# Patient Record
Sex: FEMALE | Race: BLACK OR AFRICAN AMERICAN | Employment: UNEMPLOYED | URBAN - METROPOLITAN AREA
[De-identification: names, ages, dates, MRNs, and addresses within clinical notes are randomized per-mention and may not be internally consistent; named-entity substitution may affect disease eponyms.]

---

## 2017-02-11 ENCOUNTER — IP HISTORICAL/CONVERTED ENCOUNTER (OUTPATIENT)
Dept: OTHER | Age: 62
End: 2017-02-11

## 2017-02-18 ENCOUNTER — IP HISTORICAL/CONVERTED ENCOUNTER (OUTPATIENT)
Dept: OTHER | Age: 62
End: 2017-02-18

## 2017-03-07 ENCOUNTER — IP HISTORICAL/CONVERTED ENCOUNTER (OUTPATIENT)
Dept: OTHER | Age: 62
End: 2017-03-07

## 2017-03-24 ENCOUNTER — ED HISTORICAL/CONVERTED ENCOUNTER (OUTPATIENT)
Dept: OTHER | Age: 62
End: 2017-03-24

## 2017-05-18 ENCOUNTER — HOSPITAL ENCOUNTER (INPATIENT)
Age: 62
LOS: 90 days | Discharge: HOME | DRG: 750 | End: 2017-08-16
Attending: PSYCHIATRY & NEUROLOGY | Admitting: PSYCHIATRY & NEUROLOGY
Payer: MEDICAID

## 2017-05-18 PROBLEM — F25.9 SCHIZOAFFECTIVE DISORDER (HCC): Status: ACTIVE | Noted: 2017-05-18

## 2017-05-18 LAB
GLUCOSE BLD STRIP.AUTO-MCNC: 103 MG/DL (ref 65–100)
GLUCOSE BLD STRIP.AUTO-MCNC: 134 MG/DL (ref 65–100)
SERVICE CMNT-IMP: ABNORMAL
SERVICE CMNT-IMP: ABNORMAL

## 2017-05-18 PROCEDURE — 65220000003 HC RM SEMIPRIVATE PSYCH

## 2017-05-18 PROCEDURE — 74011250636 HC RX REV CODE- 250/636: Performed by: PSYCHIATRY & NEUROLOGY

## 2017-05-18 PROCEDURE — 74011250637 HC RX REV CODE- 250/637: Performed by: PSYCHIATRY & NEUROLOGY

## 2017-05-18 PROCEDURE — 74011000250 HC RX REV CODE- 250: Performed by: PSYCHIATRY & NEUROLOGY

## 2017-05-18 PROCEDURE — 82962 GLUCOSE BLOOD TEST: CPT

## 2017-05-18 RX ORDER — ZOLPIDEM TARTRATE 5 MG/1
5 TABLET ORAL
Status: DISCONTINUED | OUTPATIENT
Start: 2017-05-18 | End: 2017-05-23

## 2017-05-18 RX ORDER — MAGNESIUM SULFATE 100 %
4 CRYSTALS MISCELLANEOUS AS NEEDED
Status: DISCONTINUED | OUTPATIENT
Start: 2017-05-18 | End: 2017-08-16 | Stop reason: HOSPADM

## 2017-05-18 RX ORDER — ESCITALOPRAM OXALATE 5 MG/1
5 TABLET ORAL DAILY
COMMUNITY
End: 2017-08-16

## 2017-05-18 RX ORDER — ATORVASTATIN CALCIUM 20 MG/1
20 TABLET, FILM COATED ORAL DAILY
Status: DISCONTINUED | OUTPATIENT
Start: 2017-05-19 | End: 2017-08-16 | Stop reason: HOSPADM

## 2017-05-18 RX ORDER — DIVALPROEX SODIUM 500 MG/1
500 TABLET, DELAYED RELEASE ORAL 2 TIMES DAILY
COMMUNITY
End: 2017-08-16

## 2017-05-18 RX ORDER — GABAPENTIN 100 MG/1
100 CAPSULE ORAL 2 TIMES DAILY
COMMUNITY
End: 2017-08-16

## 2017-05-18 RX ORDER — ACETAMINOPHEN 500 MG
500 TABLET ORAL 2 TIMES DAILY
COMMUNITY
End: 2017-08-16

## 2017-05-18 RX ORDER — LORAZEPAM 0.5 MG/1
0.5 TABLET ORAL
Status: DISCONTINUED | OUTPATIENT
Start: 2017-05-18 | End: 2017-05-20

## 2017-05-18 RX ORDER — ACETAMINOPHEN 325 MG/1
650 TABLET ORAL
Status: DISCONTINUED | OUTPATIENT
Start: 2017-05-18 | End: 2017-08-16 | Stop reason: HOSPADM

## 2017-05-18 RX ORDER — ADHESIVE BANDAGE
30 BANDAGE TOPICAL DAILY PRN
Status: DISCONTINUED | OUTPATIENT
Start: 2017-05-18 | End: 2017-08-04

## 2017-05-18 RX ORDER — LORAZEPAM 2 MG/ML
0.5 INJECTION INTRAMUSCULAR
Status: DISCONTINUED | OUTPATIENT
Start: 2017-05-18 | End: 2017-05-20

## 2017-05-18 RX ORDER — OLANZAPINE 2.5 MG/1
2.5 TABLET ORAL
Status: DISCONTINUED | OUTPATIENT
Start: 2017-05-18 | End: 2017-05-26

## 2017-05-18 RX ORDER — CLONIDINE HYDROCHLORIDE 0.2 MG/1
TABLET ORAL 3 TIMES DAILY
COMMUNITY
End: 2017-08-16

## 2017-05-18 RX ORDER — QUETIAPINE FUMARATE 25 MG/1
25 TABLET, FILM COATED ORAL DAILY
COMMUNITY
End: 2017-08-16

## 2017-05-18 RX ORDER — INSULIN LISPRO 100 [IU]/ML
INJECTION, SOLUTION INTRAVENOUS; SUBCUTANEOUS
Status: DISCONTINUED | OUTPATIENT
Start: 2017-05-18 | End: 2017-06-05

## 2017-05-18 RX ORDER — CLONIDINE HYDROCHLORIDE 0.1 MG/1
0.2 TABLET ORAL 2 TIMES DAILY
Status: DISCONTINUED | OUTPATIENT
Start: 2017-05-18 | End: 2017-05-18

## 2017-05-18 RX ORDER — ESCITALOPRAM OXALATE 10 MG/1
10 TABLET ORAL DAILY
Status: ON HOLD | COMMUNITY
End: 2017-05-18

## 2017-05-18 RX ORDER — IBUPROFEN 400 MG/1
400 TABLET ORAL
Status: DISCONTINUED | OUTPATIENT
Start: 2017-05-18 | End: 2017-06-21

## 2017-05-18 RX ORDER — DEXTROSE 50 % IN WATER (D50W) INTRAVENOUS SYRINGE
12.5-25 AS NEEDED
Status: DISCONTINUED | OUTPATIENT
Start: 2017-05-18 | End: 2017-05-18 | Stop reason: RX

## 2017-05-18 RX ORDER — HYDROCHLOROTHIAZIDE 25 MG/1
25 TABLET ORAL DAILY
Status: DISCONTINUED | OUTPATIENT
Start: 2017-05-19 | End: 2017-07-04

## 2017-05-18 RX ORDER — QUETIAPINE FUMARATE 100 MG/1
100 TABLET, FILM COATED ORAL EVERY EVENING
COMMUNITY
End: 2017-08-16

## 2017-05-18 RX ORDER — HYDROXYZINE PAMOATE 50 MG/1
50 CAPSULE ORAL 4 TIMES DAILY
COMMUNITY
End: 2017-08-16

## 2017-05-18 RX ORDER — LOPERAMIDE HYDROCHLORIDE 2 MG/1
2 CAPSULE ORAL
COMMUNITY
End: 2017-08-16

## 2017-05-18 RX ORDER — BENZTROPINE MESYLATE 1 MG/ML
1 INJECTION INTRAMUSCULAR; INTRAVENOUS
Status: DISCONTINUED | OUTPATIENT
Start: 2017-05-18 | End: 2017-08-16 | Stop reason: HOSPADM

## 2017-05-18 RX ORDER — FLUPHENAZINE HYDROCHLORIDE 5 MG/1
5 TABLET ORAL 2 TIMES DAILY
Status: DISCONTINUED | OUTPATIENT
Start: 2017-05-18 | End: 2017-05-19

## 2017-05-18 RX ORDER — AMLODIPINE BESYLATE 5 MG/1
10 TABLET ORAL DAILY
Status: DISCONTINUED | OUTPATIENT
Start: 2017-05-19 | End: 2017-08-16 | Stop reason: HOSPADM

## 2017-05-18 RX ORDER — NAPROXEN 500 MG/1
500 TABLET ORAL 2 TIMES DAILY WITH MEALS
COMMUNITY
End: 2017-08-16

## 2017-05-18 RX ORDER — BENZTROPINE MESYLATE 1 MG/1
1 TABLET ORAL
Status: DISCONTINUED | OUTPATIENT
Start: 2017-05-18 | End: 2017-08-16 | Stop reason: HOSPADM

## 2017-05-18 RX ORDER — LORAZEPAM 0.5 MG/1
0.5 TABLET ORAL 2 TIMES DAILY
COMMUNITY
End: 2017-08-16

## 2017-05-18 RX ORDER — IBUPROFEN 200 MG
1 TABLET ORAL
Status: DISCONTINUED | OUTPATIENT
Start: 2017-05-18 | End: 2017-08-16 | Stop reason: HOSPADM

## 2017-05-18 RX ADMIN — LORAZEPAM 0.5 MG: 0.5 TABLET ORAL at 13:49

## 2017-05-18 RX ADMIN — WATER 10 MG: 1 INJECTION INTRAMUSCULAR; INTRAVENOUS; SUBCUTANEOUS at 16:30

## 2017-05-18 RX ADMIN — OLANZAPINE 2.5 MG: 2.5 TABLET, FILM COATED ORAL at 20:11

## 2017-05-18 RX ADMIN — OLANZAPINE 2.5 MG: 2.5 TABLET, FILM COATED ORAL at 09:25

## 2017-05-18 NOTE — IP AVS SNAPSHOT
[address] 
[phone] Patient: Patricia Jordan MRN: [id number] CUY:[id number] Current Discharge Medication List  
  
START taking these medications Dose & Instructions Dispensing Information Comments Morning Noon Evening Bedtime  
 brief disposable Misc Commonly known as:  adult Dose:  1 Package 1 Package by Does Not Apply route. Indications: urinary incontinence Quantity:  10 Package Refills:  0  
     
   
   
   
  
 carBAMazepine  mg SR tablet Commonly known as:  TEGretol XR Replaces:  carBAMazepine 200 mg tablet Your last dose was:  8/16/17 Your next dose is:  8/16/17 Dose:  200 mg Take 1 Tab by mouth two (2) times a day. Indications: Cris associated with Bipolar Disorder Quantity:  30 Tab Refills:  1  
     
  
   
   
  
   
  
 divalproex  mg ER tablet Commonly known as:  DEPAKOTE ER  
Replaces:  divalproex  mg tablet Your last dose was:  8/15/17 Your next dose is:  8/16/17 Dose:  1000 mg Take 2 Tabs by mouth nightly. Indications: Cris associated with Bipolar Disorder Quantity:  30 Tab Refills:  1  
     
   
   
   
  
  
 docusate sodium 100 mg capsule Commonly known as:  Clarisa Booze Dose:  100 mg Take 1 Cap by mouth two (2) times daily as needed for Constipation for up to 90 days. Indications: constipation Quantity:  30 Cap Refills:  1  
     
   
   
   
  
 fluPHENAZine decanoate 25 mg/mL injection Commonly known as:  PROLIXIN Start taking on:  8/18/2017 Dose:  25 mg  
1 mL by IntraMUSCular route Once every 2 weeks. Next injection due on August 18th  Indications: schizoaffective disorder Quantity:  1 Vial  
Refills:  1  
     
   
   
   
  
 insulin lispro 100 unit/mL injection Commonly known as:  HUMALOG Notes to Patient:  No insulin on 8/16/17  Sliding scale insulin twice daily  Indications: type 2 diabetes mellitus Quantity:  1 Vial  
Refills:  0  
     
   
   
   
  
 lidocaine 5 % Commonly known as:  Katarzyna Lat Your last dose was:  8/16/17-10 am  
   
 Apply patch to the affected area for 12 hours a day and remove for 12 hours a day. Indications: knee pain Quantity:  15 Each Refills:  0  
     
  
   
   
   
  
 lisinopril 10 mg tablet Commonly known as:  Cash Paras Your last dose was:  8/16/17 Dose:  10 mg Take 1 Tab by mouth daily. Indications: hypertension Quantity:  15 Tab Refills:  1  
     
  
   
   
   
  
 methyl salicylate-menthol 26-92 % topical cream  
Commonly known as:  Erick Malissa Your last dose was:  8/16/17 Your next dose is:  8/16/17 Apply  to affected area three (3) times daily. Indications: arthritis in knees Quantity:  1 Tube Refills:  0  
     
  
   
   
  
   
  
  
 oxybutynin chloride XL 15 mg CR tablet Commonly known as:  DITROPAN XL Your last dose was:  8/16/17 Dose:  15 mg Take 1 Tab by mouth daily. Indications: URINARY URGE INCONTINENCE Quantity:  15 Tab Refills:  1  
     
  
   
   
   
  
 pantoprazole 40 mg tablet Commonly known as:  PROTONIX Your last dose was:  8/16/17 Dose:  40 mg Take 1 Tab by mouth Daily (before breakfast). Indications: gastroesophageal reflux disease Quantity:  15 Tab Refills:  1  
     
  
   
   
   
  
 polyethylene glycol 17 gram packet Commonly known as:  Agata Muzzy Dose:  17 g Take 1 Packet by mouth daily as needed. Indications: constipation Quantity:  15 Packet Refills:  1  
     
   
   
   
  
 trihexyphenidyl 2 mg tablet Commonly known as:  ARTANE Your last dose was:  8/16/17 Your next dose is:  8/16/17 Dose:  2 mg Take 1 Tab by mouth three (3) times daily for 90 days. Indications: extrapyramidal disease Quantity:  90 Tab Refills:  1  
     
  
   
   
  
   
  
  
 zolpidem CR 12.5 mg tablet Commonly known as:  AMBIEN CR Your last dose was:  8/15/17 Your next dose is:  8/16/17 Dose:  12.5 mg Take 1 Tab by mouth nightly as needed for Sleep. Max Daily Amount: 12.5 mg. Indications: INSOMNIA Quantity:  15 Tab Refills:  1 CONTINUE these medications which have CHANGED Dose & Instructions Dispensing Information Comments Morning Noon Evening Bedtime  
 atorvastatin 20 mg tablet Commonly known as:  LIPITOR What changed:  when to take this Your last dose was:  8/16/17 Dose:  20 mg Take 1 Tab by mouth daily. Indications: hyperlipidemia Quantity:  15 Tab Refills:  1 CONTINUE these medications which have NOT CHANGED Dose & Instructions Dispensing Information Comments Morning Noon Evening Bedtime  
 amLODIPine 10 mg tablet Commonly known as:  Gabrielle Navarro Your last dose was:  8/16/17 Dose:  10 mg Take 1 Tab by mouth daily. Indications: hypertension Quantity:  15 Tab Refills:  1 SITagliptin 100 mg tablet Commonly known as:  Chris Hernandez Your last dose was:  8/16/17 Dose:  100 mg Take 1 Tab by mouth daily. Indications: type 2 diabetes mellitus Quantity:  15 Tab Refills:  1 STOP taking these medications   
 acetaminophen 500 mg tablet Commonly known as:  TYLENOL  
   
  
 carBAMazepine 200 mg tablet Commonly known as:  TEGretol Replaced by:  carBAMazepine  mg SR tablet  
   
  
 cloNIDine HCl 0.2 mg tablet Commonly known as:  CATAPRES  
   
  
 divalproex  mg tablet Commonly known as:  DEPAKOTE Replaced by:  divalproex  mg ER tablet  
   
  
 escitalopram oxalate 5 mg tablet Commonly known as:  LEXAPRO  
   
  
 gabapentin 100 mg capsule Commonly known as:  NEURONTIN  
   
  
 hydroCHLOROthiazide 25 mg tablet Commonly known as:  HYDRODIURIL  
   
  
 hydrOXYzine pamoate 50 mg capsule Commonly known as:  VISTARIL  
   
 loperamide 2 mg capsule Commonly known as:  IMODIUM LORazepam 0.5 mg tablet Commonly known as:  ATIVAN  
   
  
 naproxen 500 mg tablet Commonly known as:  NAPROSYN  
   
  
 QUEtiapine 100 mg tablet Commonly known as:  SEROquel QUEtiapine 25 mg tablet Commonly known as:  SEROquel Where to Get Your Medications Information on where to get these meds will be given to you by the nurse or doctor. ! Ask your nurse or doctor about these medications  
  amLODIPine 10 mg tablet  
 atorvastatin 20 mg tablet  
 brief disposable Misc  
 carBAMazepine  mg SR tablet  
 divalproex  mg ER tablet  
 docusate sodium 100 mg capsule  
 fluPHENAZine decanoate 25 mg/mL injection  
 insulin lispro 100 unit/mL injection  
 lidocaine 5 %  
 lisinopril 10 mg tablet  
 methyl salicylate-menthol 05-23 % topical cream  
 oxybutynin chloride XL 15 mg CR tablet  
 pantoprazole 40 mg tablet  
 polyethylene glycol 17 gram packet SITagliptin 100 mg tablet  
 trihexyphenidyl 2 mg tablet  
 zolpidem CR 12.5 mg tablet

## 2017-05-18 NOTE — BH NOTES
PSYCHOSOCIAL ASSESSMENT  :Patient identifying info: Jeb Spurling is a 65 y.o., female admitted 2017  5:06 AM     Presenting problem and precipitating factors: Pt was transferred to [place] [address] from [facility] in [facility]. Pt was TDO'd due to aggressive, violent, and psychotic behavior. Pt lives with a caretaker and was reportedly refusing her medications. Pt has been diagnosed with schizoaffective disorder and has a history of frequent psychiatric hospitalizations. Pt exhibits symptoms of agitation, yelling, paranoia, and delusions. Current psychiatric providers and contact info: [facility] ([phone])    Previous psychiatric services/providers and response to treatment: Multiple (12+) previous inpatient psychiatric hospitalizations, including [facility]      Substance abuse history:  UDS Negative  Social History   Substance Use Topics    Smoking status: Former Smoker     Years: 40.00     Quit date: 3/19/1983    Smokeless tobacco: Not on file    Alcohol use No       Family constellation: 2 daughters Gonzalo Parlor [phone]; Crispin Ashley)    Is significant other involved? No      Describe support system: Caregiver Patrick Benites; Mrs.  [address] ([address]) [phone]; [facility] ([facility]) [phone]    Describe living arrangements and home environment: Lives with caregiver  Health issues:   Hospital Problems  Date Reviewed: 2015          Codes Class Noted POA    Schizoaffective disorder [facility]) ICD-10-CM: F25.9  ICD-9-CM: 295.70  2017 Unknown              Trauma history: Unknown    Legal issues: None indicated    History of  service: No    Financial status: SSDI    Muslim/cultural factors: Tenriism    Education/work history: Unknown    Have you been licensed as a audrey care professional (current or ): No  Leisure and recreation preferences: None  Describe coping skills: [address]  2017

## 2017-05-18 NOTE — H&P
[address]   [address] [facility], [address] [place] [address]   HISTORY AND PHYSICAL       Name:  Demarcus Garrett   MR#:  [id number]   :  1955   Account #:  [de-identified]        Date of Adm:  2017       CHIEF COMPLAINT: Unable to obtain. HISTORY OF PRESENT ILLNESS: The patient is a 81-year-old    female with past medical history of paranoid   schizophrenia, hypertension and diabetes mellitus. She was   transferred to [facility] psychiatric unit from [facility] in [facility], as a TDO for   aggressive, violent and psychotic behavior. The patient is a poor   historian. She is rambling and saying incoherent words. She tells me,   however, that she has osteoarthritis, swelling legs and a heart murmur,   and she kept on saying tangential words. She denies any acute   medical problems at this time. She tells me that she is diabetic and she   is on a medication, but she does not know the name of the drug. RECENT HOSPITALIZATION: Unable to obtain. PAST MEDICAL HISTORY   1. History of paranoid schizophrenia. 2. Diabetes mellitus type 2, on oral hypoglycemic drugs. 3. Hypertension. 4. Osteoarthritis. PAST SURGICAL HISTORY: Unable to obtain. HOME MEDICATIONS INCLUDE   1. Tylenol 500 mg 2 times a day. 2. Amlodipine 10 mg tablet p.o. daily. 3. Atorvastatin 20 mg tablet at nighttime. 4. Carbamazepine 200 mg 3 times a day. 5. Clonidine hydrochloride 0.2 mg tablet 3 times daily. 6. Divalproex 500 mg 2 times a day. 7. Lexapro 5 mg p.o. daily. 8. Gabapentin 100 mg by mouth 2 times a day. 9. Hydrochlorothiazide 25 mg by mouth daily. 10. Hydroxyzine pamoate 50 mg by mouth 4 times daily. 11. Loperamide 2 mg by mouth every 4 hours as needed for diarrhea. 12. Ativan 0.5 mg 2 times a day. 13. Naproxen 500 mg by mouth 2 times daily. 14. Quetiapine 100 mg by mouth every evening. 15. Seroquel 25 mg by mouth daily. 16. Sitagliptin 100 mg by mouth daily. ALLERGIES: SHE IS ALLERGIC TO PENICILLIN. FAMILY HISTORY: Unobtainable due to patient factors. SOCIAL HISTORY: Unobtainable due to patient factors, but as per   documentation, she has spent several decades in senior living. REVIEW OF SYSTEMS: This was unobtainable due to patient factors. PHYSICAL EXAMINATION   TRIAGE VITAL SIGNS: Temperature of 97.7, pulse 85 beats per   minute, blood pressure 115/74 mmHg, respiration is 20. GENERAL APPEARANCE: She is sitting down on the chair, talkative,   but mainly rambling. She is not in any obvious distress. HEENT: Atraumatic, normocephalic. She has bilateral proptosis. The   pupils are reactive to light. Oral mucosa is moist.   NECK: Supple. No jugular venous distention. RESPIRATORY: Clear to auscultation bilaterally. No wheeze or rales   heard. CARDIOVASCULAR: S1 and S2 heard. Regular rate and rhythm. She   has a systolic murmur in the left sternal border. ABDOMEN: Soft, nontender, nondistended. EXTREMITIES: No pitting pedal edema. She has evidence of chronic   stasis dermatitis, bilateral lower legs, with bilateral hammertoes. NEUROLOGIC: She is awake and alert. She is oriented to her name,   but not to place. LABORATORY EVALUATION: None. ASSESSMENT AND PLAN: The patient is a 41-year-old female with   history of schizoaffective disorder and paranoid schizophrenia,   transferred to [facility] [address] [place] [address] Psychiatric Unit on account of violent,   aggressive behavior, with psychosis. 1. Psychosis. This appears to be a chronic issue now with acute   exacerbation. Management as per Psychiatry. 2. History of hypertension. Blood pressure is stable. We will resume   home Norvasc and hydrochlorothiazide for now. We will hold lisinopril. 3. History of diabetes mellitus type 2. She is on home Januvia, which   we will resume. We will start her on insulin sliding scale. 4. We will obtain basic BMP,  CBC in a.m. Will also check thyroid-  stimulating hormone due to proptosis. DISPOSITION: Per Primary Team.     We will sign off for now. Please reconsult us when necessary.         Real Mackenzie MD       / [facility]   D:  05/18/2017   16:26   T:  05/18/2017   16:57   Job #:  [id number]

## 2017-05-18 NOTE — BH NOTES
Behavioral Health Interdisciplinary Rounds     Patient Name: Yusra Hamlin  Age: 65 y.o. Room/Bed:  748/02  Primary Diagnosis: <principal problem not specified>   Admission Status: TDO     Readmission within 30 days: no  Power of  in place: no  Patient requires a blocked bed: yes          Reason for blocked bed: aggressive behavior    VTE Prophylaxis: Not indicated  Mobility needs/Fall risk: yes    Nutritional Plan: no  Consults:          Labs/Testing due today?: yes    Sleep hours: NEW ADMISSION       Participation in Care/Groups:  NEW ADMISSION  Medication Compliant?: NEW ADMISSION - NO MEDS ORDERED YET  PRNS (last 24 hours): None    Restraints (last 24 hours):  no  Substance Abuse:  no  CIWA (range last 24 hours):  COWS (range last 24 hours):   Alcohol screening (AUDIT) completed -     If applicable, date SBIRT discussed in treatment team AND documented:   Tobacco - patient is a smoker: no   Date tobacco education completed by RN: N/A  24 hour chart check complete: no     Patient goal(s) for today: NEW ADMISSION  Treatment team focus/goals: psychosocial  LOS:  0  Expected LOS: TBD  Master treatment plan initiated: To be completed 5/18          Next due (every 7 days): 5/25  Psychiatric Advanced Care Directives -  no   Name of Decision maker if patient has Psychiatric Care Directive: None  Patient was offered information, patient declined. Financial concerns/prescription coverage:  [employer] Medicaid  Date of last family contact: None      Family requesting physician contact today:  no  Discharge plan: TBD        Outpatient provider(s): [facility] ([phone]);   [facility] [facility] ([facility]) [phone]; [facility]) [phone]    Participating treatment team members: PEGGY Krishnan; Dr. Xuan Blank MD; Leida Mendoza RN

## 2017-05-18 NOTE — BH NOTES
PRN Medication Documentation    Specific patient behavior that led to need for PRN medication: pt was agitated, hearing voices and seeing things   Staff interventions attempted prior to PRN being given: talking with pt, redirecting, and reorienting  PRN medication given: Ativan   Patient response/effectiveness of PRN medication: agitation reduced

## 2017-05-18 NOTE — IP AVS SNAPSHOT
2700 [facility] [address] [place] [address] 
[phone] Patient: Rubi Santiago MRN: [id number] OCS:4/37/4653 You are allergic to the following Allergen Reactions Penicillins Rash Recent Documentation Height Weight BMI OB Status Smoking Status 1.651 m 78 kg 28.62 kg/m2 Postmenopausal Former Smoker Emergency Contacts Name Discharge Info Relation Home Work Mobile Dmitriy Nieto  Child [2] [phone] Angel Forrest  Other Relative [6] [phone] Bita Sanchez  Friend [5] About your hospitalization You were admitted on:  May 18, 2017 You last received care in the:  [address] You were discharged on:  August 16, 2017 Unit phone number:  [phone] Why you were hospitalized Your primary diagnosis was:  Schizoaffective Disorder (Hcc) Providers Seen During Your Hospitalizations Provider Role Specialty Primary office phone Kerry Lozano MD Attending Provider Psychiatry [phone] Your Primary Care Physician (PCP) Primary Care Physician Office Phone Office Fax Radha Causey [phone] Follow-up Information Follow up With Details Comments Contact Info Fluphenazine decanoate 25 mg IM injection On 8/18/2017 Continuation of fluphenazine decanoate 25 mg IM injection every 2 weeks. Patient last received fluphenazine decanoate 25 mg IM injection on 7/21/17. Attending Physician @ [facility] [address] [place], [address] 
[phone] Attending Psychiatrist On 8/18/2017 Patient has an appointment to meet with the visiting psychiatrist at the [facility] on Friday. [facility] ([address] [place], [address] 
[phone] Queenie Andrade MD   [address] [place] [address] 
[phone] Current Discharge Medication List  
  
START taking these medications Dose & Instructions Dispensing Information Comments Morning Noon Evening Bedtime  
 brief disposable Misc Commonly known as:  adult Dose:  1 Package 1 Package by Does Not Apply route. Indications: urinary incontinence Quantity:  10 Package Refills:  0  
     
   
   
   
  
 carBAMazepine  mg SR tablet Commonly known as:  TEGretol XR Replaces:  carBAMazepine 200 mg tablet Your last dose was:  8/16/17 Your next dose is:  8/16/17 Dose:  200 mg Take 1 Tab by mouth two (2) times a day. Indications: Cris associated with Bipolar Disorder Quantity:  30 Tab Refills:  1  
     
  
   
   
  
   
  
 divalproex  mg ER tablet Commonly known as:  DEPAKOTE ER  
Replaces:  divalproex  mg tablet Your last dose was:  8/15/17 Your next dose is:  8/16/17 Dose:  1000 mg Take 2 Tabs by mouth nightly. Indications: Cris associated with Bipolar Disorder Quantity:  30 Tab Refills:  1  
     
   
   
   
  
  
 docusate sodium 100 mg capsule Commonly known as:  Parmjit Mars Dose:  100 mg Take 1 Cap by mouth two (2) times daily as needed for Constipation for up to 90 days. Indications: constipation Quantity:  30 Cap Refills:  1  
     
   
   
   
  
 fluPHENAZine decanoate 25 mg/mL injection Commonly known as:  PROLIXIN Start taking on:  8/18/2017 Dose:  25 mg  
1 mL by IntraMUSCular route Once every 2 weeks. Next injection due on August 18th  Indications: schizoaffective disorder Quantity:  1 Vial  
Refills:  1  
     
   
   
   
  
 insulin lispro 100 unit/mL injection Commonly known as:  HUMALOG Notes to Patient:  No insulin on 8/16/17 Sliding scale insulin twice daily  Indications: type 2 diabetes mellitus Quantity:  1 Vial  
Refills:  0  
     
   
   
   
  
 lidocaine 5 % Commonly known as:  Neelam Dust Your last dose was:  8/16/17-10 am  
   
 Apply patch to the affected area for 12 hours a day and remove for 12 hours a day. Indications: knee pain Quantity:  15 Each Refills:  0  
     
  
   
   
   
  
 lisinopril 10 mg tablet Commonly known as:  José Miguel Peek Your last dose was:  8/16/17 Dose:  10 mg Take 1 Tab by mouth daily. Indications: hypertension Quantity:  15 Tab Refills:  1  
     
  
   
   
   
  
 methyl salicylate-menthol 64-57 % topical cream  
Commonly known as:  Lonie Nageotte Your last dose was:  8/16/17 Your next dose is:  8/16/17 Apply  to affected area three (3) times daily. Indications: arthritis in knees Quantity:  1 Tube Refills:  0  
     
  
   
   
  
   
  
  
 oxybutynin chloride XL 15 mg CR tablet Commonly known as:  DITROPAN XL Your last dose was:  8/16/17 Dose:  15 mg Take 1 Tab by mouth daily. Indications: URINARY URGE INCONTINENCE Quantity:  15 Tab Refills:  1  
     
  
   
   
   
  
 pantoprazole 40 mg tablet Commonly known as:  PROTONIX Your last dose was:  8/16/17 Dose:  40 mg Take 1 Tab by mouth Daily (before breakfast). Indications: gastroesophageal reflux disease Quantity:  15 Tab Refills:  1  
     
  
   
   
   
  
 polyethylene glycol 17 gram packet Commonly known as:  Sixto Barrs Dose:  17 g Take 1 Packet by mouth daily as needed. Indications: constipation Quantity:  15 Packet Refills:  1  
     
   
   
   
  
 trihexyphenidyl 2 mg tablet Commonly known as:  ARTANE Your last dose was:  8/16/17 Your next dose is:  8/16/17 Dose:  2 mg Take 1 Tab by mouth three (3) times daily for 90 days. Indications: extrapyramidal disease Quantity:  90 Tab Refills:  1  
     
  
   
   
  
   
  
  
 zolpidem CR 12.5 mg tablet Commonly known as:  AMBIEN CR Your last dose was:  8/15/17 Your next dose is:  8/16/17 Dose:  12.5 mg Take 1 Tab by mouth nightly as needed for Sleep. Max Daily Amount: 12.5 mg.  Indications: INSOMNIA  
 Quantity:  15 Tab Refills:  1 CONTINUE these medications which have CHANGED Dose & Instructions Dispensing Information Comments Morning Noon Evening Bedtime  
 atorvastatin 20 mg tablet Commonly known as:  LIPITOR What changed:  when to take this Your last dose was:  8/16/17 Dose:  20 mg Take 1 Tab by mouth daily. Indications: hyperlipidemia Quantity:  15 Tab Refills:  1 CONTINUE these medications which have NOT CHANGED Dose & Instructions Dispensing Information Comments Morning Noon Evening Bedtime  
 amLODIPine 10 mg tablet Commonly known as:  New Mindenlucía Vides Your last dose was:  8/16/17 Dose:  10 mg Take 1 Tab by mouth daily. Indications: hypertension Quantity:  15 Tab Refills:  1 SITagliptin 100 mg tablet Commonly known as:  Jayne Carrera Your last dose was:  8/16/17 Dose:  100 mg Take 1 Tab by mouth daily. Indications: type 2 diabetes mellitus Quantity:  15 Tab Refills:  1 STOP taking these medications   
 acetaminophen 500 mg tablet Commonly known as:  TYLENOL  
   
  
 carBAMazepine 200 mg tablet Commonly known as:  TEGretol Replaced by:  carBAMazepine  mg SR tablet  
   
  
 cloNIDine HCl 0.2 mg tablet Commonly known as:  CATAPRES  
   
  
 divalproex  mg tablet Commonly known as:  DEPAKOTE Replaced by:  divalproex  mg ER tablet  
   
  
 escitalopram oxalate 5 mg tablet Commonly known as:  LEXAPRO  
   
  
 gabapentin 100 mg capsule Commonly known as:  NEURONTIN  
   
  
 hydroCHLOROthiazide 25 mg tablet Commonly known as:  HYDRODIURIL  
   
  
 hydrOXYzine pamoate 50 mg capsule Commonly known as:  VISTARIL  
   
  
 loperamide 2 mg capsule Commonly known as:  IMODIUM LORazepam 0.5 mg tablet Commonly known as:  ATIVAN  
   
  
 naproxen 500 mg tablet Commonly known as:  NAPROSYN QUEtiapine 100 mg tablet Commonly known as:  SEROquel QUEtiapine 25 mg tablet Commonly known as:  SEROquel Where to Get Your Medications Information on where to get these meds will be given to you by the nurse or doctor. ! Ask your nurse or doctor about these medications  
  amLODIPine 10 mg tablet  
 atorvastatin 20 mg tablet  
 brief disposable Misc  
 carBAMazepine  mg SR tablet  
 divalproex  mg ER tablet  
 docusate sodium 100 mg capsule  
 fluPHENAZine decanoate 25 mg/mL injection  
 insulin lispro 100 unit/mL injection  
 lidocaine 5 %  
 lisinopril 10 mg tablet  
 methyl salicylate-menthol 66-32 % topical cream  
 oxybutynin chloride XL 15 mg CR tablet  
 pantoprazole 40 mg tablet  
 polyethylene glycol 17 gram packet SITagliptin 100 mg tablet  
 trihexyphenidyl 2 mg tablet  
 zolpidem CR 12.5 mg tablet Discharge Instructions DISCHARGE SUMMARY 
 
NAME:Dahiana Josue : 1955 MRN: [id number] The patient Guillermo Lopez exhibits the ability to control behavior in a less restrictive environment. Patient's level of functioning is improving. No assaultive/destructive behavior has been observed for the past 24 hours. No suicidal/homicidal threat or behavior has been observed for the past 24 hours. There is no evidence of serious medication side effects. Patient has not been in physical or protective restraints for at least the past 24 hours. If weapons involved, how are they secured? No weapons involved. Is patient aware of and in agreement with discharge plan? Yes Arrangements for medication:  Prescriptions given to patient, faxed to pharmacy, and sent to [facility]. Copy of discharge instructions to provider?:  Yes, Mr. Segura at [facility] ([phone]) Arrangements for transportation home:  Medicaid taxi to [facility] Keep all follow up appointments as scheduled, continue to take prescribed medications per physician instructions. Mental health crisis number:  122 or your local mental health crisis line number at [phone]. DISCHARGE SUMMARY from Nurse The following personal items are in your possession at time of discharge: 
 
Dental Appliances: None Visual Aid: None Jewelry: Necklace (necklace in gold box) Clothing: Shirt PATIENT INSTRUCTIONS: 
 
 
 
What to do at Home: 
Recommended activity: Activity as tolerated, continue to use prescribed assisted device to ambulate If you experience any of the following symptoms thoughts of harming self, intense thoughts or beliefs that are paranoid about you or your families safety, auditory hallucinations, please follow up with your staff, ***. If you can not reach them and symptoms continue immediately call your local crisis number at 911 or your local mental health crisis line number at [phone]. *  Please give a list of your current medications to your Primary Care Provider. *  Please update this list whenever your medications are discontinued, doses are 
    changed, or new medications (including over-the-counter products) are added. *  Please carry medication information at all times in case of emergency situations. These are general instructions for a healthy lifestyle: No smoking/ No tobacco products/ Avoid exposure to second hand smoke Surgeon General's Warning:  Quitting smoking now greatly reduces serious risk to your health. Obesity, smoking, and sedentary lifestyle greatly increases your risk for illness A healthy diet, regular physical exercise & weight monitoring are important for maintaining a healthy lifestyle You may be retaining fluid if you have a history of heart failure or if you experience any of the following symptoms:  Weight gain of 3 pounds or more overnight or 5 pounds in a week, increased swelling in our hands or feet or shortness of breath while lying flat in bed. Please call your doctor as soon as you notice any of these symptoms; do not wait until your next office visit. Recognize signs and symptoms of STROKE: 
 
F-face looks uneven A-arms unable to move or move unevenly S-speech slurred or non-existent T-time-call 911 as soon as signs and symptoms begin-DO NOT go Back to bed or wait to see if you get better-TIME IS BRAIN. Warning Signs of HEART ATTACK Call 911 if you have these symptoms:  Chest discomfort. Most heart attacks involve discomfort in the center of the chest that lasts more than a few minutes, or that goes away and comes back. It can feel like uncomfortable pressure, squeezing, fullness, or pain.  Discomfort in other areas of the upper body. Symptoms can include pain or discomfort in one or both arms, the back, neck, jaw, or stomach.  Shortness of breath with or without chest discomfort.  Other signs may include breaking out in a cold sweat, nausea, or lightheadedness. Don't wait more than five minutes to call [address]! Fast action can save your life. Calling 911 is almost always the fastest way to get lifesaving treatment. Emergency Medical Services staff can begin treatment when they arrive  up to an hour sooner than if someone gets to the hospital by car. The discharge information has been reviewed with the patient. The patient verbalized understanding. Discharge medications reviewed with the patient and appropriate educational materials and side effects teaching were provided. MD instructions: 
Prolixin injection should be given every two weeks beginning 8/18/17 Stop Naproxen Sliding scale insulin instructions, for BID glucose checks INITIATE CORRECTIVE INSULIN PROTOCOL (GT): 
AC (before meals Q6H and Q4H CORRECTIONAL SCALE only For Blood Sugar (mg/dl) of           
140-199=2 units 200-249=3 units 250-299=5 units 300-349=7 units 350 or greater = Call MD 
Give in addition to basal medications. Do Not Hold for NPO BEDTIME CORRECTIONAL sliding scale when scheduled: 
200-249=2 units 250-299=3 units 300-349=4 units 350 or greater = Call MD 
Give in addition to basal medications. Do Not Hold for NPO Discharge Orders None Introducing [facility]! Yakov Jj introduces Siri patient portal. Now you can access parts of your medical record, email your doctor's office, and request medication refills online. 1. In your internet browser, go to [email or web address] 2. Click on the First Time User? Click Here link in the Sign In box. You will see the New Member Sign Up page. 3. Enter your Siri Access Code exactly as it appears below. You will not need to use this code after youve completed the sign-up process. If you do not sign up before the expiration date, you must request a new code. · Siri Access Code: [email or web address] Expires: 2017 10:51 AM 
 
4. Enter the last four digits of your Social Security Number (xxxx) and Date of Birth (mm/dd/yyyy) as indicated and click Submit. You will be taken to the next sign-up page. 5. Create a Siri ID. This will be your Siri login ID and cannot be changed, so think of one that is secure and easy to remember. 6. Create a Siri password. You can change your password at any time. 7. Enter your Password Reset Question and Answer. This can be used at a later time if you forget your password. 8. Enter your e-mail address. You will receive e-mail notification when new information is available in [address] 9. Click Sign Up. You can now view and download portions of your medical record. 10. Click the Download Summary menu link to download a portable copy of your medical information. If you have questions, please visit the Frequently Asked Questions section of the Siri website. Remember, Siri is NOT to be used for urgent needs.  For medical emergencies, dial 911. 
 
 
Now available from your iPhone and Android! General Information Please provide this summary of care documentation to your next provider. Patient Signature:  ____________________________________________________________ Date:  ____________________________________________________________  
  
Moises Snipe Provider Signature:  ____________________________________________________________ Date:  ____________________________________________________________

## 2017-05-18 NOTE — PROGRESS NOTES
Problem: Altered Thought Process (Adult/Pediatric)These goals will be met by 5/25/17  Goal: *STG: Participates in treatment plan  Outcome: Progressing Towards Goal  Reviewed medications and discussed stressors with staff,including medications and placement of personal belongings during admission process. Took part in treatment team meeting,but unable to answer all questions due to confusion. Goal: *STG: Remains safe in hospital  Outcome: Progressing Towards Goal  Remains safe in the hospital,walking with walker-gait is fairly stable. PT was consulted to evaluate patients strength and need for walker. Staff to continue to maintain patient safety while on the unit. Goal: *STG: Seeks staff when feelings of anxiety and fear arise  Outcome: Progressing Towards Goal  Earlier this morning,patient yelled out while down in her room. Voiced she heard and saw people. Requested taking medication for her hallucinations. Staff re-assured patient and patient took re-direction. Patient was medication compliant. Goal: Interventions  Outcome: Progressing Towards Goal  Staff to continue to re-assure and re-direct patient as needed and provide a safe environment for patient while on the unit. Staff will also as ist patient with anxieties/restlessness and hallucinations with medications as ordered per doctor. Staff to encourage unit/group activities to decrease anxieties and promote good coping skills. 1320-Patient was able to rest/sleep for a period of time out on the unit in the tej chair. Has been able to eat lunch  and staff not noting any AH/VH at present. 1400- medicated with Ativan PRN for anxieties and poor re-direction. Patient had become irritated and yelling at staff. Showed some paranoia about belongings.  Was compliant with taking medications,  100 [facility] 60  Master Treatment Plan for Ricardo Fontan    Date Treatment Plan Initiated:5/18/17    Treatment Plan Modalities:  Type of Modality Amount  (x minutes) Frequency (x/week) Duration (x days) Name of Responsible Staff   [address] [place] [address] meetings to encourage peer interactions         Group psychotherapy to assist in building coping skills and internal controls       Therapeutic activity groups to build coping skills       Psychoeducation in group setting to address:   Medication education   15 7 1 Lavonda Barthel RN   Coping skills   15 7 1 Karen Barragan   Relaxation techniques   Dontrell Simms RN   Symptom management         Discharge planning         Spirituality          VICENTA         Recovery/AA/NA         Physician medication management   15   7   1   Dr. Martin Trevino   Family meeting/discharge planning

## 2017-05-18 NOTE — BH NOTES
0592  Pt admitted to [address], going to room 748/2. Pt is a 65 yr old female. Pt ambulatory, but unsteady on her feet (she says she uses a regular cane at home. Pt received from [facility]; accepted by  for Schizoaffective Disorder. Pt is Diabetic on oral med. Pt is a TDO from being aggressive, violent, + psychotic. Monitoring pt.    0610  Pt has not slept since arriving on 7W. Pt is very argumentative & controlling. Attempting to complete or at least start admission process. Monitoring continues. 5941  Pt has been refusing to cooperate with answering questions for admission process. Pt now sorting things in her purse & bags with tech observing to make list of her items & for safety. Monitoring continues. 0710  Pt is steady walking without any assistance when she wants to get something from down the gonzáles or across the room. Pt acts unsteady when wanting attention from staff. Pt has been having auditory & visual hallucinations--saying that there was an invisible person in the room, listening to our conversations, who is going to steal her Identity & charge things on her credit cards. Monitoring continues.
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
## 2017-05-18 NOTE — BH NOTES
TRANSFER - IN REPORT:    Verbal report received from Bo Nelson on Yusra Hamlin  being received from [facility] for routine progression of care      Report consisted of patients Situation, Background, Assessment and   Recommendations(SBAR). Information from the following report(s) SBAR, ED Summary, [facility] and Recent Results was reviewed with the receiving nurse. Opportunity for questions and clarification was provided. Assessment completed upon patients arrival to unit and care assumed.

## 2017-05-18 NOTE — BH NOTES
Primary Nurse Arnoldo Lopez RN and Canelo Evangelista RN performed a dual skin assessment on this patient. Patient has scar on right knee. Scar on left thigh. Scar in middle of back along spine. Remainder of skin is unremarkable. Robert score is 19    Pressure Injury Documentation  (COMPLETE ONE LABEL PER PRESSURE INJURY)  For further information, please review corresponding Wound Care flowsheet. Paola Betancur has:    No pressure ulcer upon admission. Pressure ulcer prevention protocol initiated.   Arnoldo Lopez RN

## 2017-05-18 NOTE — BH NOTES
Screamed out while she was in her room. Voiced she heard and saw people,\"Don't you see them? Do you think I should take some mediation to help me? \" Is medication compliant. Medicated with Zyprexa 2.5. Re-assured by staff  And was easily re-direct able.  Remains sitting at the dining table at present eating breakfast.MRM/RN

## 2017-05-18 NOTE — BH NOTES
1630:  Patient received as she is disrupting the unit shouting about her clothes and threatening staff - she grabbed this writer's name aftab. She will not take redirection and refuses to leave the nurses' station. Security called - IM Geodon administered. 1940:  Patient is up in her room and the bathroom with a loose bowel movement and a mess in the bathroom. She is belligerent with staff attempting to assist her and refusing to follow directions. Patient is talking constantly and ignoring all staff attempts to redirect her. Will continue to monitor. 2245:  Patient has been uncooperative and belligerent with poor boundaries. She has refused redirection and has intermittently threatened staff throughout this shift. She is not appropriate for the Geriatric unit.

## 2017-05-19 LAB
GLUCOSE BLD STRIP.AUTO-MCNC: 116 MG/DL (ref 65–100)
GLUCOSE BLD STRIP.AUTO-MCNC: 142 MG/DL (ref 65–100)
GLUCOSE BLD STRIP.AUTO-MCNC: 164 MG/DL (ref 65–100)
SERVICE CMNT-IMP: ABNORMAL

## 2017-05-19 PROCEDURE — 74011000250 HC RX REV CODE- 250: Performed by: PSYCHIATRY & NEUROLOGY

## 2017-05-19 PROCEDURE — 74011250637 HC RX REV CODE- 250/637: Performed by: PSYCHIATRY & NEUROLOGY

## 2017-05-19 PROCEDURE — 74011250637 HC RX REV CODE- 250/637: Performed by: HOSPITALIST

## 2017-05-19 PROCEDURE — 82962 GLUCOSE BLOOD TEST: CPT

## 2017-05-19 PROCEDURE — 74011250636 HC RX REV CODE- 250/636: Performed by: PSYCHIATRY & NEUROLOGY

## 2017-05-19 PROCEDURE — 74011636637 HC RX REV CODE- 636/637: Performed by: HOSPITALIST

## 2017-05-19 PROCEDURE — 65220000003 HC RM SEMIPRIVATE PSYCH

## 2017-05-19 RX ORDER — QUETIAPINE FUMARATE 100 MG/1
100 TABLET, FILM COATED ORAL 2 TIMES DAILY
Status: DISCONTINUED | OUTPATIENT
Start: 2017-05-19 | End: 2017-05-22

## 2017-05-19 RX ORDER — DIVALPROEX SODIUM 500 MG/1
500 TABLET, DELAYED RELEASE ORAL 2 TIMES DAILY
Status: DISCONTINUED | OUTPATIENT
Start: 2017-05-19 | End: 2017-05-27

## 2017-05-19 RX ADMIN — LORAZEPAM 0.5 MG: 0.5 TABLET ORAL at 15:45

## 2017-05-19 RX ADMIN — AMLODIPINE BESYLATE 10 MG: 5 TABLET ORAL at 10:12

## 2017-05-19 RX ADMIN — DIVALPROEX SODIUM 500 MG: 500 TABLET, DELAYED RELEASE ORAL at 21:23

## 2017-05-19 RX ADMIN — ZOLPIDEM TARTRATE 5 MG: 5 TABLET ORAL at 21:23

## 2017-05-19 RX ADMIN — ATORVASTATIN CALCIUM 20 MG: 20 TABLET, FILM COATED ORAL at 10:12

## 2017-05-19 RX ADMIN — QUETIAPINE FUMARATE 100 MG: 100 TABLET, FILM COATED ORAL at 17:07

## 2017-05-19 RX ADMIN — DIVALPROEX SODIUM 500 MG: 500 TABLET, DELAYED RELEASE ORAL at 14:34

## 2017-05-19 RX ADMIN — HYDROCHLOROTHIAZIDE 25 MG: 25 TABLET ORAL at 10:12

## 2017-05-19 RX ADMIN — IBUPROFEN 400 MG: 400 TABLET, FILM COATED ORAL at 21:23

## 2017-05-19 RX ADMIN — FLUPHENAZINE HYDROCHLORIDE 5 MG: 5 TABLET, FILM COATED ORAL at 10:13

## 2017-05-19 RX ADMIN — SITAGLIPTIN 100 MG: 100 TABLET, FILM COATED ORAL at 10:12

## 2017-05-19 RX ADMIN — WATER 10 MG: 1 INJECTION INTRAMUSCULAR; INTRAVENOUS; SUBCUTANEOUS at 16:23

## 2017-05-19 RX ADMIN — QUETIAPINE FUMARATE 100 MG: 100 TABLET, FILM COATED ORAL at 14:35

## 2017-05-19 RX ADMIN — INSULIN LISPRO 2 UNITS: 100 INJECTION, SOLUTION INTRAVENOUS; SUBCUTANEOUS at 17:08

## 2017-05-19 RX ADMIN — OLANZAPINE 2.5 MG: 2.5 TABLET, FILM COATED ORAL at 21:23

## 2017-05-19 NOTE — PROGRESS NOTES
NUTRITION COMPLETE ASSESSMENT    RECOMMENDATIONS:   1. Continue diet as ordered; however, if BG poorly controlled in the hospital, would recommend Diabetic Consistent Carb 2836-5198 kcal    2. Weekly weights    Interventions/Plan:   Food/Nutrient Delivery: Will gladly add snacks/supplements prn    Assessment:   Reason for Assessment:   [x]BPA/MST Referral     Diet: Regular  Nutritionally Significant Medications: [x] Reviewed & Includes: lipitor; humalog correction scale; milk of magnesia; januvia     Subjective:  Pt yelling out and not able to provide appropriate history or stay on task to have have a meaningful conversation with RD at this time. Objective:  Chart reviewed, discussed with RN. Pt admitted with schizoaffective disorder and chronic psychosis. Pt is unable to provide history at this time secondary to psychosis and agitation. Per weights in EHR, pt with 60# weight loss x 14 months, which is severe, but unsure of true etiology. Muscle wasting noted while pt up walking around the table and organizing the writing utensils. No reported intake yet this admission. Will gladly add snacks/supplements as appropriate; however, little to offer in current setting without additional history. Wt Readings from Last 5 Encounters:   05/18/17 61.9 kg (136 lb 8 oz)   03/14/16 89 kg (196 lb 3.2 oz)   07/21/15 90.7 kg (200 lb)   07/04/15 90.7 kg (200 lb)   06/15/15 90.7 kg (200 lb)     Estimated Nutrition Needs:   Kcals/day: 1550 Kcals/day (5303-3327 kcal/day (25-30 kcal/kg))  Protein: 62 g (1 g/kg)  Fluid: 1860 ml (~1 mL/kcal)     Based On: Kcal/kg - specify (Comment)  Weight Used: Actual wt (61.9 kg)    Pt expected to meet estimated nutrient needs:  []   Yes     []  No  [x] Unable to predict at this time     Nutrition Diagnosis:   1.  Inadequate protein-energy intake related to unknown etiology as evidenced by reports in H&P and 60# weight loss x 14 months    Goals:     Pt to consume at least 75% of meals over the next 5-7 days     Monitoring & Evaluation:    -     - Weight/weight change     Previous Nutrition Goals Met:  N/A  Previous Recommendations:      N/A    Education & Discharge Needs:   [x] None Identified   [] Identified and addressed    [x] Participated in care plan, discharge planning, and/or interdisciplinary rounds        Cultural, Yarsanism and ethnic food preferences identified:  NONE      Skin Integrity: [x]Intact  []Other  Edema: [x]None []Other  Last BM: PTA  Food Allergies: [x]None []Other    Anthropometrics:    Weight Loss Metrics 5/18/2017 3/14/2016 7/21/2015 7/4/2015 6/15/2015 6/5/2015 5/23/2015   Today's Wt 136 lb 8 oz 196 lb 3.2 oz 200 lb 200 lb 200 lb 200 lb 8 oz 200 lb   BMI 22.71 kg/m2 32.65 kg/m2 33.28 kg/m2 33.28 kg/m2 33.28 kg/m2 33.36 kg/m2 33.28 kg/m2      Last 3 Recorded Weights in this Encounter    05/18/17 0515   Weight: 61.9 kg (136 lb 8 oz)      Weight Source: Bed  Height: 5' 5\" (165.1 cm),    Body mass index is 22.71 kg/(m^2). IBW : 56.7 kg (125 lb),    Usual Body Weight: 88.9 kg (196 lb) (14 months ago),      Labs:    Lab Results   Component Value Date/Time    Sodium 144 03/14/2016 05:54 AM    Potassium 3.4 03/14/2016 05:54 AM    Chloride 111 03/14/2016 05:54 AM    CO2 25 03/14/2016 05:54 AM    Glucose 187 03/14/2016 05:54 AM    BUN 18 03/14/2016 05:54 AM    Creatinine 0.90 03/14/2016 05:54 AM    Calcium 7.4 03/14/2016 05:54 AM    Magnesium 1.1 07/21/2015 06:21 PM    Albumin 3.8 03/14/2016 05:54 AM     Lab Results   Component Value Date/Time    Hemoglobin A1c 8.0 05/07/2015 09:20 AM     Lab Results   Component Value Date/Time    Glucose 187 03/14/2016 05:54 AM    Glucose (POC) 142 05/19/2017 11:43 AM      Lab Results   Component Value Date/Time    ALT (SGPT) 15 03/14/2016 05:54 AM    AST (SGOT) 10 03/14/2016 05:54 AM    Alk.  phosphatase 98 03/14/2016 05:54 AM    Bilirubin, direct <0.1 03/14/2016 05:54 AM    Bilirubin, total 0.3 03/14/2016 05:54 AM      [facility] [address]

## 2017-05-19 NOTE — INTERDISCIPLINARY ROUNDS
Behavioral Health Interdisciplinary Rounds     Patient Name: Karin Ayala  Age: 65 y.o. Room/Bed:  748/02  Primary Diagnosis: [facility]ffect[facility] ([facility]   Admission Status: Involuntary Commitment     Readmission within 30 days: no  Power of  in place: no  Patient requires a blocked bed: yes          Reason for blocked bed: aggressive behavior     VTE Prophylaxis: Not indicated  Mobility needs/Fall risk: yes    Nutritional Plan: no  Consults:          Labs/Testing due today?:     Sleep hours:        Participation in Care/Groups:  no  Medication Compliant?:   PRNS (last 24 hours): Antipsychotic (PO), Antipsychotic (IM) and Antianxiety    Restraints (last 24 hours):  no  Substance Abuse:  no  CIWA (range last 24 hours):  COWS (range last 24 hours):   Alcohol screening (AUDIT) completed -     If applicable, date SBIRT discussed in treatment team AND documented: NEEDS TO BE COMPLETED   Tobacco - patient is a smoker:   No Date tobacco education completed by RN:   24 hour chart check complete:       Patient goal(s) for today:   Treatment team focus/goals: Patient was committed at her hearing Plan to titrate her medications.     LOS:  1  Expected LOS: TBD   Master treatment plan initiated: 5/19          Next due (every 7 days): 5/26   Psychiatric [address] -   no  Name of Decision maker if patient has Psychiatric Care Directive  Patient was offered information   Financial concerns/prescription coverage:  Medicaid   Date of last family contact:      Family requesting physician contact today:  no  Discharge plan: TBD        Outpatient provider(s): [place] [id number]   Participating treatment team members: [address].

## 2017-05-19 NOTE — BH NOTES
GROUP THERAPY PROGRESS NOTE    Celina Gonzales participated in an Afternoon Process Group on the Geriatric Unit with a focus on shifting ones feelings to a positive note and preparing for rest of the day through group singing. Group time: 40 minutes. Personal goal for participation: To increase the capacity to improve ones mood and structure. Goal orientation: The patient will be able to identify their feelings, develop a plan for structuring the rest their day, and either listen or, hopefully, participate in the singing. Group therapy participation: When prompted, this patient partially participated in the group. Therapeutic interventions reviewed and discussed: The group members were asked to identify an emotion they are having or let the group know what they want to focus on for the day. Impression of participation: The patient was actively disruptive in the first half of group. She got up and down several times, needing to be escorted out of the nursing station, and screaming from the hallway. She was anxious and may have been responding to internal stimuli. The patient expressed no SI/HI. She looked frightened, if not paranoid. After re-direction several times from the nursing staff, the patient sat with a calmer attitude and listened to the music. She clapped approvingly of the last couple of songs.
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
## 2017-05-19 NOTE — BH NOTES
Post Fall Documentation      Mariel Ball witnessed/unwitnessed fall occurred on 5/18/17 (Date) at 200 (Time). The answers to the following questions summarize the fall:     · In the patient's own words,:  · What was he/she doing when he/she fell? I was trying to talk on the phone, I guess I slid off the chair    · What are his/her complaints? none    · Nurse:  · Document observation, treatment, conversation, follow-up, and patient response. Pt was walking quickly with phone to ear, tried to sit in recliner while on phone, nearly missed recliner when sitting, sat on edge of recliner, nurse tried to roll recliner into place to catch patient, chair slid back as pt slid forward due to disengaged wheel lock and pt slid to floor. Pt stated \"I guess I slid off the chair\", patient was helped to feet by 3 RNs, pt denied pain and continued to talk on phone. · What was the patient's condition when found (i.e., pain, symptoms, cuts, bruises)? No complaints of pain, no injuries, pt alert and oriented x 4    · What specific complaints did the patient have? none     · What did the staff do when patient was found (i.e., vital signs, returned to bed with fall alarm, side rails up)? Helped pt to feet and into locked recliner    · Which physician was notified?  Armando Beltran

## 2017-05-19 NOTE — PROGRESS NOTES
Orders received, chart reviewed. Spoke with nursing who feel patient is at baseline and does not need skilled therapy services at this time. Will complete order.  Meka Amezquita, PT

## 2017-05-19 NOTE — PROGRESS NOTES
Problem: Falls - Risk of  Goal: *Absence of falls  Outcome: Progressing Towards Goal  Patient remains absent of falls    Problem: Altered Thought Process (Adult/Pediatric)  Goal: *STG: Remains safe in hospital  Outcome: Progressing Towards Goal  Patient remains safe in hospital.  Goal: *STG: Decreased hallucinations  Outcome: Not Progressing Towards Goal  Variance: Patient slowly responding  Comments: Patient is internally stimulated, auditory hallucinations. Affect labile, mood congruent, defensive, paranoid, disorganized thinking, intrusive,disruptive, demanding, med compliant with much encouragement, remains on Q15 min safety checks.

## 2017-05-19 NOTE — BH NOTES
1630:  PRN Medication Documentation    Specific patient behavior that led to need for PRN medication: shouting, banging on windows, grabbing staff, posturing, threatening staff, not redirectable  Staff interventions attempted prior to PRN being given: redirection, verbal deescalation  PRN medication given: 10 mg Geodon IM  Patient response/effectiveness of PRN medication: Pt no longer aggressive. Difficult to redirect, uncooperative. 2011:  PRN Medication Documentation    Specific patient behavior that led to need for PRN medication: restless, paranoid of staff and \"people outside the window\", disjointed thought process.   Staff interventions attempted prior to PRN being given: reassurance, redirection, lights dimmed, food, fluids, music  PRN medication given: 2.5 mg zyprexa PO  Patient response/effectiveness of PRN medication: Pt sitting quietly, watching tv
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
## 2017-05-19 NOTE — BH NOTES
0600  Pt slept 0 hrs this shift. She has been awake the whole shift. Pt wants a lot of attention from staff. Monitoring continues.

## 2017-05-19 NOTE — DIABETES MGMT
Consult received to assess home management of diabetes. Per H&P patient lives with care taker who reports agitation and paranoia. Therefore, not appropriate for consult. DTC will follow pt's BS for hospital glucose management.      Thank you,    Albert Peter RD

## 2017-05-19 NOTE — BH NOTES
GROUP THERAPY PROGRESS NOTE    Edwin Mcginnis did not attend a Process Group on the General Unit with a focus identifying feelings, planning for the day, and thinking about DBT concepts related to behavioral health maintenance.
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
## 2017-05-19 NOTE — BH NOTES
[address] [facility]  Master Treatment Plan for Renato Mancilla      Date Treatment Plan Initiated: 05/19/17      Treatment Plan Modalities:  Type of Modality Amount  (x minutes) Frequency (x/week) Duration (x days) Name of Responsible Staff   [address] [facility] meetings to encourage peer interactions 15 7 1 MARIANO Bravo   Group psychotherapy to assist in building coping skills and internal controls 60 7 1 Samuel Carter LCSW   Therapeutic activity groups to build coping skills 60 7 1 Nuzhat Spears LCSW   Psychoeducation in group setting to address:   Medication education Aram Acosta RN   Coping skills               Relaxation techniques               Symptom management               Discharge planning               Spirituality  61 2 1 [facility] 60 1 1 Volunteer from [employer]   Recovery/AA/NA 60 3 1 Volunteer from [address] [place] [address] medication management 13 7 1 Dr. Martin Trevino   Family meeting/discharge planning                                                                           Problem: Falls - Risk of  Goal will be met by 05/26/17  Goal: *Absence of falls  Outcome: Progressing Towards Goal  Patient remains absent of falls     Problem: Altered Thought Process (Adult/Pediatric)  Goal will be met by 05/26/17  Goal: *STG: Remains safe in hospital  Outcome: Progressing Towards Goal  Patient remains safe in hospital.  Goal: *STG: Decreased hallucinations  Goal will be met by 05/26/17  Outcome: Not Progressing Towards Goal  Variance: Patient slowly responding  Comments: Patient is internally stimulated, auditory hallucinations.                                           Revision History

## 2017-05-20 LAB
GLUCOSE BLD STRIP.AUTO-MCNC: 131 MG/DL (ref 65–100)
SERVICE CMNT-IMP: ABNORMAL

## 2017-05-20 PROCEDURE — 74011250637 HC RX REV CODE- 250/637: Performed by: PSYCHIATRY & NEUROLOGY

## 2017-05-20 PROCEDURE — 82962 GLUCOSE BLOOD TEST: CPT

## 2017-05-20 PROCEDURE — 74011000250 HC RX REV CODE- 250: Performed by: PSYCHIATRY & NEUROLOGY

## 2017-05-20 PROCEDURE — 74011250636 HC RX REV CODE- 250/636: Performed by: PSYCHIATRY & NEUROLOGY

## 2017-05-20 PROCEDURE — 65220000003 HC RM SEMIPRIVATE PSYCH

## 2017-05-20 RX ORDER — LORAZEPAM 1 MG/1
1 TABLET ORAL
Status: DISCONTINUED | OUTPATIENT
Start: 2017-05-20 | End: 2017-08-16 | Stop reason: HOSPADM

## 2017-05-20 RX ORDER — LORAZEPAM 2 MG/ML
1 INJECTION INTRAMUSCULAR
Status: DISCONTINUED | OUTPATIENT
Start: 2017-05-20 | End: 2017-08-16 | Stop reason: HOSPADM

## 2017-05-20 RX ORDER — LORAZEPAM 1 MG/1
1 TABLET ORAL ONCE
Status: COMPLETED | OUTPATIENT
Start: 2017-05-20 | End: 2017-05-20

## 2017-05-20 RX ORDER — DIPHENHYDRAMINE HCL 50 MG
50 CAPSULE ORAL ONCE
Status: COMPLETED | OUTPATIENT
Start: 2017-05-20 | End: 2017-05-20

## 2017-05-20 RX ADMIN — ZOLPIDEM TARTRATE 5 MG: 5 TABLET ORAL at 21:46

## 2017-05-20 RX ADMIN — LORAZEPAM 1 MG: 1 TABLET ORAL at 12:55

## 2017-05-20 RX ADMIN — DIVALPROEX SODIUM 500 MG: 500 TABLET, DELAYED RELEASE ORAL at 21:46

## 2017-05-20 RX ADMIN — WATER 10 MG: 1 INJECTION INTRAMUSCULAR; INTRAVENOUS; SUBCUTANEOUS at 09:46

## 2017-05-20 RX ADMIN — LORAZEPAM 1 MG: 1 TABLET ORAL at 23:34

## 2017-05-20 RX ADMIN — LORAZEPAM 1 MG: 2 INJECTION INTRAMUSCULAR; INTRAVENOUS at 09:46

## 2017-05-20 RX ADMIN — LORAZEPAM 0.5 MG: 2 INJECTION INTRAMUSCULAR; INTRAVENOUS at 02:40

## 2017-05-20 RX ADMIN — DIPHENHYDRAMINE HYDROCHLORIDE 50 MG: 50 CAPSULE ORAL at 23:34

## 2017-05-20 RX ADMIN — WATER 20 MG: 1 INJECTION INTRAMUSCULAR; INTRAVENOUS; SUBCUTANEOUS at 17:49

## 2017-05-20 RX ADMIN — OLANZAPINE 2.5 MG: 2.5 TABLET, FILM COATED ORAL at 12:56

## 2017-05-20 RX ADMIN — WATER 10 MG: 1 INJECTION INTRAMUSCULAR; INTRAVENOUS; SUBCUTANEOUS at 02:40

## 2017-05-20 NOTE — BH NOTES
PRN Medication Documentation    Specific patient behavior that led to need for PRN medication: Patient is yelling, reporting staff is stealing her things, threatening staff, refused all scheduled meds  Staff interventions attempted prior to PRN being given: redirections, offered dinner and fluids, relaxation music    PRN medication given:   1749 IM  20 mg/ml Geodon     Patient response/effectiveness of PRN medication: Will monitor. 1848 Patient is little calmer, however,  continues to be hyper verbal, delusional and demanding snacks. PRN Medication Documentation    Specific patient behavior that led to need for PRN medication: Patient is restless, talkative, hyperverbal  Staff interventions attempted prior to PRN being given: gave a snack     PRN medication given: 2146  Ambien 5 mg tab  Patient response/effectiveness of PRN medication: will monitor. 2246  Patient continues to be hyper verbal, delusional and demanding snacks and to take a shower.

## 2017-05-20 NOTE — BH NOTES
Pt has received multiple prns today at 0946 Geodon Ativan IM for aggression. 1300 Pt out in day room, tangential, talkative, intrusive, argumentative. Received Ativan and Zyprexa po prn.

## 2017-05-20 NOTE — BH NOTES
2130 Pt requested motrin 400 po for c/o generalized arthritic pain. Zyprexa 2.5 mg po given for agitation and confusion. 2240 Meds considered moderately effective as pt no longer c/o arthritic pain and slightly less agitated. Pt refused accucheck @ 2000.

## 2017-05-20 NOTE — BH NOTES
Behavioral Health Interdisciplinary Rounds     Patient Name: Merced Minaya  Age: 65 y.o. Room/Bed:  748/02  Primary Diagnosis: Schizoaffective disorder ([facility] [address]   Admission Status: TDO     Readmission within 30 days: no  Power of  in place: no  Patient requires a blocked bed: yes          Reason for blocked bed: Aggressive behavior    VTE Prophylaxis: Not indicated  Mobility needs/Fall risk: yes    Nutritional Plan: no  Consults:          Labs/Testing due today?: yes    Sleep hours: 1.75          Participation in Care/Groups:    Medication Compliant?: Yes  PRNS (last 24 hours): Antipsychotic (PO), Antipsychotic (IM), Antianxiety, Sleep Aid and Pain    Restraints (last 24 hours):  no  Substance Abuse:  no  CIWA (range last 24 hours):  COWS (range last 24 hours):   Alcohol screening (AUDIT) completed -     If applicable, date SBIRT discussed in treatment team AND documented:   Tobacco - patient is a smoker: no   Date tobacco education completed by RN: n/a  24 hour chart check complete: yes     Patient goal(s) for today:     Treatment team focus/goals: psychosocial  LOS:  2  Expected LOS: TBD  Master treatment plan initiated: To be completed 5/18          Next due (every 7 days): 5/25  Psychiatric Advanced Care Directives -  no   Name of Decision maker if patient has Psychiatric Care Directive   Patient was offered information, patient declined.   Financial concerns/prescription coverage:  [employer] Medicaid  Date of last family contact: none      Family requesting physician contact today:  no   Discharge plan: TBD       Outpatient provider(s): Leon Bergeron [phone]; Mrs Tarango([facility]) [phone]; Refugio Kincaid ([facility])    Participating treatment team members: Merced Minaya, * (assigned SW),
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
## 2017-05-20 NOTE — PROGRESS NOTES
Problem: Falls - Risk of  Goal: *Absence of falls  Outcome: Progressing Towards Goal  Patient absence of falls currently. Very behavioral, aggressive, agitated, labile, threatened to fall, attention seeking. Continue on close observations by staffs. Goal: *Knowledge of fall prevention  Outcome: Not Progressing Towards Goal  Pt used walker, but walked in very fast pace, driven her walker. Refused to follow redirections. Appeared aggressive, hostile, uncooperative. Continue on close staffs observations for fall preventions. Problem: Altered Thought Process (Adult/Pediatric)  Goal: *STG: Remains safe in hospital  Outcome: Progressing Towards Goal  Pt remains safe in hospital.  Staffs currently monitoring pt closely. Pt behavior inappropriate, hostile, threatening, labile, uncooperative. Unable to redirect. Pt being behavioral disruptive, excessively loud speech, scaring other patient on milieu. Refused to follow redirections. Security notified. PRN IM geodon and ativan given as required. Continue pt on close observations. Goal: *STG: Complies with medication therapy  Outcome: Not Progressing Towards Goal  Patient refused medications. Goal: *STG: Decreased delusional thinking  Outcome: Not Progressing Towards Goal  Pt continued being paranoid, controlling, demanding, aggressive, hostile, preoccupied with her water pitcher & basin. Unwilling to follow redirections. Goal: *STG: Demonstrates ability to understand and use improved judgment in daily activities and relationships  Outcome: Not Progressing Towards Goal  Impaired judgement, paranoid, impulsive. Behavior inappropriate toward staffs and peers, hostile, disruptive in milieu, unable to redirect.         [facility] 60  Master Treatment Plan for Betty Payne  Date Treatment Plan Initiated: 5/20/17  Treatment Plan Modalities:  Type of Modality Amount  (x minutes) Frequency (x/week) Duration (x days) Name of Responsible Staff   Community & wrap-up meetings to encourage peer interactions 15 7 7 Christiana Rush, RN     Group psychotherapy to assist in building coping skills and internal controls 60 7 7 Samuel Carter LCSW   Therapeutic activity groups to build coping skills 60 7 7 Christiana Ellsworth., RN   Psychoeducation in group setting to address:   Medication education   15 7 7 Fortino Alpers A., RN   Coping skills         Relaxation techniques         Symptom management         Discharge planning         Spirituality    [phone] [place]   60 1 7 Volunteer from [employer]   Recovery/AA/NA   61 3 7 Volunteer from [address] [place] [address] medication management   15 7 7 Dr. Leonarda Love meeting/discharge planning                                        Goal will be met by 5/27/17

## 2017-05-20 NOTE — PROGRESS NOTES
Problem: Altered Thought Process (Adult/Pediatric)  Goal: *STG: Remains safe in hospital  Outcome: Progressing Towards Goal  Received patient sitting in the dayroom talking. NAD distress noted. Will continue to monitor patient for safety q15 minutes for safety. 0600 Unable to obtain patient's lab, informed day shift to try.

## 2017-05-20 NOTE — BH NOTES
PRN Medication Documentation    Specific patient behavior that led to need for PRN medication: pt anxious, restless, ambulated down hallway and into dayroom with brief off & dragging on the floor. Pt refused allowing staff assist her pulling up her brief. Unable to redirect pt. Pt had racing thoughts. Hyperverbal, constantly talking nonstop about random unrelated things. Get irritable easily. Uncooperative. Staff interventions attempted prior to PRN being given: redirections, offered snacks, use distractions such as music. Pt continued appearing disruptive. Scaring other patients. Other patients left the dayroom to return to their room. PRN medication given: ativan 1mg given for anxiety and zyprexa given for racing thought & psychosis as needed  Patient response/effectiveness of PRN medication: pt continued hyperverbal while sitting in dayroom. Continue to closely monitor pt.

## 2017-05-20 NOTE — PROGRESS NOTES
Problem: Altered Thought Process (Adult/Pediatric)  Goal: *STG: Attends activities and groups  Outcome: Progressing Towards Goal  Patient has been visible on the unit throughout this shift. She has been loud, disruptive and attention-seeking early in the shift. Her behavior was improved later in the shift after administration of IM Geodon. Goal: *STG: Absence of lethality  Outcome: Not Progressing Towards Goal  Patient threatening and striking staff early in the shift despite attempts to redirect and distract. Her behavior was much improved later in the shift after IM Geodon administration.

## 2017-05-20 NOTE — BH NOTES
Patient is verbally aggressive, standing behind this writer stating Guanaco Muñoz are you turning around for, I'm not talking to you. \" Patient is not following redirections, she is closing the door between the general unit and the geriatric unit. Patient turn [address] [facility] hallway light on and writer redirected patient and turn the light off; patient went and turn the light on again. Security officers were called,and patient received ativan 0.5mg IM and Geodon 10mg IM for severe agitation. Will continue to monitor patient for safety.
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
## 2017-05-20 NOTE — BH NOTES
.PRN Medication Documentation    Specific patient behavior that led to need for PRN medication: awoke abruptly out of bed with pants down to day room, not directable, cursing and threatening staff, posturing stance  Staff interventions attempted prior to PRN being given: code atlas called,  redirection, re-oriented, reassurance of safety  PRN medication given: ativan 1mg IM prn and geodon 10mg IM prn at 0946  Patient response/effectiveness of PRN medication: 1 hour later, pt is continuously yelling and threatening staff

## 2017-05-21 LAB
GLUCOSE BLD STRIP.AUTO-MCNC: 78 MG/DL (ref 65–100)
GLUCOSE BLD STRIP.AUTO-MCNC: 97 MG/DL (ref 65–100)
SERVICE CMNT-IMP: NORMAL
SERVICE CMNT-IMP: NORMAL

## 2017-05-21 PROCEDURE — 74011250637 HC RX REV CODE- 250/637: Performed by: PSYCHIATRY & NEUROLOGY

## 2017-05-21 PROCEDURE — 74011250636 HC RX REV CODE- 250/636

## 2017-05-21 PROCEDURE — 82962 GLUCOSE BLOOD TEST: CPT

## 2017-05-21 PROCEDURE — 74011250636 HC RX REV CODE- 250/636: Performed by: PSYCHIATRY & NEUROLOGY

## 2017-05-21 PROCEDURE — 74011000250 HC RX REV CODE- 250: Performed by: PSYCHIATRY & NEUROLOGY

## 2017-05-21 PROCEDURE — 65220000003 HC RM SEMIPRIVATE PSYCH

## 2017-05-21 RX ORDER — HALOPERIDOL 5 MG/ML
5 INJECTION INTRAMUSCULAR
Status: COMPLETED | OUTPATIENT
Start: 2017-05-21 | End: 2017-05-21

## 2017-05-21 RX ORDER — LORAZEPAM 2 MG/ML
2 INJECTION INTRAMUSCULAR ONCE
Status: ACTIVE | OUTPATIENT
Start: 2017-05-21 | End: 2017-05-21

## 2017-05-21 RX ORDER — LORAZEPAM 2 MG/ML
INJECTION INTRAMUSCULAR
Status: COMPLETED
Start: 2017-05-21 | End: 2017-05-21

## 2017-05-21 RX ORDER — HALOPERIDOL 5 MG/ML
5 INJECTION INTRAMUSCULAR
Status: DISCONTINUED | OUTPATIENT
Start: 2017-05-21 | End: 2017-05-26

## 2017-05-21 RX ADMIN — LORAZEPAM 1 MG: 1 TABLET ORAL at 20:37

## 2017-05-21 RX ADMIN — QUETIAPINE FUMARATE 100 MG: 100 TABLET, FILM COATED ORAL at 08:57

## 2017-05-21 RX ADMIN — OLANZAPINE 2.5 MG: 2.5 TABLET, FILM COATED ORAL at 13:51

## 2017-05-21 RX ADMIN — DIVALPROEX SODIUM 500 MG: 500 TABLET, DELAYED RELEASE ORAL at 08:57

## 2017-05-21 RX ADMIN — HALOPERIDOL LACTATE 5 MG: 5 INJECTION, SOLUTION INTRAMUSCULAR at 23:49

## 2017-05-21 RX ADMIN — LORAZEPAM 1 MG: 2 INJECTION INTRAMUSCULAR; INTRAVENOUS at 15:47

## 2017-05-21 RX ADMIN — LORAZEPAM 2 MG: 2 INJECTION INTRAMUSCULAR; INTRAVENOUS at 03:18

## 2017-05-21 RX ADMIN — DIVALPROEX SODIUM 500 MG: 500 TABLET, DELAYED RELEASE ORAL at 20:36

## 2017-05-21 RX ADMIN — HALOPERIDOL LACTATE 5 MG: 5 INJECTION, SOLUTION INTRAMUSCULAR at 03:03

## 2017-05-21 RX ADMIN — LORAZEPAM 1 MG: 1 TABLET ORAL at 13:51

## 2017-05-21 RX ADMIN — WATER 20 MG: 1 INJECTION INTRAMUSCULAR; INTRAVENOUS; SUBCUTANEOUS at 15:47

## 2017-05-21 RX ADMIN — ZOLPIDEM TARTRATE 5 MG: 5 TABLET ORAL at 21:46

## 2017-05-21 RX ADMIN — LORAZEPAM 1 MG: 1 TABLET ORAL at 08:57

## 2017-05-21 NOTE — PROGRESS NOTES
Problem: Altered Thought Process (Adult/Pediatric)  Goal: *STG: Remains safe in hospital  Outcome: Progressing Towards Goal  Received patient up yelling, combative and verbally aggressive with staffs. Will continue to monitor patient for safety q15 minutes. 0126 24 Hour chart checks completed.
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
## 2017-05-21 NOTE — BH NOTES
Pt going into Pt refrigerator and grabbing items out to open. She is not accepting redirection. Items removed from Pt's hands and she begins screaming and cursing. Pt puts self on floor. Security notified. PRN IM Ativan and Geodon given.
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
## 2017-05-21 NOTE — PROGRESS NOTES
Problem: Falls - Risk of  Goal: *Absence of falls  Outcome: Progressing Towards Goal  Patient has not experienced any falls on this shift. Ambulates with walker. Requires frequent reminders not to carry anything in her hands (water pitchers, bags, books) when ambulating with walker. Problem: Altered Thought Process (Adult/Pediatric)  Goal: *STG: Complies with medication therapy  Outcome: Not Progressing Towards Goal  Patient refused to take  scheduled meds this shift. Goal: *STG: Attends activities and groups  Outcome: Not Progressing Towards Goal  Patient is very labile, hyperverbal and not able to watch TV or play games with peers. She carries personal belongings such as her magazines or paper bags to  Goal: *STG: Decreased delusional thinking  Outcome: Not Progressing Towards Goal  Patient continues to display delusional thinking. She reported \"I never ate breakfast or lunch today. People are stealing my things\".
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
## 2017-05-21 NOTE — PROGRESS NOTES
Pt alert oriented. agitated demanding. Yelling screaming. Pt refused lab work. Refused VS. Refused blood sugar check. Refusing most medications. Pt eating breakfast, lunch and snacks. Tolerating oral fluids well. Bathed by staff. 2 person assist.   Hostile agitated defiant and manipulative. No AH/VH noted. Looseness of association. Poor safety awareness. No boundary awareness. Ambulates with walker and 1 person stand by assist.  urinary  inconstant today. BM today. Decreased stimulation and attempt to reduce attention seeking behavior tolerating poorly by pt. Staff and pt discuss appropriate behavior for common areas. Pt covered the dining floor with fluids and placed herself under the dining table.   Pt returns to her room after defiant outburst.
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
## 2017-05-21 NOTE — BH NOTES
PRN Medication Documentation    Specific patient behavior that led to need for PRN medication: Physically and verbally aggressive with staff.    Staff interventions attempted prior to PRN being given: Used redirections, talked to patient  PRN medication given: Benadryl 50 mg PO and Ativan 1 mg PO  Patient response/effectiveness of PRN medication:

## 2017-05-21 NOTE — PROGRESS NOTES
Hospitalist follow up from H&P on 5/19:    Reviewed chart and noted continued psychosis and paranoia and refusal of medications and lab work throughout hospital stay. She is on her home medictions at this time and these should be continued if she allows. Her BG appears to have been stable on current regimen. We have nothing more to offer at this point, but please do not hesitate to re-consult us if needed and thank you for involving us in the care of this patient.      Steph Odell, DNP, ACNP-BC  Hospitalist

## 2017-05-21 NOTE — PROGRESS NOTES
Problem: Altered Thought Process (Adult/Pediatric)  Goal: *STG: Complies with medication therapy  Outcome: Progressing Towards Goal  Pt awoke this morning and immediately began yelling out and making demands. . Pt uncooperative with vital signs or check of blood sugar. Writer spent roughly 30 minutes with Pt and was able to finally convince her to take PRN Ativan, scheduled Seroquel and Depakote. We will attempt her other medications if these medications are able to settle her down.

## 2017-05-22 PROCEDURE — 65220000003 HC RM SEMIPRIVATE PSYCH

## 2017-05-22 PROCEDURE — 74011250637 HC RX REV CODE- 250/637: Performed by: HOSPITALIST

## 2017-05-22 PROCEDURE — 74011250636 HC RX REV CODE- 250/636: Performed by: PSYCHIATRY & NEUROLOGY

## 2017-05-22 PROCEDURE — 74011250637 HC RX REV CODE- 250/637: Performed by: PSYCHIATRY & NEUROLOGY

## 2017-05-22 RX ORDER — LORAZEPAM 1 MG/1
1 TABLET ORAL 2 TIMES DAILY
Status: DISCONTINUED | OUTPATIENT
Start: 2017-05-22 | End: 2017-05-26

## 2017-05-22 RX ORDER — QUETIAPINE FUMARATE 100 MG/1
200 TABLET, FILM COATED ORAL 2 TIMES DAILY
Status: DISCONTINUED | OUTPATIENT
Start: 2017-05-22 | End: 2017-05-23

## 2017-05-22 RX ORDER — SODIUM CHLORIDE 0.9 % (FLUSH) 0.9 %
SYRINGE (ML) INJECTION
Status: DISPENSED
Start: 2017-05-22 | End: 2017-05-22

## 2017-05-22 RX ADMIN — AMLODIPINE BESYLATE 10 MG: 5 TABLET ORAL at 09:10

## 2017-05-22 RX ADMIN — IBUPROFEN 400 MG: 400 TABLET, FILM COATED ORAL at 00:10

## 2017-05-22 RX ADMIN — HALOPERIDOL LACTATE 5 MG: 5 INJECTION, SOLUTION INTRAMUSCULAR at 16:03

## 2017-05-22 RX ADMIN — QUETIAPINE FUMARATE 100 MG: 100 TABLET, FILM COATED ORAL at 17:01

## 2017-05-22 RX ADMIN — OLANZAPINE 2.5 MG: 2.5 TABLET, FILM COATED ORAL at 15:37

## 2017-05-22 RX ADMIN — QUETIAPINE FUMARATE 100 MG: 100 TABLET, FILM COATED ORAL at 16:58

## 2017-05-22 RX ADMIN — LORAZEPAM 1 MG: 1 TABLET ORAL at 21:29

## 2017-05-22 RX ADMIN — QUETIAPINE FUMARATE 200 MG: 100 TABLET, FILM COATED ORAL at 21:29

## 2017-05-22 RX ADMIN — QUETIAPINE FUMARATE 100 MG: 100 TABLET, FILM COATED ORAL at 09:12

## 2017-05-22 RX ADMIN — SITAGLIPTIN 100 MG: 100 TABLET, FILM COATED ORAL at 09:12

## 2017-05-22 RX ADMIN — DIVALPROEX SODIUM 500 MG: 500 TABLET, DELAYED RELEASE ORAL at 21:29

## 2017-05-22 RX ADMIN — HALOPERIDOL LACTATE 5 MG: 5 INJECTION, SOLUTION INTRAMUSCULAR at 10:15

## 2017-05-22 RX ADMIN — ATORVASTATIN CALCIUM 20 MG: 20 TABLET, FILM COATED ORAL at 09:11

## 2017-05-22 RX ADMIN — HYDROCHLOROTHIAZIDE 25 MG: 25 TABLET ORAL at 09:11

## 2017-05-22 RX ADMIN — LORAZEPAM 1 MG: 2 INJECTION INTRAMUSCULAR; INTRAVENOUS at 16:04

## 2017-05-22 RX ADMIN — DIVALPROEX SODIUM 500 MG: 500 TABLET, DELAYED RELEASE ORAL at 09:11

## 2017-05-22 NOTE — PROGRESS NOTES
Problem: Altered Thought Process (Adult/Pediatric)  Goal: *STG: Participates in treatment plan  Outcome: Not Progressing Towards Goal  Patient refusing redirection and threatening staff. She refused her vital signs and blood glucose checks this evening. Goal: *STG: Attends activities and groups  Outcome: Not Progressing Towards Goal  Patient is visible on the unit throughout this shift. She is disruptive, defiant, combative,  attention-seeking, intrusive and inappropriate.

## 2017-05-22 NOTE — PROGRESS NOTES
Problem: Altered Thought Process (Adult/Pediatric)  Goal: *STG: Participates in treatment plan  Outcome: Not Progressing Towards Goal  Is alert ,noted to have some confusion. Staff re-oriented patient. Remains labile,irritable,guarded and paranoid. Able to take direction,for example refraining from curssing out in the day room,but difficult to re-direct. Was medication complaint this morning,but needed mush encouragement. Refused  blood sugar this am.Is hyper verbal and did become combative with staff this morning. Received a PRN dose of Haldol  at 1020 for combative behavior with staff,throwing items and being verbally abusive. Remains siting down in room with staff at side. Goal: *STG: Remains safe in hospital  Outcome: Progressing Towards Goal  Remains safe on the unit. Using walker for ambulation and staff assistance when required. Bed alarm is on patients bed and patient is encouraged to use walker when ambulating. Goal: *STG: Seeks staff when feelings of anxiety and fear arise  Outcome: Not Progressing Towards Goal  Encouraged to verbalize her needs in a positive manner, not using profanity. Goal: *STG: Complies with medication therapy  Outcome: Progressing Towards Goal  Continue to encourage patient to take her scheduled medications as well as her PRN meds as needed to assist with decreased anxieties.   Goal: Interventions  Outcome: Progressing Towards Goal  Encourage progressing towards goals

## 2017-05-22 NOTE — BH NOTES
Pt given 2.5 mg zydis for agitation, yelling and cursing,and disrobing in dining room. 1600 Agitated behavior continues. Haldol 5 mg and ativan 1 mg IM given in R upper arm. Pt cooperative with same with security present.

## 2017-05-22 NOTE — BH NOTES
2125 Called  to notify that patient was found sitting on bedroom floor at 2000 when staff was performing q 15 min. Rounds. Patient stated \"I was using the toilet\". She had voided and had a bowel movement on the floor. She denied pain. No bruises found on body. VSS. Read Fuelling gave order to call Hospitalist if necessary.

## 2017-05-22 NOTE — BH NOTES
GROUP THERAPY PROGRESS NOTE    Valente Mosher did not participate in an Afternoon Process Group with a focus on identifying feelings, planning for the rest of the day, and group singing. She was the only patient on the Geriatric Unit at the time who was up and potentially available. She was loud, cursing the nurses, and flashing her rear end and breasts. She was told by the undersigned and by the unit nurses to keep her clothes on. She was inappropriate for a group session this afternoon.
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
## 2017-05-22 NOTE — BH NOTES
PRN Medication Documentation    Specific patient behavior that led to need for PRN medication: Restless, argumentative, loud, getting up w/o assist; not following orders  Staff interventions attempted prior to PRN being given: Distraction, emotional support  PRN medication given:  2349  Haldol IM  Patient response/effectiveness of PRN medication: Monitoring continues. PRN Medication Documentation    Specific patient behavior that led to need for PRN medication: 0005  Pt c/o \"arthritis aching\". Staff interventions attempted prior to PRN being given: Distraction; emotional support. Encouraged pt to rest in bed. PRN medication given: 0010  Motrin 400 mg po for aching. Patient response/effectiveness of PRN medication: Monitoring continues. 0045  Pt not talking loudly now; normal volume. No distress noted. Pt calmer, but still constantly talking & constantly wanting attention. Monitoring continues.

## 2017-05-22 NOTE — BH NOTES
Behavioral Health Interdisciplinary Rounds     Patient Name: Moraima Ricks  Age: 65 y.o. Room/Bed:  748/02  Primary Diagnosis: Schizoaffective disorder ([facility] [address]   Admission Status: TDO     Readmission within 30 days: no  Power of  in place: no  Patient requires a blocked bed: yes          Reason for blocked bed: Aggressive behavior    VTE Prophylaxis: Not indicated  Mobility needs/Fall risk: yes    Nutritional Plan: no  Consults:          Labs/Testing due today?: yes    Sleep hours: 1.75          Participation in Care/Groups:    Medication Compliant?: Yes  PRNS (last 24 hours): Antipsychotic (PO), Antipsychotic (IM), Antianxiety, Sleep Aid and Pain    Restraints (last 24 hours):  no  Substance Abuse:  no  CIWA (range last 24 hours):  COWS (range last 24 hours):   Alcohol screening (AUDIT) completed -     If applicable, date SBIRT discussed in treatment team AND documented:   Tobacco - patient is a smoker: no   Date tobacco education completed by RN: n/a  24 hour chart check complete: yes     Patient goal(s) for today:   Treatment team focus/goals: Ayan hoskins? LOS:  4  Expected LOS: TBD  Master treatment plan initiated: To be completed 5/18          Next due (every 7 days): 5/25  Psychiatric Advanced Care Directives -  no   Name of Decision maker if patient has Psychiatric Care Directive   Patient was offered information, patient declined.   Financial concerns/prescription coverage:  [employer] Medicaid  Date of last family contact: none      Family requesting physician contact today:  no   Discharge plan: TBD       Outpatient provider(s): Terry Dunlap [phone]; Mrs Tarango(DSS) [phone]; Ava Hahn ([facility])    Participating treatment team members: PEGGY Blake; Dr. Brittney Lopez MD; Marlon Daly, ESTER; Oanh Heard, PharmD

## 2017-05-22 NOTE — BH NOTES
1530:  Patient received as she is ambulating in the gonz[address]les with her walker and her belongings while shouting, screaming and laughing. She is demanding and performing in the Dining Room driving all of the other patients to their rooms and refusing to take redirection. She is hyper-verbal - demanding food and drinks while banging her water pitcher on the table. The pitcher is taken from her. Social Work is here for Group, but patient is cursing, laughing and disruptive. She is disrobing and posturing. There is no other patient present to participate so Group is cancelled. Will maintain q 15 minute safety checks and administer PRN meds. 1550:  Security on unit - IM Haldol and IM Ativan administered. 1630:  Patient continues to disrupt the unit with her loud cursing and inappropriate behavior. She is pulling things off the wall and disrobing despite redirection. Patient escorted to her room. Will continue to monitor. 1730:  Patient sitting at the [address] table for dinner with her peers continues her disruptive, attention-seeking behaviors. She is hyper-verbal, singing loudly and grabbing food from the trays of the other patients. One other patient specifically requested to eat dinner in her room because of this patient, and another patient was in a verbal altercation with her at the [address] table R/T her disruptive and inappropriate behavior. 1815:  Patient in [place] chair in the [address] appears to finally be dozing off R/T medication - she continues talking while fighting sleep. 2120:  Patient now awake and talking, cursing in the [address] She is slamming things on the counter and fighting with staff to open the refrigerator door. When attempted to be moved away from the refrigerator, patient threatened staff with her walker and threw it across the room while throwing herself toward the floor.   Staff assisted her to sit on the floor while she was cursing and fighting all the while. Patient was able to easily get herself up from a seated position. Will continue to monitor and administer scheduled bedtime medications.

## 2017-05-22 NOTE — BH NOTES
GROUP THERAPY PROGRESS NOTE    Evangelina Davis did not participate in a Process Group on the General Unit with a focus identifying feelings, planning for the day, and learning more about DBT concepts on \"Interpersonal Effectiveness Skills. \"

## 2017-05-22 NOTE — DIABETES MGMT
DTC Progress Note    Recommendations/ Comments: Chart reviewed due to hypoglycemia yesterday evening. Noted patient is refusing most correction insulin and refused Januvia yesterday morning but still had hypoglycemia yesterday evening. f appropriate, please consider:  1. D/C Januvia  2. Change diet to carb consistent  3. Have nurse document po intake. Chart reviewed on Paola Betanc[facility]. Patient is a 65 y.o. female with known diabetes on Januvia 100 mg at home. A1c:   Lab Results   Component Value Date/Time    Hemoglobin A1c 8.0 05/07/2015 09:20 AM       Recent Glucose Results:   Lab Results   Component Value Date/Time    GLUCPOC 97 05/21/2017 08:44 PM    GLUCPOC 78 05/21/2017 08:31 PM        Lab Results   Component Value Date/Time    Creatinine 0.90 03/14/2016 05:54 AM     CrCl cannot be calculated (Patient has no serum creatinine result on file. ). Active Orders   Diet    DIET REGULAR        PO intake: No data found. Current hospital DM medication: Januiva 100 mg and correction scale Humalog, normal sensitivity. Will continue to follow as needed.     Thank you  Tarun Posadas RD, CDE

## 2017-05-23 PROCEDURE — 74011250636 HC RX REV CODE- 250/636: Performed by: PSYCHIATRY & NEUROLOGY

## 2017-05-23 PROCEDURE — 65220000003 HC RM SEMIPRIVATE PSYCH

## 2017-05-23 PROCEDURE — 74011250637 HC RX REV CODE- 250/637: Performed by: HOSPITALIST

## 2017-05-23 PROCEDURE — 74011250637 HC RX REV CODE- 250/637: Performed by: PSYCHIATRY & NEUROLOGY

## 2017-05-23 RX ORDER — DIPHENHYDRAMINE HYDROCHLORIDE 50 MG/ML
50 INJECTION, SOLUTION INTRAMUSCULAR; INTRAVENOUS ONCE
Status: COMPLETED | OUTPATIENT
Start: 2017-05-23 | End: 2017-05-23

## 2017-05-23 RX ORDER — QUETIAPINE FUMARATE 100 MG/1
300 TABLET, FILM COATED ORAL 2 TIMES DAILY
Status: DISCONTINUED | OUTPATIENT
Start: 2017-05-23 | End: 2017-05-25

## 2017-05-23 RX ORDER — LORAZEPAM 2 MG/ML
2 INJECTION INTRAMUSCULAR ONCE
Status: COMPLETED | OUTPATIENT
Start: 2017-05-23 | End: 2017-05-23

## 2017-05-23 RX ORDER — DIPHENHYDRAMINE HYDROCHLORIDE 50 MG/ML
50 INJECTION, SOLUTION INTRAMUSCULAR; INTRAVENOUS
Status: DISCONTINUED | OUTPATIENT
Start: 2017-05-23 | End: 2017-08-16 | Stop reason: HOSPADM

## 2017-05-23 RX ORDER — HALOPERIDOL 5 MG/ML
5 INJECTION INTRAMUSCULAR ONCE
Status: COMPLETED | OUTPATIENT
Start: 2017-05-23 | End: 2017-05-23

## 2017-05-23 RX ORDER — ZOLPIDEM TARTRATE 10 MG/1
10 TABLET ORAL
Status: DISCONTINUED | OUTPATIENT
Start: 2017-05-23 | End: 2017-05-29

## 2017-05-23 RX ADMIN — QUETIAPINE FUMARATE 300 MG: 100 TABLET, FILM COATED ORAL at 17:20

## 2017-05-23 RX ADMIN — DIPHENHYDRAMINE HYDROCHLORIDE 50 MG: 50 INJECTION, SOLUTION INTRAMUSCULAR; INTRAVENOUS at 15:59

## 2017-05-23 RX ADMIN — LORAZEPAM 1 MG: 1 TABLET ORAL at 17:20

## 2017-05-23 RX ADMIN — QUETIAPINE FUMARATE 200 MG: 100 TABLET, FILM COATED ORAL at 08:26

## 2017-05-23 RX ADMIN — LORAZEPAM 1 MG: 1 TABLET ORAL at 08:26

## 2017-05-23 RX ADMIN — LORAZEPAM 2 MG: 2 INJECTION INTRAMUSCULAR; INTRAVENOUS at 17:50

## 2017-05-23 RX ADMIN — HALOPERIDOL LACTATE 5 MG: 5 INJECTION, SOLUTION INTRAMUSCULAR at 17:50

## 2017-05-23 RX ADMIN — LORAZEPAM 1 MG: 2 INJECTION INTRAMUSCULAR; INTRAVENOUS at 16:00

## 2017-05-23 RX ADMIN — HALOPERIDOL LACTATE 5 MG: 5 INJECTION, SOLUTION INTRAMUSCULAR at 14:55

## 2017-05-23 RX ADMIN — SITAGLIPTIN 100 MG: 100 TABLET, FILM COATED ORAL at 08:32

## 2017-05-23 RX ADMIN — AMLODIPINE BESYLATE 10 MG: 5 TABLET ORAL at 08:31

## 2017-05-23 RX ADMIN — ATORVASTATIN CALCIUM 20 MG: 20 TABLET, FILM COATED ORAL at 08:31

## 2017-05-23 RX ADMIN — DIPHENHYDRAMINE HYDROCHLORIDE 50 MG: 50 INJECTION, SOLUTION INTRAMUSCULAR; INTRAVENOUS at 17:51

## 2017-05-23 RX ADMIN — HYDROCHLOROTHIAZIDE 25 MG: 25 TABLET ORAL at 08:28

## 2017-05-23 NOTE — PROGRESS NOTES
Problem: Altered Thought Process (Adult/Pediatric)  Goal: *STG: Participates in treatment plan  Outcome: Not Progressing Towards Goal  Out on the unit upon fist rounds this morning. Alert and oriented,but not taking re-direction. Intrusive,Yelling,Demanding. Unable to stay focused. Taking bites of breakfast,but is restless-getting up often walking the halls. Continues to be paranoid about her belonging,caring them with her. Was agreeable to take medications with much encouragement and trying to ,[facility] deals. \" After scheduled medications were given,patient did better at following directions and was less irritable. Patient set goal of not using profanity out on the unit. Requested to lay down. Presently is sleeping in her room. Goal: *STG: Remains safe in hospital  Outcome: Progressing Towards Goal  Gait is stable with walker. Able to ambulate out on the unit this morning without difficulties. Staff to encourage use of walker and do stand-by assist when needed. Staff to monitor patient safety while on the unit. Goal: *STG: Complies with medication therapy  Outcome: Progressing Towards Goal  Agreeable to taking medications this morning with much encouragement. Continues to need re-assurance from staff. Goal: Interventions  Continue to progress towards goals for discharge. 1430- placed stool and urine on walls of room. Assist x 3 staff to complete hygiene. Shower completed. Difficulty following directions, hyperverbal and loose associations. Received prn Haldol IM.     1530 remains hyperverbal, loose associations and noncompliant with nursing direction. Continues to be intrusive.

## 2017-05-23 NOTE — BH NOTES
15:30 Patient remains agitated with aggressive speech, yelling, cursing, with minimal response to redirection. Order obtained from Dr. Sara Magallanes for Benadryl 50 mg IM PRN (Q 6 hrs). PRN Medication Documentation  At 15:59  Specific patient behavior that led to need for PRN medication:  aggressive speech, yelling, cursing, with minimal response to redirection. Staff interventions attempted prior to PRN being given: relaxation, redirection. PRN medication given: PRN IM Ativan 1 mg and PRN Benadryl 50 mg IM  Patient response/effectiveness of PRN medication: Patient remains resistant to redirection and is loud. However patient is less aggressive. 1626: Patient refused blood glucose checks. Insulin held.

## 2017-05-23 NOTE — BH NOTES
GROUP THERAPY PROGRESS NOTE    Thi Ortez did not participate in an Afternoon Geriatric Process Group with a focus on identifying feelings and planning for the day. There were not enough patients available for a group.

## 2017-05-23 NOTE — BH NOTES
Behavioral Health Interdisciplinary Rounds     Patient Name: Yusra Hamlin  Age: 65 y.o. Room/Bed:  748/01  Primary Diagnosis: Schizoaffective disorder ([facility] [address]   Admission Status: Involuntary Commitment     Readmission within 30 days: no  Power of  in place: no  Patient requires a blocked bed: yes          Reason for blocked bed: aggressive, disruptive behaviors    VTE Prophylaxis: Not indicated  Mobility needs/Fall risk: yes    Nutritional Plan: no  Consults:      (Pt is Diabetic)         Labs/Testing due today?: yes--but pt has been refusing since admitted to     Sleep hours: 5.5+ (awake since 0100)      Participation in Care/Groups:  no  Medication Compliant?: Selective  PRNS (last 24 hours): Antipsychotic (IM), Antianxiety and Pain, Antipsychotic (PO)    Restraints (last 24 hours):  no  Substance Abuse:  no  CIWA (range last 24 hours): na COWS (range last 24 hours): na  Alcohol screening (AUDIT) completed -     If applicable, date SBIRT discussed in treatment team AND documented: na  Tobacco - patient is a smoker: no   Date tobacco education completed by RN: carla  24 hour chart check complete: yes     Patient goal(s) for today:   Treatment team focus/goals: Ayan hoskins?   LOS:  5  Expected LOS: TBD  Master treatment plan initiated: 5/18          Next due (every 7 days): 5/25  Psychiatric [address] -  no   Name of Decision maker if patient has Psychiatric Care Directive - None  Patient was offered information --pt declined  Financial concerns/prescription coverage:  [employer] Medicaid  Date of last family contact: none      Family requesting physician contact today:  no  Discharge plan: TBD       Outpatient provider(s): John Vazquez [phone]; Mrs Tarango(dds) [phone]; [facility] ([facility])    Participating treatment team members: PEGGY Krishnan; Dr. Xuan Blank MD; Leida Mendoza, ESTER; Meenakshi Schultz, PharmD

## 2017-05-23 NOTE — BH NOTES
0133  Pt remains in [address] room, sleeping in recliner, but is presently mumbling at intervals. Monitoring continues. 0225  When pt awoke, RN offered to assist her in changed her pull-ups & getting cleaned up. Pt talking loudly & trying to get into refrigerator; RN held frig door closed & tole her we could get her a snack, but pt wanting Nutragrain bar, ice cream, & other snacks. Pt threw laundry bag that was nearby at wall. Pt yelling & storming off to her room, cussing this nurse; another nurse went with her to help her calm down & get cleaned up. Monitoring continues. 0326  Pt argumentative & trying to play 1 staff member against the other. Pt is safety conscious; she held onto the wheeled chair when she sat in it. Pt keeps demanding assistance & to get snacks. Refuses to get accucheck done(RN told her if her BS was low, some of the extra snacks she wants could be gotten for her, but still refuses fingerstick. ). Mumbles when not directly talking with staff, but some cuss words audible. Monitoring continues. 0347  Pt said if nurses did not give her snacks, she would lay down in the floor. Nurse said we would negotiate, snacks if BS low enough. Pt refused Accucheck, then laid down on floor. Pt said she would kill herself if she did not get snacks. When snacks negated, pt got up into kneeling position & nurses assisted pt onto her feet. Pt trying to get into frig & cabinets(locked now), but blocked. Pt cussing staff. Pt walked without using walker--steady on feet. Monitoring continues. 56  Tech assisted pt putting gripper socks on after brp. Pt took 1 of them off, then told nurse I was to pick it up for her off the floor. When RN refused, pt said \"I told you to pick it up. \" Pt came to [address] room & sat at table. She says she wants to be moved to General unit.  Pt informed she needs to be more compliant with the unit's rules, not be trying to wake all her peers up, & behave herself, then she could probably move to General. Monitoring continues. [address] pt's bathroom & bedroom floors flooded. Maintenance & Housekeeping were called. It was found that this pt had flushed many disposible wipes down toilet & it had overrun. Room being cleaned & sanitized. Monitoring continues.

## 2017-05-23 NOTE — BH NOTES
Pt remains extremely agitated, throwing a cup of water, threatening, cursing loudly, disruptive to milieu. Pt has received Haldol, benadryl and ativan IM, over the course of several hours, which have been ineffective. Call placed to Dr. Lili Donovan to gives orders for Haldol 5 mg Ativan, 2 Mg and and Benadryl 50 mg IM NOW. Injections given. 1838  Pt remains restless, calling out, mumbling.

## 2017-05-23 NOTE — BH NOTES
1530:  Patient received as she is up in her room talking and then walking down the [address]áles and into the Nurses' station. She is redirected to sit at the Dining Room table and is minimally cooperative requesting snacks for rewards for good behavior. She does allow her vital signs to be taken and continues talking and singing loudly - demanding snacks and attention. Patient with poor boundaries is loud, inappropriate and refuses redirection. Will maintain q 15 minute safety checks. 1620:  Patient screaming/yelling for attention. She has had another bowel movement, attempting to smear staff and her room/linens. She slaps the arm of the nurse attempting to clean her up. She has received IM Benadryl and IM Ativan in addition to the IM Haldol administered on Day Shift. Patient escorted to sit in the [place] chair in the [address] for close staff observation. Will continue to monitor. 1645:  Lights are down on the unit. Patient seated in the [place] chair at the Dining Room table is shouting and threatening staff - throwing cups of water at staff. Will continue to monitor. 1845:  Patient has received Scheduled meds, PRN meds and a one time dose of Benadryl, Haldol and Ativan over the course of the past 3+ hours. She remains defiant and restless. She is yelling, screaming, cursing and  refusing to follow directions. She is seated in the [place] chair at the [address] table for line of sight observation. Will continue to monitor. 1900:  Patient is slowing down - she is still talking, mumbling and calling out from her chair at the Dining Room table. Will continue to monitor. 2000:  Patient continues to mumble, talk and sing as she sits in the [place] chair in the [address] Room. Will continue to monitor. 2045:  Patient finally appears to be resting comfortably and quietly in the [place] chair. Will continue to monitor.       2145:  Patient mumbling - is attempting to wake up and pushing the [address] table.  Will continue to monitor. 2155:  Patient yelling and screaming in the [address] Room - she is mumbling and slurring her words - will continue to monitor.

## 2017-05-23 NOTE — PROGRESS NOTES
Problem: Psychosis  Goal: *STG/LTG: Demonstrates improved social functioning by responding appropriately to staff  Outcome: Not Progressing Towards Goal  Patient with poor boundaries remains intrusive, demanding, defiant and non-compliant with patient care efforts.
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
## 2017-05-23 NOTE — BH NOTES
GROUP THERAPY PROGRESS NOTE    Edwin Ras did not participate in a Morning Process Group on the Geriatric Unit, with a focus on identifying feelings and planning for the day. The group session was not held because there were not two patients on this unit who were available.
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
## 2017-05-24 PROCEDURE — 74011250637 HC RX REV CODE- 250/637: Performed by: PSYCHIATRY & NEUROLOGY

## 2017-05-24 PROCEDURE — 74011250636 HC RX REV CODE- 250/636: Performed by: PSYCHIATRY & NEUROLOGY

## 2017-05-24 PROCEDURE — 65220000003 HC RM SEMIPRIVATE PSYCH

## 2017-05-24 PROCEDURE — 74011250637 HC RX REV CODE- 250/637: Performed by: HOSPITALIST

## 2017-05-24 RX ADMIN — LORAZEPAM 1 MG: 1 TABLET ORAL at 03:43

## 2017-05-24 RX ADMIN — HALOPERIDOL LACTATE 5 MG: 5 INJECTION, SOLUTION INTRAMUSCULAR at 22:13

## 2017-05-24 RX ADMIN — LORAZEPAM 1 MG: 1 TABLET ORAL at 17:14

## 2017-05-24 RX ADMIN — AMLODIPINE BESYLATE 10 MG: 5 TABLET ORAL at 08:03

## 2017-05-24 RX ADMIN — QUETIAPINE FUMARATE 300 MG: 100 TABLET, FILM COATED ORAL at 08:03

## 2017-05-24 RX ADMIN — ATORVASTATIN CALCIUM 20 MG: 20 TABLET, FILM COATED ORAL at 08:03

## 2017-05-24 RX ADMIN — DIVALPROEX SODIUM 500 MG: 500 TABLET, DELAYED RELEASE ORAL at 08:03

## 2017-05-24 RX ADMIN — LORAZEPAM 1 MG: 1 TABLET ORAL at 08:03

## 2017-05-24 RX ADMIN — DIVALPROEX SODIUM 500 MG: 500 TABLET, DELAYED RELEASE ORAL at 17:14

## 2017-05-24 RX ADMIN — HALOPERIDOL LACTATE 5 MG: 5 INJECTION, SOLUTION INTRAMUSCULAR at 06:05

## 2017-05-24 RX ADMIN — DIPHENHYDRAMINE HYDROCHLORIDE 50 MG: 50 INJECTION, SOLUTION INTRAMUSCULAR; INTRAVENOUS at 22:13

## 2017-05-24 RX ADMIN — QUETIAPINE FUMARATE 300 MG: 100 TABLET, FILM COATED ORAL at 17:14

## 2017-05-24 RX ADMIN — LORAZEPAM 1 MG: 2 INJECTION INTRAMUSCULAR; INTRAVENOUS at 22:12

## 2017-05-24 RX ADMIN — DIPHENHYDRAMINE HYDROCHLORIDE 50 MG: 50 INJECTION, SOLUTION INTRAMUSCULAR; INTRAVENOUS at 03:40

## 2017-05-24 RX ADMIN — SITAGLIPTIN 100 MG: 100 TABLET, FILM COATED ORAL at 08:03

## 2017-05-24 RX ADMIN — HYDROCHLOROTHIAZIDE 25 MG: 25 TABLET ORAL at 08:02

## 2017-05-24 NOTE — PROGRESS NOTES
Problem: Altered Thought Process (Adult/Pediatric)  Goal: *STG: Remains safe in hospital  Outcome: Progressing Towards Goal  2330 Pt appears asleep in recliner at nurse's station. Respirations even and unlabored. Pt close to nurse's station for safety. Will continue to monitor with Q 15 safety checks. 80 Pt began mumbling and calling for nurse. Stated she wanted to go to bed. 2 RNs assisted pt to room and began attempting to help pt with incontinence care. Pt became combative, cursing and slapping staff. Security called to scene. Third RN came to room, pt became calmer when seeing third RN, with whom she was familiar. Pt allowed staff to assist with toileting and insentience care. Pt voided in bedside commode and laid in bed. No medications administered at this time due to pt drowsiness and becoming compliant. 0020 Pt resting quietly in bed. NAD. Light on and door remains open. Will continue to monitor. 0230 Pt walking down hallway, threw her own watch at floor, a metal piece broke off and placed in locked cabinet. 0320 Pt went in another pts room. 0340 PRN Medication Documentation    Specific patient behavior that led to need for PRN medication: combative/hitting staff, cursing, yelling, singing, attempting to drag staff to floor   Staff interventions attempted prior to PRN being given: distraction, music, snack, calm redirection, sitting with pt, therapeutic listening  PRN medication given: Benadryl 50 mg IM, Ativan 1 mg IM - security present, some hands-on required   Patient response/effectiveness of PRN medication: 0420 Pt appears asleep in recliner, respirations even and unlabored.       5902 PRN Medication Documentation    Specific patient behavior that led to need for PRN medication: squirting foam soap at staff and over floors, smearing bottom on floor down hallway, combative with staff, yelling and cursing loudly  Staff interventions attempted prior to PRN being given: everything   PRN medication given: Haldol 5 mg IM   Patient response/effectiveness of PRN medication: 0630 Pt remains loud, yelling and singing in room. Staff sitting with pt.

## 2017-05-24 NOTE — PROGRESS NOTES
Problem: Altered Thought Process (Adult/Pediatric)  Goal: *STG: Participates in treatment plan  Outcome: Not Progressing Towards Goal  Sleeping off and on this morning. Easily wakes for breakfast,needing to be fed by staff. Continues to be paranoid and having altered thought process. Irritated at [address] water at nurse. Was medication and meal compliant. Ate 100% of breakfast. Assisted with am cares/daisy care. Returned to sleeping and has remained sleeping all morning. Refused blood sugar this am when awake for breakfast.   Goal: *STG: Remains safe in hospital  Outcome: Progressing Towards Goal  Remains safe on the unit. Bed alarm is on the bed. Patient remains on 15min checks throughout the day. Assisted by staff x2 for any cares. Goal: *STG: Complies with medication therapy  Outcome: Progressing Towards Goal  Was medication and meal compliant this morning.   Goal: Interventions  Outcome: Progressing Towards Goal  Encourage progressing towards goals for discharge planning

## 2017-05-24 NOTE — BH NOTES
The documentation for this period is being entered following the guidelines as defined in the [facility] policy by Griselda Lehman RN.    [id number]

## 2017-05-24 NOTE — BH NOTES
Pt remains extremely agitated, throwing a cup of water, threatening, cursing loudly, disruptive to milieu. Pt has received Haldol, benadryl and ativan IM, over the course of several hours, which have been ineffective. Call placed to Dr. Matthew Elkins to gives orders for Haldol 5 mg Ativan, 2 Mg and and Benadryl 50 mg IM NOW. Injections given. 1838  Pt remains restless, calling out, mumbling. 2100  Pt refuses  meds.

## 2017-05-24 NOTE — PROGRESS NOTES
Problem: Psychosis  Goal: *STG: Decreased delusional thinking  Outcome: Not Progressing Towards Goal  1525 Received patient resting quietly in bed with eyes closed. NAD.   VSS. Refused to have blood sugars taken. Ate 100% dinner, fluids and snacks. Requires two staff assist to ambulate and toilet. Incontinent of urine and bowel movement. Labile, irritable, poured water and soda on table and floor. Attempted to hit staff on arms. Does not follow redirections. She received at 2213 PRN IM  Haldol, Benadryl and Ativan. 2310 Continues to be Labile. Refused to take scheduled Ambien.

## 2017-05-24 NOTE — INTERDISCIPLINARY ROUNDS
Behavioral Health Interdisciplinary Rounds     Patient Name: Valente Mosher  Age: 65 y.o. Room/Bed:  748/01  Primary Diagnosis: Schiz[facility]ffective disorder ([facility]   Admission Status: Involuntary Commitment     Readmission within 30 days: no  Power of  in place: no  Patient requires a blocked bed: yes          Reason for blocked bed: aggressive/combative behavior     VTE Prophylaxis: Not indicated  Mobility needs/Fall risk: yes    Nutritional Plan: no  Consults: no         Labs/Testing due today?: yes: combative, refusing     Sleep hours: 1        Participation in Care/Groups:  no  Medication Compliant?: Selective  PRNS (last 24 hours): Antipsychotic (IM) and Antianxiety (IM)    Restraints (last 24 hours):  no  Substance Abuse:  no  CIWA (range last 24 hours):  COWS (range last 24 hours):   Alcohol screening (AUDIT) completed -     If applicable, date SBIRT discussed in treatment team AND documented: N/A  Tobacco - patient is a smoker: no   Date tobacco education completed by RN: n/a  24 hour chart check complete: yes     Patient goal(s) for today:   Treatment team focus/goals: [address] paperwork  LOS:  6  Expected LOS: TBD  Master treatment plan initiated:           Next due (every 7 days):   Psychiatric Advanced Care Directives - No  Name of Decision maker if patient has Psychiatric Care Directive: None  Patient was offered information, patient declined.    Financial concerns/prescription coverage: Medicaid  Date of last family contact:       Family requesting physician contact today:    Discharge plan: TBD       Outpatient provider(s): TBD    Participating treatment team members: PEGGY Rodgers; Dr. Kelvin Kim MD; Payton Gibson RN; Umesh PadillaD; Pharmacy resident

## 2017-05-25 LAB
GLUCOSE BLD STRIP.AUTO-MCNC: 110 MG/DL (ref 65–100)
GLUCOSE BLD STRIP.AUTO-MCNC: 273 MG/DL (ref 65–100)
GLUCOSE BLD STRIP.AUTO-MCNC: 85 MG/DL (ref 65–100)
SERVICE CMNT-IMP: ABNORMAL
SERVICE CMNT-IMP: ABNORMAL
SERVICE CMNT-IMP: NORMAL

## 2017-05-25 PROCEDURE — 65220000003 HC RM SEMIPRIVATE PSYCH

## 2017-05-25 PROCEDURE — 74011250636 HC RX REV CODE- 250/636: Performed by: PSYCHIATRY & NEUROLOGY

## 2017-05-25 PROCEDURE — 82962 GLUCOSE BLOOD TEST: CPT

## 2017-05-25 PROCEDURE — 74011250637 HC RX REV CODE- 250/637: Performed by: PSYCHIATRY & NEUROLOGY

## 2017-05-25 PROCEDURE — 74011250637 HC RX REV CODE- 250/637: Performed by: HOSPITALIST

## 2017-05-25 RX ORDER — DIPHENHYDRAMINE HYDROCHLORIDE 50 MG/ML
50 INJECTION, SOLUTION INTRAMUSCULAR; INTRAVENOUS ONCE
Status: COMPLETED | OUTPATIENT
Start: 2017-05-25 | End: 2017-05-25

## 2017-05-25 RX ORDER — LORAZEPAM 2 MG/ML
1 INJECTION INTRAMUSCULAR ONCE
Status: COMPLETED | OUTPATIENT
Start: 2017-05-25 | End: 2017-05-25

## 2017-05-25 RX ORDER — FLUPHENAZINE HYDROCHLORIDE 5 MG/1
5 TABLET ORAL 2 TIMES DAILY
Status: DISCONTINUED | OUTPATIENT
Start: 2017-05-25 | End: 2017-05-26

## 2017-05-25 RX ORDER — FLUPHENAZINE HYDROCHLORIDE 5 MG/1
5 TABLET ORAL 2 TIMES DAILY
Status: DISCONTINUED | OUTPATIENT
Start: 2017-05-25 | End: 2017-05-25

## 2017-05-25 RX ADMIN — DIPHENHYDRAMINE HYDROCHLORIDE 50 MG: 50 INJECTION, SOLUTION INTRAMUSCULAR; INTRAVENOUS at 19:24

## 2017-05-25 RX ADMIN — ZOLPIDEM TARTRATE 10 MG: 10 TABLET ORAL at 21:37

## 2017-05-25 RX ADMIN — LORAZEPAM 1 MG: 1 TABLET ORAL at 08:12

## 2017-05-25 RX ADMIN — DIPHENHYDRAMINE HYDROCHLORIDE 50 MG: 50 INJECTION, SOLUTION INTRAMUSCULAR; INTRAVENOUS at 19:30

## 2017-05-25 RX ADMIN — HYDROCHLOROTHIAZIDE 25 MG: 25 TABLET ORAL at 08:11

## 2017-05-25 RX ADMIN — LORAZEPAM 1 MG: 2 INJECTION INTRAMUSCULAR; INTRAVENOUS at 19:25

## 2017-05-25 RX ADMIN — DIVALPROEX SODIUM 500 MG: 500 TABLET, DELAYED RELEASE ORAL at 17:19

## 2017-05-25 RX ADMIN — LORAZEPAM 1 MG: 2 INJECTION INTRAMUSCULAR; INTRAVENOUS at 19:03

## 2017-05-25 RX ADMIN — OLANZAPINE 2.5 MG: 2.5 TABLET, FILM COATED ORAL at 15:58

## 2017-05-25 RX ADMIN — DIVALPROEX SODIUM 500 MG: 500 TABLET, DELAYED RELEASE ORAL at 08:12

## 2017-05-25 RX ADMIN — ATORVASTATIN CALCIUM 20 MG: 20 TABLET, FILM COATED ORAL at 08:11

## 2017-05-25 RX ADMIN — LORAZEPAM 1 MG: 2 INJECTION INTRAMUSCULAR; INTRAVENOUS at 05:10

## 2017-05-25 RX ADMIN — AMLODIPINE BESYLATE 10 MG: 5 TABLET ORAL at 08:12

## 2017-05-25 RX ADMIN — HALOPERIDOL LACTATE 5 MG: 5 INJECTION, SOLUTION INTRAMUSCULAR at 05:19

## 2017-05-25 RX ADMIN — SITAGLIPTIN 100 MG: 100 TABLET, FILM COATED ORAL at 09:15

## 2017-05-25 RX ADMIN — QUETIAPINE FUMARATE 300 MG: 100 TABLET, FILM COATED ORAL at 08:11

## 2017-05-25 RX ADMIN — HALOPERIDOL LACTATE 5 MG: 5 INJECTION, SOLUTION INTRAMUSCULAR at 19:03

## 2017-05-25 RX ADMIN — FLUPHENAZINE HYDROCHLORIDE 5 MG: 5 TABLET, FILM COATED ORAL at 21:10

## 2017-05-25 RX ADMIN — DIPHENHYDRAMINE HYDROCHLORIDE 50 MG: 50 INJECTION, SOLUTION INTRAMUSCULAR; INTRAVENOUS at 05:26

## 2017-05-25 RX ADMIN — LORAZEPAM 1 MG: 1 TABLET ORAL at 17:19

## 2017-05-25 RX ADMIN — LORAZEPAM 1 MG: 2 INJECTION INTRAMUSCULAR; INTRAVENOUS at 19:30
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
## 2017-05-25 NOTE — BH NOTES
2330 Pt singing loudly at nurse's station. Spilling water on floor, removing clothing. Recently received prn medication. Will attempt to decrease stimuli. Will continue to monitor with Q 15 safety checks. 0015 Pt appears asleep in recliner at nurse's station. Respirations even and unlabored. 9682 PRN Medication Documentation    Specific patient behavior that led to need for PRN medication: escalating in agitation, yelling and cursing, grabbing staff and attempting to pull to floor  Staff interventions attempted prior to PRN being given: sitting with pt, calm interactions, boundary setting, calm music, distraction, toileting needs   PRN medication given: Ativan 1 mg IM, Benadryl 50 mg IM, Haldol 5 mg IM  Patient response/effectiveness of PRN medication: 0600 Pt returned to room and is singing loudly, pulling all of her belongings out of drawer     0700 Pt continues to sing and call out, rearranging items in room.
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
## 2017-05-25 NOTE — INTERDISCIPLINARY ROUNDS
Behavioral Health Interdisciplinary Rounds     Patient Name: Darcy Anderson  Age: 65 y.o. Room/Bed:  748/  Primary Diagnosis: [facility]izoaffective disorder ([facility]   Admission Status: Involuntary Commitment     Readmission within 30 days: no  Power of  in place: no  Patient requires a blocked bed: yes          Reason for blocked bed: aggressive/combative/intrusive behavior     VTE Prophylaxis: Not indicated  Mobility needs/Fall risk: yes    Nutritional Plan: no  Consults: no         Labs/Testing due today?: yes: unsuccessful after two attempts: pt refuses further attempts. Sleep hours: 3        Participation in Care/Groups:  no  Medication Compliant?: Selective  PRNS (last 24 hours): Antipsychotic (IM) and Antianxiety (IM)    Restraints (last 24 hours):  no  Substance Abuse:  no  CIWA (range last 24 hours):  COWS (range last 24 hours):   Alcohol screening (AUDIT) completed -     If applicable, date SBIRT discussed in treatment team AND documented:   Tobacco - patient is a smoker: no   Date tobacco education completed by RN: n/a  24 hour chart check complete: yes     Patient goal(s) for today:   Treatment team focus/goals: [address] paperwork  LOS:  7  Expected LOS: TBD  Master treatment plan initiated: 5/19         Next due (every 7 days): 5/26  Psychiatric Advanced Care Directives -  No  Name of Decision maker if patient has Psychiatric Care Directive: None  Patient was offered information, patient declined.   Financial concerns/prescription coverage: Medicaid/Medicare    Date of last family contact:  5/25 Dr to contact family     Family requesting physician contact today: No  Discharge plan: TBD       Outpatient provider(s): TBD    Participating treatment team members: Darcy Anderson, PEGGY Bass; Dr. Martin Trevino MD; Jamaal Perry, ESTER; Betina Saenz, UmeshD

## 2017-05-25 NOTE — BH NOTES
SECOND OPINION FORCED MEDICATIONS NOTE.5-25-17    Pt. Suffers from schizoaffective d/o. She refuses to take medications due to her mistaken view that she does not requires sparkle and that she does not have a mental  Illness. The patient is disorganized and very physically aggressive. She also has poor self care. She has been combative with nurses, spitting in one's face. If this patient is not given psychiatric medications she will deteriorate and her condition will worsen and become more less responsive to treatment.    Colton Giron M.D.

## 2017-05-25 NOTE — PROGRESS NOTES
Problem: Falls - Risk of  Goal: *Absence of falls  Outcome: Progressing Towards Goal  Patient is absent of falls. Labile affect, mood congruent, flight of ideas, paranoid, difficult to re-direct. Patient is medication compliant, ate breakfast, slept through lunch. Remains on Q15 min safety checks.
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
## 2017-05-26 LAB
ATRIAL RATE: 83 BPM
CALCULATED P AXIS, ECG09: 48 DEGREES
CALCULATED R AXIS, ECG10: -22 DEGREES
CALCULATED T AXIS, ECG11: 57 DEGREES
DIAGNOSIS, 93000: NORMAL
GLUCOSE BLD STRIP.AUTO-MCNC: 130 MG/DL (ref 65–100)
P-R INTERVAL, ECG05: 158 MS
Q-T INTERVAL, ECG07: 428 MS
QRS DURATION, ECG06: 106 MS
QTC CALCULATION (BEZET), ECG08: 502 MS
SERVICE CMNT-IMP: ABNORMAL
VENTRICULAR RATE, ECG03: 83 BPM

## 2017-05-26 PROCEDURE — 74011250636 HC RX REV CODE- 250/636: Performed by: PSYCHIATRY & NEUROLOGY

## 2017-05-26 PROCEDURE — 82962 GLUCOSE BLOOD TEST: CPT

## 2017-05-26 PROCEDURE — 65220000003 HC RM SEMIPRIVATE PSYCH

## 2017-05-26 PROCEDURE — 74011250637 HC RX REV CODE- 250/637: Performed by: HOSPITALIST

## 2017-05-26 PROCEDURE — 93005 ELECTROCARDIOGRAM TRACING: CPT

## 2017-05-26 PROCEDURE — 74011250637 HC RX REV CODE- 250/637: Performed by: PSYCHIATRY & NEUROLOGY

## 2017-05-26 PROCEDURE — 74011000250 HC RX REV CODE- 250: Performed by: PSYCHIATRY & NEUROLOGY

## 2017-05-26 RX ORDER — FLUPHENAZINE HYDROCHLORIDE 5 MG/1
10 TABLET ORAL 2 TIMES DAILY
Status: DISCONTINUED | OUTPATIENT
Start: 2017-05-26 | End: 2017-05-27

## 2017-05-26 RX ORDER — OLANZAPINE 5 MG/1
5 TABLET ORAL
Status: DISCONTINUED | OUTPATIENT
Start: 2017-05-26 | End: 2017-08-16 | Stop reason: HOSPADM

## 2017-05-26 RX ORDER — LORAZEPAM 1 MG/1
1 TABLET ORAL 3 TIMES DAILY
Status: DISCONTINUED | OUTPATIENT
Start: 2017-05-26 | End: 2017-05-27

## 2017-05-26 RX ADMIN — DIVALPROEX SODIUM 500 MG: 500 TABLET, DELAYED RELEASE ORAL at 17:35

## 2017-05-26 RX ADMIN — LORAZEPAM 1 MG: 1 TABLET ORAL at 13:44

## 2017-05-26 RX ADMIN — ACETAMINOPHEN 650 MG: 325 TABLET, FILM COATED ORAL at 11:23

## 2017-05-26 RX ADMIN — LORAZEPAM 1 MG: 1 TABLET ORAL at 21:44

## 2017-05-26 RX ADMIN — OLANZAPINE 2.5 MG: 2.5 TABLET, FILM COATED ORAL at 11:23

## 2017-05-26 RX ADMIN — LORAZEPAM 1 MG: 1 TABLET ORAL at 17:36

## 2017-05-26 RX ADMIN — DIPHENHYDRAMINE HYDROCHLORIDE 50 MG: 50 INJECTION, SOLUTION INTRAMUSCULAR; INTRAVENOUS at 03:41

## 2017-05-26 RX ADMIN — HALOPERIDOL LACTATE 5 MG: 5 INJECTION, SOLUTION INTRAMUSCULAR at 03:40

## 2017-05-26 RX ADMIN — ZOLPIDEM TARTRATE 10 MG: 10 TABLET ORAL at 21:44

## 2017-05-26 RX ADMIN — WATER 10 MG: 1 INJECTION INTRAMUSCULAR; INTRAVENOUS; SUBCUTANEOUS at 07:00

## 2017-05-26 RX ADMIN — DIVALPROEX SODIUM 500 MG: 500 TABLET, DELAYED RELEASE ORAL at 08:47

## 2017-05-26 RX ADMIN — HYDROCHLOROTHIAZIDE 25 MG: 25 TABLET ORAL at 08:47

## 2017-05-26 RX ADMIN — LORAZEPAM 1 MG: 1 TABLET ORAL at 08:47

## 2017-05-26 RX ADMIN — FLUPHENAZINE HYDROCHLORIDE 10 MG: 5 TABLET, FILM COATED ORAL at 17:35

## 2017-05-26 RX ADMIN — AMLODIPINE BESYLATE 10 MG: 5 TABLET ORAL at 08:46

## 2017-05-26 RX ADMIN — FLUPHENAZINE HYDROCHLORIDE 5 MG: 5 TABLET, FILM COATED ORAL at 08:51

## 2017-05-26 RX ADMIN — ATORVASTATIN CALCIUM 20 MG: 20 TABLET, FILM COATED ORAL at 08:47

## 2017-05-26 RX ADMIN — SITAGLIPTIN 100 MG: 100 TABLET, FILM COATED ORAL at 08:46

## 2017-05-26 RX ADMIN — LORAZEPAM 1 MG: 2 INJECTION INTRAMUSCULAR; INTRAVENOUS at 03:40

## 2017-05-26 NOTE — PROGRESS NOTES
Problem: Altered Thought Process (Adult/Pediatric)  Goal: *STG: Participates in treatment plan  Outcome: Progressing Towards Goal  Patient denies SI. Patient labile. Patient un-cooperative. Patient non-redirectable at times.

## 2017-05-26 NOTE — BH NOTES
7359  Went looking for the cordless cell phone for this unit;  button was pushed. Found it under this pt's newspaper & sheets in her bed. When located, pt started screaming. Pt was informed why staff had disturbed her sleep, but she would not listen. Trying to get her to go back to sleep. Monitoring continues. PRN Medication Documentation    Specific patient behavior that led to need for PRN medication: Pt agitated when staff located cordless phone in her bed & had to retrieve to to be charging. Pt yelling & calling out; went into another pt's room & grabbed a trash can swinging it. Encouraged her to go back to her room, but pt not following directions. Combative & argumentative. Staff interventions attempted prior to PRN being given: Diversion; attempted reorienting pt & to calm her. PRN medication given: 0340 Haldol & Ativan given IM in BRAVO; 0341 Benadryl 50 mg IM given in BRAVO 1\" from previous IM  Patient response/effectiveness of PRN medication: Pt steady on feet; continues trying to wake rest of pts; Monitoring continues. 0435  Pt awake, but quiet & calm.

## 2017-05-26 NOTE — BH NOTES
Behavioral Health Interdisciplinary Rounds     Patient Name: Kelvin Lopez  Age: 65 y.o. Room/Bed:  748/  Primary Diagnosis: Schizoaffective disorder (HCC)   Admission Status: Involuntary Commitment     Readmission within 30 days: no  Power of  in place: yes  Patient requires a blocked bed: yes          Reason for blocked bed: aggressive/combative/intrusive behaviors    VTE Prophylaxis: Not indicated  Mobility needs/Fall risk: yes    Nutritional Plan: no  Consults: no         Labs/Testing due today?: yes    Sleep hours: 5.25+  (she's been awake sine 0330)       Participation in Care/Groups:  no  Medication Compliant?: Selective  PRNS (last 24 hours):  Antipsychotic (PO), Antipsychotic (IM) and Antianxiety+Benadryl IM to prevent EPS   Restraints (last 24 hours):  no  Substance Abuse:  no  CIWA (range last 24 hours): na COWS (range last 24 hours): na  Alcohol screening (AUDIT) completed -     If applicable, date SBIRT discussed in treatment team AND documented: na  Tobacco - patient is a smoker: no   Date tobacco education completed by RN: carla  24 hour chart check complete: yes     Patient goal(s) for today:   Treatment team focus/goals: [address] medications  LOS:  8  Expected LOS: TBD  Master treatment plan initiated: 5/19          Next due (every 7 days): 5/26  Psychiatric [address] -  no   Name of Decision maker if patient has [address] Directive-- none  Patient was offered information pt declined  Financial concerns/prescription coverage:  Medicare/Medicaid  Date of last family contact: 5/25  Dr to contact family      Family requesting physician contact today:  no  Discharge plan: TBD       Outpatient provider(s): TBD    Participating treatment team members: Sally Khan, MSW; Dr. Lilian Munoz MD; Marcus Brennan, RN

## 2017-05-26 NOTE — PROGRESS NOTES
Problem: Falls - Risk of  Goal: *Absence of falls  Outcome: Progressing Towards Goal  No falls this am. Poor safety awareness due to altered thought process. Problem: Altered Thought Process (Adult/Pediatric)  Goal: *STG: Participates in treatment plan  Outcome: Progressing Towards Goal  Forced med hearing complete. New medication orders will be completed this am during tx team. Up on unit hyperverbal, flight of ideas, tangential and loose associations. Difficulty following nursing direction. Continues to demonstrate intrusive behaviors. Nursing will continue to offer support, encourage medication compliance and limit setting. Goal: *STG: Demonstrates ability to understand and use improved judgment in daily activities and relationships  Outcome: Not Progressing Towards Goal  Hyperverbal, loose associations, flight of ideas and tangential     0926 sitting in dayroom hyper verbal, loose associations and flight of ideas. Demonstrates appropriate behaviors with decrease in intrusiveness and no profanity. Staff continue to encourage improved behaviors.

## 2017-05-26 NOTE — BH NOTES
PRN Medication Documentation    Specific patient behavior that led to need for PRN medication: patient is loud, calling out for sister, attempting to climb out of chair, racing thoughts,  sitting in floor, not following redirections, she also requested med for all over body pain  Staff interventions attempted prior to PRN being given: gave snacks, TV, walking in hallway with staff earlier   PRN medication given: 1126 zyprexa  2.5 mg  And Tylenol 650 mg tab  Patient response/effectiveness of PRN medication: Will monitor. 1226 Patient is sitting quietly at DR table eating lunch. Med was effective.

## 2017-05-26 NOTE — BH NOTES
PRN Medication Documentation  At 1344  Specific patient behavior that led to need for PRN medication: c/o anxiety  Staff interventions attempted prior to PRN being given: relaxation   PRN medication given: Ativan 1 mg po prn   Patient response/effectiveness of PRN medication:  Medication was helpful per pt      Patient was compliant with EKG.

## 2017-05-26 NOTE — BH NOTES
Care management Note;    Patient had a forced medication hearing this am and medications were forced.
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
## 2017-05-26 NOTE — PROGRESS NOTES
Problem: Altered Thought Process (Adult/Pediatric)  Goal: *STG: Attends activities and groups  Outcome: Not Progressing Towards Goal  Variance: Patient Uncooperative      Goal: *STG: Decreased delusional thinking  Outcome: Not Progressing Towards Goal  Variance: Patient slowly responding      Goal: *STG: Demonstrates ability to understand and use improved judgment in daily activities and relationships  Outcome: Not Progressing Towards Goal  Variance: Patient Uncooperative        Problem: Psychosis  Goal: *STG: Decreased hallucinations  Outcome: Not Progressing Towards Goal  Variance: Patient slowly responding      Goal: *STG: Decreased delusional thinking  Outcome: Not Progressing Towards Goal  Variance: Patient slowly responding      Goal: *STG: Patient will verbalize areas in need of boundary recognition and limit setting  Outcome: Not Progressing Towards Goal  Variance: Patient Uncooperative      Goal: *STG/LTG: Demonstrates improved thought patterns as evidenced by logical and coherent speech  Outcome: Not Progressing Towards Goal  Variance: Patient slowly responding      Goal: *STG/LTG: Demonstrates improved social functioning by responding appropriately to staff  Outcome: Not Progressing Towards Goal  Variance: Patient Uncooperative

## 2017-05-27 LAB
ALBUMIN SERPL BCP-MCNC: 3.7 G/DL (ref 3.5–5)
ALBUMIN/GLOB SERPL: 1.2 {RATIO} (ref 1.1–2.2)
ALP SERPL-CCNC: 103 U/L (ref 45–117)
ALT SERPL-CCNC: 29 U/L (ref 12–78)
ANION GAP BLD CALC-SCNC: 10 MMOL/L (ref 5–15)
AST SERPL W P-5'-P-CCNC: 32 U/L (ref 15–37)
BILIRUB SERPL-MCNC: 0.2 MG/DL (ref 0.2–1)
BUN SERPL-MCNC: 36 MG/DL (ref 6–20)
BUN/CREAT SERPL: 36 (ref 12–20)
CALCIUM SERPL-MCNC: 8.7 MG/DL (ref 8.5–10.1)
CHLORIDE SERPL-SCNC: 102 MMOL/L (ref 97–108)
CHOLEST SERPL-MCNC: 150 MG/DL
CO2 SERPL-SCNC: 25 MMOL/L (ref 21–32)
CREAT SERPL-MCNC: 1 MG/DL (ref 0.55–1.02)
ERYTHROCYTE [DISTWIDTH] IN BLOOD BY AUTOMATED COUNT: 12.4 % (ref 11.5–14.5)
EST. AVERAGE GLUCOSE BLD GHB EST-MCNC: 126 MG/DL
GLOBULIN SER CALC-MCNC: 3.2 G/DL (ref 2–4)
GLUCOSE BLD STRIP.AUTO-MCNC: 128 MG/DL (ref 65–100)
GLUCOSE BLD STRIP.AUTO-MCNC: 177 MG/DL (ref 65–100)
GLUCOSE BLD STRIP.AUTO-MCNC: 203 MG/DL (ref 65–100)
GLUCOSE SERPL-MCNC: 136 MG/DL (ref 65–100)
HBA1C MFR BLD: 6 % (ref 4.2–6.3)
HCT VFR BLD AUTO: 33.4 % (ref 35–47)
HDLC SERPL-MCNC: 83 MG/DL
HDLC SERPL: 1.8 {RATIO} (ref 0–5)
HGB BLD-MCNC: 11.1 G/DL (ref 11.5–16)
LDLC SERPL CALC-MCNC: 53.8 MG/DL (ref 0–100)
LIPID PROFILE,FLP: NORMAL
MCH RBC QN AUTO: 33.6 PG (ref 26–34)
MCHC RBC AUTO-ENTMCNC: 33.2 G/DL (ref 30–36.5)
MCV RBC AUTO: 101.2 FL (ref 80–99)
PLATELET # BLD AUTO: 175 K/UL (ref 150–400)
POTASSIUM SERPL-SCNC: 4.1 MMOL/L (ref 3.5–5.1)
PROT SERPL-MCNC: 6.9 G/DL (ref 6.4–8.2)
RBC # BLD AUTO: 3.3 M/UL (ref 3.8–5.2)
SERVICE CMNT-IMP: ABNORMAL
SODIUM SERPL-SCNC: 137 MMOL/L (ref 136–145)
TRIGL SERPL-MCNC: 66 MG/DL (ref ?–150)
TSH SERPL DL<=0.05 MIU/L-ACNC: 1.09 UIU/ML (ref 0.36–3.74)
VALPROATE SERPL-MCNC: 105 UG/ML (ref 50–100)
VLDLC SERPL CALC-MCNC: 13.2 MG/DL
WBC # BLD AUTO: 6 K/UL (ref 3.6–11)

## 2017-05-27 PROCEDURE — 74011636637 HC RX REV CODE- 636/637: Performed by: HOSPITALIST

## 2017-05-27 PROCEDURE — 80061 LIPID PANEL: CPT | Performed by: PSYCHIATRY & NEUROLOGY

## 2017-05-27 PROCEDURE — 93005 ELECTROCARDIOGRAM TRACING: CPT | Performed by: PSYCHIATRY & NEUROLOGY

## 2017-05-27 PROCEDURE — 83036 HEMOGLOBIN GLYCOSYLATED A1C: CPT | Performed by: PSYCHIATRY & NEUROLOGY

## 2017-05-27 PROCEDURE — 74011250636 HC RX REV CODE- 250/636: Performed by: PSYCHIATRY & NEUROLOGY

## 2017-05-27 PROCEDURE — 36415 COLL VENOUS BLD VENIPUNCTURE: CPT | Performed by: PSYCHIATRY & NEUROLOGY

## 2017-05-27 PROCEDURE — 85027 COMPLETE CBC AUTOMATED: CPT | Performed by: PSYCHIATRY & NEUROLOGY

## 2017-05-27 PROCEDURE — 74011250637 HC RX REV CODE- 250/637: Performed by: PSYCHIATRY & NEUROLOGY

## 2017-05-27 PROCEDURE — 80053 COMPREHEN METABOLIC PANEL: CPT | Performed by: PSYCHIATRY & NEUROLOGY

## 2017-05-27 PROCEDURE — 74011250637 HC RX REV CODE- 250/637: Performed by: HOSPITALIST

## 2017-05-27 PROCEDURE — 65220000003 HC RM SEMIPRIVATE PSYCH

## 2017-05-27 PROCEDURE — 84443 ASSAY THYROID STIM HORMONE: CPT | Performed by: PSYCHIATRY & NEUROLOGY

## 2017-05-27 PROCEDURE — 80164 ASSAY DIPROPYLACETIC ACD TOT: CPT | Performed by: PSYCHIATRY & NEUROLOGY

## 2017-05-27 PROCEDURE — 82962 GLUCOSE BLOOD TEST: CPT

## 2017-05-27 RX ORDER — FLUPHENAZINE HYDROCHLORIDE 5 MG/1
15 TABLET ORAL
Status: DISCONTINUED | OUTPATIENT
Start: 2017-05-27 | End: 2017-05-27

## 2017-05-27 RX ORDER — FLUPHENAZINE HYDROCHLORIDE 2.5 MG/ML
2.5 INJECTION, SOLUTION INTRAMUSCULAR DAILY
Status: DISCONTINUED | OUTPATIENT
Start: 2017-05-28 | End: 2017-06-08

## 2017-05-27 RX ORDER — FLUPHENAZINE HYDROCHLORIDE 2.5 MG/ML
2.5 INJECTION, SOLUTION INTRAMUSCULAR 2 TIMES DAILY
Status: DISCONTINUED | OUTPATIENT
Start: 2017-05-27 | End: 2017-06-13 | Stop reason: SDUPTHER

## 2017-05-27 RX ORDER — FLUPHENAZINE HYDROCHLORIDE 5 MG/1
10 TABLET ORAL DAILY
Status: DISCONTINUED | OUTPATIENT
Start: 2017-05-28 | End: 2017-06-08

## 2017-05-27 RX ORDER — FLUPHENAZINE HYDROCHLORIDE 2.5 MG/ML
5 INJECTION, SOLUTION INTRAMUSCULAR DAILY
Status: DISCONTINUED | OUTPATIENT
Start: 2017-05-28 | End: 2017-05-27

## 2017-05-27 RX ORDER — FLUPHENAZINE HYDROCHLORIDE 5 MG/1
15 TABLET ORAL
Status: DISCONTINUED | OUTPATIENT
Start: 2017-05-27 | End: 2017-06-08

## 2017-05-27 RX ORDER — LORAZEPAM 2 MG/ML
1 INJECTION INTRAMUSCULAR 3 TIMES DAILY
Status: DISCONTINUED | OUTPATIENT
Start: 2017-05-27 | End: 2017-06-09

## 2017-05-27 RX ORDER — DIVALPROEX SODIUM 500 MG/1
500 TABLET, EXTENDED RELEASE ORAL 2 TIMES DAILY
Status: DISCONTINUED | OUTPATIENT
Start: 2017-05-27 | End: 2017-05-27

## 2017-05-27 RX ORDER — FLUPHENAZINE HYDROCHLORIDE 5 MG/ML
5 SOLUTION, CONCENTRATE ORAL
Status: DISCONTINUED | OUTPATIENT
Start: 2017-05-27 | End: 2017-05-27

## 2017-05-27 RX ORDER — DIVALPROEX SODIUM 500 MG/1
500 TABLET, EXTENDED RELEASE ORAL 2 TIMES DAILY
Status: DISCONTINUED | OUTPATIENT
Start: 2017-05-27 | End: 2017-06-13 | Stop reason: DRUGHIGH

## 2017-05-27 RX ORDER — FLUPHENAZINE HYDROCHLORIDE 5 MG/1
10 TABLET ORAL DAILY
Status: DISCONTINUED | OUTPATIENT
Start: 2017-05-28 | End: 2017-05-27

## 2017-05-27 RX ORDER — LORAZEPAM 1 MG/1
1 TABLET ORAL 3 TIMES DAILY
Status: DISCONTINUED | OUTPATIENT
Start: 2017-05-27 | End: 2017-06-09

## 2017-05-27 RX ORDER — FLUPHENAZINE HYDROCHLORIDE 5 MG/ML
2.5 SOLUTION, CONCENTRATE ORAL 2 TIMES DAILY
Status: DISCONTINUED | OUTPATIENT
Start: 2017-05-27 | End: 2017-05-27

## 2017-05-27 RX ORDER — FLUPHENAZINE HYDROCHLORIDE 2.5 MG/ML
2.5 INJECTION, SOLUTION INTRAMUSCULAR
Status: DISCONTINUED | OUTPATIENT
Start: 2017-05-27 | End: 2017-06-08

## 2017-05-27 RX ADMIN — FLUPHENAZINE HYDROCHLORIDE 10 MG: 5 TABLET, FILM COATED ORAL at 08:56

## 2017-05-27 RX ADMIN — DIVALPROEX SODIUM 500 MG: 500 TABLET, DELAYED RELEASE ORAL at 14:02

## 2017-05-27 RX ADMIN — FLUPHENAZINE HYDROCHLORIDE 15 MG: 5 TABLET, FILM COATED ORAL at 20:57

## 2017-05-27 RX ADMIN — DIPHENHYDRAMINE HYDROCHLORIDE 50 MG: 50 INJECTION, SOLUTION INTRAMUSCULAR; INTRAVENOUS at 16:47

## 2017-05-27 RX ADMIN — INSULIN LISPRO 3 UNITS: 100 INJECTION, SOLUTION INTRAVENOUS; SUBCUTANEOUS at 12:16

## 2017-05-27 RX ADMIN — HYDROCHLOROTHIAZIDE 25 MG: 25 TABLET ORAL at 08:57

## 2017-05-27 RX ADMIN — DIPHENHYDRAMINE HYDROCHLORIDE 50 MG: 50 INJECTION, SOLUTION INTRAMUSCULAR; INTRAVENOUS at 03:14

## 2017-05-27 RX ADMIN — LORAZEPAM 1 MG: 2 INJECTION INTRAMUSCULAR; INTRAVENOUS at 03:15

## 2017-05-27 RX ADMIN — OLANZAPINE 5 MG: 5 TABLET, FILM COATED ORAL at 05:29

## 2017-05-27 RX ADMIN — DIVALPROEX SODIUM 500 MG: 500 TABLET, FILM COATED, EXTENDED RELEASE ORAL at 17:54

## 2017-05-27 RX ADMIN — LORAZEPAM 1 MG: 1 TABLET ORAL at 14:36

## 2017-05-27 RX ADMIN — LORAZEPAM 1 MG: 2 INJECTION INTRAMUSCULAR; INTRAVENOUS at 16:46

## 2017-05-27 RX ADMIN — AMLODIPINE BESYLATE 10 MG: 5 TABLET ORAL at 08:56

## 2017-05-27 RX ADMIN — ATORVASTATIN CALCIUM 20 MG: 20 TABLET, FILM COATED ORAL at 08:56

## 2017-05-27 RX ADMIN — LORAZEPAM 1 MG: 1 TABLET ORAL at 08:58

## 2017-05-27 RX ADMIN — SITAGLIPTIN 100 MG: 100 TABLET, FILM COATED ORAL at 08:56

## 2017-05-27 NOTE — BH NOTES
2330:  Patient received as she is sitting in the Union chair in the [address] Room. She is calmer, and her voice is somewhat quieter than when last cared for, but she remains hyper-verbal and demanding - requesting food and drinks. She states that she will be quiet if she can watch TV, and does appear to doze off intermittently at the table. Will maintain q 15 minute safety checks. 0100:  Patient awake and loudly states that she needs to use the restroom. She is taken to the unit bathroom in the Union chair and assisted by staff. She is minimally cooperative and is difficult to keep on task as she is focused on her jewelry that is locked up and stalling at the sink to wash her hands. Will continue to monitor. 0315:  Patient acting out and yelling in the [address] - she refuses to follow staff directions while demanding more food and something to drink after spilling her gingerale down the front of her gown. Patient escorted to her room where she continued to argue with and threaten staff. IM Benadryl 50 mg and IM Ativan  Mg administered at this time. Will continue to monitor. 0400:  Patient appears to be sleeping comfortably in her bed - all the lights are on in patient's room at her earlier insistence. Will continue to monitor. 0500:  Patient continues to appear to be asleep at this time. Will continue to monitor.

## 2017-05-27 NOTE — PROGRESS NOTES
Problem: Altered Thought Process (Adult/Pediatric)  Goal: *STG: Complies with medication therapy  Outcome: Progressing Towards Goal  Patient complies with medication therapy. Labile mood, affect congruent, attention seeking behavior, delusions of paranoia, demanding, med and meal compliant, hyper verbal, remains on Q15 min safety checks. 1400: Per Dr. Ruiz Smoker, ok to give patient Depakote now (valproic acid 105). Depakote is now a forced med linked with prolixin. Next dose was changed to 2000 per Dr. Nancy Dahl instructions as 1st dose was late being given.

## 2017-05-27 NOTE — BH NOTES
Behavioral Health Interdisciplinary Rounds     Patient Name: Akin Brito  Age: 65 y.o. Room/Bed:  748/  Primary Diagnosis: [facility]   Admission Status: Involuntary Commitment     Readmission within 30 days: no  Power of  in place: no  Patient requires a blocked bed: yes          Reason for blocked bed: aggressive/combative/intrusive behaviors    VTE Prophylaxis: Not indicated  Mobility needs/Fall risk: yes    Nutritional Plan: no  Consults: no           Labs/Testing due today?: yes    Sleep hours:  1.25 hours  Participation in Care/Groups:  no  Medication Compliant?: Selective  PRNS (last 24 hours):  Antipsychotic (PO), Antianxiety and Pain    Restraints (last 24 hours):  no  Substance Abuse:  no  CIWA (range last 24 hours): N/A COWS (range last 24 hours): N/A  Alcohol screening (AUDIT) completed -     If applicable, date SBIRT discussed in treatment team AND documented: N/A  Tobacco - patient is a smoker: no   Date tobacco education completed by RN: N/A  24 hour chart check complete: yes     Patient goal(s) for today:  Treatment team focus/goals:Link Forced Medications  LOS:  9  Expected LOS: TBD  Master treatment plan initiated: 5/19          Next due (every 7 days):   Psychiatric Advanced Care Directives -  no   Name of Decision maker if patient has [address] Directive - None  Patient was offered information - Patient declined  Financial concerns/prescription coverage:  Medicare/Medicaid  Date of last family contact: 5/25      Family requesting physician contact today:  no  Discharge plan: TBD       Outpatient provider(s): TBD    Participating treatment team members: Akin Brito,
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
## 2017-05-27 NOTE — BH NOTES
PRN Medication Documentation    Specific patient behavior that led to need for PRN medication: Patient observed on rounds to be sitting on the side of her bed. She states that she is afraid to be in her room by herself and requests \"some medicine to help me with my thoughts\"  Staff interventions attempted prior to PRN being given: Patient reassured that she is in a safe place - shown that the window and blinds do not conceal \"someone who is trying to kill me\" - reorientation and reassurance  PRN medication given: PO Zyprexa 5 mg   Patient response/effectiveness of PRN medication: Patient sitting up in her room states that she wants to come out to the [address] \"if I be quiet\". When told that it is too early, the patient states \"I will clean up my room then\". Will continue to monitor.

## 2017-05-27 NOTE — BH NOTES
9755:  PRN Medication Documentation    Specific patient behavior that led to need for PRN medication: Patient refusing to follow directions, yelling in the [address] and threatening staff  Staff interventions attempted prior to PRN being given: Patient had spilled gingerale on self - attempted to change her gown per her demands - patient fighting against staff efforts to transfer her to her room and out of the Dining Room  PRN medication given: IM 50 mg benadryl, IM 1 mg Ativan  Patient response/effectiveness of PRN medication: Patient left her room and was seen ambulating in the hallway on the General Unit requiring staff assistance back to bed.     0400:  Patient appears to be sleeping comfortably at this time. 6157:  Patient awake sitting on the side of her bed. She is groggy and states that she is afraid indicating toward the window saying that \"someone is out there who is going to kill me. Can you get me some medicine to help with my thoughts? \"  PRN Zyprexa given at this time. Will continue to monitor.

## 2017-05-27 NOTE — PROGRESS NOTES
Problem: Altered Thought Process (Adult/Pediatric)  Goal: *STG: Participates in treatment plan  Outcome: Progressing Towards Goal  Patient denies SI. Patient irritable and agitated at times and aggressive. Patient given PRN Im medication because patient was going into other patient's rooms and making verbal threats and not accepting redirection from staff. Medication was effective and patient took her scheduled meds, ate dinner and calmed down.

## 2017-05-28 LAB
GLUCOSE BLD STRIP.AUTO-MCNC: 115 MG/DL (ref 65–100)
GLUCOSE BLD STRIP.AUTO-MCNC: 134 MG/DL (ref 65–100)
GLUCOSE BLD STRIP.AUTO-MCNC: 135 MG/DL (ref 65–100)
SERVICE CMNT-IMP: ABNORMAL

## 2017-05-28 PROCEDURE — 74011250637 HC RX REV CODE- 250/637: Performed by: PSYCHIATRY & NEUROLOGY

## 2017-05-28 PROCEDURE — 74011250636 HC RX REV CODE- 250/636: Performed by: PSYCHIATRY & NEUROLOGY

## 2017-05-28 PROCEDURE — 82962 GLUCOSE BLOOD TEST: CPT

## 2017-05-28 PROCEDURE — 65220000003 HC RM SEMIPRIVATE PSYCH

## 2017-05-28 PROCEDURE — 74011000250 HC RX REV CODE- 250: Performed by: PSYCHIATRY & NEUROLOGY

## 2017-05-28 PROCEDURE — 74011250637 HC RX REV CODE- 250/637: Performed by: HOSPITALIST

## 2017-05-28 RX ADMIN — FLUPHENAZINE HYDROCHLORIDE 2.5 MG: 2.5 INJECTION, SOLUTION INTRAMUSCULAR at 09:00

## 2017-05-28 RX ADMIN — FLUPHENAZINE HYDROCHLORIDE 15 MG: 5 TABLET, FILM COATED ORAL at 22:11

## 2017-05-28 RX ADMIN — ATORVASTATIN CALCIUM 20 MG: 20 TABLET, FILM COATED ORAL at 13:26

## 2017-05-28 RX ADMIN — OLANZAPINE 5 MG: 5 TABLET, FILM COATED ORAL at 18:43

## 2017-05-28 RX ADMIN — HYDROCHLOROTHIAZIDE 25 MG: 25 TABLET ORAL at 13:26

## 2017-05-28 RX ADMIN — LORAZEPAM 1 MG: 1 TABLET ORAL at 22:11

## 2017-05-28 RX ADMIN — ZOLPIDEM TARTRATE 10 MG: 10 TABLET ORAL at 22:10

## 2017-05-28 RX ADMIN — LORAZEPAM 1 MG: 2 INJECTION INTRAMUSCULAR; INTRAVENOUS at 08:55

## 2017-05-28 RX ADMIN — LORAZEPAM 2 MG: 2 INJECTION INTRAMUSCULAR; INTRAVENOUS at 22:00

## 2017-05-28 RX ADMIN — LORAZEPAM 1 MG: 1 TABLET ORAL at 13:26

## 2017-05-28 RX ADMIN — DIVALPROEX SODIUM 500 MG: 500 TABLET, FILM COATED, EXTENDED RELEASE ORAL at 18:43

## 2017-05-28 RX ADMIN — DIPHENHYDRAMINE HYDROCHLORIDE 50 MG: 50 INJECTION, SOLUTION INTRAMUSCULAR; INTRAVENOUS at 16:02

## 2017-05-28 RX ADMIN — FLUPHENAZINE HYDROCHLORIDE 2.5 MG: 2.5 INJECTION, SOLUTION INTRAMUSCULAR at 08:56

## 2017-05-28 RX ADMIN — SITAGLIPTIN 100 MG: 100 TABLET, FILM COATED ORAL at 13:26

## 2017-05-28 RX ADMIN — AMLODIPINE BESYLATE 10 MG: 5 TABLET ORAL at 13:26

## 2017-05-28 RX ADMIN — LORAZEPAM 1 MG: 2 INJECTION INTRAMUSCULAR; INTRAVENOUS at 16:02

## 2017-05-28 NOTE — BH NOTES
2320:  Patient received as she is rummaging in her bathroom. She is alert but very groggy and unsteady as she attempts to clean up her mess and dress herself. She greets oncoming staff cordially and voices no complaints at this time. Will maintain q 15 minute safety checks. 0330: For the second time overnight, this patient has trashed her bathroom with feces all over the commode and the floor. Housekeeping was called earlier and complained about the mess. Staff has attempted to clean the bathroom, commode and floor again and instructed the patient to remain in bed. She has been up in her bathroom going through the soiled items in the trash and using clean pairs of briefs that had been provided to her earlier tonight to clean herself and the floor. Will continue to monitor.

## 2017-05-28 NOTE — BH NOTES
Behavioral Health Interdisciplinary Rounds     Patient Name: Patricia Jordan  Age: 65 y.o. Room/Bed:  748/01  Primary Diagnosis: [facility]   Admission Status: Involuntary Commitment     Readmission within 30 days: no  Power of  in place: no  Patient requires a blocked bed: yes          Reason for blocked bed: Aggressive/Combative/Intrusive Behaviors      VTE Prophylaxis: Not indicated  Mobility needs/Fall risk: yes    Nutritional Plan: no  Consults: No           Labs/Testing due today?: no    Sleep hours:  1.0      Participation in Care/Groups:  no  Medication Compliant?: Selective  PRNS (last 24 hours):  Antianxiety and Anticholinergic    Restraints (last 24 hours):  no  Substance Abuse:  no  CIWA (range last 24 hours): N/A COWS (range last 24 hours): N/A  Alcohol screening (AUDIT) completed -     If applicable, date SBIRT discussed in treatment team AND documented: N/A  Tobacco - patient is a smoker: no   Date tobacco education completed by RN: N/A  24 hour chart check complete: yes     Patient goal(s) for today:   Treatment team focus/goals:   LOS:  10  Expected LOS: TBD  Master treatment plan initiated: 5/19          Next due (every 7 days):   Psychiatric Advanced Care Directives -  no   Name of Decision maker if patient has [address] Directive none  Patient was offered information: Patient declined  Financial concerns/prescription coverage:  Medicare/Medicaid  Date of last family contact: 5/25      Family requesting physician contact today:  no  Discharge plan: TBD       Outpatient provider(s): TBD    Participating treatment team members: Patricia Julian, * (assigned ALYSA),

## 2017-05-28 NOTE — BH NOTES
Patient has feces wiped on floor, all over her gown. Before patient could be changed, she came out into dining room and sat in chair. Patient was reminded her gown was covered in feces, she had been asked to stay in room until she could be changed, she refused to leave dining room. Patient was escorted to her room where she sat down right inside the door and started reading from papers in her hand refusing to listen to staff. Patient refused to allow VS to be taken. 3365:  Patient calling from her room, \"here I am, who wants a piece of me?\"    0900:  Patient was sitting in gonzáles outside of room, took brief off, feces on floor and wall, refused to move back into her room, security was called, patient escorted back to room. 0930:  Patient sat herself down again in gonzáles, not dressed, patient was assisted back to her room, given a clean brief.

## 2017-05-28 NOTE — PROGRESS NOTES
Problem: Psychosis  Goal: *STG/LTG: Demonstrates improved social functioning by responding appropriately to staff  Outcome: Progressing Towards Goal  Patient appropriately apologetic to staff for the mess made in her room and the bathroom R/T two large bowel movements overnight. Housekeeping on the unit twice to clean her room. Bed linens changed.

## 2017-05-28 NOTE — PROGRESS NOTES
Problem: Altered Thought Process (Adult/Pediatric)  Goal: *STG: Complies with medication therapy  Variance: Patient slowly responding, did not take meds by mouth.

## 2017-05-28 NOTE — PROGRESS NOTES
Problem: Psychosis  Goal: *STG: Patient will verbalize areas in need of boundary recognition and limit setting  Outcome: Not Progressing Towards Goal  Pt remains intrusive, demanding, unable or unwilling to follow unit rules at the expense of peers. Pt currently laying in her floor cursing at staff. She is demanding cold drinks, a new room and various other things. While staff was receiving report, Pt made her way into general unit Nurses station and began removing items. She requires constant monitoring for both her and peer safety.
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
## 2017-05-28 NOTE — PROGRESS NOTES
Problem: Altered Thought Process (Adult/Pediatric)  Goal: *STG: Remains safe in hospital  Outcome: Progressing Towards Goal  Patient remains safe in hospital.    Problem: Psychosis  Goal: *STG: Decreased delusional thinking  Variance: Patient slowly responding

## 2017-05-28 NOTE — BH NOTES
[facility]  Master Treatment Plan for Sim Hidalgo    Date Treatment Plan Initiated: 05/28/17    Treatment Plan Modalities:  Type of Modality Amount  (x minutes) Frequency (x/week) Duration (x days) Name of Responsible Staff   Community & wrap-up meetings to encourage peer interactions 15 7 1 MARIANO Sargent   Group psychotherapy to assist in building coping skills and internal controls 60 7 1 Samuel Carter LCSW   Therapeutic activity groups to build coping skills 60 7 1 Cierra Small LCSW   Psychoeducation in group setting to address:   Medication education   15 7 1 Mendoza Campbell RN   Coping skills         Relaxation techniques         Symptom management         Discharge planning         Spirituality    61 2 1 [facility]   60 1 1 Volunteer from [employer]   Recovery/AA/NA   61 3 1 Volunteer from [address] [place] [address] medication management   13 7 1 Dr. Tip Johnson   Family meeting/discharge planning                                        Problem: Altered Thought Process (Adult/Pediatric)  Goal will be met by 06/4/17  Goal: *STG: Remains safe in hospital  Outcome: Progressing Towards Goal  Patient remains safe in hospital.     Problem: Psychosis Goal will be met by 6/4/17  Goal: *STG: Decreased delusional thinking  Variance: Patient slowly responding    Problem: Altered Thought Process (Adult/Pediatric)  Goal will be met by 6/4/17  Goal: *STG: Complies with medication therapy  Variance: Patient slowly responding, did not take meds by mouth.

## 2017-05-29 LAB
GLUCOSE BLD STRIP.AUTO-MCNC: 100 MG/DL (ref 65–100)
GLUCOSE BLD STRIP.AUTO-MCNC: 104 MG/DL (ref 65–100)
GLUCOSE BLD STRIP.AUTO-MCNC: 130 MG/DL (ref 65–100)
SERVICE CMNT-IMP: ABNORMAL
SERVICE CMNT-IMP: ABNORMAL
SERVICE CMNT-IMP: NORMAL

## 2017-05-29 PROCEDURE — 74011250637 HC RX REV CODE- 250/637: Performed by: PSYCHIATRY & NEUROLOGY

## 2017-05-29 PROCEDURE — 93005 ELECTROCARDIOGRAM TRACING: CPT

## 2017-05-29 PROCEDURE — 74011250637 HC RX REV CODE- 250/637: Performed by: HOSPITALIST

## 2017-05-29 PROCEDURE — 74011250636 HC RX REV CODE- 250/636: Performed by: PSYCHIATRY & NEUROLOGY

## 2017-05-29 PROCEDURE — 65220000003 HC RM SEMIPRIVATE PSYCH

## 2017-05-29 PROCEDURE — 82962 GLUCOSE BLOOD TEST: CPT

## 2017-05-29 RX ORDER — LORAZEPAM 2 MG/ML
2 INJECTION INTRAMUSCULAR ONCE
Status: COMPLETED | OUTPATIENT
Start: 2017-05-29 | End: 2017-05-29

## 2017-05-29 RX ORDER — CARBAMAZEPINE 200 MG/1
200 TABLET, EXTENDED RELEASE ORAL 2 TIMES DAILY
Status: DISCONTINUED | OUTPATIENT
Start: 2017-05-29 | End: 2017-08-16 | Stop reason: HOSPADM

## 2017-05-29 RX ORDER — ZOLPIDEM TARTRATE 6.25 MG/1
12.5 TABLET, FILM COATED, EXTENDED RELEASE ORAL
Status: DISCONTINUED | OUTPATIENT
Start: 2017-05-29 | End: 2017-08-16 | Stop reason: HOSPADM

## 2017-05-29 RX ADMIN — AMLODIPINE BESYLATE 10 MG: 5 TABLET ORAL at 08:13

## 2017-05-29 RX ADMIN — LORAZEPAM 1 MG: 1 TABLET ORAL at 08:12

## 2017-05-29 RX ADMIN — FLUPHENAZINE HYDROCHLORIDE 10 MG: 5 TABLET, FILM COATED ORAL at 08:10

## 2017-05-29 RX ADMIN — LORAZEPAM 2 MG: 2 INJECTION INTRAMUSCULAR; INTRAVENOUS at 02:07

## 2017-05-29 RX ADMIN — CARBAMAZEPINE 200 MG: 200 TABLET, EXTENDED RELEASE ORAL at 16:41

## 2017-05-29 RX ADMIN — HYDROCHLOROTHIAZIDE 25 MG: 25 TABLET ORAL at 08:16

## 2017-05-29 RX ADMIN — DIVALPROEX SODIUM 500 MG: 500 TABLET, FILM COATED, EXTENDED RELEASE ORAL at 08:23

## 2017-05-29 RX ADMIN — OLANZAPINE 5 MG: 5 TABLET, FILM COATED ORAL at 16:36

## 2017-05-29 RX ADMIN — OLANZAPINE 5 MG: 5 TABLET, FILM COATED ORAL at 11:16

## 2017-05-29 RX ADMIN — SITAGLIPTIN 100 MG: 100 TABLET, FILM COATED ORAL at 08:11

## 2017-05-29 RX ADMIN — ATORVASTATIN CALCIUM 20 MG: 20 TABLET, FILM COATED ORAL at 08:15

## 2017-05-29 RX ADMIN — DIPHENHYDRAMINE HYDROCHLORIDE 50 MG: 50 INJECTION, SOLUTION INTRAMUSCULAR; INTRAVENOUS at 02:15

## 2017-05-29 RX ADMIN — DIVALPROEX SODIUM 500 MG: 500 TABLET, FILM COATED, EXTENDED RELEASE ORAL at 16:36

## 2017-05-29 RX ADMIN — LORAZEPAM 1 MG: 1 TABLET ORAL at 13:55

## 2017-05-29 NOTE — BH NOTES
PRN Medication Documentation    Specific patient behavior that led to need for PRN medication: Pt told not to flush toilet ()it was stopped up);pt flushed it 3 times(luckily it flushed OK). Pt combative, yelling, agitated, threatening staff & threatening to fall intentionally. Staff interventions attempted prior to PRN being given: Distraction; redirection. Tried to calm pt to no avail. PRN medication given: Ativan 2 mg IM in left hip, then Benadryl 50 mg IM in left hip. Patient response/effectiveness of PRN medication: Monitoring continues. Mattress placed on floor since pt threatening to fall off bed; pt on mattress. 0315  Pt calmer, but still awake & constantly talking. Monitoring continues. 0400  Pt constantly talking; no distress noted. Monitoring continues. 0976  Pt has gotten quiet in the last 15 min. A BHT remained with her entire shift for her safety. Monitoring continues.

## 2017-05-29 NOTE — PROGRESS NOTES
Problem: Altered Thought Process (Adult/Pediatric)  Goal: *STG: Complies with medication therapy  Outcome: Progressing Towards Goal  Pt has been much more appropriate on unit tonight. She has been medication compliant. She is less demanding, less argumentative, better hygiene. She is interacting well with peers. She is still making frequent requests with staff, but she is doing so in an appropriate manner.
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
## 2017-05-29 NOTE — BH NOTES
1602 Pt receive ativan 1 mg and benadryl 50 mg IM for agitation, yelling loudly, inability to follow any directions. Considered moderately effective for same. 1843 Pt again loud, yelling, agitated. Zyprexa 5 mg po given. Considered moderately effective.

## 2017-05-29 NOTE — BH NOTES
Behavioral Health Interdisciplinary Rounds     Patient Name: Reese Weinberg  Age: 65 y.o. Room/Bed:  748/  Primary Diagnosis: Schizoaffective disorder ([facility]   Admission Status: Involuntary Commitment     Readmission within 30 days: no  Power of  in place: no  Patient requires a blocked bed: yes          Reason for blocked bed: aggressive/combative/intrusive behavior    VTE Prophylaxis: Not indicated  Mobility needs/Fall risk: yes    Nutritional Plan: no  Consults: no         Labs/Testing due today?: no    Sleep hours: 1.75+       Participation in Care/Groups:  no  Medication Compliant?: Selective  PRNS (last 24 hours):  Antipsychotic (PO) and Antianxiety, & Benadryl IM for EPS prevention    Restraints (last 24 hours):  no  Substance Abuse:  no  CIWA (range last 24 hours): na COWS (range last 24 hours): na  Alcohol screening (AUDIT) completed -     If applicable, date SBIRT discussed in treatment team AND documented: na  Tobacco - patient is a smoker: no   Date tobacco education completed by RN: carla  24 hour chart check complete: yes     Patient goal(s) for today:   Treatment team focus/goals: Develop behavioral plan  LOS:  11  Expected LOS: TBD  Master treatment plan initiated: 5/19          Next due (every 7 days):   Psychiatric Advanced Care Directives -  no   Name of Decision maker if patient has Psychiatric Care Directive --none  Patient was offered information --pt declined  Financial concerns/prescription coverage:  Medicare/Medicaid  Date of last family contact: 5/25      Family requesting physician contact today:  no  Discharge plan: TBD       Outpatient provider(s): TBD    Participating treatment team members: Joel Washington MSW; Dr. Kyaw Martins MD; Cristi Baker RN

## 2017-05-29 NOTE — PROGRESS NOTES
Problem: Altered Thought Process (Adult/Pediatric)  Goal: *STG: Complies with medication therapy  Outcome: Progressing Towards Goal  Patient complies with medication therapy    Problem: Psychosis  Goal: *STG: Remains safe in hospital  Outcome: Progressing Towards Goal  Patient remains safe in hospital.    1125: PRN Medication Documentation    Specific patient behavior that led to need for PRN medication: psychosis and paranoia  Staff interventions attempted prior to PRN being given: redirection, reorientation  PRN medication given: 5 mg Zyprexa PO  Patient response/effectiveness of PRN medication:

## 2017-05-30 LAB
ATRIAL RATE: 86 BPM
CALCULATED P AXIS, ECG09: 70 DEGREES
CALCULATED R AXIS, ECG10: -23 DEGREES
CALCULATED T AXIS, ECG11: 61 DEGREES
DIAGNOSIS, 93000: NORMAL
GLUCOSE BLD STRIP.AUTO-MCNC: 105 MG/DL (ref 65–100)
GLUCOSE BLD STRIP.AUTO-MCNC: 92 MG/DL (ref 65–100)
P-R INTERVAL, ECG05: 146 MS
Q-T INTERVAL, ECG07: 400 MS
QRS DURATION, ECG06: 96 MS
QTC CALCULATION (BEZET), ECG08: 478 MS
SERVICE CMNT-IMP: ABNORMAL
SERVICE CMNT-IMP: NORMAL
VENTRICULAR RATE, ECG03: 86 BPM

## 2017-05-30 PROCEDURE — 82962 GLUCOSE BLOOD TEST: CPT

## 2017-05-30 PROCEDURE — 65220000003 HC RM SEMIPRIVATE PSYCH

## 2017-05-30 PROCEDURE — 74011250637 HC RX REV CODE- 250/637: Performed by: PSYCHIATRY & NEUROLOGY

## 2017-05-30 PROCEDURE — 74011250637 HC RX REV CODE- 250/637: Performed by: HOSPITALIST

## 2017-05-30 RX ADMIN — FLUPHENAZINE HYDROCHLORIDE 10 MG: 5 TABLET, FILM COATED ORAL at 10:02

## 2017-05-30 RX ADMIN — LORAZEPAM 1 MG: 1 TABLET ORAL at 10:03

## 2017-05-30 RX ADMIN — SITAGLIPTIN 100 MG: 100 TABLET, FILM COATED ORAL at 10:04

## 2017-05-30 RX ADMIN — CARBAMAZEPINE 200 MG: 200 TABLET, EXTENDED RELEASE ORAL at 18:24

## 2017-05-30 RX ADMIN — ATORVASTATIN CALCIUM 20 MG: 20 TABLET, FILM COATED ORAL at 10:00

## 2017-05-30 RX ADMIN — DIVALPROEX SODIUM 500 MG: 500 TABLET, FILM COATED, EXTENDED RELEASE ORAL at 10:01

## 2017-05-30 RX ADMIN — CARBAMAZEPINE 200 MG: 200 TABLET, EXTENDED RELEASE ORAL at 10:05

## 2017-05-30 RX ADMIN — LORAZEPAM 1 MG: 1 TABLET ORAL at 21:11

## 2017-05-30 RX ADMIN — FLUPHENAZINE HYDROCHLORIDE 15 MG: 5 TABLET, FILM COATED ORAL at 21:10

## 2017-05-30 RX ADMIN — ZOLPIDEM TARTRATE 12.5 MG: 6.25 TABLET, EXTENDED RELEASE ORAL at 22:12

## 2017-05-30 RX ADMIN — DIVALPROEX SODIUM 500 MG: 500 TABLET, FILM COATED, EXTENDED RELEASE ORAL at 17:03

## 2017-05-30 RX ADMIN — LORAZEPAM 1 MG: 1 TABLET ORAL at 13:44

## 2017-05-30 RX ADMIN — AMLODIPINE BESYLATE 10 MG: 5 TABLET ORAL at 10:00

## 2017-05-30 RX ADMIN — HYDROCHLOROTHIAZIDE 25 MG: 25 TABLET ORAL at 10:02

## 2017-05-30 NOTE — INTERDISCIPLINARY ROUNDS
Behavioral Health Interdisciplinary Rounds     Patient Name: Michael Barkley  Age: 65 y.o. Room/Bed:  748/  Primary Diagnosis: [facility]ffective disorder ([facility]   Admission Status: Involuntary Commitment and Forced Medication Order     Readmission within 30 days: no  Power of  in place: no  Patient requires a blocked bed: yes          Reason for blocked bed: Disruptive behavior    VTE Prophylaxis: Not indicated  Mobility needs/Fall risk: yes    Nutritional Plan: no  Consults: no         Labs/Testing due today?: no    Sleep hours: 4       Participation in Care/Groups:  no  Medication Compliant?: Yes  PRNS (last 24 hours): Antipsychotic (PO) and Antihistamine (IM)    Restraints (last 24 hours):  no  Substance Abuse:  no  CIWA (range last 24 hours):  COWS (range last 24 hours):   Alcohol screening (AUDIT) completed -     If applicable, date SBIRT discussed in treatment team AND documented: n/a  Tobacco - patient is a smoker: no   Date tobacco education completed by RN: n/a  24 hour chart check complete: yes     Patient goal(s) for today:   Treatment team focus/goals: behavioral plan  LOS:  12  Expected LOS: TBD  Master treatment plan initiated: 5/28          Next due (every 7 days): 6/4  Psychiatric [address] - No    Name of Decision maker if patient has Psychiatric Care Directive: None   Patient was offered information, patient declined.    Financial concerns/prescription coverage: Medicaid/Medicare  Date of last family contact:       Family requesting physician contact today: No  Discharge plan: TBD       Outpatient provider(s): TBD    Participating treatment team members: Michael Barkley, PEGGY Moore; Dr. Mike Miramontes MD; Zoey Preciado, ESTER; Shelby Moya, PharmD; Pharmacy resident

## 2017-05-30 NOTE — BH NOTES
Pt received resting comfortably in day room without distress. When pt awoke, she was assisted by writer and bht to bed. Pt incontinent of urine, brief changed and pt assisted to bed without incident. No acute distress noted at this time and no needs expressed. Continue to monitor and support. 0500-pt up to bathroom with assist of bht. No complaints voiced. Requested and received grape juice and nutrigrain bar and was assisted back to bed. Pt following direction of staff. Tone and volume of voice appropriate and interaction with staff pleasant at this time. No acute distress noted. Continue to monitor and support. 0527-pt currently sitting up in bed reading bible. No acute distress noted. Continue to monitor and support.

## 2017-05-30 NOTE — PROGRESS NOTES
Problem: Falls - Risk of  Goal: *Absence of falls  Outcome: Progressing Towards Goal  No falls in past shift  Goal: *Knowledge of fall prevention  Outcome: Progressing Towards Goal  Poor safety awareness related to loose associations and short attention span. Problem: Altered Thought Process (Adult/Pediatric)  Goal: *STG: Participates in treatment plan  Outcome: Progressing Towards Goal  Review meds, discussed patterns of behaviors and discussed incorporation of a individualized behavior plan. Out on unit this am able to demonstrates appropriate behaviors. Noted impatient and reports \"I don't like to wait\". Pt verbalized understanding of unit dayroom rules and pt expectations. Goal: *STG: Seeks staff when feelings of anxiety and fear arise  Outcome: Progressing Towards Goal  Easily talks about her sadness and fear related to her thoughts  Goal: *STG: Complies with medication therapy  Outcome: Progressing Towards Goal  yes  Goal: *STG: Decreased delusional thinking  Outcome: Not Progressing Towards Goal  Paranoid thinking remains  Goal: Interventions  Outcome: Progressing Towards Goal  Staff focus is on setting limits, offering support and reassurance.
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
## 2017-05-30 NOTE — PROGRESS NOTES
Problem: Altered Thought Process (Adult/Pediatric)  Goal: *STG: Participates in treatment plan  Outcome: Progressing Towards Goal  Mood is labile. Pt is demanding and attempts to engage staff is power struggles, requiring limits and redirection. 2000  Pt declines fingerstick.

## 2017-05-31 LAB
GLUCOSE BLD STRIP.AUTO-MCNC: 105 MG/DL (ref 65–100)
GLUCOSE BLD STRIP.AUTO-MCNC: 117 MG/DL (ref 65–100)
GLUCOSE BLD STRIP.AUTO-MCNC: 123 MG/DL (ref 65–100)
GLUCOSE BLD STRIP.AUTO-MCNC: 174 MG/DL (ref 65–100)
SERVICE CMNT-IMP: ABNORMAL

## 2017-05-31 PROCEDURE — 74011250636 HC RX REV CODE- 250/636: Performed by: PSYCHIATRY & NEUROLOGY

## 2017-05-31 PROCEDURE — 82962 GLUCOSE BLOOD TEST: CPT

## 2017-05-31 PROCEDURE — 65220000003 HC RM SEMIPRIVATE PSYCH

## 2017-05-31 PROCEDURE — 74011250637 HC RX REV CODE- 250/637: Performed by: PSYCHIATRY & NEUROLOGY

## 2017-05-31 PROCEDURE — 74011250637 HC RX REV CODE- 250/637: Performed by: HOSPITALIST

## 2017-05-31 RX ADMIN — ATORVASTATIN CALCIUM 20 MG: 20 TABLET, FILM COATED ORAL at 08:36

## 2017-05-31 RX ADMIN — DIPHENHYDRAMINE HYDROCHLORIDE 50 MG: 50 INJECTION, SOLUTION INTRAMUSCULAR; INTRAVENOUS at 03:40

## 2017-05-31 RX ADMIN — CARBAMAZEPINE 200 MG: 200 TABLET, EXTENDED RELEASE ORAL at 18:27

## 2017-05-31 RX ADMIN — CARBAMAZEPINE 200 MG: 200 TABLET, EXTENDED RELEASE ORAL at 08:37

## 2017-05-31 RX ADMIN — DIVALPROEX SODIUM 500 MG: 500 TABLET, FILM COATED, EXTENDED RELEASE ORAL at 08:36

## 2017-05-31 RX ADMIN — HYDROCHLOROTHIAZIDE 25 MG: 25 TABLET ORAL at 08:36

## 2017-05-31 RX ADMIN — ZOLPIDEM TARTRATE 12.5 MG: 6.25 TABLET, EXTENDED RELEASE ORAL at 23:00

## 2017-05-31 RX ADMIN — FLUPHENAZINE HYDROCHLORIDE 10 MG: 5 TABLET, FILM COATED ORAL at 08:36

## 2017-05-31 RX ADMIN — SITAGLIPTIN 100 MG: 100 TABLET, FILM COATED ORAL at 08:37

## 2017-05-31 RX ADMIN — LORAZEPAM 1 MG: 1 TABLET ORAL at 14:00

## 2017-05-31 RX ADMIN — LORAZEPAM 1 MG: 2 INJECTION INTRAMUSCULAR; INTRAVENOUS at 03:46

## 2017-05-31 RX ADMIN — FLUPHENAZINE HYDROCHLORIDE 15 MG: 5 TABLET, FILM COATED ORAL at 21:46

## 2017-05-31 RX ADMIN — LORAZEPAM 1 MG: 1 TABLET ORAL at 20:19

## 2017-05-31 RX ADMIN — DIVALPROEX SODIUM 500 MG: 500 TABLET, FILM COATED, EXTENDED RELEASE ORAL at 18:27

## 2017-05-31 RX ADMIN — LORAZEPAM 1 MG: 1 TABLET ORAL at 08:36

## 2017-05-31 RX ADMIN — AMLODIPINE BESYLATE 10 MG: 5 TABLET ORAL at 08:35

## 2017-05-31 NOTE — PROGRESS NOTES
Problem: Falls - Risk of  Goal: *Absence of falls  Outcome: Progressing Towards Goal  Has remained free of falls. Walks with walker and stand-by assistance from staff. Bed alarm on the  Bed. Problem: Altered Thought Process (Adult/Pediatric)  Goal: *STG: Participates in treatment plan  Outcome: Progressing Towards Goal  Is alert and oriented. Discussed Behavior Plan with  patient. Is agreeable with plan and has been out on the unit acting appropriately. Appreciative towards staff, social with other patients and doing crafts. Is impulsive at times,but re-directable and able to follow directions. Has been medication and meal compliant. Goal: *STG: Remains safe in hospital  Outcome: Progressing Towards Goal  Remains safe in the hospital. Needing assistance with ambulating,using walker . Bed alarm on the bed as well. Goal: *STG: Seeks staff when feelings of anxiety and fear arise  Outcome: Progressing Towards Goal  Able to verbalize needs to staff- including bathroom requirements and expressing when she becomes,\"Board. \" Staff to continue to encourage patient to express her needs in an appropriate manner.   Goal: Interventions  Outcome: Progressing Towards Goal  Encourage progress towards  Goals for discharge planning

## 2017-05-31 NOTE — INTERDISCIPLINARY ROUNDS
Behavioral Health Interdisciplinary Rounds     Patient Name: Destiney Found  Age: 65 y.o. Room/Bed:  748/01  Primary Diagnosis: Schizoaffective disorder ([facility] [address]   Admission Status: Involuntary Commitment and Forced Medication Order     Readmission within 30 days: no  Power of  in place: no  Patient requires a blocked bed: yes          Reason for blocked bed: disruptive behavior     VTE Prophylaxis: Not indicated  Mobility needs/Fall risk: yes    Nutritional Plan: no  Consults: no         Labs/Testing due today?: no    Sleep hours: 2.75 +       Participation in Care/Groups:  yes  Medication Compliant?: Yes  PRNS (last 24 hours): Antianxiety (IM)   Restraints (last 24 hours):  no  Substance Abuse:  no  CIWA (range last 24 hours):  COWS (range last 24 hours):   Alcohol screening (AUDIT) completed -     If applicable, date SBIRT discussed in treatment team AND documented:   Tobacco - patient is a smoker: no   Date tobacco education completed by RN: n/a  24 hour chart check complete: yes     Patient goal(s) for today: Maintain appropriate boundaries   Treatment team focus/goals: Enforce appropriate boundaries; ETOH audit  LOS:  13  Expected LOS: TBD  Master treatment plan initiated: 5/28  Next due (every 7 days): 6/4  Psychiatric Advanced Care Directives - No   Name of Decision maker if patient has Psychiatric Care Directive: None  Patient was offered information, patient declined.   Financial concerns/prescription coverage: Medicaid/Medicare  Date of last family contact:        Family requesting physician contact today: No  Discharge plan: TBD       Outpatient provider(s): TBD    Participating treatment team members: PEGGY Farooq; Dr. Oralia Dudley MD; Radha Alberts RN; Pharmacy resident

## 2017-05-31 NOTE — PROGRESS NOTES
Problem: Falls - Risk of  Goal: *Absence of falls  Outcome: Progressing Towards Goal  2330 Pt reading in bed, smiling and talking about positive experiences from day. Pt given warm milk. No other needs expressed. Bed alarm on, call bell within reach, door remains open. Will continue to monitor with Q 15 safety checks. 0340 PRN Medication Documentation    Specific patient behavior that led to need for PRN medication: verbally aggressive with staff, physically resistant to staff assistance, not following directions  Staff interventions attempted prior to PRN being given: reinforcing positive behavior, setting clear boundaries, offering PO prn medications (pt initially agreed, then changed mind), offering reading material, addressing needs  PRN medication given: Ativan 1 mg IM, Benadryl 50 mg IM - pt compliant with administration   Patient response/effectiveness of PRN medication: 0400 Pt mumbling quietly to self in room. Will monitor. 0420 Pt appears asleep in bed. Respirations even and unlabored.

## 2017-05-31 NOTE — BH NOTES
GROUP THERAPY PROGRESS NOTE    Rubi Santiago participated in a Process Group on the Geriatric Unit, with a focus identifying feelings, planning for the day, and singing. Group time: 30 minutes. Personal goal for participation: To increase the capacity to shift ones mood, prepare for the rest of theday, and share in group singing. Goal orientation: The patient will participate in group singing. Group therapy participation: When prompted, this patient partially participated in the group. Therapeutic interventions reviewed and discussed: The group members were introduce themselves by first names and participate in group singing as a way to increase their oxygen and blood flow and begin their day on a positive note. They were also asked to join in singing several songs. Impression of participation: The patient said she did not want to sit by the undersigned and moved to another chair when the group began. She continues to express a reluctance to and negative attitude towards participation in the group. She refused to sing and at one point asked for the music to cease. She said she didn't want to listen to [facility]. \" The patient exhibited no SI/HI nor overt psychosis, but she have been responding to internal stimuli. She seemed slightly more alert than the last time I saw her in group but she indicated that she did not remember the undersigned from previous groups. She may be slightly more oriented and coherent. Her affect was negative and defensive. Her mood was depressed and angry.

## 2017-05-31 NOTE — BH NOTES
GROUP THERAPY PROGRESS NOTE    Rubi Santiago did not participate in a Process Group on the General Unit with a focus identifying feelings, planning for the day, and learning more about DBT concepts on \"Emotion Regulation. \"

## 2017-06-01 LAB
GLUCOSE BLD STRIP.AUTO-MCNC: 101 MG/DL (ref 65–100)
GLUCOSE BLD STRIP.AUTO-MCNC: 164 MG/DL (ref 65–100)
SERVICE CMNT-IMP: ABNORMAL
SERVICE CMNT-IMP: ABNORMAL

## 2017-06-01 PROCEDURE — 74011250637 HC RX REV CODE- 250/637: Performed by: HOSPITALIST

## 2017-06-01 PROCEDURE — 74011636637 HC RX REV CODE- 636/637: Performed by: HOSPITALIST

## 2017-06-01 PROCEDURE — 74011250637 HC RX REV CODE- 250/637: Performed by: PSYCHIATRY & NEUROLOGY

## 2017-06-01 PROCEDURE — 65220000003 HC RM SEMIPRIVATE PSYCH

## 2017-06-01 PROCEDURE — 82962 GLUCOSE BLOOD TEST: CPT

## 2017-06-01 RX ORDER — BISACODYL 5 MG
5 TABLET, DELAYED RELEASE (ENTERIC COATED) ORAL ONCE
Status: DISPENSED | OUTPATIENT
Start: 2017-06-02 | End: 2017-06-02

## 2017-06-01 RX ADMIN — FLUPHENAZINE HYDROCHLORIDE 10 MG: 5 TABLET, FILM COATED ORAL at 09:57

## 2017-06-01 RX ADMIN — SITAGLIPTIN 100 MG: 100 TABLET, FILM COATED ORAL at 09:57

## 2017-06-01 RX ADMIN — DIVALPROEX SODIUM 500 MG: 500 TABLET, FILM COATED, EXTENDED RELEASE ORAL at 17:39

## 2017-06-01 RX ADMIN — DIVALPROEX SODIUM 500 MG: 500 TABLET, FILM COATED, EXTENDED RELEASE ORAL at 09:58

## 2017-06-01 RX ADMIN — AMLODIPINE BESYLATE 10 MG: 5 TABLET ORAL at 09:58

## 2017-06-01 RX ADMIN — LORAZEPAM 1 MG: 1 TABLET ORAL at 13:27

## 2017-06-01 RX ADMIN — LORAZEPAM 1 MG: 1 TABLET ORAL at 09:59

## 2017-06-01 RX ADMIN — INSULIN LISPRO 2 UNITS: 100 INJECTION, SOLUTION INTRAVENOUS; SUBCUTANEOUS at 17:40

## 2017-06-01 RX ADMIN — CARBAMAZEPINE 200 MG: 200 TABLET, EXTENDED RELEASE ORAL at 09:58

## 2017-06-01 RX ADMIN — CARBAMAZEPINE 200 MG: 200 TABLET, EXTENDED RELEASE ORAL at 17:39

## 2017-06-01 RX ADMIN — ATORVASTATIN CALCIUM 20 MG: 20 TABLET, FILM COATED ORAL at 09:58

## 2017-06-01 RX ADMIN — HYDROCHLOROTHIAZIDE 25 MG: 25 TABLET ORAL at 09:58

## 2017-06-01 NOTE — PROGRESS NOTES
Problem: Falls - Risk of  Goal: *Absence of falls  Outcome: Progressing Towards Goal  0015 Pt appears asleep in bed. Respirations even and unlabored. Room light on, call bell within reach. Will continue to monitor with Q 15 safety checks. 0500 Pt had two episodes of loose stool, partially incontinent. Pt assisted in cleaning and care of self.

## 2017-06-01 NOTE — BH NOTES
1540:  Patient received as she is sitting up in the [place] chair in her room. She greets oncoming staff cordially stating \"I moved up closer to you all\". She voices no complaints or concerns at this time. Will maintain q 15 minute safety checks. 2045:  Patient has been dozing in bed throughout the evening since dinner. When up with assistance, patient is pleasant and cooperative but stumbling and off-balance. She is slurring her speech and falling asleep mid-sentence. 2200:  Order received from Dr. Maeve Joseph to hold all patient's sedating medications tonight at bedtime:  Prolixin, Ativan and Ambien. Will continue to monitor.

## 2017-06-01 NOTE — BH NOTES
GROUP THERAPY PROGRESS NOTE    Harlan Mclean participated in a Morning Process Group on the Geriatric Unit, with a focus identifying feelings,   planning for the day, and singing. Group time: 40 minutes. Personal goal for participation: To increase the capacity to shift ones mood, prepare for the day,   and share in group singing. Goal orientation: The patient will be able to prepare for the day through group singing. Group therapy participation: When prompted, this patient partially participated in the group. Therapeutic interventions reviewed and discussed: The group members were introduce themselves   by first names and participate in group singing as a way to increase their oxygen and blood flow and begin  their day on a positive note. They were also asked to join in singing several songs. Impression of participation: The patient joined the group a little over half-way through and said she is not   normally up this early. She listened passively to the music but did not contribute to the singing. She was not   negative or interruptive this morning - a shift from the previous times in this group. No SI/HI or overt psychosis   were noted. She seemed calm and was able to engage in conversation about a colorful butterfly on her walker  that she said she had made. Her thinking seemed more relevant and coherent today. She appeared calmer  and more accepting of her situation. She was approachable in conversation and responded appropriately.

## 2017-06-01 NOTE — INTERDISCIPLINARY ROUNDS
Behavioral Health Interdisciplinary Rounds     Patient Name: Ashley Charles  Age: 65 y.o. Room/Bed:  748/  Primary [facility]: [facility]   Admission Status: Involuntary Commitment and Forced Medication Order     Readmission within 30 days: no  Power of  in place: no  Patient requires a blocked bed: yes          Reason for blocked bed: disruptive behavior     VTE Prophylaxis: Not indicated  Mobility needs/Fall risk: yes    Nutritional Plan: yes  Consults: no         Labs/Testing due today?: no    Sleep hours: 4.5         Participation in Care/Groups:  yes  Medication Compliant?: Yes  PRNS (last 24 hours): None    Restraints (last 24 hours):  no  Substance Abuse:  no  CIWA (range last 24 hours):  COWS (range last 24 hours):   Alcohol screening (AUDIT) completed -     If applicable, date SBIRT discussed in treatment team AND documented:   Tobacco - patient is a smoker: no   Date tobacco education completed by RN: n/a  24 hour chart check complete: yes     Patient goal(s) for today: Maintain appropriate behaviors  Treatment team focus/goals:   LOS:  14  Expected LOS: TBD  Psychiatric Advanced Care Directives -  No  Name of Decision maker if patient has Psychiatric Care Directive: None  Patient was offered information, patient declined.    Financial concerns/prescription coverage: Medicaid  Date of last family contact: None     Family requesting physician contact today: No  Discharge plan: TBD       Outpatient provider(s): [facility]    Participating treatment team members: PEGGY Green; Dr. Lili Donovan MD; Zeus Linn RN

## 2017-06-01 NOTE — PROGRESS NOTES
Problem: Altered Thought Process (Adult/Pediatric)  Goal: *STG: Participates in treatment plan  Outcome: Progressing Towards Goal  Pt participates in unit activities. She is appropriate in social interactions with peers. She verbalizes her needs appropriately.

## 2017-06-01 NOTE — PROGRESS NOTES
Problem: Falls - Risk of  Goal: *Absence of falls  Outcome: Progressing Towards Goal  No falls this shift  Goal: *Knowledge of fall prevention  Outcome: Progressing Towards Goal  Using walker appropriately,     Problem: Altered Thought Process (Adult/Pediatric)  Goal: *STG: Participates in treatment plan  Outcome: Progressing Towards Goal  demonstrates appropriate use of tap bell and requesting staff assistance when ambulating or transferring positions. Thoughts remain loose however noted increase in logical conversation, improved organization and decrease in hyper verbal. Demonstrates increase in self control, ability to follow direction and ask for assistance from staff. Daily goal is to talk with family, walk without a walker      Goal: *STG: Demonstrates ability to understand and use improved judgment in daily activities and relationships  Outcome: Progressing Towards Goal  Improved judgment, increase in thought organization. Does not demonstrates attention seeking behaviors.    Goal: Interventions  Outcome: Progressing Towards Goal  Continue to set limits, encourage progress towards goals and praise accomplishments

## 2017-06-02 LAB
GLUCOSE BLD STRIP.AUTO-MCNC: 115 MG/DL (ref 65–100)
GLUCOSE BLD STRIP.AUTO-MCNC: 125 MG/DL (ref 65–100)
GLUCOSE BLD STRIP.AUTO-MCNC: 129 MG/DL (ref 65–100)
GLUCOSE BLD STRIP.AUTO-MCNC: 98 MG/DL (ref 65–100)
SERVICE CMNT-IMP: ABNORMAL
SERVICE CMNT-IMP: NORMAL

## 2017-06-02 PROCEDURE — 65220000003 HC RM SEMIPRIVATE PSYCH

## 2017-06-02 PROCEDURE — 74011250637 HC RX REV CODE- 250/637: Performed by: PSYCHIATRY & NEUROLOGY

## 2017-06-02 PROCEDURE — 82962 GLUCOSE BLOOD TEST: CPT

## 2017-06-02 PROCEDURE — 74011250637 HC RX REV CODE- 250/637: Performed by: HOSPITALIST

## 2017-06-02 RX ADMIN — ZOLPIDEM TARTRATE 12.5 MG: 6.25 TABLET, EXTENDED RELEASE ORAL at 21:05

## 2017-06-02 RX ADMIN — FLUPHENAZINE HYDROCHLORIDE 10 MG: 5 TABLET, FILM COATED ORAL at 08:19

## 2017-06-02 RX ADMIN — HYDROCHLOROTHIAZIDE 25 MG: 25 TABLET ORAL at 08:20

## 2017-06-02 RX ADMIN — DIVALPROEX SODIUM 500 MG: 500 TABLET, FILM COATED, EXTENDED RELEASE ORAL at 08:17

## 2017-06-02 RX ADMIN — FLUPHENAZINE HYDROCHLORIDE 15 MG: 5 TABLET, FILM COATED ORAL at 21:05

## 2017-06-02 RX ADMIN — ATORVASTATIN CALCIUM 20 MG: 20 TABLET, FILM COATED ORAL at 08:20

## 2017-06-02 RX ADMIN — CARBAMAZEPINE 200 MG: 200 TABLET, EXTENDED RELEASE ORAL at 17:58

## 2017-06-02 RX ADMIN — LORAZEPAM 1 MG: 1 TABLET ORAL at 13:31

## 2017-06-02 RX ADMIN — AMLODIPINE BESYLATE 10 MG: 5 TABLET ORAL at 08:18

## 2017-06-02 RX ADMIN — SITAGLIPTIN 100 MG: 100 TABLET, FILM COATED ORAL at 08:17

## 2017-06-02 RX ADMIN — DIVALPROEX SODIUM 500 MG: 500 TABLET, FILM COATED, EXTENDED RELEASE ORAL at 17:49

## 2017-06-02 RX ADMIN — CARBAMAZEPINE 200 MG: 200 TABLET, EXTENDED RELEASE ORAL at 08:16

## 2017-06-02 RX ADMIN — LORAZEPAM 1 MG: 1 TABLET ORAL at 08:18

## 2017-06-02 RX ADMIN — LORAZEPAM 1 MG: 1 TABLET ORAL at 21:06

## 2017-06-02 NOTE — BH NOTES
Received pt in bed quietly with eyes closed. Respirations regular, even and unlabored. No distress noted. Call bell with reach. Bed alarm in place. Q-15 checks for safety. -24 hr chart completed.    -0230: Pt resting in bed quietly. No sign of distress noted. Will continue to monitor.  -0430: Pt up for toileting. With loose tool. Cleaned up and return to ed with no difficulties. Q-15 checks for safety. -5716: Pt awake in bed talking to self. No sign of distress noted. Q-15 checks for safety.

## 2017-06-02 NOTE — PROGRESS NOTES
Problem: Altered Thought Process (Adult/Pediatric)  Goal: *STG: Remains safe in hospital  Patient is alert, calm and cooperative. Greeted staff with a smile. Sitting in the dinning room; playing the piano. No distress noted. 1920 - Patient is meds and meals compliant. Cheerful affect.   Patient remains safe in hospital.

## 2017-06-02 NOTE — PROGRESS NOTES
Problem: Falls - Risk of  Goal: *Absence of falls  Outcome: Progressing Towards Goal  Patient is very unsteady and ambulating with staff assistance. She has remained free from falls/injury throughout this shift.
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
## 2017-06-02 NOTE — BH NOTES
GROUP THERAPY PROGRESS NOTE    Rabon Habermann participated in a Morning Process Group on the Geriatric Unit, with a focus identifying feelings, planning for the day, and singing. Group time: 45 minutes. Personal goal for participation: To increase the capacity to shift ones mood, prepare for the day, and share in group singing. Goal orientation: The patient will be able to prepare for the day through group singing. Group therapy participation: When prompted, this patient actively participated in the group. Therapeutic interventions reviewed and discussed: The group members were introduce themselves by first names and participate in group singing as a way to increase their oxygen and blood flow and begin their day on a positive note. They were also asked to join in singing several songs. Impression of participation: The patient said she wanted to sing some of the songs this morning. She both sang along and suggested several of the selections this morning. She seemed to drift between mild euphoria and drowsiness. No SI/HI or overt psychosis were noted. She was cooperative and appropriate in her conduct and said she enjoyed singing this morning. This is obvious progress for this patient.

## 2017-06-02 NOTE — BH NOTES
Behavioral Health Interdisciplinary Rounds     Patient Name: Thi Ortez  Age: 65 y.o. Room/Bed:  740/01  Primary Diagnosis: [facility]   Admission Status: Involuntary Commitment and Forced Medication Order     Readmission within 30 days: no  Power of  in place: no  Patient requires a blocked bed: yes          Reason for blocked bed: Disruptive behavior    VTE Prophylaxis: Not indicated  Mobility needs/Fall risk: yes    Nutritional Plan: yes  Consults:          Labs/Testing due today?: no    Sleep hours:  5.75 hrs      Participation in Care/Groups:  yes  Medication Compliant?: Yes  PRNS (last 24 hours): None    Restraints (last 24 hours):  no  Substance Abuse:  no  CIWA (range last 24 hours):  COWS (range last 24 hours):   Alcohol screening (AUDIT) completed -     If applicable, date SBIRT discussed in treatment team AND documented:   Tobacco - patient is a smoker: yes   Date tobacco education completed by RN: Completed  24 hour chart check complete: yes     Patient goal(s) for today: Maintain appropriate behaviors  Treatment team focus/goals: Reinforce appropriate boundaries  LOS:  15  Expected LOS: TBD  Psychiatric Advanced Care Directives -  no   Name of Decision maker if patient has Psychiatric Care Directive : None  Patient was offered information, patient declined.   Financial concerns/prescription coverage: Medicaid  Date of last family contact:       Family requesting physician contact today: No  Discharge plan: TBD       Outpatient provider(s): TBD    Participating treatment team members: Thi Ortez, PEGGY Babb; Dr. Sheilla Nyhan, MD; Sergio Lopez, ESTER; Umesh BrennanD; Pharmacy resident

## 2017-06-03 LAB
ALBUMIN SERPL BCP-MCNC: 2.7 G/DL (ref 3.5–5)
ALBUMIN/GLOB SERPL: 0.9 {RATIO} (ref 1.1–2.2)
ALP SERPL-CCNC: 65 U/L (ref 45–117)
ALT SERPL-CCNC: 16 U/L (ref 12–78)
ANION GAP BLD CALC-SCNC: 7 MMOL/L (ref 5–15)
AST SERPL W P-5'-P-CCNC: 9 U/L (ref 15–37)
BILIRUB SERPL-MCNC: 0.2 MG/DL (ref 0.2–1)
BUN SERPL-MCNC: 16 MG/DL (ref 6–20)
BUN/CREAT SERPL: 21 (ref 12–20)
CALCIUM SERPL-MCNC: 8 MG/DL (ref 8.5–10.1)
CARBAMAZEPINE SERPL-MCNC: 7.4 UG/ML (ref 4–12)
CHLORIDE SERPL-SCNC: 106 MMOL/L (ref 97–108)
CO2 SERPL-SCNC: 29 MMOL/L (ref 21–32)
CREAT SERPL-MCNC: 0.76 MG/DL (ref 0.55–1.02)
ERYTHROCYTE [DISTWIDTH] IN BLOOD BY AUTOMATED COUNT: 13 % (ref 11.5–14.5)
GLOBULIN SER CALC-MCNC: 3 G/DL (ref 2–4)
GLUCOSE BLD STRIP.AUTO-MCNC: 113 MG/DL (ref 65–100)
GLUCOSE BLD STRIP.AUTO-MCNC: 115 MG/DL (ref 65–100)
GLUCOSE BLD STRIP.AUTO-MCNC: 125 MG/DL (ref 65–100)
GLUCOSE SERPL-MCNC: 87 MG/DL (ref 65–100)
HCT VFR BLD AUTO: 29.3 % (ref 35–47)
HGB BLD-MCNC: 9.7 G/DL (ref 11.5–16)
MCH RBC QN AUTO: 33.1 PG (ref 26–34)
MCHC RBC AUTO-ENTMCNC: 33.1 G/DL (ref 30–36.5)
MCV RBC AUTO: 100 FL (ref 80–99)
PLATELET # BLD AUTO: 172 K/UL (ref 150–400)
POTASSIUM SERPL-SCNC: 3.8 MMOL/L (ref 3.5–5.1)
PROT SERPL-MCNC: 5.7 G/DL (ref 6.4–8.2)
RBC # BLD AUTO: 2.93 M/UL (ref 3.8–5.2)
SERVICE CMNT-IMP: ABNORMAL
SODIUM SERPL-SCNC: 142 MMOL/L (ref 136–145)
VALPROATE SERPL-MCNC: 89 UG/ML (ref 50–100)
WBC # BLD AUTO: 5.4 K/UL (ref 3.6–11)

## 2017-06-03 PROCEDURE — 80156 ASSAY CARBAMAZEPINE TOTAL: CPT | Performed by: PSYCHIATRY & NEUROLOGY

## 2017-06-03 PROCEDURE — 80164 ASSAY DIPROPYLACETIC ACD TOT: CPT | Performed by: PSYCHIATRY & NEUROLOGY

## 2017-06-03 PROCEDURE — 65220000003 HC RM SEMIPRIVATE PSYCH

## 2017-06-03 PROCEDURE — 74011250637 HC RX REV CODE- 250/637: Performed by: PSYCHIATRY & NEUROLOGY

## 2017-06-03 PROCEDURE — 36415 COLL VENOUS BLD VENIPUNCTURE: CPT | Performed by: PSYCHIATRY & NEUROLOGY

## 2017-06-03 PROCEDURE — 82962 GLUCOSE BLOOD TEST: CPT

## 2017-06-03 PROCEDURE — 80053 COMPREHEN METABOLIC PANEL: CPT | Performed by: PSYCHIATRY & NEUROLOGY

## 2017-06-03 PROCEDURE — 85027 COMPLETE CBC AUTOMATED: CPT | Performed by: PSYCHIATRY & NEUROLOGY

## 2017-06-03 PROCEDURE — 74011250637 HC RX REV CODE- 250/637: Performed by: HOSPITALIST

## 2017-06-03 RX ADMIN — CARBAMAZEPINE 200 MG: 200 TABLET, EXTENDED RELEASE ORAL at 17:14

## 2017-06-03 RX ADMIN — AMLODIPINE BESYLATE 10 MG: 5 TABLET ORAL at 08:51

## 2017-06-03 RX ADMIN — DIVALPROEX SODIUM 500 MG: 500 TABLET, FILM COATED, EXTENDED RELEASE ORAL at 17:14

## 2017-06-03 RX ADMIN — HYDROCHLOROTHIAZIDE 25 MG: 25 TABLET ORAL at 08:51

## 2017-06-03 RX ADMIN — CARBAMAZEPINE 200 MG: 200 TABLET, EXTENDED RELEASE ORAL at 09:37

## 2017-06-03 RX ADMIN — ACETAMINOPHEN 650 MG: 325 TABLET, FILM COATED ORAL at 00:56

## 2017-06-03 RX ADMIN — SITAGLIPTIN 100 MG: 100 TABLET, FILM COATED ORAL at 08:51

## 2017-06-03 RX ADMIN — LORAZEPAM 1 MG: 1 TABLET ORAL at 14:14

## 2017-06-03 RX ADMIN — ZOLPIDEM TARTRATE 12.5 MG: 6.25 TABLET, EXTENDED RELEASE ORAL at 22:05

## 2017-06-03 RX ADMIN — DIVALPROEX SODIUM 500 MG: 500 TABLET, FILM COATED, EXTENDED RELEASE ORAL at 08:51

## 2017-06-03 RX ADMIN — FLUPHENAZINE HYDROCHLORIDE 15 MG: 5 TABLET, FILM COATED ORAL at 21:19

## 2017-06-03 RX ADMIN — LORAZEPAM 1 MG: 1 TABLET ORAL at 08:51

## 2017-06-03 RX ADMIN — LORAZEPAM 1 MG: 1 TABLET ORAL at 20:24

## 2017-06-03 RX ADMIN — ATORVASTATIN CALCIUM 20 MG: 20 TABLET, FILM COATED ORAL at 08:51

## 2017-06-03 RX ADMIN — FLUPHENAZINE HYDROCHLORIDE 10 MG: 5 TABLET, FILM COATED ORAL at 08:51

## 2017-06-03 NOTE — PROGRESS NOTES
Problem: Altered Thought Process (Adult/Pediatric)  Goal: *STG: Participates in treatment plan  Outcome: Progressing Towards Goal  Pt is labile, cooperative or argumentative, demanding, and resistant to redirection. Pt has poor safety awareness. Pt had sad affect after phone time, stating \"I can't reach my children, I guess I'll just go to bed\". Pt has urinated on the floor multiple times through briefs. Med and meal compliant. Will continue to support, redirect, and monitor.
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
## 2017-06-03 NOTE — INTERDISCIPLINARY ROUNDS
Behavioral Health Interdisciplinary Rounds     Patient Name: Merced Minaya  Age: 65 y.o.   Room/Bed:  740/01  Primary Diagnosis: Schizoaffective disorder ([facility])   Admission Status: Involuntary Commitment and Forced Medication Order     Readmission within 30 days: no  Power of  in place: no  Patient requires a blocked bed: yes          Reason for blocked bed: behavior     VTE Prophylaxis: Not indicated  Mobility needs/Fall risk: yes    Nutritional Plan: yes  Consults:        Labs/Testing due today?: yes, cooperative with lab draw , tolerated well     Sleep hours: 5 1/2 hours       Participation in Care/Groups:  yes  Medication Compliant?: Yes  PRNS (last 24 hours): Pain    Restraints (last 24 hours):  no  Substance Abuse:  no  CIWA (range last 24 hours):  COWS (range last 24 hours):   Alcohol screening (AUDIT) completed -     If applicable, date SBIRT discussed in treatment team AND documented:   Tobacco - patient is a smoker: yes   Date tobacco education completed by RN:   24 hour chart check complete: yes     Patient goal(s) for today:   Treatment team focus/goals:   LOS:  16  Expected LOS:   [address] -  no   Name of Decision maker if patient has Psychiatric Care Directive   Patient was offered information   Financial concerns/prescription coverage:  Date of last family contact:      Family requesting physician contact today:   Discharge plan:       Outpatient provider(s):     Participating treatment team members: Tiera WATT),

## 2017-06-03 NOTE — BH NOTES
Received pt playing the piano on general unit  No voiced complaints or acute distress at present  Pt is redirectable at present  As evening progress,note pt is demanding and some sl resistive,paranoid behavior. Pt assist to chair by two staff,almost missed chair entirely.  Reminded to call  Staff for assistance  Pt remains a fall risk, aware

## 2017-06-03 NOTE — PROGRESS NOTES
Problem: Falls - Risk of  Goal: *Absence of falls  Outcome: Progressing Towards Goal  Lying quietly in bed with eyes closed , respirations even and unlabored , NAD noted .   Tap bell at bedside within reach , night light on in room, 1 side rail up, maintaining q15 min safety checks this shift

## 2017-06-03 NOTE — PROGRESS NOTES
Problem: Falls - Risk of  Goal: *Absence of falls  Outcome: Progressing Towards Goal  Pt continues to use walker appropriately. No distress noted. Pt has been calm/cooperative this evening and exhibit no unsafe behaviors. Pt noted medication and meal compliant. Nursing to continue to monitor.

## 2017-06-03 NOTE — BH NOTES
PRN Medication Documentation  MEWS=1, Pain scale 10/10   Specific patient behavior that led to need for PRN medication: rt knee discomfort states  \" Arthitis \" ,   Staff interventions attempted prior to PRN being given: reposition   PRN medication given: tylenol 650 mg po   Patient response/effectiveness of PRN medication: resting quietly

## 2017-06-03 NOTE — PROGRESS NOTES
At change of shift while staff was assisting pt to bathroom pt insisted that she did not need assistance. Pt's gait noted very unsteady and pt needing reminders to use walker appropriately. Pt noted to have accusatory behaviors stating to writer, \"I don't need you help no way, I'm independent, you just want to look at my ass. \" Charge nurse made aware and switched with this writer to continue to assist pt to bathroom. Charge nurse noted to have 1:1 verbal support and verbal redirection. Pt remained argumentative. Pt exhibit no unsafe behaviors. No distress noted. Nursing to continue to monitor for safety, location and behavior.

## 2017-06-03 NOTE — PROGRESS NOTES
Problem: Falls - Risk of  Goal: *Absence of falls  Outcome: Progressing Towards Goal  Pt noted to become defiant and oppositional during latter part of shift. Pt noted using walker incorrectly and attempting to get up without assistance, despite education and verbal redirection. Pt also noted antagonistic and argumentative towards staff needing verbal de-escalation. No falls noted as of this documentation. Pt noted to play piano to regroup and calm down. Nursing to continue to monitor for safety, location and behavior.

## 2017-06-04 LAB
GLUCOSE BLD STRIP.AUTO-MCNC: 123 MG/DL (ref 65–100)
GLUCOSE BLD STRIP.AUTO-MCNC: 143 MG/DL (ref 65–100)
GLUCOSE BLD STRIP.AUTO-MCNC: 99 MG/DL (ref 65–100)
SERVICE CMNT-IMP: ABNORMAL
SERVICE CMNT-IMP: ABNORMAL
SERVICE CMNT-IMP: NORMAL

## 2017-06-04 PROCEDURE — 74011250637 HC RX REV CODE- 250/637: Performed by: PSYCHIATRY & NEUROLOGY

## 2017-06-04 PROCEDURE — 74011250637 HC RX REV CODE- 250/637: Performed by: HOSPITALIST

## 2017-06-04 PROCEDURE — 82962 GLUCOSE BLOOD TEST: CPT

## 2017-06-04 PROCEDURE — 65220000003 HC RM SEMIPRIVATE PSYCH

## 2017-06-04 RX ADMIN — FLUPHENAZINE HYDROCHLORIDE 10 MG: 5 TABLET, FILM COATED ORAL at 08:13

## 2017-06-04 RX ADMIN — DIVALPROEX SODIUM 500 MG: 500 TABLET, FILM COATED, EXTENDED RELEASE ORAL at 08:13

## 2017-06-04 RX ADMIN — ATORVASTATIN CALCIUM 20 MG: 20 TABLET, FILM COATED ORAL at 08:13

## 2017-06-04 RX ADMIN — FLUPHENAZINE HYDROCHLORIDE 15 MG: 5 TABLET, FILM COATED ORAL at 21:30

## 2017-06-04 RX ADMIN — HYDROCHLOROTHIAZIDE 25 MG: 25 TABLET ORAL at 08:13

## 2017-06-04 RX ADMIN — LORAZEPAM 1 MG: 1 TABLET ORAL at 08:13

## 2017-06-04 RX ADMIN — LORAZEPAM 1 MG: 1 TABLET ORAL at 15:20

## 2017-06-04 RX ADMIN — AMLODIPINE BESYLATE 10 MG: 5 TABLET ORAL at 08:13

## 2017-06-04 RX ADMIN — ZOLPIDEM TARTRATE 12.5 MG: 6.25 TABLET, EXTENDED RELEASE ORAL at 22:11

## 2017-06-04 RX ADMIN — SITAGLIPTIN 100 MG: 100 TABLET, FILM COATED ORAL at 08:13

## 2017-06-04 RX ADMIN — CARBAMAZEPINE 200 MG: 200 TABLET, EXTENDED RELEASE ORAL at 18:04

## 2017-06-04 RX ADMIN — CARBAMAZEPINE 200 MG: 200 TABLET, EXTENDED RELEASE ORAL at 08:14

## 2017-06-04 RX ADMIN — DIVALPROEX SODIUM 500 MG: 500 TABLET, FILM COATED, EXTENDED RELEASE ORAL at 18:04

## 2017-06-04 RX ADMIN — LORAZEPAM 1 MG: 1 TABLET ORAL at 20:24

## 2017-06-04 NOTE — BH NOTES
GROUP THERAPY PROGRESS NOTE    Paola Betancur is participating in [employer].  Music group    Group time: 15 minutes    Personal goal for participation: decreased anxiety    Goal orientation: relaxation    Group therapy participation: active    Therapeutic interventions reviewed and discussed:     Impression of participation: fair

## 2017-06-04 NOTE — BH NOTES
Received pt sl anxious and demanding with her request.  Pt is redirectable at present.   Less agitation noted
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
## 2017-06-04 NOTE — INTERDISCIPLINARY ROUNDS
Behavioral Health Interdisciplinary Rounds     Patient Name: Kelvin Lopez  Age: 65 y.o.   Room/Bed:  740/  Primary Diagnosis: [facility] ([facility]   Admission Status: Involuntary Commitment and Forced Medication Order     Readmission within 30 days: no  Power of  in place: no  Patient requires a blocked bed: yes          Reason for blocked bed: behavior     VTE Prophylaxis: Not indicated  Mobility needs/Fall risk: yes    Nutritional Plan: yes  Consults:       Labs/Testing due today?: no    Sleep hours:  7 1/2 hours +      Participation in Care/Groups:  no  Medication Compliant?: Yes  PRNS (last 24 hours): pain    Restraints (last 24 hours):  no  Substance Abuse:  no  CIWA (range last 24 hours):  COWS (range last 24 hours):   Alcohol screening (AUDIT) completed -     If applicable, date SBIRT discussed in treatment team AND documented:  Tobacco - patient is a smoker:yes      Date tobacco education completed by RN  , not using PRN  Nicotine Patch , 6-3-17 .  24 hour chart check complete: yes      Patient goal(s) for today:   Treatment team focus/goals:   LOS:  17  Expected LOS:   [address] -   Name of Decision maker if patient has Psychiatric Care Directive   Patient was offered information   Financial concerns/prescription coverage:    Date of last family contact:      Family requesting physician contact today:    Discharge plan:       Outpatient provider(s):     Participating treatment team members: Kelvin Lopez, * (assigned SW),

## 2017-06-04 NOTE — PROGRESS NOTES
[address] [facility]  Master Treatment Plan for Michael Barkley    Date Treatment Plan Initiated: 6/04/17    Treatment Plan Modalities:  Type of Modality Amount  (x minutes) Frequency (x/week) Duration (x days) Name of Responsible Staff   [address] [facility] meetings to encourage peer interactions [place] RN    Group psychotherapy to assist in building coping skills and internal controls 60 7 1 Samuel Stoner LCSW   Therapeutic activity groups to build coping skills 60 7 1 Samuel Stoner LCSW   Psychoeducation in group setting to address:   Medication education   [address] ESTER    Coping skills         Relaxation techniques         Symptom management         Discharge planning   [address] [facility]   60 1 1 Volunteer from [employer]    Recovery/AA/NA   61 1 1 Volunteer from [address] [place] [address] medication management   13 7 1 Dr. Pollo Cheng    Family meeting/discharge planning                                                    Problem: Altered Thought Process (Adult/Pediatric)  Goal: *STG: Participates in treatment plan  Outcome: Progressing Towards Goal  Patient denies SI. Patient mood is labile. Affect is smiling. Patient is cooperative with staff. Patient can be demanding with staff but is redirectable. Is food focused. Patient med and meal compliant. Patient has not required any prns this shift. Goal: *STG: Remains safe in hospital  Outcome: Progressing Towards Goal  Monitored Q 15 min for safety. Goal: *STG: Seeks staff when feelings of anxiety and fear arise  Outcome: Progressing Towards Goal  Patient is able to express feelings appropriately. Goal: *STG: Complies with medication therapy  Outcome: Progressing Towards Goal  Patient has been compliant with medications. Goal: *STG: Attends activities and groups  Outcome: Progressing Towards Goal  Patient attends activities and groups.    Goal: *STG: Decreased delusional thinking  Outcome: Progressing Towards Goal  This is improving. Goal: *STG: Decreased hallucinations  Outcome: Progressing Towards Goal  Patient continues to have auditory hallucinations but it is not as frequent. Goal: *STG: Absence of lethality  Outcome: Progressing Towards Goal  Patient has not been aggressive with staff this shift and has not required any prns. Goal: *STG: Demonstrates ability to understand and use improved judgment in daily activities and relationships  Outcome: Progressing Towards Goal  Patient's thoughts are more organized and patient is more aware of safety. Patient asks for assistance when using the walker to ambulate. Goal: *LTG: Returns to baseline functioning  Outcome: Progressing Towards Goal  This is a long-term goal that has not been met and it is unclear if this will be met in this short hospital stay. Goal: Interventions  Outcome: Progressing Towards Goal  Monitoring patient Q 15 min for safety. Medication management and group therapy provided.

## 2017-06-04 NOTE — PROGRESS NOTES
Problem: Altered Thought Process (Adult/Pediatric)  Goal: *STG: Participates in treatment plan  Outcome: Progressing Towards Goal  Pt is cooperative, labile. Pt was tearful early in shift, stating she wanted family to visit. Pt and staff have called family members multiple times with no answer. Pt had bouts of isolation to room with sad affect, stating \"No one is coming to see me or they would be here already\". Pt saw pastoral care this evening. Denies SI. Pt has bouts of argumentative behavior and blames staff for random incidents. Pt is med compliant and eating. Poor safety awareness.

## 2017-06-04 NOTE — PROGRESS NOTES
Problem: Falls - Risk of  Goal: *Absence of falls  Outcome: Progressing Towards Goal  Lying quietly in bed with eyes closed, respirations even and unlabored , NAD noted .   Bathroom light on , Tap bell and walker within reach , Maintaining q15 min safety checks

## 2017-06-05 LAB
GLUCOSE BLD STRIP.AUTO-MCNC: 122 MG/DL (ref 65–100)
GLUCOSE BLD STRIP.AUTO-MCNC: 98 MG/DL (ref 65–100)
SERVICE CMNT-IMP: ABNORMAL
SERVICE CMNT-IMP: NORMAL

## 2017-06-05 PROCEDURE — 74011250637 HC RX REV CODE- 250/637: Performed by: PSYCHIATRY & NEUROLOGY

## 2017-06-05 PROCEDURE — 65220000003 HC RM SEMIPRIVATE PSYCH

## 2017-06-05 PROCEDURE — 82962 GLUCOSE BLOOD TEST: CPT

## 2017-06-05 PROCEDURE — 74011250637 HC RX REV CODE- 250/637: Performed by: HOSPITALIST

## 2017-06-05 RX ORDER — INSULIN LISPRO 100 [IU]/ML
INJECTION, SOLUTION INTRAVENOUS; SUBCUTANEOUS 2 TIMES DAILY
Status: DISCONTINUED | OUTPATIENT
Start: 2017-06-05 | End: 2017-08-16 | Stop reason: HOSPADM

## 2017-06-05 RX ORDER — SODIUM CHLORIDE 0.9 % (FLUSH) 0.9 %
SYRINGE (ML) INJECTION
Status: DISPENSED
Start: 2017-06-05 | End: 2017-06-06

## 2017-06-05 RX ADMIN — DIVALPROEX SODIUM 500 MG: 500 TABLET, FILM COATED, EXTENDED RELEASE ORAL at 17:09

## 2017-06-05 RX ADMIN — LORAZEPAM 1 MG: 1 TABLET ORAL at 08:51

## 2017-06-05 RX ADMIN — ZOLPIDEM TARTRATE 12.5 MG: 6.25 TABLET, EXTENDED RELEASE ORAL at 20:34

## 2017-06-05 RX ADMIN — AMLODIPINE BESYLATE 10 MG: 5 TABLET ORAL at 08:51

## 2017-06-05 RX ADMIN — DIVALPROEX SODIUM 500 MG: 500 TABLET, FILM COATED, EXTENDED RELEASE ORAL at 08:51

## 2017-06-05 RX ADMIN — ATORVASTATIN CALCIUM 20 MG: 20 TABLET, FILM COATED ORAL at 08:51

## 2017-06-05 RX ADMIN — FLUPHENAZINE HYDROCHLORIDE 15 MG: 5 TABLET, FILM COATED ORAL at 20:35

## 2017-06-05 RX ADMIN — SITAGLIPTIN 100 MG: 100 TABLET, FILM COATED ORAL at 08:51

## 2017-06-05 RX ADMIN — LORAZEPAM 1 MG: 1 TABLET ORAL at 20:42

## 2017-06-05 RX ADMIN — CARBAMAZEPINE 200 MG: 200 TABLET, EXTENDED RELEASE ORAL at 08:52

## 2017-06-05 RX ADMIN — LORAZEPAM 1 MG: 1 TABLET ORAL at 15:17

## 2017-06-05 RX ADMIN — HYDROCHLOROTHIAZIDE 25 MG: 25 TABLET ORAL at 08:51

## 2017-06-05 RX ADMIN — CARBAMAZEPINE 200 MG: 200 TABLET, EXTENDED RELEASE ORAL at 17:09

## 2017-06-05 RX ADMIN — FLUPHENAZINE HYDROCHLORIDE 10 MG: 5 TABLET, FILM COATED ORAL at 08:51

## 2017-06-05 NOTE — PROGRESS NOTES
Problem: Falls - Risk of  Goal: *Absence of falls  Outcome: Progressing Towards Goal  Lying quietly in bed with eyes closed , respirations even and unlabored , NAD noted   Tap bell within reach , night light on for safety , Maintaining  safety checks q15  Min

## 2017-06-05 NOTE — PROGRESS NOTES
Problem: Altered Thought Process (Adult/Pediatric)  Goal: *STG: Participates in treatment plan  Outcome: Progressing Towards Goal  Patient denies SI. Mood is labile and affect is flat. Patient goal: \"to talk to the doctor about when I am going home\". Staff goal: to encourage patient to go to groups and continue being cooperative with treatment. Goal: Interventions  Outcome: Progressing Towards Goal  Medication management provided. Groups provided. Q 15 min monitoring for safety.

## 2017-06-05 NOTE — BH NOTES
GROUP THERAPY PROGRESS NOTE    Genaro Prakash did not participate in a Morning Geriatric Process Group with a focus on identifying feelings and planning for the day. The patient was not available for group on this unit this morning.

## 2017-06-05 NOTE — PROGRESS NOTES
Spiritual Care Assessment/Progress Notes    Cullen Roque [id number]  [id number]    1955  65 y.o.  female    Patient Telephone Number: [phone] (home)   Sabianist Affiliation: [facility]   Language: English   Extended Emergency Contact Information  Primary Emergency Contact: [address]  Home Phone: [phone]  Mobile Phone: [phone]  Relation: Child  Secondary Emergency Contact: [address] Phone: [phone]  Relation: Other Relative   Patient Active Problem List    Diagnosis Date Noted    Schizoaffective disorder ([facility] 05/18/2017    Cellulitis and abscess of leg 05/06/2015    DM II (diabetes mellitus, type II), controlled [facility] 05/06/2015    Seizure disorder ([facility] 05/06/2015    Febrile illness, acute 05/06/2015    Sepsis affecting skin 05/06/2015    HTN (hypertension) 03/25/2013    Schizophrenia, paranoid type ([facility] 03/20/2013        Date: 6/4/2017       Level of Sabianist/Spiritual Activity:  [x]         Involved in jesus tradition/spiritual practice    []         Not involved in jesus tradition/spiritual practice  []         Spiritually oriented    []         Claims no spiritual orientation    []         seeking spiritual identity  []         Feels alienated from Samaritan practice/tradition  []         Feels angry about Samaritan practice/tradition  [x]         Spirituality/Samaritan tradition is a resource for coping at this time.   []         Not able to assess due to medical condition    Services Provided Today:  []         crisis intervention    []         reading Scriptures  [x]         spiritual assessment    [x]         prayer  [x]         empathic listening/emotional support  []         rites and rituals (cite in comments)  []         life review     []         Samaritan support  []         theological development   []         advocacy  []         ethical dialog     []         blessing  []         bereavement support    []         support to family  []         anticipatory grief support   []         help with AMD  []         spiritual guidance    []         meditation      Spiritual Care Needs  []         Emotional Support  []         Spiritual/Religion Care  []         Loss/Adjustment  []         Advocacy/Referral                /Ethics  [x]         No needs expressed at               this time  []         Other: (note in               comments)  [address]  []         Follow up visits with               pt/family  []         Provide materials  []         Schedule sacraments  []         Contact Community               Clergy  [x]         Follow up as needed  []         Other: (note in               comments)     Comments: Initial visit in 740/1. Patient stated she wanted prayer and a rosary. Patient requested prayer for healing, a stable and welcoming home, and financial stability. Provided supportive presence and prayer. rosangela Clemente M.Div.    Paging Service [phone])

## 2017-06-05 NOTE — PROGRESS NOTES
Problem: Altered Thought Process (Adult/Pediatric)  Goal: *STG: Complies with medication therapy  Outcome: Progressing Towards Goal  Pt cooperative with medication administration. She is pleased to switch to BID blood sugar checks. Pt currently resting in her room. She was out on unit but requested to take a nap before dinner. Pt is pleasant and cooperative with staff. 2104- Pt has had an excellent night, appropriate with staff and peers. She had a nice talk with her daughter on the phone. She played the piano on the General unit and we listened to 70's and 80's music.

## 2017-06-05 NOTE — PROGRESS NOTES
Responded to patient request for rosary. Provided rosary for Ms. Velasquez---who is not Religious, but desired to have a rosary to wear around her neck. Unfortunately, the rosary broke from the pressure of placing over her head. Her nurse provided a solution in taping the rosary together. Ms. Rei Landry also requested spoken prayer---which was provided. She requested a replacement rosary---will arrange for this tomorrow. [address] [facility]'s Staff  ([place] [address] Patient Care Specialist)   Paging Service [phone])

## 2017-06-05 NOTE — INTERDISCIPLINARY ROUNDS
Behavioral Health Interdisciplinary Rounds     Patient Name: Evangelina Davis  Age: 65 y.o. Room/Bed:  740/01  Primary Diagnosis: Sch[facility]ffective disorder ([facility]   Admission Status: Involuntary Commitment and Forced Medication Order     Readmission within 30 days: no  Power of  in place: no  Patient requires a blocked bed: no          Reason for blocked bed:     VTE Prophylaxis: Not indicated  Mobility needs/Fall risk: yes    Nutritional Plan: yes  Consults:       Labs/Testing due today?:     Sleep hours:  5hours +      Participation in Care/Groups:   Medication Compliant?: Yes  PRNS (last 24 hours): none   Restraints (last 24 hours):  no  Substance Abuse:  no  CIWA (range last 24 hours):  COWS (range last 24 hours):   Alcohol screening (AUDIT) completed -     If applicable, date SBIRT discussed in treatment team AND documented:  Tobacco - patient is a smoker: yes    Date tobacco education completed by RN :6-3-17 not  Using Nicotine patch ,   24 hour chart check complete: yes    Patient goal(s) for today:   Treatment team focus/goals:   LOS:  18  Expected LOS: TBD  Psychiatric Advanced Care Directives - No  Name of Decision maker if patient has Psychiatric Care Directive: None  Patient was offered information, patient declined.    Financial concerns/prescription coverage: Medicaid/Medicare  Date of last family contact: None      Family requesting physician contact today: No    Discharge plan: TBD       Outpatient provider(s): [facility]  Participating treatment team members: PEGGY Ward; Dr. Valente Almonte MD; Edison Pisano RN

## 2017-06-06 LAB
GLUCOSE BLD STRIP.AUTO-MCNC: 101 MG/DL (ref 65–100)
GLUCOSE BLD STRIP.AUTO-MCNC: 140 MG/DL (ref 65–100)
SERVICE CMNT-IMP: ABNORMAL
SERVICE CMNT-IMP: ABNORMAL

## 2017-06-06 PROCEDURE — 65220000003 HC RM SEMIPRIVATE PSYCH

## 2017-06-06 PROCEDURE — 74011250637 HC RX REV CODE- 250/637: Performed by: PSYCHIATRY & NEUROLOGY

## 2017-06-06 PROCEDURE — 82962 GLUCOSE BLOOD TEST: CPT

## 2017-06-06 PROCEDURE — 97161 PT EVAL LOW COMPLEX 20 MIN: CPT

## 2017-06-06 PROCEDURE — 97116 GAIT TRAINING THERAPY: CPT

## 2017-06-06 PROCEDURE — 97166 OT EVAL MOD COMPLEX 45 MIN: CPT

## 2017-06-06 PROCEDURE — 74011250637 HC RX REV CODE- 250/637: Performed by: HOSPITALIST

## 2017-06-06 RX ORDER — MAG HYDROX/ALUMINUM HYD/SIMETH 200-200-20
30 SUSPENSION, ORAL (FINAL DOSE FORM) ORAL
Status: DISCONTINUED | OUTPATIENT
Start: 2017-06-06 | End: 2017-08-16 | Stop reason: HOSPADM

## 2017-06-06 RX ADMIN — LORAZEPAM 1 MG: 1 TABLET ORAL at 20:25

## 2017-06-06 RX ADMIN — DIVALPROEX SODIUM 500 MG: 500 TABLET, FILM COATED, EXTENDED RELEASE ORAL at 17:09

## 2017-06-06 RX ADMIN — CARBAMAZEPINE 200 MG: 200 TABLET, EXTENDED RELEASE ORAL at 17:09

## 2017-06-06 RX ADMIN — AMLODIPINE BESYLATE 10 MG: 5 TABLET ORAL at 08:37

## 2017-06-06 RX ADMIN — ACETAMINOPHEN 650 MG: 325 TABLET, FILM COATED ORAL at 21:06

## 2017-06-06 RX ADMIN — DIVALPROEX SODIUM 500 MG: 500 TABLET, FILM COATED, EXTENDED RELEASE ORAL at 08:38

## 2017-06-06 RX ADMIN — ZOLPIDEM TARTRATE 12.5 MG: 6.25 TABLET, EXTENDED RELEASE ORAL at 22:20

## 2017-06-06 RX ADMIN — LORAZEPAM 1 MG: 1 TABLET ORAL at 13:51

## 2017-06-06 RX ADMIN — HYDROCHLOROTHIAZIDE 25 MG: 25 TABLET ORAL at 08:38

## 2017-06-06 RX ADMIN — LORAZEPAM 1 MG: 1 TABLET ORAL at 08:38

## 2017-06-06 RX ADMIN — FLUPHENAZINE HYDROCHLORIDE 15 MG: 5 TABLET, FILM COATED ORAL at 21:28

## 2017-06-06 RX ADMIN — ATORVASTATIN CALCIUM 20 MG: 20 TABLET, FILM COATED ORAL at 08:38

## 2017-06-06 RX ADMIN — CARBAMAZEPINE 200 MG: 200 TABLET, EXTENDED RELEASE ORAL at 08:40

## 2017-06-06 RX ADMIN — FLUPHENAZINE HYDROCHLORIDE 10 MG: 5 TABLET, FILM COATED ORAL at 08:38

## 2017-06-06 RX ADMIN — SITAGLIPTIN 100 MG: 100 TABLET, FILM COATED ORAL at 08:38

## 2017-06-06 NOTE — BH NOTES
Received pt in dining room  Pleasant and cooperative  several request,demanding at irritable at times.   Sl paranoid behav noted but redirectable

## 2017-06-06 NOTE — PROGRESS NOTES
Problem: Altered Thought Process (Adult/Pediatric)  Goal: *STG: Complies with medication therapy  Outcome: Progressing Towards Goal  Pt compliant with medications. Her blood sugar was 140 and she made the decision to hold insulin after she was provided education. She is pleasant and cooperative with staff and peers.
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
## 2017-06-06 NOTE — BH NOTES
2315 Pt appears asleep in bed. Respirations even and unlabored. Bed alarm on, call bell within reach. Will continue to monitor with Q 15 safety checks.

## 2017-06-06 NOTE — PROGRESS NOTES
Problem: Altered Thought Process (Adult/Pediatric)  Goal: *STG: Participates in treatment plan  Outcome: Progressing Towards Goal  Patient denies SI. Patient cooperative with meds. Patient goal: \"to have a good day\". Staff goal: To prevent falls and encourage patient to be patient and not demanding with staff. Goal: Interventions  Outcome: Progressing Towards Goal  Patient will be monitored Q 15 min for safety. Medication management. Group therapy provided.

## 2017-06-06 NOTE — INTERDISCIPLINARY ROUNDS
Behavioral Health Interdisciplinary Rounds     Patient Name: Karin Ayala  Age: 65 y.o. Room/Bed:  740/01  Primary Diagnosis: [facility]   Admission Status: Involuntary Commitment and Forced Medication Order     Readmission within 30 days: no  Power of  in place: no  Patient requires a blocked bed: yes          Reason for blocked bed: disruptive behavior     VTE Prophylaxis: Not indicated  Mobility needs/Fall risk: yes    Nutritional Plan: yes  Consults: no         Labs/Testing due today?: no    Sleep hours: 7 +       Participation in Care/Groups:  yes  Medication Compliant?: Yes  PRNS (last 24 hours): None    Restraints (last 24 hours):  no  Substance Abuse:  no  CIWA (range last 24 hours):  COWS (range last 24 hours):   Alcohol screening (AUDIT) completed -     If applicable, date SBIRT discussed in treatment team AND documented: N/A  Tobacco - patient is a smoker: yes   Date tobacco education completed by RN: 6/3/17  24 hour chart check complete: yes     Patient goal(s) for today:   Treatment team focus/goals: PT/OT assessment  LOS:  19  Expected LOS: TBD  Psychiatric Advanced Care Directives -  No  Name of Decision maker if patient has Psychiatric Care Directive: None  Patient was offered information, patient declined.    Financial concerns/prescription coverage: Medicare/Medicaid  Date of last family contact:       Family requesting physician contact today: No  Discharge plan: TBD       Outpatient provider(s): [facility]    Participating treatment team members: PEGGY Long; Dr. Jose David Hoffmann MD; Zeny Garner RN; Isabelle Greer, PharmD

## 2017-06-07 LAB
GLUCOSE BLD STRIP.AUTO-MCNC: 114 MG/DL (ref 65–100)
GLUCOSE BLD STRIP.AUTO-MCNC: 135 MG/DL (ref 65–100)
SERVICE CMNT-IMP: ABNORMAL
SERVICE CMNT-IMP: ABNORMAL

## 2017-06-07 PROCEDURE — 65220000003 HC RM SEMIPRIVATE PSYCH

## 2017-06-07 PROCEDURE — 82962 GLUCOSE BLOOD TEST: CPT

## 2017-06-07 PROCEDURE — 74011250637 HC RX REV CODE- 250/637: Performed by: PSYCHIATRY & NEUROLOGY

## 2017-06-07 PROCEDURE — 74011250637 HC RX REV CODE- 250/637: Performed by: HOSPITALIST

## 2017-06-07 RX ADMIN — ALUMINUM HYDROXIDE, MAGNESIUM HYDROXIDE, AND SIMETHICONE 30 ML: 200; 200; 20 SUSPENSION ORAL at 09:08

## 2017-06-07 RX ADMIN — LORAZEPAM 1 MG: 1 TABLET ORAL at 08:56

## 2017-06-07 RX ADMIN — CARBAMAZEPINE 200 MG: 200 TABLET, EXTENDED RELEASE ORAL at 17:01

## 2017-06-07 RX ADMIN — FLUPHENAZINE HYDROCHLORIDE 10 MG: 5 TABLET, FILM COATED ORAL at 08:55

## 2017-06-07 RX ADMIN — AMLODIPINE BESYLATE 10 MG: 5 TABLET ORAL at 08:56

## 2017-06-07 RX ADMIN — SITAGLIPTIN 100 MG: 100 TABLET, FILM COATED ORAL at 08:56

## 2017-06-07 RX ADMIN — DIVALPROEX SODIUM 500 MG: 500 TABLET, FILM COATED, EXTENDED RELEASE ORAL at 08:57

## 2017-06-07 RX ADMIN — HYDROCHLOROTHIAZIDE 25 MG: 25 TABLET ORAL at 08:56

## 2017-06-07 RX ADMIN — LORAZEPAM 1 MG: 1 TABLET ORAL at 21:05

## 2017-06-07 RX ADMIN — ZOLPIDEM TARTRATE 12.5 MG: 6.25 TABLET, EXTENDED RELEASE ORAL at 22:30

## 2017-06-07 RX ADMIN — ATORVASTATIN CALCIUM 20 MG: 20 TABLET, FILM COATED ORAL at 08:55

## 2017-06-07 RX ADMIN — CARBAMAZEPINE 200 MG: 200 TABLET, EXTENDED RELEASE ORAL at 08:59

## 2017-06-07 RX ADMIN — LORAZEPAM 1 MG: 1 TABLET ORAL at 13:48

## 2017-06-07 RX ADMIN — DIVALPROEX SODIUM 500 MG: 500 TABLET, FILM COATED, EXTENDED RELEASE ORAL at 17:01

## 2017-06-07 RX ADMIN — FLUPHENAZINE HYDROCHLORIDE 15 MG: 5 TABLET, FILM COATED ORAL at 21:17

## 2017-06-07 NOTE — BH NOTES
GROUP THERAPY PROGRESS NOTE    Moraima Ricks participated in a Morning Process Group on the Geriatric Unit, with a focus identifying feelings, planning for the day, and singing. Group time: 45 minutes. Personal goal for participation: To increase the capacity to shift ones mood, prepare for the day, and share in group singing. Goal orientation: The patient will be able to prepare for the day through group singing. Group therapy participation: When prompted, this patient minimally participated in the group. Therapeutic interventions reviewed and discussed: The group members were introduce themselves by first names and participate in group singing as a way to increase their oxygen and blood flow and begin their day on a positive note. They were also asked to join in singing several songs. Impression of participation: The patient said she was feeling tired. She was kind and cooperative with her peers and the undersigned. She decided to leave the group after only a few minutes in the session. No SI/HI or overt psychosis were noted. The patient's affect depressed and slightly anxious. She was alert with low energy. She was completely coherent and socially appropriate. She implied that she did not have the strength this morning to continue with group.

## 2017-06-07 NOTE — PROGRESS NOTES
Problem: Altered Thought Process (Adult/Pediatric)  Goal: *STG: Participates in treatment plan  Outcome: Progressing Towards Goal  Alert and oriented this morning. Reviewed all medications this am. Took part in treatment team, voicing she felt better today in regards to her mood. Has been smiling,joking with staff. Is pleasant,cooperative with staff and others. Appropriate in her conversations. Assisted over to the dining room on the general unit to play the piano. Thoghts are clear and organized. Able to follow directions. Goal was to play the piano today. Goal: *STG: Remains safe in hospital  Outcome: Progressing Towards Goal  Remains safe in the hospital. Walkers with walker and stand-by assistance from staff. Bed alarm is on and call bell with in reach. Goal: *STG: Complies with medication therapy  Outcome: Progressing Towards Goal  Medication and meal compliant. Staff to encourage medications as ordered by doctor. Goal: Interventions  Outcome: Progressing Towards Goal   Set and obtain goals to progress towards discharge.

## 2017-06-07 NOTE — INTERDISCIPLINARY ROUNDS
Behavioral Health Interdisciplinary Rounds     Patient Name: Pérez Brito  Age: 65 y.o. Room/Bed:  740/01  Primary Diagnosis: Schizoaffective disorder ([facility]   Admission Status: Involuntary Commitment and Forced Medication Order     Readmission within 30 days: no  Power of  in place: no  Patient requires a blocked bed: no          Reason for blocked bed: n/a    VTE Prophylaxis: Not indicated  Mobility needs/Fall risk: yes    Nutritional Plan: yes  Consults: no         Labs/Testing due today?: no    Sleep hours: 3 +       Participation in Care/Groups:  yes  Medication Compliant?: Yes  PRNS (last 24 hours): Pain    Restraints (last 24 hours):  no  Substance Abuse:  no  CIWA (range last 24 hours):  COWS (range last 24 hours):   Alcohol screening (AUDIT) completed -     If applicable, date SBIRT discussed in treatment team AND documented: n/a  Tobacco - patient is a smoker: yes   Date tobacco education completed by RN: 6/3/17  24 hour chart check complete: yes     Patient goal(s) for today:   Treatment team focus/goals:   LOS:  20  Expected LOS: TBD  Psychiatric [address] - No  Name of Decision maker if patient has Psychiatric Care Directive: None   Patient was offered information, patient declined.    Financial concerns/prescription coverage: Medicaid/Medicare  Date of last family contact:      Family requesting physician contact today:    Discharge plan: SNF Placement       Outpatient provider(s): [facility]    Participating treatment team members: PEGGY Aguero; Dr. Italia Glaser MD; Leslee Gale RN
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
## 2017-06-07 NOTE — BH NOTES
2330 Pt awake in bed. Assisted to bathroom. No other needs/NAD noted. Night light on, call bell within reach. Will continue to monitor with Q 15 safety checks. 0430 Pt had two episodes of loose stool. Pt had difficulty cleaning self/following directions and became irritable. Will decrease stimuli.

## 2017-06-07 NOTE — PROGRESS NOTES
Problem: Altered Thought Process (Adult/Pediatric)  Goal: *STG: Remains safe in hospital  Patient is sitting quietly in the dinning room. Alert, calm and cooperative. No distress noted. Will continue to monitor.

## 2017-06-08 LAB
GLUCOSE BLD STRIP.AUTO-MCNC: 106 MG/DL (ref 65–100)
GLUCOSE BLD STRIP.AUTO-MCNC: 86 MG/DL (ref 65–100)
SERVICE CMNT-IMP: ABNORMAL
SERVICE CMNT-IMP: NORMAL

## 2017-06-08 PROCEDURE — 74011250637 HC RX REV CODE- 250/637: Performed by: PSYCHIATRY & NEUROLOGY

## 2017-06-08 PROCEDURE — 74011250637 HC RX REV CODE- 250/637: Performed by: HOSPITALIST

## 2017-06-08 PROCEDURE — 82962 GLUCOSE BLOOD TEST: CPT

## 2017-06-08 PROCEDURE — 65220000003 HC RM SEMIPRIVATE PSYCH

## 2017-06-08 RX ORDER — FLUPHENAZINE HYDROCHLORIDE 2.5 MG/ML
2.5 INJECTION, SOLUTION INTRAMUSCULAR 2 TIMES DAILY
Status: DISCONTINUED | OUTPATIENT
Start: 2017-06-08 | End: 2017-06-22

## 2017-06-08 RX ORDER — FLUPHENAZINE DECANOATE 25 MG/ML
25 INJECTION, SOLUTION INTRAMUSCULAR; SUBCUTANEOUS ONCE
Status: COMPLETED | OUTPATIENT
Start: 2017-06-09 | End: 2017-06-09

## 2017-06-08 RX ORDER — FLUPHENAZINE HYDROCHLORIDE 5 MG/1
15 TABLET ORAL 2 TIMES DAILY
Status: DISCONTINUED | OUTPATIENT
Start: 2017-06-08 | End: 2017-06-22

## 2017-06-08 RX ADMIN — LORAZEPAM 1 MG: 1 TABLET ORAL at 14:45

## 2017-06-08 RX ADMIN — AMLODIPINE BESYLATE 10 MG: 5 TABLET ORAL at 09:11

## 2017-06-08 RX ADMIN — ALUMINUM HYDROXIDE, MAGNESIUM HYDROXIDE, AND SIMETHICONE 30 ML: 200; 200; 20 SUSPENSION ORAL at 11:44

## 2017-06-08 RX ADMIN — HYDROCHLOROTHIAZIDE 25 MG: 25 TABLET ORAL at 09:11

## 2017-06-08 RX ADMIN — CARBAMAZEPINE 200 MG: 200 TABLET, EXTENDED RELEASE ORAL at 18:18

## 2017-06-08 RX ADMIN — FLUPHENAZINE HYDROCHLORIDE 15 MG: 5 TABLET, FILM COATED ORAL at 18:17

## 2017-06-08 RX ADMIN — CARBAMAZEPINE 200 MG: 200 TABLET, EXTENDED RELEASE ORAL at 09:15

## 2017-06-08 RX ADMIN — FLUPHENAZINE HYDROCHLORIDE 10 MG: 5 TABLET, FILM COATED ORAL at 09:11

## 2017-06-08 RX ADMIN — ATORVASTATIN CALCIUM 20 MG: 20 TABLET, FILM COATED ORAL at 09:11

## 2017-06-08 RX ADMIN — SITAGLIPTIN 100 MG: 100 TABLET, FILM COATED ORAL at 09:10

## 2017-06-08 RX ADMIN — DIVALPROEX SODIUM 500 MG: 500 TABLET, FILM COATED, EXTENDED RELEASE ORAL at 09:11

## 2017-06-08 RX ADMIN — DIVALPROEX SODIUM 500 MG: 500 TABLET, FILM COATED, EXTENDED RELEASE ORAL at 18:18

## 2017-06-08 RX ADMIN — LORAZEPAM 1 MG: 1 TABLET ORAL at 09:11
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
## 2017-06-08 NOTE — INTERDISCIPLINARY ROUNDS
Behavioral Health Interdisciplinary Rounds     Patient Name: Sim Hidalgo  Age: 65 y.o.   Room/Bed:  740/02  Primary Diagnosis: Schiz[facility]ffective disorder ([facility]   Admission Status: Involuntary Commitment with court ordered medications      Readmission within 30 days: no  Power of  in place: no  Patient requires a blocked bed: no          Reason for blocked bed:     VTE Prophylaxis: Not indicated  Mobility needs/Fall risk: yes    Nutritional Plan: yes  Consults:        Labs/Testing due today?: no    Sleep hours: 6 hours       Participation in Care/Groups:  yes  Medication Compliant?: Yes  PRNS (last 24 hours): Pain    Restraints (last 24 hours):  no  Substance Abuse:  no  CIWA (range last 24 hours):  COWS (range last 24 hours):  Alcohol screening (AUDIT) completed -     If applicable, date SBIRT discussed in treatment team AND documented:   Tobacco - patient is a smoker: yes    Date tobacco education completed by RN: 5/3/17  24 hour chart check complete: yes    Patient goal(s) for today:   Treatment team focus/goals:   LOS:  21  Expected LOS:  [address] -  no   Name of Decision maker if patient has Psychiatric Care Directive   Patient was offered information   Financial concerns/prescription coverage:   Date of last family contact:      Family requesting physician contact today:    Discharge plan:       Outpatient provider(s):     Participating treatment team members: Sim Hidalgo, * (assigned SW),

## 2017-06-08 NOTE — PROGRESS NOTES
[employer]  Master Treatment Plan for Ashley Call    Date Treatment Plan Initiated: 6/8/17    Treatment Plan Modalities:  Type of Modality Amount  (x minutes) Frequency (x/week) Duration (x days) Name of Responsible Staff   [address] [facility] meetings to encourage peer interactions 15 [phone] [facility] [address] psychotherapy to assist in building coping skills and internal controls 60 7 1 Samuel Stoner    Therapeutic activity groups to build coping skills 60 7 1 95 [address] Edvin Sewell   Psychoeducation in group setting to address:   Medication education   15 [address] [facility] skills         Relaxation techniques   20 7 1 Music therapy led by peers. Symptom management         Discharge planning         Spirituality    60 2 1  radha YADAV   61 1 1 volunteer   Recovery/AA/NA         Physician medication management         Family meeting/discharge planning                                              Problem: Falls - Risk of thse goals will be met by 6/12/17  Goal: *Absence of falls  Outcome: Progressing Towards Goal  No falls   Goal: *Knowledge of fall prevention  Outcome: Progressing Towards Goal  Using walker appropriately    Problem: Altered Thought Process (Adult/Pediatric) these goals will be met by 6/12/17  Goal: *STG: Participates in treatment plan  Outcome: Progressing Towards Goal  Review meds and discussed medication adjustments. Out on unit engaged.  Daily goal is to complete art work  Goal: *STG: Seeks staff when feelings of anxiety and fear arise  Outcome: Progressing Towards Goal  Easily shares feelings of sadness with lack of communication with family  Goal: *STG: Complies with medication therapy  Outcome: Progressing Towards Goal  compliant  Goal: *STG: Attends activities and groups  Outcome: Progressing Towards Goal  engaged  Goal: *STG: Decreased delusional thinking  Outcome: Progressing Towards Goal  None voiced  Goal: *STG: Decreased hallucinations  Outcome: Progressing Towards Goal  Denies. Does not appear internally stimulated  Goal: *STG: Demonstrates ability to understand and use improved judgment in daily activities and relationships  Outcome: Progressing Towards Goal  Improved thought organization, able to follow staff direction and unit routine.    Goal: Interventions  Outcome: Progressing Towards Goal  Staff focus is on continue limit setting, offering praise for positive behaviors and d/c planning

## 2017-06-08 NOTE — BH NOTES
GROUP THERAPY PROGRESS NOTE    Yovany Vásquez participated in a Morning Process Group on the Geriatric Unit, with a focus identifying feelings, planning for the day, and singing. Group time: 60 minutes. Personal goal for participation: To increase the capacity to shift ones mood, prepare for the day, and share in group singing. Goal orientation: The patient will be able to prepare for the day through group singing. Group therapy participation: When prompted, this patient participated in the group. Therapeutic interventions reviewed and discussed: The group members were introduce themselves by first names and participate in group singing as a way to increase their oxygen and blood flow and begin their day on a positive note. They were also asked to join in singing several songs. Impression of participation: The patient said she was feeling \"terrible\" because she had to wait for her breakfast this morning. She was fully alert and vacilated between feeling euphoric and entitled and demanding. She was easily re-directed by the nursing staff. She suggested several songs for the group to sing and said she planned on playing the piano on another unit later in the day. She expressed no SI/HI and no overt psychosis. She left the group about five minutes before it need but was fully engaged in the group when she was present for most of the group. She suggested several songs for the group to sing. The patient's behavior seemed to be bordering on what might be considered slightly manic. She was full of energy and quite talkative.

## 2017-06-08 NOTE — PROGRESS NOTES
Problem: Altered Thought Process (Adult/Pediatric)  Goal: *STG: Complies with medication therapy  Outcome: Progressing Towards Goal  0730 Received patient resting quietly in bed. Has c/o of pink eye and wants drops. Will monitor. Visible in dayroom all shift. Ate 100% at meals. Participated in activities; coloring, process group, listen to music, played piano on [address] ordered meds. Med. Compliant. C/o of gas and given PRN Mylanta. Ambulates with walker and one staff assist. Requires assistance with toileting. Pleasant. Cooperative.

## 2017-06-08 NOTE — PROGRESS NOTES
Problem: Altered Thought Process (Adult/Pediatric)  Goal: *STG: Remains safe in hospital  Patient is sitting quietly in the day room (General Unit). No distress noted. Patient remains safe in hospital.  1945 - Patient was intrusive and disrespectful to staff. Patient complained that staff was not washing her clothes, the doctors were  not meeting her needs and staff wan not giving her anything to eat. Used redirections and tried to talk to patient. Patient walked to the general unit.

## 2017-06-08 NOTE — PROGRESS NOTES
Problem: Falls - Risk of  Goal: *Absence of falls  Lying quietly in bed with eyes closed, Respirations even and unlabored , NAD noted .   Tap bell and walker  within reach at bedside , Side rails up x2 , Bed alarm light blinking green   Q15 min  Safety checks continue

## 2017-06-09 LAB
GLUCOSE BLD STRIP.AUTO-MCNC: 114 MG/DL (ref 65–100)
GLUCOSE BLD STRIP.AUTO-MCNC: 99 MG/DL (ref 65–100)
SERVICE CMNT-IMP: ABNORMAL
SERVICE CMNT-IMP: NORMAL

## 2017-06-09 PROCEDURE — 97535 SELF CARE MNGMENT TRAINING: CPT

## 2017-06-09 PROCEDURE — 74011250637 HC RX REV CODE- 250/637: Performed by: PSYCHIATRY & NEUROLOGY

## 2017-06-09 PROCEDURE — 74011250636 HC RX REV CODE- 250/636: Performed by: PSYCHIATRY & NEUROLOGY

## 2017-06-09 PROCEDURE — 82962 GLUCOSE BLOOD TEST: CPT

## 2017-06-09 PROCEDURE — 74011250637 HC RX REV CODE- 250/637: Performed by: HOSPITALIST

## 2017-06-09 PROCEDURE — 65220000003 HC RM SEMIPRIVATE PSYCH

## 2017-06-09 RX ORDER — LORAZEPAM 0.5 MG/1
0.5 TABLET ORAL 3 TIMES DAILY
Status: DISCONTINUED | OUTPATIENT
Start: 2017-06-09 | End: 2017-06-12

## 2017-06-09 RX ORDER — LORAZEPAM 2 MG/ML
1 INJECTION INTRAMUSCULAR 3 TIMES DAILY
Status: DISCONTINUED | OUTPATIENT
Start: 2017-06-09 | End: 2017-06-12

## 2017-06-09 RX ADMIN — AMLODIPINE BESYLATE 10 MG: 5 TABLET ORAL at 09:26

## 2017-06-09 RX ADMIN — LORAZEPAM 1 MG: 1 TABLET ORAL at 09:26

## 2017-06-09 RX ADMIN — FLUPHENAZINE HYDROCHLORIDE 15 MG: 5 TABLET, FILM COATED ORAL at 09:24

## 2017-06-09 RX ADMIN — DIVALPROEX SODIUM 500 MG: 500 TABLET, FILM COATED, EXTENDED RELEASE ORAL at 18:15

## 2017-06-09 RX ADMIN — FLUPHENAZINE DECANOATE 25 MG: 25 INJECTION, SOLUTION INTRAMUSCULAR; SUBCUTANEOUS at 13:33

## 2017-06-09 RX ADMIN — LORAZEPAM 0.5 MG: 0.5 TABLET ORAL at 13:44

## 2017-06-09 RX ADMIN — SITAGLIPTIN 100 MG: 100 TABLET, FILM COATED ORAL at 09:26

## 2017-06-09 RX ADMIN — LORAZEPAM 0.5 MG: 0.5 TABLET ORAL at 22:19

## 2017-06-09 RX ADMIN — HYDROCHLOROTHIAZIDE 25 MG: 25 TABLET ORAL at 09:25

## 2017-06-09 RX ADMIN — CARBAMAZEPINE 200 MG: 200 TABLET, EXTENDED RELEASE ORAL at 09:28

## 2017-06-09 RX ADMIN — CARBAMAZEPINE 200 MG: 200 TABLET, EXTENDED RELEASE ORAL at 18:15

## 2017-06-09 RX ADMIN — FLUPHENAZINE HYDROCHLORIDE 15 MG: 5 TABLET, FILM COATED ORAL at 18:15

## 2017-06-09 RX ADMIN — ATORVASTATIN CALCIUM 20 MG: 20 TABLET, FILM COATED ORAL at 09:26

## 2017-06-09 RX ADMIN — DIVALPROEX SODIUM 500 MG: 500 TABLET, FILM COATED, EXTENDED RELEASE ORAL at 09:26

## 2017-06-09 NOTE — PROGRESS NOTES
Problem: Altered Thought Process (Adult/Pediatric)  Goal: *STG: Remains safe in hospital  Patient is sitting in the dinning room; interacting with staff and peers. Flat sad affect. No distress noted. Will continue to monitor.

## 2017-06-09 NOTE — INTERDISCIPLINARY ROUNDS
Behavioral Health Interdisciplinary Rounds     Patient Name: Genaro Prakash  Age: 65 y.o.   Room/Bed:  740/02  Primary Diagnosis: [facility]ffective disorder [facility]   Admission Status: Involuntary Commitment and Forced Medication Order     Readmission within 30 days: no  Power of  in place: no  Patient requires a blocked bed: no          Reason for blocked bed:     VTE Prophylaxis: Not indicated  Mobility needs/Fall risk: yes   Nutritional Plan:yes   Consults:        Labs/Testing due today?: no    Sleep hours:  4 + hours      Participation in Care/Groups:  yes  Medication Compliant?: Yes  PRNS (last 24 hours): None    Restraints (last 24 hours):  no  Substance Abuse:  no  CIWA (range last 24 hours):  COWS (range last 24 hours):   Alcohol screening (AUDIT) completed -     If applicable, date SBIRT discussed in treatment team AND documented:   Tobacco - patient is a smoker:    Date tobacco education completed by RN:   24 hour chart check complete:     Patient goal(s) for today:  Treatment team focus/goals:   LOS:  22  Expected LOS:   [address] -  no   Name of Decision maker if patient has Psychiatric Care Directive   Patient was offered information   Financial concerns/prescription coverage:    Date of last family contact:     Family requesting physician contact today:   Discharge plan:       Outpatient provider(s):    Participating treatment team members: Genaro Prakash, * (assigned SW),

## 2017-06-09 NOTE — BH NOTES
GROUP THERAPY PROGRESS NOTE    Ashley Call did not participate in a Morning Process Group on the Geriatric Unit with a focus on shifting ones feelings to a positive note and preparing for the day through group singing. The patient decided to stay in her room during this morning's group.

## 2017-06-09 NOTE — PROGRESS NOTES
Problem: Falls - Risk of  Goal: *Absence of falls  Outcome: Progressing Towards Goal  Lying quietly in bed with eyes closed , respiraions even and unlabored , NAD noted   Tap bell within reach along with walker , side rail x1 up , bed alarm blinking green   Q15 min safety checks continue
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
## 2017-06-09 NOTE — PROGRESS NOTES
Problem: Altered Thought Process (Adult/Pediatric)  Goal: *STG: Complies with medication therapy  Outcome: Progressing Towards Goal  0730 Received patient resting in bed with eyes open. Greeted staff with a smile. NAD. VS taken and WNL. Patient did not participate in Process group. Returned to bed after receiving meds. When meal tray came to unit, she looked at peers tray than wants what they have. She argued with staff to order her pizza, potato chips etc.  Remains attention seeking and pleasantly confused. Requires frequent limit setting and redirections. Impulsive.  Ambulates with walker and one staff assist.

## 2017-06-10 LAB
GLUCOSE BLD STRIP.AUTO-MCNC: 101 MG/DL (ref 65–100)
SERVICE CMNT-IMP: ABNORMAL

## 2017-06-10 PROCEDURE — 82962 GLUCOSE BLOOD TEST: CPT

## 2017-06-10 PROCEDURE — 74011250637 HC RX REV CODE- 250/637: Performed by: PSYCHIATRY & NEUROLOGY

## 2017-06-10 PROCEDURE — 65220000003 HC RM SEMIPRIVATE PSYCH

## 2017-06-10 PROCEDURE — 74011250637 HC RX REV CODE- 250/637: Performed by: HOSPITALIST

## 2017-06-10 RX ADMIN — AMLODIPINE BESYLATE 10 MG: 5 TABLET ORAL at 09:26

## 2017-06-10 RX ADMIN — ZOLPIDEM TARTRATE 12.5 MG: 6.25 TABLET, EXTENDED RELEASE ORAL at 20:57

## 2017-06-10 RX ADMIN — HYDROCHLOROTHIAZIDE 25 MG: 25 TABLET ORAL at 09:26

## 2017-06-10 RX ADMIN — CARBAMAZEPINE 200 MG: 200 TABLET, EXTENDED RELEASE ORAL at 17:47

## 2017-06-10 RX ADMIN — SITAGLIPTIN 100 MG: 100 TABLET, FILM COATED ORAL at 09:26

## 2017-06-10 RX ADMIN — CARBAMAZEPINE 200 MG: 200 TABLET, EXTENDED RELEASE ORAL at 09:27

## 2017-06-10 RX ADMIN — DIVALPROEX SODIUM 500 MG: 500 TABLET, FILM COATED, EXTENDED RELEASE ORAL at 09:26

## 2017-06-10 RX ADMIN — LORAZEPAM 0.5 MG: 0.5 TABLET ORAL at 14:26

## 2017-06-10 RX ADMIN — ATORVASTATIN CALCIUM 20 MG: 20 TABLET, FILM COATED ORAL at 09:26

## 2017-06-10 RX ADMIN — LORAZEPAM 0.5 MG: 0.5 TABLET ORAL at 09:27

## 2017-06-10 RX ADMIN — LORAZEPAM 0.5 MG: 0.5 TABLET ORAL at 20:55

## 2017-06-10 RX ADMIN — FLUPHENAZINE HYDROCHLORIDE 15 MG: 5 TABLET, FILM COATED ORAL at 17:47

## 2017-06-10 RX ADMIN — DIVALPROEX SODIUM 500 MG: 500 TABLET, FILM COATED, EXTENDED RELEASE ORAL at 17:47

## 2017-06-10 RX ADMIN — FLUPHENAZINE HYDROCHLORIDE 15 MG: 5 TABLET, FILM COATED ORAL at 09:26
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
## 2017-06-10 NOTE — BH NOTES
0000-patient up with assistance of staff to bathroom to void and had bm. Given fresh pull ups to wear. Continue to monitor q 15 minutes for safety.

## 2017-06-10 NOTE — PROGRESS NOTES
Problem: Altered Thought Process (Adult/Pediatric)  Goal: *STG: Complies with medication therapy  Outcome: Progressing Towards Goal  Patient medications and meals compliant at this time. Ate 95% breakfast.    Remained absence of falls. Interacted with staffs and peers appropriately. Appeared smiling and cooperative. No acute distress noted. Continue pt on Q 15 min safety checks.

## 2017-06-10 NOTE — PROGRESS NOTES
Problem: Altered Thought Process (Adult/Pediatric)  Goal: *STG: Remains safe in hospital  Outcome: Progressing Towards Goal  1545: Greeted patient on unit in room resting. Appeared in no acute distress. Will continue to monitor on Q 15 minute safety checks.

## 2017-06-10 NOTE — BH NOTES
Behavioral Health Interdisciplinary Rounds     Patient Name: Basilio Kuhn  Age: 65 y.o. Room/Bed:  740/02  Primary Diagnosis: Schizoaffective disorder ([facility]   Admission Status: Involuntary Commitment and Forced Medication Order     Readmission within 30 days: no  Power of  in place:   Patient requires a blocked bed: no          Reason for blocked bed: n/a    VTE Prophylaxis: Not indicated  Mobility needs/Fall risk: yes    Nutritional Plan: no  Consults:          Labs/Testing due today?: no    Sleep hours: 6.5+       Participation in Care/Groups:  no  Medication Compliant?: Selective  PRNS (last 24 hours):     Restraints (last 24 hours):  no  Substance Abuse:  no  CIWA (range last 24 hours):  COWS (range last 24 hours):   Alcohol screening (AUDIT) completed -     If applicable, date SBIRT discussed in treatment team AND documented:   Tobacco - patient is a smoker:    Date tobacco education completed by RN:   24 hour chart check complete: yes     Patient goal(s) for today:   Treatment team focus/goals:   LOS:  23  Expected LOS:   [address] -     Name of Decision maker if patient has Psychiatric Care Directive   Patient was offered information   Financial concerns/prescription coverage:    Date of last family contact:       Family requesting physician contact today:    Discharge plan:  SNF Placement     Outpatient provider(s):  [facility]    Participating treatment team members: Basilio Kuhn, Dr Nico WATT and ARTHUR Cabrera

## 2017-06-11 LAB
GLUCOSE BLD STRIP.AUTO-MCNC: 125 MG/DL (ref 65–100)
GLUCOSE BLD STRIP.AUTO-MCNC: 92 MG/DL (ref 65–100)
SERVICE CMNT-IMP: ABNORMAL
SERVICE CMNT-IMP: NORMAL

## 2017-06-11 PROCEDURE — 82962 GLUCOSE BLOOD TEST: CPT

## 2017-06-11 PROCEDURE — 74011250637 HC RX REV CODE- 250/637: Performed by: HOSPITALIST

## 2017-06-11 PROCEDURE — 74011250637 HC RX REV CODE- 250/637: Performed by: PSYCHIATRY & NEUROLOGY

## 2017-06-11 PROCEDURE — 65220000003 HC RM SEMIPRIVATE PSYCH

## 2017-06-11 RX ADMIN — CARBAMAZEPINE 200 MG: 200 TABLET, EXTENDED RELEASE ORAL at 18:02

## 2017-06-11 RX ADMIN — CARBAMAZEPINE 200 MG: 200 TABLET, EXTENDED RELEASE ORAL at 09:08

## 2017-06-11 RX ADMIN — ZOLPIDEM TARTRATE 12.5 MG: 6.25 TABLET, EXTENDED RELEASE ORAL at 20:40

## 2017-06-11 RX ADMIN — HYDROCHLOROTHIAZIDE 25 MG: 25 TABLET ORAL at 09:09

## 2017-06-11 RX ADMIN — LORAZEPAM 0.5 MG: 0.5 TABLET ORAL at 13:24

## 2017-06-11 RX ADMIN — LORAZEPAM 0.5 MG: 0.5 TABLET ORAL at 09:09

## 2017-06-11 RX ADMIN — DIVALPROEX SODIUM 500 MG: 500 TABLET, FILM COATED, EXTENDED RELEASE ORAL at 17:20

## 2017-06-11 RX ADMIN — FLUPHENAZINE HYDROCHLORIDE 15 MG: 5 TABLET, FILM COATED ORAL at 09:09

## 2017-06-11 RX ADMIN — SITAGLIPTIN 100 MG: 100 TABLET, FILM COATED ORAL at 09:09

## 2017-06-11 RX ADMIN — AMLODIPINE BESYLATE 10 MG: 5 TABLET ORAL at 09:09

## 2017-06-11 RX ADMIN — ATORVASTATIN CALCIUM 20 MG: 20 TABLET, FILM COATED ORAL at 09:09

## 2017-06-11 RX ADMIN — FLUPHENAZINE HYDROCHLORIDE 15 MG: 5 TABLET, FILM COATED ORAL at 17:20

## 2017-06-11 RX ADMIN — DIVALPROEX SODIUM 500 MG: 500 TABLET, FILM COATED, EXTENDED RELEASE ORAL at 09:09

## 2017-06-11 RX ADMIN — LORAZEPAM 0.5 MG: 0.5 TABLET ORAL at 20:39

## 2017-06-11 NOTE — BH NOTES
Behavioral Health Interdisciplinary Rounds     Patient Name: Rabon Habermann  Age: [place] y.o.   Room/Bed:  740/02  Primary Diagnosis: Schizoaffective disorder ([facility] [address]   Admission Status: Involuntary Commitment and Forced Medication Order     Readmission within 30 days: no  Power of  in place:   Patient requires a blocked bed: no          Reason for blocked bed: N/A    VTE Prophylaxis: Not indicated  Mobility needs/Fall risk: yes    Nutritional Plan: no  Consults:            Labs/Testing due today?: no    Sleep hours:  4.5+      Participation in Care/Groups:  no  Medication Compliant?: Selective  PRNS (last 24 hours): None    Restraints (last 24 hours):  no  Substance Abuse:  no  CIWA (range last 24 hours):  COWS (range last 24 hours):   Alcohol screening (AUDIT) completed -     If applicable, date SBIRT discussed in treatment team AND documented:  Tobacco - patient is a smoker:    Date tobacco education completed by RN:   24 hour chart check complete: yes     Patient goal(s) for today:   Treatment team focus/goals:   LOS:  24  Expected LOS:   [address] -     Name of Decision maker if patient has Psychiatric Care Directive   Patient was offered information   Financial concerns/prescription coverage:    Date of last family contact:       Family requesting physician contact today:    Discharge plan: SNF Placement       Outpatient provider(s): [facility]    Participating treatment team members: Rabon Habermann, * (assigned SW),

## 2017-06-11 NOTE — PROGRESS NOTES
Problem: Falls - Risk of  Goal: *Absence of falls  Outcome: Progressing Towards Goal  Patient remains absent of falls. Blunted affect, calm, cooperative, appropriate with peers and staff, med and meal compliant, remains on Q15 min safety checks.

## 2017-06-11 NOTE — PROGRESS NOTES
Problem: Altered Thought Process (Adult/Pediatric)  Goal: *STG: Remains safe in hospital  Outcome: Progressing Towards Goal  Received in dining area smiling. Pt greets staff appropriately. Remains safe in the hospital. Pt using walker to ambulate. NAD. Q-15 checks for safety.

## 2017-06-11 NOTE — BH NOTES
2300:  Patient received as she appears to be sleeping comfortably with regular/unlabored respirations and no signs/symptoms of pain or distress. Will maintain q 15 minute safety checks.

## 2017-06-12 LAB
GLUCOSE BLD STRIP.AUTO-MCNC: 100 MG/DL (ref 65–100)
GLUCOSE BLD STRIP.AUTO-MCNC: 120 MG/DL (ref 65–100)
SERVICE CMNT-IMP: ABNORMAL
SERVICE CMNT-IMP: NORMAL

## 2017-06-12 PROCEDURE — 74011250637 HC RX REV CODE- 250/637: Performed by: PSYCHIATRY & NEUROLOGY

## 2017-06-12 PROCEDURE — 74011250637 HC RX REV CODE- 250/637: Performed by: HOSPITALIST

## 2017-06-12 PROCEDURE — 82962 GLUCOSE BLOOD TEST: CPT

## 2017-06-12 PROCEDURE — 65220000003 HC RM SEMIPRIVATE PSYCH

## 2017-06-12 RX ORDER — LORAZEPAM 2 MG/ML
1 INJECTION INTRAMUSCULAR
Status: COMPLETED | OUTPATIENT
Start: 2017-06-12 | End: 2017-06-14

## 2017-06-12 RX ORDER — LORAZEPAM 0.5 MG/1
0.5 TABLET ORAL
Status: COMPLETED | OUTPATIENT
Start: 2017-06-12 | End: 2017-06-14

## 2017-06-12 RX ADMIN — SITAGLIPTIN 100 MG: 100 TABLET, FILM COATED ORAL at 11:11

## 2017-06-12 RX ADMIN — FLUPHENAZINE HYDROCHLORIDE 15 MG: 5 TABLET, FILM COATED ORAL at 11:11

## 2017-06-12 RX ADMIN — DIVALPROEX SODIUM 500 MG: 500 TABLET, FILM COATED, EXTENDED RELEASE ORAL at 11:11

## 2017-06-12 RX ADMIN — LORAZEPAM 0.5 MG: 0.5 TABLET ORAL at 21:11

## 2017-06-12 RX ADMIN — CARBAMAZEPINE 200 MG: 200 TABLET, EXTENDED RELEASE ORAL at 11:12

## 2017-06-12 RX ADMIN — CARBAMAZEPINE 200 MG: 200 TABLET, EXTENDED RELEASE ORAL at 17:44

## 2017-06-12 RX ADMIN — DIVALPROEX SODIUM 500 MG: 500 TABLET, FILM COATED, EXTENDED RELEASE ORAL at 17:43

## 2017-06-12 RX ADMIN — AMLODIPINE BESYLATE 10 MG: 5 TABLET ORAL at 11:11

## 2017-06-12 RX ADMIN — LORAZEPAM 0.5 MG: 0.5 TABLET ORAL at 11:11

## 2017-06-12 RX ADMIN — ZOLPIDEM TARTRATE 12.5 MG: 6.25 TABLET, EXTENDED RELEASE ORAL at 21:11

## 2017-06-12 RX ADMIN — HYDROCHLOROTHIAZIDE 25 MG: 25 TABLET ORAL at 11:12

## 2017-06-12 RX ADMIN — ATORVASTATIN CALCIUM 20 MG: 20 TABLET, FILM COATED ORAL at 11:12

## 2017-06-12 RX ADMIN — FLUPHENAZINE HYDROCHLORIDE 15 MG: 5 TABLET, FILM COATED ORAL at 17:43

## 2017-06-12 NOTE — PROGRESS NOTES
Problem: Falls - Risk of  Goal: *Absence of falls  Outcome: Progressing Towards Goal  Patient is using her walker safely and appropriately when she is up to the bathroom. Will continue to monitor.

## 2017-06-12 NOTE — PROGRESS NOTES
Problem: Altered Thought Process (Adult/Pediatric)  Goal: *STG: Participates in treatment plan  Outcome: Progressing Towards Goal  Received this morning greeting staff with a smile. Is alert and oriented. Pleasant and cooperative with staff. Some anxieties noted when dealing with room mate and personal belongings. Able to be re-directed by staff. Follows directions. Retreats to room to decrease feelings of anxiety. Has been meal compliant. Patients goal is to have a ,\"Peacfull quiet day and enjoy my room. \"   Goal: *STG: Remains safe in hospital  Outcome: Progressing Towards Goal  Walking with walker,gait is stable and patient has staff at side for stand-by assistance when needed. Bed alarm is on and patient instructed to use call bell to ask for assistance when needed. Goal: *STG: Attends activities and groups  Outcome: Progressing Towards Goal  Came out on the unit for breakfast. Is  Social with staff and other patients. Declined coming out for group, voiced she wanted to stay in her,\"Quiet room. \"  Goal: Interventions  Outcome: Progressing Towards Goal  Encourage goals and use good coping skills to reduce anxieties during hospital stay to progress towards discharge.                 [facility]  Master Treatment Plan for Darcy Anderson    Date Treatment Plan Initiate6/12/17    Treatment Plan Modalities:  Type of Modality Amount  (x minutes) Frequency (x/week) Duration (x days) Name of Responsible Staff   Community & wrap-up meetings to encourage peer interactions 15 7 1   FRANCOIS Barragan   Group psychotherapy to assist in building coping skills and internal controls 60 7 1 Samuel Carter LCSW   Therapeutic activity groups to build coping skills 60 7 1 Samuel Carter LCSW   Psychoeducation in group setting to address:   Medication education   712 Roc Ronquillo RN   Coping skills         Relaxation techniques         Symptom management         Discharge planning   15 7 1  Spirituality    60 2 1 Nav YADAV   60 1 1 Vol. Recovery/AA/NA   60 1 1 Vol.    Physician medication management   15 7 1    Family meeting/discharge planning

## 2017-06-12 NOTE — BH NOTES
Received pt resting in her room  No voiced complaints or acute distress at present  Affect brighter and redirectable

## 2017-06-12 NOTE — BH NOTES
Behavioral Health Interdisciplinary Rounds     Patient Name: Merced Minaya  Age: 65 y.o.   Room/Bed:  740/02  Primary [facility]: [facility]   Admission Status: Involuntary Commitment and Forced Medication Order     Readmission within 30 days: no  Power of  in place:   Patient requires a blocked bed: no          Reason for blocked bed: N/A    VTE Prophylaxis: Not indicated  Mobility needs/Fall risk: yes    Nutritional Plan: no  Consults:          Labs/Testing due today?: no    Sleep hours:  3.0      Participation in Care/Groups:  no  Medication Compliant?: Yes  PRNS (last 24 hours): None    Restraints (last 24 hours):  no  Substance Abuse:  no  CIWA (range last 24 hours):  COWS (range last 24 hours):   Alcohol screening (AUDIT) completed -     If applicable, date SBIRT discussed in treatment team AND documented:   Tobacco - patient is a smoker:    Date tobacco education completed by RN:   24 hour chart check complete: yes     Patient goal(s) for today:   Treatment team focus/goals:   LOS:  25  Expected LOS:   [address] -     Name of Decision maker if patient has Psychiatric Care Directive   Patient was offered information   Financial concerns/prescription coverage:    Date of last family contact:       Family requesting physician contact today:    Discharge plan: SNF Placement       Outpatient provider(s): [facility]    Participating treatment team members: Merced Minaya, * (assigned SW),

## 2017-06-12 NOTE — BH NOTES
2315:  Patient received as she is getting up from bed to use the bathroom. She greets oncoming staff cordially and with requests to ensure that her clothes don't go missing (a reference to her roommate). She voices no other concerns or complaints at this time. Will maintain q 15 minute safety checks. 0600:  Patient has been restless and frequently up/down to the bathroom throughout the night. She slept for a total of 3.0 hours.

## 2017-06-12 NOTE — BH NOTES
GROUP THERAPY PROGRESS NOTE    Rubi Santiago participated in a Morning Process Group on the Geriatric Unit, with a focus identifying feelings, planning for the day, and singing. Group time: 45 minutes. Personal goal for participation: To increase the capacity to shift ones mood, prepare for the day, and share in group singing. Goal orientation: The patient will be able to prepare for the day through group singing. Group therapy participation: When prompted, this patient minimally participated in the group. Therapeutic interventions reviewed and discussed: The group members were introduce themselves by first names and participate in group singing as a way to increase their oxygen and blood flow and begin their day on a positive note. They were also asked to join in singing several songs. Impression of participation: The patient said she wanted to get up and go to her room as the group was beginning. She added that she did not want to participate in the group singing. She was encouraged to stay by the nursing staff but continued with her plan to leave the group this morning. She expressed no SI/HI and no overt psychosis. Her participation was very limited. She expressed negatively towards the process this morning. There was a significant degree of anxiety displayed and eagerness to avoid the group interaction.

## 2017-06-12 NOTE — BH NOTES
GROUP THERAPY PROGRESS NOTE    Reese Weinberg participated in an early Afternoon Coping Skills Group on the Geriatric Unit, with a focus on singing and building positive experiences as a coping skill. Group time: 45 minutes. Personal goal for participation: To increase the capacity to shift ones mood, prepare for the rest of the day, and share in group singing. Goal orientation: The patient will participate in group singing. Group therapy participation: When prompted, this patient minimally participated in the group. Therapeutic interventions reviewed and discussed: The group members were join in singing several songs and talk about what ideas and thoughts came up for them during the song. Impression of participation: The patient said she wanted to watch more of the [employer] special the nursing staff had been playing for the patients immediately prior to this group. She left the group until the last five minutes. When she returned, she sat down quietly without contributing to the group discussion. She wished a peer good-bye as the peer was being discharged.

## 2017-06-13 LAB
GLUCOSE BLD STRIP.AUTO-MCNC: 114 MG/DL (ref 65–100)
GLUCOSE BLD STRIP.AUTO-MCNC: 92 MG/DL (ref 65–100)
SERVICE CMNT-IMP: ABNORMAL
SERVICE CMNT-IMP: NORMAL

## 2017-06-13 PROCEDURE — 74011250637 HC RX REV CODE- 250/637: Performed by: PSYCHIATRY & NEUROLOGY

## 2017-06-13 PROCEDURE — 74011250637 HC RX REV CODE- 250/637: Performed by: HOSPITALIST

## 2017-06-13 PROCEDURE — 82962 GLUCOSE BLOOD TEST: CPT

## 2017-06-13 PROCEDURE — 65220000003 HC RM SEMIPRIVATE PSYCH

## 2017-06-13 RX ORDER — DIVALPROEX SODIUM 500 MG/1
1000 TABLET, EXTENDED RELEASE ORAL
Status: DISCONTINUED | OUTPATIENT
Start: 2017-06-13 | End: 2017-08-16 | Stop reason: HOSPADM

## 2017-06-13 RX ORDER — DIVALPROEX SODIUM 500 MG/1
1000 TABLET, EXTENDED RELEASE ORAL
Status: DISCONTINUED | OUTPATIENT
Start: 2017-06-13 | End: 2017-06-13 | Stop reason: SDUPTHER

## 2017-06-13 RX ORDER — FLUPHENAZINE HYDROCHLORIDE 2.5 MG/ML
2.5 INJECTION, SOLUTION INTRAMUSCULAR
Status: DISCONTINUED | OUTPATIENT
Start: 2017-06-13 | End: 2017-07-10

## 2017-06-13 RX ADMIN — AMLODIPINE BESYLATE 10 MG: 5 TABLET ORAL at 10:10

## 2017-06-13 RX ADMIN — HYDROCHLOROTHIAZIDE 25 MG: 25 TABLET ORAL at 10:11

## 2017-06-13 RX ADMIN — FLUPHENAZINE HYDROCHLORIDE 15 MG: 5 TABLET, FILM COATED ORAL at 10:10

## 2017-06-13 RX ADMIN — ZOLPIDEM TARTRATE 12.5 MG: 6.25 TABLET, EXTENDED RELEASE ORAL at 22:00

## 2017-06-13 RX ADMIN — DIVALPROEX SODIUM 1000 MG: 500 TABLET, FILM COATED, EXTENDED RELEASE ORAL at 21:16

## 2017-06-13 RX ADMIN — SITAGLIPTIN 100 MG: 100 TABLET, FILM COATED ORAL at 10:10

## 2017-06-13 RX ADMIN — ATORVASTATIN CALCIUM 20 MG: 20 TABLET, FILM COATED ORAL at 10:11

## 2017-06-13 RX ADMIN — CARBAMAZEPINE 200 MG: 200 TABLET, EXTENDED RELEASE ORAL at 17:26

## 2017-06-13 RX ADMIN — LORAZEPAM 0.5 MG: 0.5 TABLET ORAL at 21:16

## 2017-06-13 RX ADMIN — CARBAMAZEPINE 200 MG: 200 TABLET, EXTENDED RELEASE ORAL at 10:12

## 2017-06-13 RX ADMIN — FLUPHENAZINE HYDROCHLORIDE 15 MG: 5 TABLET, FILM COATED ORAL at 17:26

## 2017-06-13 NOTE — BH NOTES
GROUP THERAPY PROGRESS NOTE    Karin Ayala is participating in Leisure-Creative Group.      Group time: 15 minutes    Personal goal for participation: decrease anxiety    Goal orientation: relaxation    Group therapy participation: active    Therapeutic interventions reviewed and discussed:   mpression of participation:good

## 2017-06-13 NOTE — BH NOTES
Pt received awake in bed. Assisted to the bathroom to void. C/o gi upset. Requested and received diet ginger-dave. Assisted back to bed without incident. Denies SI/HI and reports no acute distress at this time. Bed alarm on and functioning and call bell within reach. Continue to monitor and support.

## 2017-06-13 NOTE — PROGRESS NOTES
Problem: Altered Thought Process (Adult/Pediatric)  Goal: *STG: Participates in treatment plan  Outcome: Progressing Towards Goal  Received alert and awake in room. Medications revived with patient. Thoughts are clear and organized. Able to follow directions. Minimal assistance with am cares. Gait stable with walker and stand by assistance. Small amount of anxiety shown about her belongings getting mixed up with her room mates, but easily re-direct able. Out in the dining room eating breakfast. Appropriate requests,social with staff and others. Patient goal is to,\"Stay  quiet and enjoy my surroundings. \"  Goal: *STG: Remains safe in hospital  Outcome: Progressing Towards Goal  Gaite stable with walker. Staff is at side for standby assistance if needed. Bed alarm is on the bed and call bell within reach. Staff to continue to monitor patient safety while in the hospital.  Goal: *STG: Complies with medication therapy  Outcome: Progressing Towards Goal  Medication and meal compliant. Goal: Interventions  Outcome: Progressing Towards Goal  Continue to meet goals to progress towards discharge.
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
## 2017-06-13 NOTE — INTERDISCIPLINARY ROUNDS
Behavioral Health Interdisciplinary Rounds     Patient Name: Kelvin Lopez  Age: 65 y.o. Room/Bed:  740/02  Primary Diagnosis: [facility]   Admission Status: Involuntary Commitment and Forced Medication Order     Readmission within 30 days: no  Power of  in place: no  Patient requires a blocked bed: no          Reason for blocked bed: n/a    VTE Prophylaxis: Not indicated  Mobility needs/Fall risk: yes    Nutritional Plan: yes  Consults: no         Labs/Testing due today?: no    Sleep hours: 2:15        Participation in Care/Groups:  yes  Medication Compliant?: Yes  PRNS (last 24 hours): None    Restraints (last 24 hours):  no  Substance Abuse:  no  CIWA (range last 24 hours):  COWS (range last 24 hours):   Alcohol screening (AUDIT) completed -     If applicable, date SBIRT discussed in treatment team AND documented: n/a  Tobacco - patient is a smoker: yes   Date tobacco education completed by RN: 5/29/17  24 hour chart check complete: yes     Patient goal(s) for today:   Treatment team focus/goals: Placement  LOS:  26  Expected LOS: TBD  Psychiatric Advanced Care Directives -  No  Name of Decision maker if patient has Psychiatric Care Directive: None  Patient was offered information, patient declined.   Financial concerns/prescription coverage: Medicaid  Date of last family contact:       Family requesting physician contact today: No  Discharge plan: Placement       Outpatient provider(s): [facility]    Participating treatment team members: PEGGY Rasmussen; Dr. Lilian Munoz MD; Any Aaron, ESTER; Romelia Gutierrez, UmeshD

## 2017-06-13 NOTE — PROGRESS NOTES
Problem: Altered Thought Process (Adult/Pediatric)  Goal: *STG: Remains safe in hospital  Pt thoughts are clear and organized. Pt continues to be  cooperative and pleasant toward staff. Appetite continues to be good as pt complies with court ordered meds. Staff will continue to monitor q 15 min checks.

## 2017-06-13 NOTE — BH NOTES
GROUP THERAPY PROGRESS NOTE    Reese Weinberg did not participate in a morning or afternoon Process Group on the Geriatric Unit with a focus on shifting ones feelings to a positive note and preparing for the rest of the day through group singing. She was not out on the unit in the morning. In the afternoon she was absorbed, with a couple of peers, in watching a Jeanne Pooja video.

## 2017-06-14 LAB
GLUCOSE BLD STRIP.AUTO-MCNC: 104 MG/DL (ref 65–100)
GLUCOSE BLD STRIP.AUTO-MCNC: 110 MG/DL (ref 65–100)
SERVICE CMNT-IMP: ABNORMAL
SERVICE CMNT-IMP: ABNORMAL

## 2017-06-14 PROCEDURE — 82962 GLUCOSE BLOOD TEST: CPT

## 2017-06-14 PROCEDURE — 74011250637 HC RX REV CODE- 250/637: Performed by: PSYCHIATRY & NEUROLOGY

## 2017-06-14 PROCEDURE — 65220000003 HC RM SEMIPRIVATE PSYCH

## 2017-06-14 PROCEDURE — 74011250637 HC RX REV CODE- 250/637: Performed by: HOSPITALIST

## 2017-06-14 RX ADMIN — ZOLPIDEM TARTRATE 12.5 MG: 6.25 TABLET, EXTENDED RELEASE ORAL at 21:38

## 2017-06-14 RX ADMIN — BENZTROPINE MESYLATE 1 MG: 1 TABLET ORAL at 14:27

## 2017-06-14 RX ADMIN — SITAGLIPTIN 100 MG: 100 TABLET, FILM COATED ORAL at 08:50

## 2017-06-14 RX ADMIN — ATORVASTATIN CALCIUM 20 MG: 20 TABLET, FILM COATED ORAL at 08:50

## 2017-06-14 RX ADMIN — LORAZEPAM 0.5 MG: 0.5 TABLET ORAL at 21:39

## 2017-06-14 RX ADMIN — ACETAMINOPHEN 650 MG: 325 TABLET, FILM COATED ORAL at 08:54

## 2017-06-14 RX ADMIN — HYDROCHLOROTHIAZIDE 25 MG: 25 TABLET ORAL at 08:50

## 2017-06-14 RX ADMIN — DIVALPROEX SODIUM 1000 MG: 500 TABLET, FILM COATED, EXTENDED RELEASE ORAL at 21:38

## 2017-06-14 RX ADMIN — LORAZEPAM 1 MG: 1 TABLET ORAL at 22:38

## 2017-06-14 RX ADMIN — FLUPHENAZINE HYDROCHLORIDE 15 MG: 5 TABLET, FILM COATED ORAL at 18:09

## 2017-06-14 RX ADMIN — FLUPHENAZINE HYDROCHLORIDE 15 MG: 5 TABLET, FILM COATED ORAL at 08:50

## 2017-06-14 RX ADMIN — CARBAMAZEPINE 200 MG: 200 TABLET, EXTENDED RELEASE ORAL at 08:50

## 2017-06-14 RX ADMIN — AMLODIPINE BESYLATE 10 MG: 5 TABLET ORAL at 08:51

## 2017-06-14 RX ADMIN — ACETAMINOPHEN 650 MG: 325 TABLET, FILM COATED ORAL at 20:35

## 2017-06-14 RX ADMIN — BENZTROPINE MESYLATE 1 MG: 1 TABLET ORAL at 20:35

## 2017-06-14 RX ADMIN — CARBAMAZEPINE 200 MG: 200 TABLET, EXTENDED RELEASE ORAL at 18:09

## 2017-06-14 NOTE — PROGRESS NOTES
Problem: Falls - Risk of  Goal: *Absence of falls  Outcome: Progressing Towards Goal  Pt ambulating with walker 1 person assist. Pt tolerating education well. Problem: Altered Thought Process (Adult/Pediatric)  Goal: *STG: Remains safe in hospital  Outcome: Progressing Towards Goal  Pt remains safe in hospital on q 15 min safety checks. Goal: *STG: Complies with medication therapy  Outcome: Progressing Towards Goal  Pt asking appropriate questions regarding medications. Compliant with medications. Verbalizes understanding of possible side effects and medication education. Goal: *STG: Decreased hallucinations  Outcome: Progressing Towards Goal  Pt talking to someone not seen by staff; pt answering. Pt remains calm pleasant. Denies /. 1427- PRN Medication Documentation    Specific patient behavior that led to need for PRN medication: pt c/o stiffness to left side neck, BLE, right arm.  Pt request cogentin   Staff interventions attempted prior to PRN being given: education, active listening, exercise, rest  PRN medication given: po 1 mg cogentin   Patient response/effectiveness of PRN medication: pt resting in bed

## 2017-06-14 NOTE — BH NOTES
GROUP THERAPY PROGRESS NOTE    Ashley Call did not participate in a Morning Process Group on the Geriatric Unit with a focus on shifting ones feelings to a positive note and preparing for the day through group singing.

## 2017-06-14 NOTE — BH NOTES
1530:  Patient initially received as she is playing the piano on the General Unit. When she returns to the unit, she greets oncoming staff with a bright smile and big hug. She voices no complaints or concerns at this time. Will maintain q 15 minute safety checks.
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
## 2017-06-14 NOTE — BH NOTES
2330 Pt appears asleep in bed. Respirations even and unlabored. Bed alarm on, call bell within reach, door remains open. Will continue to monitor with Q 15 safety checks.

## 2017-06-14 NOTE — INTERDISCIPLINARY ROUNDS
Behavioral Health Interdisciplinary Rounds     Patient Name: Eveline Hammond  Age: 65 y.o. Room/Bed:  740/02  Primary Diagnosis: [facility]   Admission Status: Involuntary Commitment and Forced Medication Order     Readmission within 30 days: no  Power of  in place: no  Patient requires a blocked bed: no          Reason for blocked bed: n/a    VTE Prophylaxis: Not indicated  Mobility needs/Fall risk: yes    Nutritional Plan: yes  Consults: no         Labs/Testing due today?: no    Sleep hours: 4.5       Participation in Care/Groups:  yes  Medication Compliant?: Yes  PRNS (last 24 hours): None    Restraints (last 24 hours):  no  Substance Abuse:  no  CIWA (range last 24 hours):  COWS (range last 24 hours):   Alcohol screening (AUDIT) completed -     If applicable, date SBIRT discussed in treatment team AND documented: N/A  Tobacco - patient is a smoker: yes   Date tobacco education completed by RN: 5/29/17  24 hour chart check complete: yes     Patient goal(s) for today:   Treatment team focus/goals: call family  LOS:  27  Expected LOS: TBD  Psychiatric [address] -  No  Name of Decision maker if patient has Psychiatric Care Directive: None   Patient was offered information, patient declined.   Financial concerns/prescription coverage: Medicaid  Date of last family contact:      Family requesting physician contact today: No  Discharge plan: Placement       Outpatient provider(s): [facility]    Participating treatment team members: PEGGY Elias; Dr. Stu Julian MD; Srinivasa Malin RN; Beth Monahan, Pharmacy resident

## 2017-06-14 NOTE — PROGRESS NOTES
PRN Medication Documentation    Specific patient behavior that led to need for PRN medication: c/o 10/10 headache onset now anterior   Staff interventions attempted prior to PRN being given: rest, deep breathing  PRN medication given: po 650 mg Tylenol   Patient response/effectiveness of PRN medication: pt resting in bed

## 2017-06-15 LAB
GLUCOSE BLD STRIP.AUTO-MCNC: 101 MG/DL (ref 65–100)
GLUCOSE BLD STRIP.AUTO-MCNC: 123 MG/DL (ref 65–100)
SERVICE CMNT-IMP: ABNORMAL
SERVICE CMNT-IMP: ABNORMAL

## 2017-06-15 PROCEDURE — 65220000003 HC RM SEMIPRIVATE PSYCH

## 2017-06-15 PROCEDURE — 74011250637 HC RX REV CODE- 250/637: Performed by: PSYCHIATRY & NEUROLOGY

## 2017-06-15 PROCEDURE — 74011250637 HC RX REV CODE- 250/637: Performed by: HOSPITALIST

## 2017-06-15 PROCEDURE — 82962 GLUCOSE BLOOD TEST: CPT

## 2017-06-15 RX ORDER — BENZTROPINE MESYLATE 1 MG/1
1 TABLET ORAL 2 TIMES DAILY
Status: DISCONTINUED | OUTPATIENT
Start: 2017-06-15 | End: 2017-06-16

## 2017-06-15 RX ADMIN — ZOLPIDEM TARTRATE 12.5 MG: 6.25 TABLET, EXTENDED RELEASE ORAL at 21:06

## 2017-06-15 RX ADMIN — SITAGLIPTIN 100 MG: 100 TABLET, FILM COATED ORAL at 09:47

## 2017-06-15 RX ADMIN — FLUPHENAZINE HYDROCHLORIDE 15 MG: 5 TABLET, FILM COATED ORAL at 17:07

## 2017-06-15 RX ADMIN — AMLODIPINE BESYLATE 10 MG: 5 TABLET ORAL at 09:48

## 2017-06-15 RX ADMIN — BENZTROPINE MESYLATE 1 MG: 1 TABLET ORAL at 18:00

## 2017-06-15 RX ADMIN — ACETAMINOPHEN 650 MG: 325 TABLET, FILM COATED ORAL at 21:11

## 2017-06-15 RX ADMIN — CARBAMAZEPINE 200 MG: 200 TABLET, EXTENDED RELEASE ORAL at 18:00

## 2017-06-15 RX ADMIN — FLUPHENAZINE HYDROCHLORIDE 15 MG: 5 TABLET, FILM COATED ORAL at 09:48

## 2017-06-15 RX ADMIN — ATORVASTATIN CALCIUM 20 MG: 20 TABLET, FILM COATED ORAL at 09:48

## 2017-06-15 RX ADMIN — HYDROCHLOROTHIAZIDE 25 MG: 25 TABLET ORAL at 09:47

## 2017-06-15 RX ADMIN — CARBAMAZEPINE 200 MG: 200 TABLET, EXTENDED RELEASE ORAL at 09:47

## 2017-06-15 RX ADMIN — DIVALPROEX SODIUM 1000 MG: 500 TABLET, FILM COATED, EXTENDED RELEASE ORAL at 21:06

## 2017-06-15 RX ADMIN — LORAZEPAM 1 MG: 1 TABLET ORAL at 22:29

## 2017-06-15 RX ADMIN — ACETAMINOPHEN 650 MG: 325 TABLET, FILM COATED ORAL at 09:52

## 2017-06-15 RX ADMIN — BENZTROPINE MESYLATE 1 MG: 1 TABLET ORAL at 09:48

## 2017-06-15 NOTE — PROGRESS NOTES
Problem: Altered Thought Process (Adult/Pediatric)  Goal: *STG: Attends activities and groups  Outcome: Progressing Towards Goal    Patient attends activities and groups, smiling, pleasant, appropriate with staff and peers, patient is out on the unit, med and meal compliant, remains on Q15 min safety checks.

## 2017-06-15 NOTE — PROGRESS NOTES
Patient attended spirituality group and actively participated. : Rev. Mane Alonzo [facility]; to contact [phone] [address] call: [phone]

## 2017-06-15 NOTE — PROGRESS NOTES
Patient (pt) participated in music therapy group. Her affect brightened significantly as she played the piano and sang for the group.     JOSE Poe (Music Therapist-Board Certified)  Spiritual Care Department  Referral-based service

## 2017-06-15 NOTE — BH NOTES
GROUP THERAPY PROGRESS NOTE    Darcy Anderson participated in a Morning Process Group on the Geriatric Unit, with a focus identifying feelings, planning for the day, and singing. Group time: 45 minutes. Personal goal for participation: To increase the capacity to shift ones mood, prepare for the day, and share in group singing. Goal orientation: The patient will be able to prepare for the day through group singing. Group therapy participation: When prompted, this patient actively participated in the group. Therapeutic interventions reviewed and discussed: The group members were introduce themselves by first names and participate in group singing as a way to increase their oxygen and blood flow and begin their day on a positive note. They were also asked to join in singing several songs. Impression of participation: The patient said she was feeling better today and her affect was euphoric. Her mood calm. She was smiling and helped pick out the first handful of songs the group sang. She expressed no SI/HI and no overt psychosis. Her ability to interact is much improved over earlier in the week. She left the group about five minutes early to walk down onto another unit to play the piano on that unit. There was no sign of junie. She was fully oriented, alert, and engaged in most of the group.

## 2017-06-15 NOTE — INTERDISCIPLINARY ROUNDS
Behavioral Health Interdisciplinary Rounds     Patient Name: Darcy Anderson  Age: 65 y.o. Room/Bed:  740/02  Primary Diagnosis: [facility]   Admission Status: Involuntary Commitment and Forced Medication Order     Readmission within 30 days: no  Power of  in place: no  Patient requires a blocked bed: no          Reason for blocked bed: n/a    VTE Prophylaxis: Not indicated  Mobility needs/Fall risk: yes    Nutritional Plan: yes  Consults: no         Labs/Testing due today?: no    Sleep hours: 6.25 +       Participation in Care/Groups:  yes  Medication Compliant?: Yes  PRNS (last 24 hours): Antianxiety, Anticholinergic and Pain    Restraints (last 24 hours):  no  Substance Abuse:  no  CIWA (range last 24 hours):  COWS (range last 24 hours):   Alcohol screening (AUDIT) completed -     If applicable, date SBIRT discussed in treatment team AND documented:   Tobacco - patient is a smoker: yes   Date tobacco education completed by RN: 5/29/17  24 hour chart check complete: yes     Patient goal(s) for today:   Treatment team focus/goals: Call family  LOS:  28  Expected LOS: TBD  Psychiatric [address] -  No  Name of Decision maker if patient has Psychiatric Care Directive: None  Patient was offered information, patient declined.   Financial concerns/prescription coverage:    Date of last family contact: 6/15 SW left voicemails for several family members   Family requesting physician contact today: No  Discharge plan: Placement      Outpatient provider(s): TBD    Participating treatment team members: PEGGY Garcia; Dr. Martin Trevino MD; Maciel Lipscomb RN; Heidi Henao, Pharmacy resident

## 2017-06-15 NOTE — PROGRESS NOTES
Problem: Falls - Risk of  Goal: *Absence of falls  Outcome: Progressing Towards Goal  Patient is ambulating independently while using her walker appropriately. She has remained free from falls/injury throughout this shift and states that she is being careful/does not want to fall. Problem: Altered Thought Process (Adult/Pediatric)  Goal: *STG: Attends activities and groups  Outcome: Progressing Towards Goal  Patient has been visible on the unit and appropriately interactive with staff and peers - patient enjoyed watching TV and playing Jeopardy and Wheel of Fortune with peers.

## 2017-06-15 NOTE — BH NOTES
PRN Medication Documentation    Specific patient behavior that led to need for PRN medication: headache and twitching near her mouth/cheek area  Staff interventions attempted prior to PRN being given:   PRN medication given: Tylenol 650 mg and Cogentin 1mg  Patient response/effectiveness of PRN medication: will reassess    10:39 PM    PRN Medication Documentation    Specific patient behavior that led to need for PRN medication: face twitching  Staff interventions attempted prior to PRN being given:  PRN medication given: 1mg PO ativan  Patient response/effectiveness of PRN medication: will reassess

## 2017-06-15 NOTE — BH NOTES
GROUP THERAPY PROGRESS NOTE    Kelvin Lopez participated in a Process Group on the General Unit with a focus identifying feelings, planning for the rest of the day, and reviewing DBT skills for dealing with emotional distress, Distress Regulation.      Group time: 60 minutes. Personal goal for participation: To increase the capacity to improve ones mood, structure, and coping capacity. Goal orientation: The patient will be able to identify their feelings, develop a plan for structuring their day, and begin to appreciate the possibility of successfully managing distress and other forms of intense emotions. Group therapy participation: With prompting, this patient minimally participated in the group. Therapeutic interventions reviewed and discussed: The group members were asked to introduce themselves to each other and to see if they could identify an emotion they are having and/or let the group know what they want to focus on for the day as they continue to make discharge plans. The group members also reviewed five suggested areas of DBT options when experiencing intense emotions: distraction, improve the moment, self-soothe, pros and cons, and radical acceptance. They were asked to take turns reading from the handout and discussing its contents. At the end of group the patients were provided a summary of the topics covered. The patients who attended were also offered a worksheet on the DBT concepts of Distress Regulation to complete on their own. Impression of participation: The patient joined the group about twenty minutes after it started. She said very little in group. She listened attentively until the last ten minutes and then began to look at a magazine. She expressed no current SI/HI nor any overt psychotic symptoms. Her affect was mildly euphoric and her mood disinterested. She was engaged in only a superficial fashion in this group.  She said she did not want to keep a copy of the topics discussed or the worksheet.

## 2017-06-15 NOTE — BH NOTES
1540:  Patient received as she is ambulating in the hallway on her way to the [address] on the General Unit stating that she is going to play the piano. She is laughing and joking with oncoming staff - appears in good spirits. She has a noticeable intermittent twitching to the Left side of her face, but she reports that it's better than it was last night. Will maintain q 15 minute safety checks.

## 2017-06-15 NOTE — BH NOTES
2315 Pt reading in bed. Smiling and pleasant, NAD noted. Call bell within reach, bathroom light on, door remains open. Will continue to monitor with Q 15 safety checks.

## 2017-06-15 NOTE — PROGRESS NOTES
Problem: Altered Thought Process (Adult/Pediatric)  Goal: *STG: Participates in treatment plan  Outcome: Progressing Towards Goal  Pt is smiling, pleasant, cooperative, and social.  Out on unit and attending groups on general unit. Med and meal compliant. Verbalizes no delusions, denies hallucinations. Q 15 min checks.

## 2017-06-16 LAB
GLUCOSE BLD STRIP.AUTO-MCNC: 104 MG/DL (ref 65–100)
GLUCOSE BLD STRIP.AUTO-MCNC: 111 MG/DL (ref 65–100)
SERVICE CMNT-IMP: ABNORMAL
SERVICE CMNT-IMP: ABNORMAL

## 2017-06-16 PROCEDURE — 74011250637 HC RX REV CODE- 250/637: Performed by: HOSPITALIST

## 2017-06-16 PROCEDURE — 74011250637 HC RX REV CODE- 250/637: Performed by: PSYCHIATRY & NEUROLOGY

## 2017-06-16 PROCEDURE — 65220000003 HC RM SEMIPRIVATE PSYCH

## 2017-06-16 PROCEDURE — 82962 GLUCOSE BLOOD TEST: CPT

## 2017-06-16 RX ORDER — BENZTROPINE MESYLATE 1 MG/1
1 TABLET ORAL 3 TIMES DAILY
Status: DISCONTINUED | OUTPATIENT
Start: 2017-06-16 | End: 2017-06-23

## 2017-06-16 RX ADMIN — CARBAMAZEPINE 200 MG: 200 TABLET, EXTENDED RELEASE ORAL at 17:38

## 2017-06-16 RX ADMIN — BENZTROPINE MESYLATE 1 MG: 1 TABLET ORAL at 16:34

## 2017-06-16 RX ADMIN — ACETAMINOPHEN 650 MG: 325 TABLET, FILM COATED ORAL at 12:47

## 2017-06-16 RX ADMIN — BENZTROPINE MESYLATE 1 MG: 1 TABLET ORAL at 09:15

## 2017-06-16 RX ADMIN — DIVALPROEX SODIUM 1000 MG: 500 TABLET, FILM COATED, EXTENDED RELEASE ORAL at 20:48

## 2017-06-16 RX ADMIN — HYDROCHLOROTHIAZIDE 25 MG: 25 TABLET ORAL at 09:14

## 2017-06-16 RX ADMIN — BENZTROPINE MESYLATE 1 MG: 1 TABLET ORAL at 20:46

## 2017-06-16 RX ADMIN — SITAGLIPTIN 100 MG: 100 TABLET, FILM COATED ORAL at 09:15

## 2017-06-16 RX ADMIN — FLUPHENAZINE HYDROCHLORIDE 15 MG: 5 TABLET, FILM COATED ORAL at 17:33

## 2017-06-16 RX ADMIN — FLUPHENAZINE HYDROCHLORIDE 15 MG: 5 TABLET, FILM COATED ORAL at 09:15

## 2017-06-16 RX ADMIN — ATORVASTATIN CALCIUM 20 MG: 20 TABLET, FILM COATED ORAL at 09:15

## 2017-06-16 RX ADMIN — ZOLPIDEM TARTRATE 12.5 MG: 6.25 TABLET, EXTENDED RELEASE ORAL at 20:48

## 2017-06-16 RX ADMIN — AMLODIPINE BESYLATE 10 MG: 5 TABLET ORAL at 09:15

## 2017-06-16 RX ADMIN — CARBAMAZEPINE 200 MG: 200 TABLET, EXTENDED RELEASE ORAL at 09:17

## 2017-06-16 NOTE — BH NOTES
PRN Medication Documentation    Specific patient behavior that led to need for PRN medication: sl anxiety,insomina,sl paranoid  Staff interventions attempted prior to PRN being given: coping skills,redirectionPRN medication given: ativan  Patient response/effectiveness of PRN medication: good

## 2017-06-16 NOTE — BH NOTES
GROUP THERAPY PROGRESS NOTE    Yovany Vásquez participated in a Morning Process Group on the Geriatric Unit, with a focus identifying feelings, planning for the day, and singing. Group time: 45 minutes. Personal goal for participation: To increase the capacity to shift ones mood, prepare for the day, and share in group singing. Goal orientation: The patient will be able to prepare for the day through group singing. Group therapy participation: When prompted, this patient participated in the group. Therapeutic interventions reviewed and discussed: The group members were introduce themselves by first names and participate in group singing as a way to increase their oxygen and blood flow and begin their day on a positive note. They were also asked to join in singing several songs. Impression of participation: The patient said she was \"in good spirits\" this morning. She showed the group some of the cards she made to send to various members of her family. She was proud of her walk and shared about getting a phone call from her 36year-old daughter from [place] [id number] earlier in the week. She was smiling as she shared with the group and sang along with most of the songs. She also recommended some of them for the group to sing. She even felt moved to sing a solo, a hymn she said she learned as a girl going to the [employer], [employer]. She expressed no SI/HI and no overt psychosis. Her affect was euphoric but not manic. She was fully engaged in the group and seemed happy to participate.

## 2017-06-16 NOTE — BH NOTES
GROUP THERAPY PROGRESS NOTE    Nicole Whiting participated in a Process Group on the General Unit with a focus identifying feelings, planning for the day, and learning more about DBT concepts of \"Elf Help\" and the importance of self-care. Group time: 75 minutes. Personal goal for participation: To identify feelings and increase the capacity to take care of oneself first as a primary coping skill. Goal orientation: The patient will be able to introduce themselves to their peers, identify their feelings, and consider the importance of taking time for ones self-care as an important part of life planning and coping skill development. Group therapy participation: With minimal prompting, this patient actively participated in the group. Therapeutic interventions reviewed and discussed: The group members were asked to begin by introducing themselves and sharing about their feelings. They were then asked to  take turns reading from an outline of twenty-two behavioral suggestions from DBT, called the [address] [place] [address], were reviewed with the group members and discussed as a group. The suggestions came with drawings of elves engaged in the activities described. These suggestions included: 1) trusting yourself (wise mind); 2) take a day off to retreat (observe and describe); 3) talk and play with children or adults (participate); 4) when you cant stop thinking, feel (non-judgmental stance); 5) stay in the present by recognizing when anxiety gets you caught up in a future or a past (one mindfully); 6) take time to think before just jumping in to solve a problem (efficiently);  7) when angry, let yourself feel the anger (the function of emotion); 8) make time for play (adult pleasant activities); 9) when sad, think about what would be comforting (accepting and engaging the opposite); 10) put yourself first (self-soothe); 11) when uncomfortable being alone, think about being with others and decide whether to make it happen (improve imagery); 12) take time to wonder (improve meaning); 13) carve out time to be lazy (improve relaxation); 14) notice the sun and the moon as they rise and set (improve one thing at a time); 15) nothing is usually the hardest thing to do - but often it is the best (guidelines for accepting reality); 16) when things are in chaos and one is in a frenzy, ask yourself Mely Shoulders is right about now?  (turning your mind); 17) learn to stand back and let others take their turn as leaders (radical acceptance); 18) when someone turns you down, it usually has to do with them and not you - ask someone else for what you need (using objective effectiveness); 19) when wanting to talk with someone new and are scared, breathe - dont rehearse, just plunge and if it doesnt go well, you can stop (using relationship effectiveness); 20) when you see someone elses hurt face, breathe - you are not responsible for making other people happy (no apologies); 21) when you want something from someone else, ask - asking is being true to yourself (be truthful); 22) when you are hurt, tell the person who hurt you (situations for interpersonal effectiveness). The group members were provided a summary sheet of the DBT information discussed, which they could also review on their own time. Impression of participation: The patient joined the group about ten minutes after it started and left the group about fifteen to twenty minutes to meet with her treatment team. She returned to group after that meeting. She was fully alert and engaged with the group while there. Her affect was euphoric. She said she wanted to be leaving in the near future and that she hoped she would be able to not use her walker for very long after she leaves the hospital. The patient expressed no SI/HI nor overt psychotic symptoms. She interacted appropriately with her peers. She asked her peers if she could offer a prayer at the end of the session.  She did so - it was a heartfelt blessing for the group members and their families.

## 2017-06-16 NOTE — BH NOTES
Pt received resting comfortably in bed. Respirations even and unlabored. NAD. Continue to monitor via 15 minute observation. 24 hour chart review completed. 0252-pt up to bathroom without difficulty. Brief changed. NAD. Continue to monitor. 0355-pt awake in bed reading. NAD at this time. Bright affect. Engaging well with staff. Laughing appropriately and reading scripture to staff. Discussed with writer her children and past experiences. Oriented to all spheres. Encouragement on progress provided. Continue to monitor and support.
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
## 2017-06-16 NOTE — PROGRESS NOTES
Problem: Falls - Risk of  Goal: *Absence of falls  Outcome: Progressing Towards Goal  Patient is ambulating safely and steadily while using her walker appropriately. She has been ambulating in the hallway with peers and has remained free from falls/injury throughout this shift. Problem: Altered Thought Process (Adult/Pediatric)  Goal: *STG: Attends activities and groups  Outcome: Progressing Towards Goal  Patient has been visible on the unit throughout this shift. She has been coloring, reading, watching TV and interacting appropriately with staff and peers.

## 2017-06-16 NOTE — BH NOTES
1530:  Patient received as she is sitting in the [address] [place] [address] St chatting with staff and peers. She greets oncoming staff cordially as well as visitors for other patients. She states \"even if my kids don't come to see me, the [facility] has sent me other people who care about me\". She is currently walking in the hallway for exercise. Her gait is steady, and she is using her walker appropriately. She states that she needs a walker or a cane when she is discharged. Will maintain q 15 minute safety checks.
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
## 2017-06-16 NOTE — INTERDISCIPLINARY ROUNDS
Behavioral Health Interdisciplinary Rounds     Patient Name: Reese Weinberg  Age: 65 y.o. Room/Bed:  740/02  Primary Diagnosis: Schizoaffective disorder ([facility]   Admission Status: Involuntary Commitment and Forced Medication Order     Readmission within 30 days: no  Power of  in place: no  Patient requires a blocked bed: no          Reason for blocked bed: n/a    VTE Prophylaxis: Not indicated  Mobility needs/Fall risk: yes    Nutritional Plan: yes  Consults:no         Labs/Testing due today?: no    Sleep hours: 3:30       Participation in Care/Groups:  yes  Medication Compliant?: Yes  PRNS (last 24 hours): Antianxiety, Anticholinergic and Pain    Restraints (last 24 hours):  no  Substance Abuse:  no  CIWA (range last 24 hours):  COWS (range last 24 hours):   Alcohol screening (AUDIT) completed -     If applicable, date SBIRT discussed in treatment team AND documented: n/a  Tobacco - patient is a smoker: yes   Date tobacco education completed by RN: 5/29/17  24 hour chart check complete: yes     Patient goal(s) for today:   Treatment team focus/goals: Continue working on placement  LOS:  29  Expected LOS: TBD  Psychiatric [address] - No  Name of Decision maker if patient has Psychiatric Care Directive: None  Patient was offered information, patient declined.   Financial concerns/prescription coverage: Medicare   Date of last family contact: 6/15 SW left voicemail for daughter, son-in-law, and brother    Family requesting physician contact today: No  Discharge plan: Placement        Outpatient provider(s): TBD    Participating treatment team members: PEGGY Dolan; Dr. Kyaw Martins MD; Josr Mei RN; Nathalie Pelaez, Pharmacy resident
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
## 2017-06-17 LAB
GLUCOSE BLD STRIP.AUTO-MCNC: 105 MG/DL (ref 65–100)
GLUCOSE BLD STRIP.AUTO-MCNC: 122 MG/DL (ref 65–100)
SERVICE CMNT-IMP: ABNORMAL
SERVICE CMNT-IMP: ABNORMAL

## 2017-06-17 PROCEDURE — 74011250637 HC RX REV CODE- 250/637: Performed by: HOSPITALIST

## 2017-06-17 PROCEDURE — 74011250637 HC RX REV CODE- 250/637: Performed by: PSYCHIATRY & NEUROLOGY

## 2017-06-17 PROCEDURE — 65220000003 HC RM SEMIPRIVATE PSYCH

## 2017-06-17 PROCEDURE — 82962 GLUCOSE BLOOD TEST: CPT

## 2017-06-17 RX ORDER — NAPROXEN 250 MG/1
250 TABLET ORAL 2 TIMES DAILY WITH MEALS
Status: DISCONTINUED | OUTPATIENT
Start: 2017-06-17 | End: 2017-08-09

## 2017-06-17 RX ADMIN — NAPROXEN 250 MG: 250 TABLET ORAL at 18:08

## 2017-06-17 RX ADMIN — BENZTROPINE MESYLATE 1 MG: 1 TABLET ORAL at 08:58

## 2017-06-17 RX ADMIN — ATORVASTATIN CALCIUM 20 MG: 20 TABLET, FILM COATED ORAL at 08:58

## 2017-06-17 RX ADMIN — HYDROCHLOROTHIAZIDE 25 MG: 25 TABLET ORAL at 08:57

## 2017-06-17 RX ADMIN — SITAGLIPTIN 100 MG: 100 TABLET, FILM COATED ORAL at 08:57

## 2017-06-17 RX ADMIN — CARBAMAZEPINE 200 MG: 200 TABLET, EXTENDED RELEASE ORAL at 09:00

## 2017-06-17 RX ADMIN — FLUPHENAZINE HYDROCHLORIDE 15 MG: 5 TABLET, FILM COATED ORAL at 08:57

## 2017-06-17 RX ADMIN — FLUPHENAZINE HYDROCHLORIDE 15 MG: 5 TABLET, FILM COATED ORAL at 18:09

## 2017-06-17 RX ADMIN — ACETAMINOPHEN 650 MG: 325 TABLET, FILM COATED ORAL at 09:38

## 2017-06-17 RX ADMIN — AMLODIPINE BESYLATE 10 MG: 5 TABLET ORAL at 08:57

## 2017-06-17 RX ADMIN — BENZTROPINE MESYLATE 1 MG: 1 TABLET ORAL at 22:20

## 2017-06-17 RX ADMIN — DIVALPROEX SODIUM 1000 MG: 500 TABLET, FILM COATED, EXTENDED RELEASE ORAL at 22:21

## 2017-06-17 RX ADMIN — CARBAMAZEPINE 200 MG: 200 TABLET, EXTENDED RELEASE ORAL at 18:11

## 2017-06-17 RX ADMIN — BENZTROPINE MESYLATE 1 MG: 1 TABLET ORAL at 18:10

## 2017-06-17 NOTE — PROGRESS NOTES
Problem: Altered Thought Process (Adult/Pediatric)  Goal: *STG: Participates in treatment plan  Outcome: Progressing Towards Goal  Pt is cooperative, labile. Pt becomes irritable when conversing about or with family members. [facility] [address] staff. Social and active on unit, attends groups, and med compliant. Eats well and seeks staff for needs. Currently denies hallucinations.

## 2017-06-17 NOTE — PROGRESS NOTES
06/17/17 0936   Pain 1   Pain Scale 1 Numeric (0 - 10)   Pain Intensity 1 5   Patient Stated Pain Goal 0   Pain Onset 1 now   Pain Location 1 Head   Pain Orientation 1 Anterior   Pain Description 1 Aching   Pain Intervention(s) 1 Medication (see MAR)   POSS Scale   Opioid Sedation Scale 1   tylenol given as requested by pt for headache. Continue to monitor and support.

## 2017-06-17 NOTE — INTERDISCIPLINARY ROUNDS
Behavioral Health Interdisciplinary Rounds     Patient Name: Paola Betancur  Age: 65 y.o. Room/Bed:  740/02  Primary Diagnosis: [facility]ffective disorder ([facility]   Admission Status: Involuntary Commitment and Forced Medication Order     Readmission within 30 days: no  Power of  in place: no  Patient requires a blocked bed: no          Reason for blocked bed: n/a    VTE Prophylaxis: Not indicated  Mobility needs/Fall risk: yes    Nutritional Plan: yes  Consults: no         Labs/Testing due today?: yes: 3 attempts, multiple nurses unsuccessful     Sleep hours: 3.75        Participation in Care/Groups:  yes  Medication Compliant?: Yes  PRNS (last 24 hours): Pain    Restraints (last 24 hours):  no  Substance Abuse:  no  CIWA (range last 24 hours):  COWS (range last 24 hours):   Alcohol screening (AUDIT) completed -     If applicable, date SBIRT discussed in treatment team AND documented:   Tobacco - patient is a smoker: yes   Date tobacco education completed by RN: 5/29/17  24 hour chart check complete: yes     Patient goal(s) for today:   Treatment team focus/goals: Eval meds and effectivenmess and offer support / seeking appropriate palcement   LOS:  30  Expected LOS:   Psychiatric Advanced Care Directives -    Name of Decision maker if patient has Psychiatric Care Directive   Patient was offered information   Financial concerns/prescription coverage:    Date of last family contact:      Family requesting physician contact today:  no  Discharge plan: TBD/ Placement      Outpatient provider(s): TAMIKO MEIER and [facility] w/ [facility]    Participating treatment team members: ARTHUR Godinez RN and Erasmo WATT

## 2017-06-17 NOTE — PROGRESS NOTES
Problem: Falls - Risk of  Goal: *Absence of falls  Outcome: Progressing Towards Goal  Patient is ambulating steadily while using her walker appropriately. She has been ambulating in the hallway on the General Unit, and she attended Recovery Group there with peers this evening. She has remained free from falls/injury throughout this shift. Problem: Altered Thought Process (Adult/Pediatric)  Goal: *STG: Attends activities and groups  Outcome: Progressing Towards Goal  Patient has been visible on the unit for dinner and watching TV with her peers. She attended Recovery Group and has been chatting with another patient and her family. Patient has been pleasant, cooperative and medication/meal compliant throughout this shift.

## 2017-06-17 NOTE — BH NOTES
GROUP THERAPY PROGRESS NOTE    Yovany Fahad is participating in [facility]. Group time: 15 minutes    Personal goal for participation: Attend all groups    Goal orientation: community    Group therapy participation: active    Therapeutic interventions reviewed and discussed: reviewed unit schedule and rules    Impression of participation: Asked appropriate questions, stated \"I want to be discharged soon\".

## 2017-06-18 LAB
ALBUMIN SERPL BCP-MCNC: 3.4 G/DL (ref 3.5–5)
ALBUMIN/GLOB SERPL: 0.9 {RATIO} (ref 1.1–2.2)
ALP SERPL-CCNC: 83 U/L (ref 45–117)
ALT SERPL-CCNC: 15 U/L (ref 12–78)
AST SERPL W P-5'-P-CCNC: 12 U/L (ref 15–37)
BILIRUB DIRECT SERPL-MCNC: <0.1 MG/DL (ref 0–0.2)
BILIRUB SERPL-MCNC: 0.2 MG/DL (ref 0.2–1)
CARBAMAZEPINE SERPL-MCNC: 6.2 UG/ML (ref 4–12)
ERYTHROCYTE [DISTWIDTH] IN BLOOD BY AUTOMATED COUNT: 13 % (ref 11.5–14.5)
GLOBULIN SER CALC-MCNC: 3.7 G/DL (ref 2–4)
GLUCOSE BLD STRIP.AUTO-MCNC: 103 MG/DL (ref 65–100)
GLUCOSE BLD STRIP.AUTO-MCNC: 125 MG/DL (ref 65–100)
HCT VFR BLD AUTO: 36.1 % (ref 35–47)
HGB BLD-MCNC: 11.9 G/DL (ref 11.5–16)
MCH RBC QN AUTO: 33.2 PG (ref 26–34)
MCHC RBC AUTO-ENTMCNC: 33 G/DL (ref 30–36.5)
MCV RBC AUTO: 100.8 FL (ref 80–99)
PLATELET # BLD AUTO: 176 K/UL (ref 150–400)
PROT SERPL-MCNC: 7.1 G/DL (ref 6.4–8.2)
RBC # BLD AUTO: 3.58 M/UL (ref 3.8–5.2)
SERVICE CMNT-IMP: ABNORMAL
SERVICE CMNT-IMP: ABNORMAL
VALPROATE SERPL-MCNC: 101 UG/ML (ref 50–100)
WBC # BLD AUTO: 5 K/UL (ref 3.6–11)

## 2017-06-18 PROCEDURE — 80164 ASSAY DIPROPYLACETIC ACD TOT: CPT | Performed by: PSYCHIATRY & NEUROLOGY

## 2017-06-18 PROCEDURE — 65220000003 HC RM SEMIPRIVATE PSYCH

## 2017-06-18 PROCEDURE — 85027 COMPLETE CBC AUTOMATED: CPT | Performed by: PSYCHIATRY & NEUROLOGY

## 2017-06-18 PROCEDURE — 74011250637 HC RX REV CODE- 250/637: Performed by: PSYCHIATRY & NEUROLOGY

## 2017-06-18 PROCEDURE — 80076 HEPATIC FUNCTION PANEL: CPT | Performed by: PSYCHIATRY & NEUROLOGY

## 2017-06-18 PROCEDURE — 36415 COLL VENOUS BLD VENIPUNCTURE: CPT | Performed by: PSYCHIATRY & NEUROLOGY

## 2017-06-18 PROCEDURE — 80156 ASSAY CARBAMAZEPINE TOTAL: CPT | Performed by: PSYCHIATRY & NEUROLOGY

## 2017-06-18 PROCEDURE — 82962 GLUCOSE BLOOD TEST: CPT

## 2017-06-18 PROCEDURE — 74011250637 HC RX REV CODE- 250/637: Performed by: HOSPITALIST

## 2017-06-18 RX ORDER — PANTOPRAZOLE SODIUM 40 MG/1
40 TABLET, DELAYED RELEASE ORAL
Status: DISCONTINUED | OUTPATIENT
Start: 2017-06-18 | End: 2017-08-16 | Stop reason: HOSPADM

## 2017-06-18 RX ADMIN — FLUPHENAZINE HYDROCHLORIDE 15 MG: 5 TABLET, FILM COATED ORAL at 17:29

## 2017-06-18 RX ADMIN — HYDROCHLOROTHIAZIDE 25 MG: 25 TABLET ORAL at 08:40

## 2017-06-18 RX ADMIN — PANTOPRAZOLE SODIUM 40 MG: 40 TABLET, DELAYED RELEASE ORAL at 11:46

## 2017-06-18 RX ADMIN — ATORVASTATIN CALCIUM 20 MG: 20 TABLET, FILM COATED ORAL at 08:39

## 2017-06-18 RX ADMIN — NAPROXEN 250 MG: 250 TABLET ORAL at 08:36

## 2017-06-18 RX ADMIN — BENZTROPINE MESYLATE 1 MG: 1 TABLET ORAL at 22:17

## 2017-06-18 RX ADMIN — FLUPHENAZINE HYDROCHLORIDE 15 MG: 5 TABLET, FILM COATED ORAL at 08:37

## 2017-06-18 RX ADMIN — AMLODIPINE BESYLATE 10 MG: 5 TABLET ORAL at 08:39

## 2017-06-18 RX ADMIN — CARBAMAZEPINE 200 MG: 200 TABLET, EXTENDED RELEASE ORAL at 17:29

## 2017-06-18 RX ADMIN — NAPROXEN 250 MG: 250 TABLET ORAL at 16:43

## 2017-06-18 RX ADMIN — BENZTROPINE MESYLATE 1 MG: 1 TABLET ORAL at 17:28

## 2017-06-18 RX ADMIN — BENZTROPINE MESYLATE 1 MG: 1 TABLET ORAL at 08:38

## 2017-06-18 RX ADMIN — ZOLPIDEM TARTRATE 12.5 MG: 6.25 TABLET, EXTENDED RELEASE ORAL at 22:17

## 2017-06-18 RX ADMIN — SITAGLIPTIN 100 MG: 100 TABLET, FILM COATED ORAL at 08:40

## 2017-06-18 RX ADMIN — DIVALPROEX SODIUM 1000 MG: 500 TABLET, FILM COATED, EXTENDED RELEASE ORAL at 22:17

## 2017-06-18 RX ADMIN — CARBAMAZEPINE 200 MG: 200 TABLET, EXTENDED RELEASE ORAL at 08:45

## 2017-06-18 NOTE — PROGRESS NOTES
Problem: Altered Thought Process (Adult/Pediatric)    Will met goals by 6/21/17  Goal: *STG: Participates in treatment plan  Outcome: Progressing Towards Goal  Pt alert oriented. Calm cooperative pleasant. Response well to positive feedback. Positive influence on milieu. Hopeful positive logical thought process. Goal oriented. Goal: *STG: Remains safe in hospital  Outcome: Progressing Towards Goal  Pt safe in hospital on q 15 min safety checks. Verbalizing and demonstrating understanding of safety education provided. Goal: *STG: Attends activities and groups  Outcome: Progressing Towards Goal  Engaged in activities on unit. Pt initiating activities with peers.       [facility] 60  Master Treatment Plan for Ashley Call    Date Treatment Plan Initiated: 6/18/17    Treatment Plan Modalities:  Type of Modality Amount  (x minutes) Frequency (x/week) Duration (x days) Name of Responsible Staff   [address] [place] [address] meetings to encourage peer interactions 15 7 1 Vida Davis RN     Group psychotherapy to assist in building coping skills and internal controls 60 7 1 Samuel Stoner LCSW   Therapeutic activity groups to build coping skills 60 7 1 Samuel Stoner LCSW   Psychoeducation in group setting to address:   Medication education   30 Kate Pinon RN   Coping skills   10 3 [facility] Femi NORMAN   Relaxation techniques   10 3 1 Shelly QUINTANA   Symptom management         Discharge planning         Spirituality    [facility]   60 1 1 Volunteer From [employer]   Recovery/AA/NA   60 3 1 Volunteer from [address] [place] [address] medication management   15 7 1 Dr. Ike Mendosa meeting/discharge planning

## 2017-06-18 NOTE — BH NOTES
2330 Pt appears asleep in bed. respirations even and unlabored. Bed alarm on, door remains open. Will continue to monitor with Q 15 safety checks.

## 2017-06-18 NOTE — PROGRESS NOTES
Problem: Altered Thought Process (Adult/Pediatric)  Goal: *STG: Participates in treatment plan  Outcome: Progressing Towards Goal  Pt is smiling and cooperative, med and meal compliant. Out on General unit, attends groups, social with peers. Denies hallucinations. Q 15 min checks.

## 2017-06-18 NOTE — INTERDISCIPLINARY ROUNDS
Behavioral Health Interdisciplinary Rounds     Patient Name: Akin Brito  Age: 65 y.o.   Room/Bed:  740/02  Primary Diagnosis: [facility]   Admission Status: Involuntary Commitment and Forced Medication Order     Readmission within 30 days: no  Power of  in place: no  Patient requires a blocked bed: no          Reason for blocked bed: n/a    VTE Prophylaxis: Not indicated  Mobility needs/Fall risk: yes    Nutritional Plan: yes  Consults: no         Labs/Testing due today?: yes: collected     Sleep hours: 4.5         Participation in Care/Groups:  yes  Medication Compliant?: Yes  PRNS (last 24 hours): Pain    Restraints (last 24 hours):  no  Substance Abuse:  no  CIWA (range last 24 hours):  COWS (range last 24 hours):   Alcohol screening (AUDIT) completed -     If applicable, date SBIRT discussed in treatment team AND documented:   Tobacco - patient is a smoker: yes  Date tobacco education completed by RN: 5/29/17  24 hour chart check complete: yes     Patient goal(s) for today:   Treatment team focus/goals:   LOS:  31  Expected LOS:   [address] -    Name of Decision maker if patient has Psychiatric Care Directive   Patient was offered information  Financial concerns/prescription coverage:    Date of last family contact:       Family requesting physician contact today:    Discharge plan:        Outpatient provider(s):     Participating treatment team members: Akin Brito, * (assigned SW),

## 2017-06-18 NOTE — PROGRESS NOTES
Problem: Falls - Risk of  Goal: *Absence of falls  Outcome: Progressing Towards Goal  No falls   Goal: *Knowledge of fall prevention  Outcome: Progressing Towards Goal  Using walker appropriately no falls this shift    Problem: Altered Thought Process (Adult/Pediatric)  Goal: *STG: Participates in treatment plan  Outcome: Progressing Towards Goal  Review meds, out on general unit boarding participating in groups and activities. Pt daily goal is to attend activities  Goal: *STG: Remains safe in hospital  Outcome: Progressing Towards Goal  No harm or injury  Goal: *STG: Seeks staff when feelings of anxiety and fear arise  Outcome: Progressing Towards Goal  Up to nursing reports frustrating about her length of stay, lack of placement  Goal: *STG: Complies with medication therapy  Outcome: Progressing Towards Goal  compliant  Goal: *STG: Attends activities and groups  Outcome: Progressing Towards Goal  engaged  Goal: *STG: Demonstrates ability to understand and use improved judgment in daily activities and relationships  Outcome: Progressing Towards Goal  Thoughts organized, logical and goal directed.  Demonstrates appropriate behaviors ability to follow direction  Goal: Interventions  Outcome: Progressing Towards Goal  Staff focus is on coping skills education limit setting
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
## 2017-06-18 NOTE — PROGRESS NOTES
Orders acknowledged. Patient seen and discharged from PT on 6/6/17 and has been observed ambulating on the unit with SBA and RW with staff since without a change in functional status. Called nurse to discussed discharge disposition. Unclear at this time. If patient is going to SNF or LTC, RW will be ordered there for patient. If patient is returning home, RW can be ordered within 48 hours of discharge if patient has not received a device within the last 5 years. Have not heard back about discharge plan. Will keep orders open and follow up in the AM as appropriate. Thanks!     Dede Christianson PT, DPT

## 2017-06-19 LAB
GLUCOSE BLD STRIP.AUTO-MCNC: 145 MG/DL (ref 65–100)
GLUCOSE BLD STRIP.AUTO-MCNC: 94 MG/DL (ref 65–100)
SERVICE CMNT-IMP: ABNORMAL
SERVICE CMNT-IMP: NORMAL

## 2017-06-19 PROCEDURE — 97116 GAIT TRAINING THERAPY: CPT

## 2017-06-19 PROCEDURE — 82962 GLUCOSE BLOOD TEST: CPT

## 2017-06-19 PROCEDURE — 74011250637 HC RX REV CODE- 250/637: Performed by: PSYCHIATRY & NEUROLOGY

## 2017-06-19 PROCEDURE — 65220000003 HC RM SEMIPRIVATE PSYCH

## 2017-06-19 PROCEDURE — 74011250637 HC RX REV CODE- 250/637: Performed by: HOSPITALIST

## 2017-06-19 RX ORDER — FLUPHENAZINE DECANOATE 25 MG/ML
25 INJECTION, SOLUTION INTRAMUSCULAR; SUBCUTANEOUS ONCE
Status: COMPLETED | OUTPATIENT
Start: 2017-06-22 | End: 2017-06-22

## 2017-06-19 RX ADMIN — PANTOPRAZOLE SODIUM 40 MG: 40 TABLET, DELAYED RELEASE ORAL at 06:57

## 2017-06-19 RX ADMIN — CARBAMAZEPINE 200 MG: 200 TABLET, EXTENDED RELEASE ORAL at 10:07

## 2017-06-19 RX ADMIN — FLUPHENAZINE HYDROCHLORIDE 15 MG: 5 TABLET, FILM COATED ORAL at 10:03

## 2017-06-19 RX ADMIN — NAPROXEN 250 MG: 250 TABLET ORAL at 17:41

## 2017-06-19 RX ADMIN — FLUPHENAZINE HYDROCHLORIDE 15 MG: 5 TABLET, FILM COATED ORAL at 17:40

## 2017-06-19 RX ADMIN — DIVALPROEX SODIUM 1000 MG: 500 TABLET, FILM COATED, EXTENDED RELEASE ORAL at 21:07

## 2017-06-19 RX ADMIN — BENZTROPINE MESYLATE 1 MG: 1 TABLET ORAL at 21:07

## 2017-06-19 RX ADMIN — ATORVASTATIN CALCIUM 20 MG: 20 TABLET, FILM COATED ORAL at 09:58

## 2017-06-19 RX ADMIN — NAPROXEN 250 MG: 250 TABLET ORAL at 10:01

## 2017-06-19 RX ADMIN — BENZTROPINE MESYLATE 1 MG: 1 TABLET ORAL at 10:01

## 2017-06-19 RX ADMIN — ZOLPIDEM TARTRATE 12.5 MG: 6.25 TABLET, EXTENDED RELEASE ORAL at 21:07

## 2017-06-19 RX ADMIN — SITAGLIPTIN 100 MG: 100 TABLET, FILM COATED ORAL at 10:02

## 2017-06-19 RX ADMIN — CARBAMAZEPINE 200 MG: 200 TABLET, EXTENDED RELEASE ORAL at 17:41

## 2017-06-19 RX ADMIN — BENZTROPINE MESYLATE 1 MG: 1 TABLET ORAL at 17:41

## 2017-06-19 RX ADMIN — AMLODIPINE BESYLATE 10 MG: 5 TABLET ORAL at 10:02
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
## 2017-06-19 NOTE — INTERDISCIPLINARY ROUNDS
Behavioral Health Interdisciplinary Rounds     Patient Name: Jeb Spurling  Age: 65 y.o. Room/Bed:  740/02  Primary Diagnosis: Sch[facility]ffective disorder ([facility]   Admission Status: Involuntary Commitment and Forced Medication Order     Readmission within 30 days: no  Power of  in place: no  Patient requires a blocked bed: no          Reason for blocked bed:     VTE Prophylaxis: Not indicated  Mobility needs/Fall risk: yes    Nutritional Plan: yes  Consults:        Labs/Testing due today?: no    Sleep hours: 6+ hours       Participation in Care/Groups:  yes  Medication Compliant?: Yes  PRNS (last 24 hours): None    Restraints (last 24 hours):  no  Substance Abuse:  no  CIWA (range last 24 hours):  COWS (range last 24 hours):   Alcohol screening (AUDIT) completed -     If applicable, date SBIRT discussed in treatment team AND documented:   Tobacco - patient is a smoker: yes   Date tobacco education completed by RN: 5/29/17  24 hour chart check complete: yes     Patient goal(s) for today:   Treatment team focus/goals: work on placement  LOS:  32  Expected LOS: TBD  Psychiatric [address] - no  Name of Decision maker if patient has Psychiatric Care Directive: None  Patient was offered information, patient declined.   Financial concerns/prescription coverage: Medicaid  Date of last family contact: SW left voicemails for family on 6/16     Family requesting physician contact today: No  Discharge plan: Placement      Outpatient provider(s): [facility]    Participating treatment team members: PEGGY Alegre; Dr. Gayatri Rey MD; Sherrod Schilder, RN; Amber Yepez, PharmD

## 2017-06-19 NOTE — PROGRESS NOTES
Problem: Falls - Risk of  Goal: *Absence of falls  Outcome: Progressing Towards Goal  Lying quietly in bed with eyes closed , Respirations even and unlabored , NAD   Night light on im room for safety, Tap bell within reach , Bed alarm on , Q15 min checks for safety maintained
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
## 2017-06-19 NOTE — BH NOTES
GROUP THERAPY PROGRESS NOTE    Guillermo Lopez participated in a Morning Process Group on the Geriatric Unit, with a focus identifying feelings, planning for the day, and singing. Group time: 45 minutes. Personal goal for participation: To increase the capacity to shift ones mood, prepare for the day, and share in group singing. Goal orientation: The patient will be able to prepare for the day through group singing. Group therapy participation: When prompted, this patient participated in the group. Therapeutic interventions reviewed and discussed: The group members were introduce themselves by first names and participate in group singing as a way to increase their oxygen and blood flow and begin their day on a positive note. They were also asked to join in singing several songs. Impression of participation: The patient said she was feeling fine this morning and she looked like she was looking forward to the group this morning. She recommended the group sing several songs and she encouraged her peers to also make selections. She expressed no SI/HI and no overt psychosis. Her affect was euphoric, without being manic. She said she hoped she would be living either with her daughter or an assisted living facility in [facility] in the not too distant future. She agreed that she has made progress during her hospitalization.

## 2017-06-19 NOTE — PROGRESS NOTES
Problem: Falls - Risk of  Goal: *Absence of falls  Outcome: Progressing Towards Goal  No falls     Problem: Altered Thought Process (Adult/Pediatric)  Goal: *STG: Participates in treatment plan  Outcome: Progressing Towards Goal  Review meds, pt is out and social on the unit with staff and peers. Pt daily goal is to attend groups   Goal: *STG: Remains safe in hospital  Outcome: Progressing Towards Goal  Denies SI, no self harming behaviors  Goal: *STG: Seeks staff when feelings of anxiety and fear arise  Outcome: Progressing Towards Goal  Pt is very vocal about her needs and voices her needs frequently   Goal: *STG: Complies with medication therapy  Outcome: Progressing Towards Goal  Compliant   Goal: *STG: Attends activities and groups  Outcome: Progressing Towards Goal  Engaged   Goal: *STG: Demonstrates ability to understand and use improved judgment in daily activities and relationships  Outcome: Progressing Towards Goal  Pt behavior is appropriate for situation, able to follow commands.  Thoughts are logical and organized   Goal: Interventions  Outcome: Progressing Towards Goal  Staff is focusing on coping skills education and limit setting
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
## 2017-06-20 LAB
ANION GAP BLD CALC-SCNC: 7 MMOL/L (ref 5–15)
BUN SERPL-MCNC: 22 MG/DL (ref 6–20)
BUN/CREAT SERPL: 22 (ref 12–20)
CALCIUM SERPL-MCNC: 8.4 MG/DL (ref 8.5–10.1)
CHLORIDE SERPL-SCNC: 100 MMOL/L (ref 97–108)
CO2 SERPL-SCNC: 25 MMOL/L (ref 21–32)
CREAT SERPL-MCNC: 1.02 MG/DL (ref 0.55–1.02)
GLUCOSE BLD STRIP.AUTO-MCNC: 107 MG/DL (ref 65–100)
GLUCOSE BLD STRIP.AUTO-MCNC: 113 MG/DL (ref 65–100)
GLUCOSE SERPL-MCNC: 98 MG/DL (ref 65–100)
POTASSIUM SERPL-SCNC: 4.5 MMOL/L (ref 3.5–5.1)
SERVICE CMNT-IMP: ABNORMAL
SERVICE CMNT-IMP: ABNORMAL
SODIUM SERPL-SCNC: 132 MMOL/L (ref 136–145)

## 2017-06-20 PROCEDURE — 82962 GLUCOSE BLOOD TEST: CPT

## 2017-06-20 PROCEDURE — 80048 BASIC METABOLIC PNL TOTAL CA: CPT | Performed by: PSYCHIATRY & NEUROLOGY

## 2017-06-20 PROCEDURE — 74011250637 HC RX REV CODE- 250/637: Performed by: HOSPITALIST

## 2017-06-20 PROCEDURE — 36415 COLL VENOUS BLD VENIPUNCTURE: CPT | Performed by: PSYCHIATRY & NEUROLOGY

## 2017-06-20 PROCEDURE — 74011250637 HC RX REV CODE- 250/637: Performed by: PSYCHIATRY & NEUROLOGY

## 2017-06-20 PROCEDURE — 65220000003 HC RM SEMIPRIVATE PSYCH

## 2017-06-20 RX ADMIN — BENZTROPINE MESYLATE 1 MG: 1 TABLET ORAL at 17:23

## 2017-06-20 RX ADMIN — CARBAMAZEPINE 200 MG: 200 TABLET, EXTENDED RELEASE ORAL at 09:55

## 2017-06-20 RX ADMIN — FLUPHENAZINE HYDROCHLORIDE 15 MG: 5 TABLET, FILM COATED ORAL at 09:54

## 2017-06-20 RX ADMIN — SITAGLIPTIN 100 MG: 100 TABLET, FILM COATED ORAL at 09:53

## 2017-06-20 RX ADMIN — PANTOPRAZOLE SODIUM 40 MG: 40 TABLET, DELAYED RELEASE ORAL at 07:25

## 2017-06-20 RX ADMIN — AMLODIPINE BESYLATE 10 MG: 5 TABLET ORAL at 09:54

## 2017-06-20 RX ADMIN — CARBAMAZEPINE 200 MG: 200 TABLET, EXTENDED RELEASE ORAL at 17:47

## 2017-06-20 RX ADMIN — NAPROXEN 250 MG: 250 TABLET ORAL at 09:54

## 2017-06-20 RX ADMIN — DIVALPROEX SODIUM 1000 MG: 500 TABLET, FILM COATED, EXTENDED RELEASE ORAL at 21:24

## 2017-06-20 RX ADMIN — HYDROCHLOROTHIAZIDE 25 MG: 25 TABLET ORAL at 09:54

## 2017-06-20 RX ADMIN — BENZTROPINE MESYLATE 1 MG: 1 TABLET ORAL at 09:54

## 2017-06-20 RX ADMIN — ZOLPIDEM TARTRATE 12.5 MG: 6.25 TABLET, EXTENDED RELEASE ORAL at 21:25

## 2017-06-20 RX ADMIN — NAPROXEN 250 MG: 250 TABLET ORAL at 17:23

## 2017-06-20 RX ADMIN — BENZTROPINE MESYLATE 1 MG: 1 TABLET ORAL at 21:25

## 2017-06-20 RX ADMIN — ATORVASTATIN CALCIUM 20 MG: 20 TABLET, FILM COATED ORAL at 09:54

## 2017-06-20 RX ADMIN — FLUPHENAZINE HYDROCHLORIDE 15 MG: 5 TABLET, FILM COATED ORAL at 17:23

## 2017-06-20 NOTE — PROGRESS NOTES
Problem: Falls - Risk of  Goal: *Absence of falls  Outcome: Progressing Towards Goal  No falls     Problem: Altered Thought Process (Adult/Pediatric)  Goal: *STG: Participates in treatment plan  Outcome: Progressing Towards Goal  Review meds, pt is out and social on the unit with staff and peers. Pt daily goal is to attend group on the general side. Goal: *STG: Remains safe in hospital  Outcome: Progressing Towards Goal  Denies SI, no self- harming behaviors   Goal: *STG: Complies with medication therapy  Outcome: Progressing Towards Goal  Compliant   Goal: *STG: Attends activities and groups  Outcome: Progressing Towards Goal  Engaged   Goal: *STG: Demonstrates ability to understand and use improved judgment in daily activities and relationships  Outcome: Progressing Towards Goal  Pt behaviors are appropriate, able to follow commands. Pt thoughts are organized and clear. Pt has insight that her reaction to peers confusing was excessive because \"sometimes I get confused too. \"   Goal: Interventions  Outcome: Progressing Towards Goal  Staff is focused on effective coping skills education and limiting setting

## 2017-06-20 NOTE — BH NOTES
GROUP THERAPY PROGRESS NOTE    Darcy Anderson participated in a Morning Process Group on the Geriatric Unit, with a focus identifying feelings, planning for the day, and singing. Group time: 45 minutes. Personal goal for participation: To increase the capacity to shift ones mood, prepare for the day, and share in group singing. Goal orientation: The patient will be able to prepare for the day through group singing. Group therapy participation: When prompted, this patient partially participated in the group. Therapeutic interventions reviewed and discussed: The group members were introduce themselves by first names and participate in group singing as a way to increase their oxygen and blood flow and begin their day on a positive note. They were also asked to join in singing several songs. Impression of participation: The patient said joined the group late, during the last fifteen minutes. She said she had taken a shower. She immediately joined in the group singing, including a couple of her favorites, \"[id number]\" and \"How much is that doggie in the window. \" She expressed no SI/HI and no overt psychosis. Her affect was euphoric. She added that she hoped she would be able to find, with her daughter, an FILIBERTO for her to live in from the [facility]. The patient was alert, pleasant, and cooperative.

## 2017-06-20 NOTE — BH NOTES
GROUP THERAPY PROGRESS NOTE    Merced Minaya participated in a morning Process Group on the General Unit with a focus identifying feelings,  planning for the day, and learning more about DBT concepts on \"Emotion Regulation. \"    .   Group time: 70 minutes. Personal goal for participation: To increase the capacity to improve ones mood, set personal goals,   and understand more about basic activities to help regulate emotions. Goal orientation: The patients will be able to identify their feelings and develop a plan for   structuring their day. They were also presented with a summary sheet on emotional regulation,   in regards to focusing on one goal per day, taking physical care of oneself, and recognizing   and/or building positive experiences. The didactic portion of the session focused on these three  concepts; 1) defining and focusing on one goal per day; 2) taking care of ones physical   maintenance and basic needs - sleep, nutrition, and exercise; and 3) finding and building on   positive experiences. Group therapy participation: With prompting, this patient partially participated in the group. Therapeutic interventions reviewed and discussed: The group members were asked to   identify an emotion they are having and/or let the group know what they want to focus on for the   day as they continue to make discharge plans. The group members reviewed three DBT   suggestions regarding emotional regulation, in regards to focusing on one goal per day, taking   physical care of oneself, and recognizing and/or building positive experiences. It was suggested   that these three concepts can be seen as headings for their list of coping skills. The group   members were also provided worksheets on the topic discussed for their review and use on   their own time. Impression of participation: The patient joined the group late, about fifteen minutes before  the conclusion of the session.  She said she had been pulled away for a consult immediately  prior to coming to the group. She appeared fully alert and oriented. She engaged the group  and had no trouble joining into the conversation. The patient expressed no SI/HI nor overt   psychotic symptoms in this group. She was focused on her concrete needs and the search  for an [facility] in the [address]. Her affect was appropriate for the content of her speech. She was slightly anxious about not having her disposition settled but said she relied on   her jesus to help her cope.

## 2017-06-20 NOTE — PROGRESS NOTES
06/20/17 0950 06/20/17 1020   Pain 1   Pain Scale 1 Numeric (0 - 10) Numeric (0 - 10)   Pain Intensity 1 9 0   Patient Stated Pain Goal 0 0   Pain Reassessment 1 --  Yes   Pain Onset 1 since breakfast per pt  --    Pain Location 1 Neck --    Pain Orientation 1 Right --    Pain Description 1 Cramping; Other (comment)  (stiff per pt ) --    Pain Intervention(s) 1 Medication (see MAR) --      Patient received scheduled pain med and was effective per pt 0/10 pain at 1020
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
## 2017-06-20 NOTE — PROGRESS NOTES
Problem: Falls - Risk of  Goal: *Absence of falls  Outcome: Progressing Towards Goal  Patient continues to ambulate safely while using her walker appropriately. She has remained free from falls/injury throughout this shift. Problem: Altered Thought Process (Adult/Pediatric)  Goal: *STG: Attends activities and groups  Outcome: Progressing Towards Goal  Patient has been visible on the unit and interactive with staff and peers. She has taken her new roommate under her wing and is concerned for her welfare and well-being.

## 2017-06-20 NOTE — INTERDISCIPLINARY ROUNDS
Behavioral Health Interdisciplinary Rounds     Patient Name: Basilio Kuhn  Age: 65 y.o.   Room/Bed:  740/02  Primary Diagnosis: [facility]   Admission Status: Involuntary Commitment and Forced Medication Order     Readmission within 30 days: no  Power of  in place: no  Patient requires a blocked bed: no          Reason for blocked bed: n/a    VTE Prophylaxis: Not indicated  Mobility needs/Fall risk: yes    Nutritional Plan: yes  Consults: no         Labs/Testing due today?: yes: BMP - collected     Sleep hours: 5 +       Participation in Care/Groups:  yes  Medication Compliant?: Yes  PRNS (last 24 hours): None    Restraints (last 24 hours):  no  Substance Abuse:  no  CIWA (range last 24 hours):  COWS (range last 24 hours):   Alcohol screening (AUDIT) completed -     If applicable, date SBIRT discussed in treatment team AND documented:   Tobacco - patient is a smoker: yes   Date tobacco education completed by RN: 5/29/17  24 hour chart check complete: yes     Patient goal(s) for today:   Treatment team focus/goals:   LOS:  33  Expected LOS:   [address] -    Name of Decision maker if patient has Psychiatric Care Directive   Patient was offered information   Financial concerns/prescription coverage:    Date of last family contact:       Family requesting physician contact today:    Discharge plan:        Outpatient provider(s):     Participating treatment team members: Basilio Kuhn, * (assigned SW),

## 2017-06-20 NOTE — BH NOTES
1545:  Patient received as she is ambulating in the Day Room. She greets oncoming staff with a bright smile and is complimented on her amazing appearance. Her hair has been washed, and she is wearing lipstick. Patient is bright and cheerful as she thoroughly completes her menu for tomorrow in great detail. Patient reports that she has been attending Groups on the General Unit and that she sometimes takes her meals there as well. She voices no complaints or concerns at this time. Will maintain q 15 minute safety checks.

## 2017-06-21 LAB
GLUCOSE BLD STRIP.AUTO-MCNC: 177 MG/DL (ref 65–100)
GLUCOSE BLD STRIP.AUTO-MCNC: 91 MG/DL (ref 65–100)
GLUCOSE BLD STRIP.AUTO-MCNC: 95 MG/DL (ref 65–100)
SERVICE CMNT-IMP: ABNORMAL
SERVICE CMNT-IMP: NORMAL
SERVICE CMNT-IMP: NORMAL

## 2017-06-21 PROCEDURE — 82962 GLUCOSE BLOOD TEST: CPT

## 2017-06-21 PROCEDURE — 74011250637 HC RX REV CODE- 250/637: Performed by: PSYCHIATRY & NEUROLOGY

## 2017-06-21 PROCEDURE — 74011636637 HC RX REV CODE- 636/637: Performed by: PSYCHIATRY & NEUROLOGY

## 2017-06-21 PROCEDURE — 74011250637 HC RX REV CODE- 250/637: Performed by: HOSPITALIST

## 2017-06-21 PROCEDURE — 65220000003 HC RM SEMIPRIVATE PSYCH

## 2017-06-21 RX ADMIN — ATORVASTATIN CALCIUM 20 MG: 20 TABLET, FILM COATED ORAL at 08:58

## 2017-06-21 RX ADMIN — DIVALPROEX SODIUM 1000 MG: 500 TABLET, FILM COATED, EXTENDED RELEASE ORAL at 21:54

## 2017-06-21 RX ADMIN — CARBAMAZEPINE 200 MG: 200 TABLET, EXTENDED RELEASE ORAL at 17:18

## 2017-06-21 RX ADMIN — PANTOPRAZOLE SODIUM 40 MG: 40 TABLET, DELAYED RELEASE ORAL at 07:14

## 2017-06-21 RX ADMIN — BENZTROPINE MESYLATE 1 MG: 1 TABLET ORAL at 16:50

## 2017-06-21 RX ADMIN — AMLODIPINE BESYLATE 10 MG: 5 TABLET ORAL at 08:59

## 2017-06-21 RX ADMIN — BENZTROPINE MESYLATE 1 MG: 1 TABLET ORAL at 08:58

## 2017-06-21 RX ADMIN — HYDROCHLOROTHIAZIDE 25 MG: 25 TABLET ORAL at 08:58

## 2017-06-21 RX ADMIN — ACETAMINOPHEN 650 MG: 325 TABLET, FILM COATED ORAL at 10:32

## 2017-06-21 RX ADMIN — ZOLPIDEM TARTRATE 12.5 MG: 6.25 TABLET, EXTENDED RELEASE ORAL at 21:54

## 2017-06-21 RX ADMIN — INSULIN LISPRO 2 UNITS: 100 INJECTION, SOLUTION INTRAVENOUS; SUBCUTANEOUS at 17:14

## 2017-06-21 RX ADMIN — CARBAMAZEPINE 200 MG: 200 TABLET, EXTENDED RELEASE ORAL at 09:00

## 2017-06-21 RX ADMIN — SITAGLIPTIN 100 MG: 100 TABLET, FILM COATED ORAL at 08:59

## 2017-06-21 RX ADMIN — BENZTROPINE MESYLATE 1 MG: 1 TABLET ORAL at 21:54

## 2017-06-21 RX ADMIN — FLUPHENAZINE HYDROCHLORIDE 15 MG: 5 TABLET, FILM COATED ORAL at 17:17

## 2017-06-21 RX ADMIN — FLUPHENAZINE HYDROCHLORIDE 15 MG: 5 TABLET, FILM COATED ORAL at 08:59

## 2017-06-21 RX ADMIN — NAPROXEN 250 MG: 250 TABLET ORAL at 16:50

## 2017-06-21 RX ADMIN — BENZTROPINE MESYLATE 1 MG: 1 TABLET ORAL at 10:32

## 2017-06-21 RX ADMIN — NAPROXEN 250 MG: 250 TABLET ORAL at 08:59

## 2017-06-21 NOTE — PROGRESS NOTES
Problem: Altered Thought Process (Adult/Pediatric)  Goal: *STG: Remains safe in hospital  Outcome: Progressing Towards Goal  Received patient appears to sleep. Respiratory even and unlabored. No acute distress noted. Will continue to monitor patient for safety q15 minutes. 0015 24 Hour chart check completed. 0600 Patient appeared to sleep 4 hours. NAD noted.

## 2017-06-21 NOTE — BH NOTES
1530:  Patient initially received as she is ambulating in the hallway. She approaches writer asking about her pocketbook and Terence Ziegler expensive broach in it\". Patient is speaking slowly and in a monotone voice as she identifies all of staff by her nicknames and other comments. Patient remains solicitous of her roommate and is exhaustively explaining things to her while continuing to make comments about staff members. She remains focused on her pocketbook. Will maintain q 15 minute safety checks. 2030:  Patient resting in bed shivering. She tells her roommate's visiting family members that she thinks her sugar is low because she is so cold. She refuses to allow staff to check her blood sugar stating \"I just need a snack like everyone else had - everyone got ice cream except me\". Patient was sleeping earlier- she is told now that once we know what her blood sugar is we will give her a snack. Patient continues to sit at the Dining Room table shivering. She does allow staff to get her vital signs which are all WNL with a MEWS of 1, but she continues to refuse to allow her Accucheck. She leaves the unit to go to the General Unit where she continues to complain that she didn't get any snacks. The Charge Nurse there tells her that she can't complain about low blood sugars and then not allow staff to check them. Will continue to monitor. 2120: The Tech on the General Unit made the announcement for Group and snacks. Patient got up from the Day Room to hurry over to the [employer] [facility] for snacks, and the Tech is told by writer that she cannot have any snacks until we check her blood sugar. Patient was compliant with the male tech's request - her blood sugar at this time is 95, and she did have snacks.

## 2017-06-21 NOTE — BH NOTES
While  Trying  To assist pt with ambulation across a cord lying in the floor,pts mood become very irritable. Pt stated\"IF I FALL,I JUST WILL [facility] pt mood is very labile and becomes paranoid toward writer and staff.  aware.
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
## 2017-06-21 NOTE — BH NOTES
PRN Medication Documentation  At 1032  Specific patient behavior that led to need for PRN medication: c/o side effect from meds, \"my mouth and tongue is moving a little bit\". And c/o HA 7/10   Staff interventions attempted prior to PRN being given: relaxation to relieve pain   PRN medication given: Tylenol 650 mg po prn and Cogentin 1 mg po prn   Patient response/effectiveness of PRN medication: at 1100 medications effective per pt.  Pain 0/10 at 1100

## 2017-06-21 NOTE — PROGRESS NOTES
Problem: Falls - Risk of  Goal: *Absence of falls  Outcome: Progressing Towards Goal  No falls     Problem: Altered Thought Process (Adult/Pediatric)  Goal: *STG: Participates in treatment plan  Outcome: Progressing Towards Goal  Review meds, pt is out and social on the unit with peers and staff. Pt attends group on both general and nicole. Pt also walks frequently. Pt is pleasant and oriented, thoughts are organized and logical Pt daily goal is to discuss her placement with tx team.   Goal: *STG: Remains safe in hospital  Outcome: Progressing Towards Goal  Denies SI, no self- harming behaviors  Goal: *STG: Complies with medication therapy  Outcome: Progressing Towards Goal  Complaint   Goal: *STG: Attends activities and groups  Outcome: Progressing Towards Goal  Engaged   Goal: Interventions  Outcome: Progressing Towards Goal  Staff is focused on limit setting and effective coping skills.

## 2017-06-21 NOTE — BH NOTES
SW spoke to Pt's daughter regarding coordination of care. Pt's daughter is interested in becoming Pt's POA and will plan on visiting Pt at the hospital on Friday, 6/23.

## 2017-06-21 NOTE — PROGRESS NOTES
1028- pt c/o \"side effects to medication\" \" prolixin\" \"twitching cheek, tongue,\" and headache 7/10 onset now right anterior. Pt verbalize request for cogentin and tylenol. Pt request Ambien; education provided. Pt tolerates education well.

## 2017-06-22 LAB
ANION GAP BLD CALC-SCNC: 6 MMOL/L (ref 5–15)
BUN SERPL-MCNC: 24 MG/DL (ref 6–20)
BUN/CREAT SERPL: 23 (ref 12–20)
CALCIUM SERPL-MCNC: 8.3 MG/DL (ref 8.5–10.1)
CHLORIDE SERPL-SCNC: 101 MMOL/L (ref 97–108)
CO2 SERPL-SCNC: 31 MMOL/L (ref 21–32)
CREAT SERPL-MCNC: 1.05 MG/DL (ref 0.55–1.02)
GLUCOSE BLD STRIP.AUTO-MCNC: 115 MG/DL (ref 65–100)
GLUCOSE BLD STRIP.AUTO-MCNC: 95 MG/DL (ref 65–100)
GLUCOSE SERPL-MCNC: 100 MG/DL (ref 65–100)
POTASSIUM SERPL-SCNC: 4 MMOL/L (ref 3.5–5.1)
SERVICE CMNT-IMP: ABNORMAL
SERVICE CMNT-IMP: NORMAL
SODIUM SERPL-SCNC: 138 MMOL/L (ref 136–145)

## 2017-06-22 PROCEDURE — 74011250636 HC RX REV CODE- 250/636: Performed by: PSYCHIATRY & NEUROLOGY

## 2017-06-22 PROCEDURE — 74011250637 HC RX REV CODE- 250/637: Performed by: HOSPITALIST

## 2017-06-22 PROCEDURE — 82962 GLUCOSE BLOOD TEST: CPT

## 2017-06-22 PROCEDURE — 65220000003 HC RM SEMIPRIVATE PSYCH

## 2017-06-22 PROCEDURE — 80048 BASIC METABOLIC PNL TOTAL CA: CPT | Performed by: PSYCHIATRY & NEUROLOGY

## 2017-06-22 PROCEDURE — 74011250637 HC RX REV CODE- 250/637: Performed by: PSYCHIATRY & NEUROLOGY

## 2017-06-22 PROCEDURE — 36415 COLL VENOUS BLD VENIPUNCTURE: CPT | Performed by: PSYCHIATRY & NEUROLOGY

## 2017-06-22 RX ADMIN — CARBAMAZEPINE 200 MG: 200 TABLET, EXTENDED RELEASE ORAL at 17:05

## 2017-06-22 RX ADMIN — BENZTROPINE MESYLATE 1 MG: 1 TABLET ORAL at 17:05

## 2017-06-22 RX ADMIN — AMLODIPINE BESYLATE 10 MG: 5 TABLET ORAL at 08:39

## 2017-06-22 RX ADMIN — HYDROCHLOROTHIAZIDE 25 MG: 25 TABLET ORAL at 08:39

## 2017-06-22 RX ADMIN — FLUPHENAZINE HYDROCHLORIDE 15 MG: 5 TABLET, FILM COATED ORAL at 08:39

## 2017-06-22 RX ADMIN — BENZTROPINE MESYLATE 1 MG: 1 TABLET ORAL at 21:17

## 2017-06-22 RX ADMIN — DIVALPROEX SODIUM 1000 MG: 500 TABLET, FILM COATED, EXTENDED RELEASE ORAL at 21:17

## 2017-06-22 RX ADMIN — BENZTROPINE MESYLATE 1 MG: 1 TABLET ORAL at 08:39

## 2017-06-22 RX ADMIN — NAPROXEN 250 MG: 250 TABLET ORAL at 08:39

## 2017-06-22 RX ADMIN — ATORVASTATIN CALCIUM 20 MG: 20 TABLET, FILM COATED ORAL at 08:39

## 2017-06-22 RX ADMIN — NAPROXEN 250 MG: 250 TABLET ORAL at 17:05

## 2017-06-22 RX ADMIN — PANTOPRAZOLE SODIUM 40 MG: 40 TABLET, DELAYED RELEASE ORAL at 06:46

## 2017-06-22 RX ADMIN — ACETAMINOPHEN 650 MG: 325 TABLET, FILM COATED ORAL at 17:08

## 2017-06-22 RX ADMIN — CARBAMAZEPINE 200 MG: 200 TABLET, EXTENDED RELEASE ORAL at 08:41

## 2017-06-22 RX ADMIN — FLUPHENAZINE DECANOATE 25 MG: 25 INJECTION, SOLUTION INTRAMUSCULAR; SUBCUTANEOUS at 12:07

## 2017-06-22 RX ADMIN — MAGNESIUM HYDROXIDE 30 ML: 400 SUSPENSION ORAL at 03:00

## 2017-06-22 RX ADMIN — ZOLPIDEM TARTRATE 12.5 MG: 6.25 TABLET, EXTENDED RELEASE ORAL at 21:16

## 2017-06-22 RX ADMIN — SITAGLIPTIN 100 MG: 100 TABLET, FILM COATED ORAL at 08:39

## 2017-06-22 NOTE — INTERDISCIPLINARY ROUNDS
Behavioral Health Interdisciplinary Rounds     Patient Name: Ricardo Stovall  Age: 65 y.o. Room/Bed:  740/02  Primary Diagnosis: Schizoaffective disorder ([facility]   Admission Status: Involuntary Commitment and Forced Medication Order     Readmission within 30 days: no  Power of  in place: no  Patient requires a blocked bed: no          Reason for blocked bed:     VTE Prophylaxis: Not indicated  Mobility needs/Fall risk: yes    Nutritional Plan: yes  Consults:        Labs/Testing due today?: yes, cooperative with lab draw , stating \" This is the last time they will get my blood \"    Sleep hours:  4 1/2 +  hours       Participation in Care/Groups:  yes  Medication Compliant?: Yes  PRNS (last 24 hours): mom     Restraints (last 24 hours):  no  Substance Abuse:  no  CIWA (range last 24 hours):  COWS (range last 24 hours):   Alcohol screening (AUDIT) completed -     If applicable, date SBIRT discussed in treatment team AND documented: N/A  Tobacco - patient is a smoker: yes   Date tobacco education completed by RN: 5/29/17  24 hour chart check complete: yes     Patient goal(s) for today:   Treatment team focus/goals: Redirect inappropriate behaviors; send referrals to SNF  LOS:  35  Expected LOS: TBD  Psychiatric Advanced Care Directives -  No  Name of Decision maker if patient has Psychiatric Care Directive: None  Patient was offered information, patient declined.   Financial concerns/prescription coverage: Medicaid  Date of last family contact: 6/22 SW spoke to daughter    Family requesting physician contact today: No  Discharge plan: Placement       Outpatient provider(s): TBD    Participating treatment team members: Elizabeth Lee MSW; Dr. Cleveland Palma MD; Sanjana Jade, Nurse extern; Spring Espinal, PharmD

## 2017-06-22 NOTE — BH NOTES
Pt states\"I DO NOT TRUST YOU!!!!\"  Pt continues to be paranoid towards writer and staff. Mood continues to be labile. irritable and angry mood outbursts. .  Difficult  To redirect behav  At present. Pt refused vital signs at present. tried to fill patients water pitcher with water upon request.pt declined from [address] [facility] [address]. Pt is \"splitting staff\" at present.  aware  1600-pt agreed  For staff to take vital signs. 2030-note pt pleasant and cooperative towards writer and staff  No further outburst noted thus far.

## 2017-06-22 NOTE — BH NOTES
PRN Medication Documentation    Specific patient behavior that led to need for PRN medication: c/o headache  Staff interventions attempted prior to PRN being given: none  PRN medication given: 650 mg Tylenol PO  Patient response/effectiveness of PRN medication: Pt asleep

## 2017-06-22 NOTE — BH NOTES
Pt states\"SOMEONE TOOK MY PICTURE OF MY CHRISTIE FROM MY ROOM!!!\"  Note pt continues to have episodes of irritable and paranoid behavior  . Mood appears labile,pt does take some redirection if consistence .    aware  At present pt refuses vital signs, aware
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
## 2017-06-22 NOTE — BH NOTES
GROUP THERAPY PROGRESS NOTE    Sim Hidalgo participated in a Morning Process Group on the Geriatric Unit, with a focus identifying feelings, planning for the day, and singing. Group time: 55 minutes. Personal goal for participation: To increase the capacity to shift ones mood, prepare for the day, and share in group singing. Goal orientation: The patient will be able to prepare for the day through group singing. Group therapy participation: When prompted, this patient minimally participated in the group. Therapeutic interventions reviewed and discussed: The group members were introduce themselves by first names and participate in group singing as a way to increase their oxygen and blood flow and begin their day on a positive note. They were also asked to join in singing several songs. Impression of participation: The patient got up and left the group within five minutes after the start and headed back to her room. The only thing she said was that she did not want us to sing too loud because her roommate was still sleeping. She expressed no SI/HI and no overt psychosis. Her affect was anxious and acted as if she were carrying some anger she had not expressed. She usually is a leader in the singing but today she choose to isolate and return to her room.

## 2017-06-22 NOTE — PROGRESS NOTES
Problem: Falls - Risk of  Goal: *Absence of falls  Outcome: Progressing Towards Goal  No falls    Problem: Altered Thought Process (Adult/Pediatric)  Goal: *STG: Participates in treatment plan  Outcome: Progressing Towards Goal  Review meds. Pt is out and social on the unit with staff and peers. Pt is labile and easily upset. Pt daily goal is to stay focused on the positive   Goal: *STG: Remains safe in hospital  Outcome: Progressing Towards Goal  Denies SI, no self-harming behaviors   Goal: *STG: Complies with medication therapy  Outcome: Progressing Towards Goal  Compliant  Goal: *STG: Attends activities and groups  Outcome: Progressing Towards Goal  Engaged  Goal: Interventions  Outcome: Progressing Towards Goal  Staff is focused on limit setting and effective coping skills education.

## 2017-06-22 NOTE — PROGRESS NOTES
Problem: Falls - Risk of  Goal: *Absence of falls  Outcome: Progressing Towards Goal  Lying quietly in bed with eyes closed , respirations even and unlabored , NAD noted   Side rail up x1, walker & tap bell within reach , bathroom light on for safety ,   Behavioral Health Interdisciplinary Rounds       PRN Medication Documentation    Specific patient behavior that led to need for PRN medication: C/o of constipation  Staff interventions attempted prior to PRN being given: Encouraged po fluids and offered prune juice   PRN medication given: MOM   Patient response/effectiveness of PRN medication: pending

## 2017-06-22 NOTE — BH NOTES
1520:  Patient initially received as she is ambulating in the hallway. She greets oncoming staff cordially and shows off her new shoes. She is excited to hear that her daughter Lorna Dolan is expected here for a visit tomorrow at 0930. She voices no complaints or concerns at this time. Will maintain q 15 minute safety checks. 1730:  Patient is labile and staff-splitting. She is uncooperative with one nurse, refusing to allow her vital signs to be taken and then later allowing the Tech on the General Unit to get them. She is manipulative and bartlett with attention-seeking behaviors. Will continue to monitor. 2030:  Patient is rapid-cycling, and she is now polite and cooperative with all staff members. She also permits her vital signs to be taken by the staff member she refused earlier. She states that she needs to go to bed and get a good night's sleep because her daughter is coming tomorrow. Will continue to monitor. 2130:  Patient agitated and argumentative with the Med Nurse at bedtime R/T her scheduled Depakote. Two tablets for the dosage - unfortunately, one was gray and one was white (different manufacturers). Patient took the gray one and refused the white one and stated that \"Dr. Apoorva Gallegos said that you all better stop threatening me with injections - I already had my Prolixin injection today and you're not giving me another one\"  Fortunately, another gray Depakote tablet was found in the Pyxis, and the patient did take her scheduled medication without further incident.

## 2017-06-22 NOTE — PROGRESS NOTES
Problem: Falls - Risk of  Goal: *Absence of falls  Outcome: Progressing Towards Goal  Patient ambulating safely and steadily while using her walker appropriately. She has remained free from falls/injury throughout this shift. Problem: Altered Thought Process (Adult/Pediatric)  Goal: *STG: Demonstrates ability to understand and use improved judgment in daily activities and relationships  Outcome: Not Progressing Towards Goal  Patient refusing staff care efforts to check her blood sugar this evening after her complaints of shivering and low blood sugar.

## 2017-06-22 NOTE — PROGRESS NOTES
Problem: Altered Thought Process (Adult/Pediatric)  Goal: *STG: Seeks staff when feelings of anxiety and fear arise  Outcome: Not Progressing Towards Goal  Patient is manipulative and staff-splitting. Her attention-seeking behaviors have been escalating over the past couple of days. This evening she is refusing care efforts and playing favorites while attempting to control the milieu.

## 2017-06-23 LAB
GLUCOSE BLD STRIP.AUTO-MCNC: 83 MG/DL (ref 65–100)
GLUCOSE BLD STRIP.AUTO-MCNC: 85 MG/DL (ref 65–100)
SERVICE CMNT-IMP: NORMAL
SERVICE CMNT-IMP: NORMAL

## 2017-06-23 PROCEDURE — 82962 GLUCOSE BLOOD TEST: CPT

## 2017-06-23 PROCEDURE — 74011250637 HC RX REV CODE- 250/637: Performed by: HOSPITALIST

## 2017-06-23 PROCEDURE — 65220000003 HC RM SEMIPRIVATE PSYCH

## 2017-06-23 PROCEDURE — 74011250637 HC RX REV CODE- 250/637: Performed by: PSYCHIATRY & NEUROLOGY

## 2017-06-23 RX ORDER — TRIHEXYPHENIDYL HYDROCHLORIDE 2 MG/1
2 TABLET ORAL 3 TIMES DAILY
Status: DISCONTINUED | OUTPATIENT
Start: 2017-06-23 | End: 2017-08-16 | Stop reason: HOSPADM

## 2017-06-23 RX ORDER — DOCUSATE SODIUM 100 MG/1
100 CAPSULE, LIQUID FILLED ORAL 2 TIMES DAILY
Status: DISCONTINUED | OUTPATIENT
Start: 2017-06-23 | End: 2017-08-04

## 2017-06-23 RX ADMIN — DIVALPROEX SODIUM 1000 MG: 500 TABLET, FILM COATED, EXTENDED RELEASE ORAL at 21:13

## 2017-06-23 RX ADMIN — NAPROXEN 250 MG: 250 TABLET ORAL at 08:17

## 2017-06-23 RX ADMIN — NAPROXEN 250 MG: 250 TABLET ORAL at 19:16

## 2017-06-23 RX ADMIN — CARBAMAZEPINE 200 MG: 200 TABLET, EXTENDED RELEASE ORAL at 19:16

## 2017-06-23 RX ADMIN — ZOLPIDEM TARTRATE 12.5 MG: 6.25 TABLET, EXTENDED RELEASE ORAL at 21:13

## 2017-06-23 RX ADMIN — HYDROCHLOROTHIAZIDE 25 MG: 25 TABLET ORAL at 08:17

## 2017-06-23 RX ADMIN — AMLODIPINE BESYLATE 10 MG: 5 TABLET ORAL at 08:18

## 2017-06-23 RX ADMIN — TRIHEXYPHENIDYL HYDROCHLORIDE 2 MG: 2 TABLET ORAL at 21:17

## 2017-06-23 RX ADMIN — TRIHEXYPHENIDYL HYDROCHLORIDE 2 MG: 2 TABLET ORAL at 16:05

## 2017-06-23 RX ADMIN — DOCUSATE SODIUM 100 MG: 100 CAPSULE, LIQUID FILLED ORAL at 21:13

## 2017-06-23 RX ADMIN — SITAGLIPTIN 100 MG: 100 TABLET, FILM COATED ORAL at 08:16

## 2017-06-23 RX ADMIN — CARBAMAZEPINE 200 MG: 200 TABLET, EXTENDED RELEASE ORAL at 08:19

## 2017-06-23 RX ADMIN — ATORVASTATIN CALCIUM 20 MG: 20 TABLET, FILM COATED ORAL at 08:17

## 2017-06-23 RX ADMIN — PANTOPRAZOLE SODIUM 40 MG: 40 TABLET, DELAYED RELEASE ORAL at 06:54

## 2017-06-23 RX ADMIN — BENZTROPINE MESYLATE 1 MG: 1 TABLET ORAL at 08:17

## 2017-06-23 NOTE — PROGRESS NOTES
I assisted Ms. Aurelia Gómez in the completion of her Advance Medical Directive. Prior to her completion of the document, Dr. Mao Peterson (Psychiatrist) had certified that Ms. Aurelia Gómez is capable of completing an Advance Medical Directive. Dr. Kelvin Kim signed the appropriate section of the AMD stating as such (Section I, Part C. \"Special Tai of My Agent to [facility] Over My Objection\"). Ms. Aurelia Gómez has named her oldest daughter, Jacky Gomes, as her primary agent. Ms. Alcala can be reached at: [phone] (cell) or [phone]. Ms. Aurelia Gómez has named her son-in-law, Gonzalo Bey. as her successor (secondary) agent. He can be reached at: [phone]. I provided a copy of the signed document to Ms. Velasquez's nurse to be placed in her chart and returned the original and one copy to her for her and her family's records. [facility] remains available if any additional needs arise. Rev. Hyun Light M.Div, [facility]
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
## 2017-06-23 NOTE — ACP (ADVANCE CARE PLANNING)
I assisted Ms. Jomar Champion in the completion of her Advance Medical Directive. Prior to her completion of the document, Dr. Urbano Felipe (Psychiatrist) had certified that Ms. Jomar Champion is capable of completing an Advance Medical Directive. Dr. Jessica Sadler signed the appropriate section of the AMD stating as such (Section I, Part C. \"Special Tai of My Agent to [facility] Over My Objection\"). Ms. Jomar Champion has named her oldest daughter, Marques Gray, as her primary agent. Ms. Alcala can be reached at: [phone] (cell) or [phone]. Ms. Jomar Champion has named her son-in-law, Sarah Mosher. as her successor (secondary) agent. He can be reached at: [phone]. I provided a copy of the signed document to Ms. Velasquez's nurse to be placed in her chart and returned the original and one copy to her for her and her family's records. [facility] remains available if any additional needs arise. Rev. Elsa Kent M.Div, [facility]

## 2017-06-23 NOTE — PROGRESS NOTES
Problem: Altered Thought Process (Adult/Pediatric)  Goal: *STG: Attends activities and groups  Outcome: Progressing Towards Goal  Patient attends activities and groups. Smiling, pleasant, talkative, appropriate, med and meal compliant, participating in group, out on the unit, remains on Q15 min safety checks.

## 2017-06-23 NOTE — INTERDISCIPLINARY ROUNDS
Behavioral Health Interdisciplinary Rounds     Patient Name: Mariel Ball  Age: 65 y.o.   Room/Bed:  740/02  Primary Diagnosis: Sch[facility]oaffective disorder ([facility]   Admission Status: Involuntary Commitment and Forced Medication Order     Readmission within 30 days: no  Power of  in place: no  Patient requires a blocked bed: no          Reason for blocked bed:     VTE Prophylaxis: Not indicated  Mobility needs/Fall risk: yes    Nutritional Plan: no  Consults:        Labs/Testing due today?: no    Sleep hours:  4+ hours      Participation in Care/Groups:  yes  Medication Compliant?: Yes , Pt suspicious of Valporic acid pills that are NOT  \" the gray ones \"   PRNS (last 24 hours): None    Restraints (last 24 hours):  no  Substance Abuse: no   CIWA (range last 24 hours): COWS (range last 24 hours):   Alcohol screening (AUDIT) completed -     If applicable, date SBIRT discussed in treatment team AND documented: N/A  Tobacco - patient is a smoker: yes    Date tobacco education completed by RN: 5/29/17  24 hour chart check complete: yes     Patient goal(s) for today: Visit with daughter  Treatment team focus/goals: POA paperwork; continue working on placement  LOS:  36  Expected LOS: TBD  Psychiatric Advanced Care Directives -  Yes  Name of Decision maker if patient has Psychiatric Care Directive: Chris Torre   Patient was offered information, patient completed   Financial concerns/prescription coverage: Medicaid  Date of last family contact: 6/23 Daughter visited for [facility] paperwork     Family requesting physician contact today: No  Discharge plan: Placement       Outpatient provider(s): TBD    Participating treatment team members: Mariel Ball, PEGGY Mcpherson; Dr. Lattie Halsted, MD; Sinan Ellison, ESTER

## 2017-06-23 NOTE — PROGRESS NOTES
Problem: Falls - Risk of  Goal: *Absence of falls  Outcome: Progressing Towards Goal  Lying quietly in bed with eyes closed, respirations even and unlabored , NAD   Tap bell & walker within reach at bedside, night light on for safety , Bed alarm blinking green ,   Q15 min safety checks continue     0330- awake, c/o of stomach ache, constipation ,  Nausea ,sitting in the bathroom for long periods . Was able to have a large formed and semiformed stool .    2234- mutiple trips to bathroom for  stool  , passing flatus \" I was backed up \"

## 2017-06-23 NOTE — BH NOTES
GROUP THERAPY PROGRESS NOTE    Nelli Ortez participated in a Morning Process Group on the Geriatric Unit, with a focus identifying feelings, planning for the day, and singing. Group time: 55 minutes. Personal goal for participation: To increase the capacity to shift ones mood, prepare for the day, and share in group singing. Goal orientation: The patient will be able to prepare for the day through group singing. Group therapy participation: When prompted, this patient participated in the group. Therapeutic interventions reviewed and discussed: The group members were introduce themselves by first names and participate in group singing as a way to increase their oxygen and blood flow and begin their day on a positive note. They were also asked to join in singing several songs. Impression of participation: The patient joined the group about ten minutes after it began. She responded to another patient's sharing about the peer's family life. This led the patient to share some information about her positive memories about school and her extended family. No SI/HI or overt psychosis were noted. Her affect was mildly euphoric and she was engaged in the group. She also suggested and sang along with a couple of the songs.

## 2017-06-24 LAB
GLUCOSE BLD STRIP.AUTO-MCNC: 110 MG/DL (ref 65–100)
GLUCOSE BLD STRIP.AUTO-MCNC: 85 MG/DL (ref 65–100)
SERVICE CMNT-IMP: ABNORMAL
SERVICE CMNT-IMP: NORMAL

## 2017-06-24 PROCEDURE — 74011250637 HC RX REV CODE- 250/637: Performed by: PSYCHIATRY & NEUROLOGY

## 2017-06-24 PROCEDURE — 82962 GLUCOSE BLOOD TEST: CPT

## 2017-06-24 PROCEDURE — 74011250637 HC RX REV CODE- 250/637: Performed by: HOSPITALIST

## 2017-06-24 PROCEDURE — 65220000003 HC RM SEMIPRIVATE PSYCH

## 2017-06-24 RX ADMIN — TRIHEXYPHENIDYL HYDROCHLORIDE 2 MG: 2 TABLET ORAL at 21:12

## 2017-06-24 RX ADMIN — CARBAMAZEPINE 200 MG: 200 TABLET, EXTENDED RELEASE ORAL at 17:17

## 2017-06-24 RX ADMIN — TRIHEXYPHENIDYL HYDROCHLORIDE 2 MG: 2 TABLET ORAL at 09:52

## 2017-06-24 RX ADMIN — AMLODIPINE BESYLATE 10 MG: 5 TABLET ORAL at 09:51

## 2017-06-24 RX ADMIN — LORAZEPAM 1 MG: 1 TABLET ORAL at 03:02

## 2017-06-24 RX ADMIN — NAPROXEN 250 MG: 250 TABLET ORAL at 17:18

## 2017-06-24 RX ADMIN — ZOLPIDEM TARTRATE 12.5 MG: 6.25 TABLET, EXTENDED RELEASE ORAL at 21:08

## 2017-06-24 RX ADMIN — DOCUSATE SODIUM 100 MG: 100 CAPSULE, LIQUID FILLED ORAL at 09:51

## 2017-06-24 RX ADMIN — TRIHEXYPHENIDYL HYDROCHLORIDE 2 MG: 2 TABLET ORAL at 16:40

## 2017-06-24 RX ADMIN — HYDROCHLOROTHIAZIDE 25 MG: 25 TABLET ORAL at 09:51

## 2017-06-24 RX ADMIN — ATORVASTATIN CALCIUM 20 MG: 20 TABLET, FILM COATED ORAL at 09:51

## 2017-06-24 RX ADMIN — DIVALPROEX SODIUM 1000 MG: 500 TABLET, FILM COATED, EXTENDED RELEASE ORAL at 21:08

## 2017-06-24 RX ADMIN — DOCUSATE SODIUM 100 MG: 100 CAPSULE, LIQUID FILLED ORAL at 17:17

## 2017-06-24 RX ADMIN — PANTOPRAZOLE SODIUM 40 MG: 40 TABLET, DELAYED RELEASE ORAL at 07:40

## 2017-06-24 RX ADMIN — NAPROXEN 250 MG: 250 TABLET ORAL at 09:51

## 2017-06-24 RX ADMIN — SITAGLIPTIN 100 MG: 100 TABLET, FILM COATED ORAL at 09:51

## 2017-06-24 RX ADMIN — CARBAMAZEPINE 200 MG: 200 TABLET, EXTENDED RELEASE ORAL at 09:52

## 2017-06-24 RX ADMIN — LORAZEPAM 1 MG: 1 TABLET ORAL at 23:34

## 2017-06-24 NOTE — BH NOTES
1520:  Patient received as she is resting in bed. She greets oncoming staff cordially with a big smile and requests popcorn or potato chips tonight. She is advised that we will look into it. She is satisfied with this answer and states that she is going to rest for a while. Will maintain q 15 minute safety checks. 1800:  Patient is attending Recovery Group on the General Unit. Will continue to monitor.

## 2017-06-24 NOTE — BH NOTES
Behavioral Health Interdisciplinary Rounds     Patient Name: Michael Barkley  Age: 65 y.o.   Room/Bed:  740/02  Primary [facility]: [facility]   Admission Status: Involuntary Commitment and Forced Medication Order     Readmission within 30 days: no  Power of  in place: no  Patient requires a blocked bed: no          Reason for blocked bed: na    VTE Prophylaxis: Not indicated  Mobility needs/Fall risk: yes    Nutritional Plan: no  Consults:          Labs/Testing due today?: no    Sleep hours:  5.5+      Participation in Care/Groups:  yes  Medication Compliant?: Yes  PRNS (last 24 hours): None    Restraints (last 24 hours):  no  Substance Abuse:  no  CIWA (range last 24 hours): na COWS (range last 24 hours): na  Alcohol screening (AUDIT) completed -     If applicable, date SBIRT discussed in treatment team AND documented: na  Tobacco - patient is a smoker: yes   Date tobacco education completed by RN: 5/29/17  24 hour chart check complete: yes     Patient goal(s) for today:   Treatment team focus/goals: POA paperwork; continue working on placement  LOS:  37  Expected LOS: TBD  Psychiatric [address] -  yes   Name of Decision maker if patient has [address] Directive Heidi Gannon  Patient was offered information pt completed  Financial concerns/prescription coverage:  Medicaid  Date of last family contact: 6/23 daughter visit for POA paperwork      Family requesting physician contact today:  no  Discharge plan: placement       Outpatient provider(s): TBD    Participating treatment team members: Michael Barkley, * (assigned SW),

## 2017-06-24 NOTE — PROGRESS NOTES
Problem: Altered Thought Process (Adult/Pediatric)  Goal: *STG: Attends activities and groups  Outcome: Progressing Towards Goal  Patient attends activities and groups on nicole unit as well as general unit. Patient is smiling, pleasant, cooperative, alert and oriented, thoughts organized, fair insight, med and meal compliant, remains on Q15 min safety checks.
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
## 2017-06-24 NOTE — PROGRESS NOTES
Problem: Falls - Risk of  Goal: *Absence of falls  Outcome: Progressing Towards Goal  Patient is ambulating steadily on the unit while using her walker appropriately. She has remained free from falls/injury throughout this shift. Problem: Altered Thought Process (Adult/Pediatric)  Goal: *STG: Remains safe in hospital  Outcome: Progressing Towards Goal  Patient enjoys spending time with the patients on the General Unit as well as attending Groups there. She is looking forward to discharge and hoping that it will be next week. Patient states \"I have been in this hospital for too long. \"

## 2017-06-24 NOTE — BH NOTES
PRN Medication Documentation    Specific patient behavior that led to need for PRN medication: 0258  Requested Ativan for her \"nerves\". Pt upset because another pt had wandered into her room. Staff interventions attempted prior to PRN being given: Emotional support, distraction  PRN medication given: 0302  Ativan 1 mg PO  Patient response/effectiveness of PRN medication: Monitoring continues. Pt presently singing (0308)    0345  Pt now quiet, although she is still awake. Monitoring continues.

## 2017-06-25 LAB
GLUCOSE BLD STRIP.AUTO-MCNC: 101 MG/DL (ref 65–100)
GLUCOSE BLD STRIP.AUTO-MCNC: 128 MG/DL (ref 65–100)
SERVICE CMNT-IMP: ABNORMAL
SERVICE CMNT-IMP: ABNORMAL

## 2017-06-25 PROCEDURE — 74011250637 HC RX REV CODE- 250/637: Performed by: PSYCHIATRY & NEUROLOGY

## 2017-06-25 PROCEDURE — 82962 GLUCOSE BLOOD TEST: CPT

## 2017-06-25 PROCEDURE — 74011250637 HC RX REV CODE- 250/637: Performed by: HOSPITALIST

## 2017-06-25 PROCEDURE — 65220000003 HC RM SEMIPRIVATE PSYCH

## 2017-06-25 RX ADMIN — TRIHEXYPHENIDYL HYDROCHLORIDE 2 MG: 2 TABLET ORAL at 17:18

## 2017-06-25 RX ADMIN — CARBAMAZEPINE 200 MG: 200 TABLET, EXTENDED RELEASE ORAL at 17:17

## 2017-06-25 RX ADMIN — CARBAMAZEPINE 200 MG: 200 TABLET, EXTENDED RELEASE ORAL at 09:09

## 2017-06-25 RX ADMIN — NAPROXEN 250 MG: 250 TABLET ORAL at 09:09

## 2017-06-25 RX ADMIN — SITAGLIPTIN 100 MG: 100 TABLET, FILM COATED ORAL at 09:08

## 2017-06-25 RX ADMIN — DIVALPROEX SODIUM 1000 MG: 500 TABLET, FILM COATED, EXTENDED RELEASE ORAL at 21:00

## 2017-06-25 RX ADMIN — HYDROCHLOROTHIAZIDE 25 MG: 25 TABLET ORAL at 09:08

## 2017-06-25 RX ADMIN — DOCUSATE SODIUM 100 MG: 100 CAPSULE, LIQUID FILLED ORAL at 17:14

## 2017-06-25 RX ADMIN — NAPROXEN 250 MG: 250 TABLET ORAL at 17:14

## 2017-06-25 RX ADMIN — ACETAMINOPHEN 650 MG: 325 TABLET, FILM COATED ORAL at 11:34

## 2017-06-25 RX ADMIN — TRIHEXYPHENIDYL HYDROCHLORIDE 2 MG: 2 TABLET ORAL at 21:00

## 2017-06-25 RX ADMIN — ATORVASTATIN CALCIUM 20 MG: 20 TABLET, FILM COATED ORAL at 09:09

## 2017-06-25 RX ADMIN — DOCUSATE SODIUM 100 MG: 100 CAPSULE, LIQUID FILLED ORAL at 09:09

## 2017-06-25 RX ADMIN — ZOLPIDEM TARTRATE 12.5 MG: 6.25 TABLET, EXTENDED RELEASE ORAL at 21:00

## 2017-06-25 RX ADMIN — AMLODIPINE BESYLATE 10 MG: 5 TABLET ORAL at 09:08

## 2017-06-25 RX ADMIN — PANTOPRAZOLE SODIUM 40 MG: 40 TABLET, DELAYED RELEASE ORAL at 06:44

## 2017-06-25 RX ADMIN — TRIHEXYPHENIDYL HYDROCHLORIDE 2 MG: 2 TABLET ORAL at 09:09

## 2017-06-25 NOTE — BH NOTES
Behavioral Health Interdisciplinary Rounds     Patient Name: Genaro Prakash  Age: 65 y.o. Room/Bed:  740/02  Primary Diagnosis: [facility]   Admission Status: Involuntary Commitment     Readmission within 30 days: no  Power of  in place: no  Patient requires a blocked bed: no          Reason for blocked bed: na    VTE Prophylaxis: Not indicated  Mobility needs/Fall risk: yes    Nutritional Plan: no  Consults:          Labs/Testing due today?: no    Sleep hours:  7.5+      Participation in Care/Groups:  yes  Medication Compliant?: Yes  PRNS (last 24 hours):  Antianxiety    Restraints (last 24 hours):  no  Substance Abuse:  no  CIWA (range last 24 hours): na COWS (range last 24 hours): na  Alcohol screening (AUDIT) completed -     If applicable, date SBIRT discussed in treatment team AND documented: na  Tobacco - patient is a smoker: yes   Date tobacco education completed by RN: 5/29/17  24 hour chart check complete: yes     Patient goal(s) for today:   Treatment team focus/goals: POA paperwork; continue working on placement  LOS:  38  Expected LOS: TBD  Psychiatric [address] -  yes   Name of Decision maker if patient has Psychiatric Care Directive --Lane Mensah  Patient was offered information --pt completed  Financial concerns/prescription coverage:  Medicaid  Date of last family contact: 6/23 daughter visit for POA paperwork      Family requesting physician contact today:  no  Discharge plan: placement       Outpatient provider(s): TBD    Participating treatment team members: Genaro Prakash, * (assigned SW),

## 2017-06-25 NOTE — BH NOTES
PRN Medication Documentation  At 9049   Specific patient behavior that led to need for PRN medication: c/o HA 7/10   Staff interventions attempted prior to PRN being given: relaxation  PRN medication given: Tylenol 650 mg po prn   Patient response/effectiveness of PRN medication: at 1200 medication was effective pain 0/10

## 2017-06-25 NOTE — BH NOTES
GROUP THERAPY PROGRESS NOTE    Nelli Ortez is participating in [employer].      Group time: 15 minutes    Personal goal for participation: decreased anxiety    Goal orientation: relaxation    Group therapy participation: active    Therapeutic interventions reviewed and discussed:     Impression of participation:good

## 2017-06-25 NOTE — BH NOTES
PRN Medication Documentation    Specific patient behavior that led to need for PRN medication: 6541  Requested med to help her relax & get to sleep. Pt has already had her sleeping med earlier. Staff interventions attempted prior to PRN being given: room darkened; distraction; emotional support; snack  PRN medication given: 2334  Ativan 1 mg PO  Patient response/effectiveness of PRN medication: 0005  Pt laying in bed with eyes closed, breathing easily; appears to be asleep. Monitoring continues.

## 2017-06-25 NOTE — PROGRESS NOTES
Problem: Falls - Risk of  Goal: *Absence of falls  Outcome: Progressing Towards Goal  Patient is ambulating safely and steadily while using her walker appropriately. She has been walking in the halls between the [facility] and General Units frequently and has remained free from falls/injury throughout this shift.

## 2017-06-25 NOTE — BH NOTES
1530:  Patient received as she returns to the Unit requesting snacks. She greets oncoming staff cordially and understands that we need to check her blood sugar before she gets her snacks. Patient is cooperative with the delay. She is sitting in the [address] chatting with peers and voices no other complaints or concerns at this time. Will maintain q 15 minute safety checks.

## 2017-06-25 NOTE — PROGRESS NOTES
Problem: Altered Thought Process (Adult/Pediatric)  Goal: *STG: Complies with medication therapy  Outcome: Progressing Towards Goal  Patient complies with medication therapy. Patient has flat affect, labile, cooperative, meal compliant. Patient is attending activities on general unit, remains on Q15 min safety checks.
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
## 2017-06-26 LAB
APPEARANCE UR: CLEAR
BACTERIA URNS QL MICRO: NEGATIVE /HPF
BILIRUB UR QL: NEGATIVE
COLOR UR: NORMAL
EPITH CASTS URNS QL MICRO: NORMAL /LPF
GLUCOSE BLD STRIP.AUTO-MCNC: 107 MG/DL (ref 65–100)
GLUCOSE BLD STRIP.AUTO-MCNC: 90 MG/DL (ref 65–100)
GLUCOSE UR STRIP.AUTO-MCNC: NEGATIVE MG/DL
HGB UR QL STRIP: NEGATIVE
HYALINE CASTS URNS QL MICRO: NORMAL /LPF (ref 0–5)
KETONES UR QL STRIP.AUTO: NEGATIVE MG/DL
LEUKOCYTE ESTERASE UR QL STRIP.AUTO: NEGATIVE
NITRITE UR QL STRIP.AUTO: NEGATIVE
PH UR STRIP: 7 [PH] (ref 5–8)
PROT UR STRIP-MCNC: NEGATIVE MG/DL
RBC #/AREA URNS HPF: NORMAL /HPF (ref 0–5)
SERVICE CMNT-IMP: ABNORMAL
SERVICE CMNT-IMP: NORMAL
SP GR UR REFRACTOMETRY: 1.01 (ref 1–1.03)
UA: UC IF INDICATED,UAUC: NORMAL
UROBILINOGEN UR QL STRIP.AUTO: 0.2 EU/DL (ref 0.2–1)
WBC URNS QL MICRO: NORMAL /HPF (ref 0–4)

## 2017-06-26 PROCEDURE — 74011250637 HC RX REV CODE- 250/637: Performed by: PSYCHIATRY & NEUROLOGY

## 2017-06-26 PROCEDURE — 74011250637 HC RX REV CODE- 250/637: Performed by: HOSPITALIST

## 2017-06-26 PROCEDURE — 81001 URINALYSIS AUTO W/SCOPE: CPT | Performed by: PSYCHIATRY & NEUROLOGY

## 2017-06-26 PROCEDURE — 82962 GLUCOSE BLOOD TEST: CPT

## 2017-06-26 PROCEDURE — 65220000003 HC RM SEMIPRIVATE PSYCH

## 2017-06-26 RX ADMIN — DIVALPROEX SODIUM 1000 MG: 500 TABLET, FILM COATED, EXTENDED RELEASE ORAL at 21:25

## 2017-06-26 RX ADMIN — ATORVASTATIN CALCIUM 20 MG: 20 TABLET, FILM COATED ORAL at 08:53

## 2017-06-26 RX ADMIN — NAPROXEN 250 MG: 250 TABLET ORAL at 17:21

## 2017-06-26 RX ADMIN — DOCUSATE SODIUM 100 MG: 100 CAPSULE, LIQUID FILLED ORAL at 17:21

## 2017-06-26 RX ADMIN — TRIHEXYPHENIDYL HYDROCHLORIDE 2 MG: 2 TABLET ORAL at 17:22

## 2017-06-26 RX ADMIN — LORAZEPAM 1 MG: 1 TABLET ORAL at 03:28

## 2017-06-26 RX ADMIN — TRIHEXYPHENIDYL HYDROCHLORIDE 2 MG: 2 TABLET ORAL at 08:53

## 2017-06-26 RX ADMIN — SITAGLIPTIN 100 MG: 100 TABLET, FILM COATED ORAL at 08:53

## 2017-06-26 RX ADMIN — NAPROXEN 250 MG: 250 TABLET ORAL at 08:55

## 2017-06-26 RX ADMIN — HYDROCHLOROTHIAZIDE 25 MG: 25 TABLET ORAL at 08:53

## 2017-06-26 RX ADMIN — CARBAMAZEPINE 200 MG: 200 TABLET, EXTENDED RELEASE ORAL at 17:21

## 2017-06-26 RX ADMIN — PANTOPRAZOLE SODIUM 40 MG: 40 TABLET, DELAYED RELEASE ORAL at 06:49

## 2017-06-26 RX ADMIN — DOCUSATE SODIUM 100 MG: 100 CAPSULE, LIQUID FILLED ORAL at 08:53

## 2017-06-26 RX ADMIN — CARBAMAZEPINE 200 MG: 200 TABLET, EXTENDED RELEASE ORAL at 08:53

## 2017-06-26 RX ADMIN — TRIHEXYPHENIDYL HYDROCHLORIDE 2 MG: 2 TABLET ORAL at 21:32

## 2017-06-26 RX ADMIN — ZOLPIDEM TARTRATE 12.5 MG: 6.25 TABLET, EXTENDED RELEASE ORAL at 21:25

## 2017-06-26 RX ADMIN — AMLODIPINE BESYLATE 10 MG: 5 TABLET ORAL at 08:55

## 2017-06-26 NOTE — BH NOTES
GROUP THERAPY PROGRESS NOTE    Sim Hidalgo is participating in Recreational Therapy. Group time: 30 minutes    Personal goal for participation: Participate in Rec.  Tx groups on unit    Goal orientation: relaxation    Group therapy participation: active    Therapeutic interventions reviewed and discussed: discuss similarities between plants and people    Impression of participation: able to discuss topic and color independently

## 2017-06-26 NOTE — BH NOTES
Behavioral Health Interdisciplinary Rounds     Patient Name: Pérez Brito  Age: 65 y.o.   Room/Bed:  740/02  Primary Diagnosis: [facility] ([facility]   Admission Status: Involuntary Commitment     Readmission within 30 days: no  Power of  in place: no  Patient requires a blocked bed: no          Reason for blocked bed: na    VTE Prophylaxis: Not indicated  Mobility needs/Fall risk: yes    Nutritional Plan: no  Consults:          Labs/Testing due today?: no    Sleep hours: 5.25+   (broken sleep)      Participation in Care/Groups:  yes  Medication Compliant?: Yes  PRNS (last 24 hours): Pain    Restraints (last 24 hours):  no  Substance Abuse:  no  CIWA (range last 24 hours): na COWS (range last 24 hours): na  Alcohol screening (AUDIT) completed -     If applicable, date SBIRT discussed in treatment team AND documented: na  Tobacco - patient is a smoker: yes   Date tobacco education completed by RN: 5/29/17  24 hour chart check complete: yes     Patient goal(s) for today:   Treatment team focus/goals: POA paperwork; continue working on placement  LOS:  39  Expected LOS: TBD  Psychiatric Advanced Care Directives -  yes   Name of Decision maker if patient has Psychiatric Care Directive --Marvetta Graft  Patient was offered information --pt completed  Financial concerns/prescription coverage:  Medicaid  Date of last family contact: 6/23 daughter visit for POA paperwork      Family requesting physician contact today:  no  Discharge plan: placement       Outpatient provider(s): TBD    Participating treatment team members: Pérez Brito, PEGGY Eaton; Dr. Italia Glaser MD; Rona Crawford Nurse extern; Farhan Camacho RN

## 2017-06-26 NOTE — PROGRESS NOTES
Problem: Altered Thought Process (Adult/Pediatric)  Goal: *STG: Participates in treatment plan  Outcome: Progressing Towards Goal  Patient denies SI. Patient mood is cooperative and somewhat drowsy this am per pt . Patient stated, \"couldnt sleep and took an ativan this morning and now I am sleepy\". Patient mood is pleasant Patient goal: \"to talk to the doctor and see if they have found me somewhere to live yet? \" Staff goal: to encourage patient to continue being med and meal compliant. Encourage patient to participate in group. Team meeting at [phone]: With Dr. Lattie Halsted Discussed plan of care and discharge planning with patient. Goal: Interventions  Outcome: Progressing Towards Goal  Medication education and administration. Group therapy.  Q 15 min rounds for safety

## 2017-06-26 NOTE — BH NOTES
PRN Medication Documentation    Specific patient behavior that led to need for PRN medication: 3142  Requested an Ativan to help her relax so she can get back to sleep. Staff interventions attempted prior to PRN being given: Distraction; emotional support; her room was already darkened  PRN medication given: 0328  Ativan 1 mg PO  Patient response/effectiveness of PRN medication: Monitoring continues. 0425  Pt remains awake, but has been resting in her bed. No c/o. No distress noted. Monitoring continues.

## 2017-06-26 NOTE — BH NOTES
GROUP THERAPY PROGRESS NOTE    Ge Dia participated in a Morning Process Group on the General Unit with a focus on identifying feelings, planning for the day, and learning more about DBT concepts of \"Mindfulness. \"     Group time: 60 minutes. Personal goal for participation: To identify feelings and increase the capacity to manage ones ability to cope. Goal orientation: The patient will be able to introduce themselves to their peers, identify their feelings, and consider the importance of coping skill development. The group session included a didactic section and the opportunity for patients to respond. Group therapy participation: This patient minimally participated in the therapy session. Therapeutic interventions reviewed and discussed: The patients were encouraged to identify themselves by their first names, acknowledge their feelings, and define a goal for their day. This was followed by a didactic session on DBT mindsets. These concepts included:   1) One-Mindfully: In the moment on the front and the following suggestions on the back of the card: a) Just do one thing at a time; b) Let go of distractions; c) Come back to the moment and what you are doing; and d) Do each thing with all your attention. 2) Effectiveness: Focus on what works: a) Do what needs to be done; b) Play by the rules (I am not an exception) and consider the context; c) Act skillfully within the given situation; d) Keep an eye on your objectives (and do what is necessary); e) Let go of vengeance, useless anger, and the need to be righteous. 3) Observe: Just notice: a) Have a Ray mind; b) Control your attention, cling to nothing; c) Be alert; d) Step inside and observe; e) Watch thoughts come and go; f) Notice what flows through your senses.    4) Nonjudgmental Stance: a) Be aware but dont evaluate; b) Sort opinions from facts; c) Accept each moment; d) Acknowledge the helpful and the harmful, but dont  it; e) Dont  your judging. 5) Wise Mind: a) Integration of emotion mind and reasonable mind; b) Allows intuition; c) Find it in the belly, the center of your head, or by following your breath. At the end of the session all the group members were provided a summary of the topics discussed and a worksheet to review on their own time. Impression of participation: The patient joined the group with about ten minutes left in the session because she said she had been cleaning up her room. She listened to the discussion of the DBT concepts and spoke about how difficult it is to not be judgmental in her family. She kept a copy of the handout sheet. in short, this patient listened to the didactic session and contributed to the discussion. The patient expressed no SI/HI nor overt psychotic symptoms in group. She indicated she was working on her aftercare planning this week. Her affect was euphoric and anxious.
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
## 2017-06-26 NOTE — BH NOTES
GROUP THERAPY PROGRESS NOTE    Guillermo Lopez did not participate in a Morning Process Group on the Geriatric Unit with a focus on shifting ones feelings to a positive note and preparing for the day through group singing. The patient walked by the group but decided to keep going past the group into the hallway.

## 2017-06-26 NOTE — BH NOTES
GROUP THERAPY PROGRESS NOTE    Celina Gonzales did not participate in [place]. Group time: 15 minutes    Personal goal for participation: Patient made a goal with writer \"talk with Doctor about discharge plans\"    Goal orientation: community    Group therapy participation: Patient did not participate in group    Therapeutic interventions reviewed and discussed: Reviewed unit schedule, rules, and make a daily goal    Impression of participation: Patient did not participate in group because \"I am very tired and need to rest in bed\".

## 2017-06-26 NOTE — PROGRESS NOTES
Problem: Altered Thought Process (Adult/Pediatric)  Goal: *STG: Participates in treatment plan  Outcome: Progressing Towards Goal  Pt is labile, smiling and social or tearful, expressing fear that she will not find a place to live, \"I want a nice place and a cell phone. Can someone help me with that? \"  Social on unit, out on general unit and attending groups. Med compliant, good appetite. Able to verbalize needs. Verbalizes no delusions, denies SI. Reports \"I hear somebody snoring in my left ear\".

## 2017-06-27 ENCOUNTER — HOSPITAL ENCOUNTER (OUTPATIENT)
Dept: GENERAL RADIOLOGY | Age: 62
Discharge: HOME | End: 2017-06-27
Attending: PSYCHIATRY & NEUROLOGY
Payer: MEDICAID

## 2017-06-27 LAB
GLUCOSE BLD STRIP.AUTO-MCNC: 114 MG/DL (ref 65–100)
GLUCOSE BLD STRIP.AUTO-MCNC: 91 MG/DL (ref 65–100)
SERVICE CMNT-IMP: ABNORMAL
SERVICE CMNT-IMP: NORMAL

## 2017-06-27 PROCEDURE — 71010 XR CHEST PORT: CPT

## 2017-06-27 PROCEDURE — 74011250637 HC RX REV CODE- 250/637: Performed by: PSYCHIATRY & NEUROLOGY

## 2017-06-27 PROCEDURE — 65220000003 HC RM SEMIPRIVATE PSYCH

## 2017-06-27 PROCEDURE — 74011250637 HC RX REV CODE- 250/637: Performed by: HOSPITALIST

## 2017-06-27 PROCEDURE — 82962 GLUCOSE BLOOD TEST: CPT

## 2017-06-27 RX ADMIN — DIVALPROEX SODIUM 1000 MG: 500 TABLET, FILM COATED, EXTENDED RELEASE ORAL at 21:14

## 2017-06-27 RX ADMIN — ZOLPIDEM TARTRATE 12.5 MG: 6.25 TABLET, EXTENDED RELEASE ORAL at 21:37

## 2017-06-27 RX ADMIN — LORAZEPAM 1 MG: 1 TABLET ORAL at 00:57

## 2017-06-27 RX ADMIN — DOCUSATE SODIUM 100 MG: 100 CAPSULE, LIQUID FILLED ORAL at 17:29

## 2017-06-27 RX ADMIN — CARBAMAZEPINE 200 MG: 200 TABLET, EXTENDED RELEASE ORAL at 08:51

## 2017-06-27 RX ADMIN — NAPROXEN 250 MG: 250 TABLET ORAL at 17:29

## 2017-06-27 RX ADMIN — SITAGLIPTIN 100 MG: 100 TABLET, FILM COATED ORAL at 08:47

## 2017-06-27 RX ADMIN — HYDROCHLOROTHIAZIDE 25 MG: 25 TABLET ORAL at 08:47

## 2017-06-27 RX ADMIN — TRIHEXYPHENIDYL HYDROCHLORIDE 2 MG: 2 TABLET ORAL at 08:51

## 2017-06-27 RX ADMIN — LORAZEPAM 1 MG: 1 TABLET ORAL at 11:02

## 2017-06-27 RX ADMIN — TRIHEXYPHENIDYL HYDROCHLORIDE 2 MG: 2 TABLET ORAL at 17:31

## 2017-06-27 RX ADMIN — TRIHEXYPHENIDYL HYDROCHLORIDE 2 MG: 2 TABLET ORAL at 21:17

## 2017-06-27 RX ADMIN — NAPROXEN 250 MG: 250 TABLET ORAL at 08:48

## 2017-06-27 RX ADMIN — CARBAMAZEPINE 200 MG: 200 TABLET, EXTENDED RELEASE ORAL at 17:31

## 2017-06-27 RX ADMIN — ATORVASTATIN CALCIUM 20 MG: 20 TABLET, FILM COATED ORAL at 08:47

## 2017-06-27 RX ADMIN — AMLODIPINE BESYLATE 10 MG: 5 TABLET ORAL at 08:47

## 2017-06-27 RX ADMIN — PANTOPRAZOLE SODIUM 40 MG: 40 TABLET, DELAYED RELEASE ORAL at 07:06

## 2017-06-27 RX ADMIN — DOCUSATE SODIUM 100 MG: 100 CAPSULE, LIQUID FILLED ORAL at 08:47

## 2017-06-27 NOTE — BH NOTES
PRN Medication Documentation    Specific patient behavior that led to need for PRN medication: \"I'm having an anxiety attack. \"  Staff interventions attempted prior to PRN being given: distraction  PRN medication given: 1 mg Ativan PO  Patient response/effectiveness of PRN medication: 1230:  Patient is able to eat lunch, was playing the piano, more relaxed.

## 2017-06-27 NOTE — PROGRESS NOTES
Problem: Falls - Risk of  Goal: *Absence of falls  Outcome: Progressing Towards Goal  Patient is ambulating safely and steadily while using her walker appropriately. She has remained free from falls/injury throughout this shift. Problem: Discharge Planning  Goal: *Discharge to safe environment  Outcome: Progressing Towards Goal  Patient had CXR this evening in preparation for placement.

## 2017-06-27 NOTE — INTERDISCIPLINARY ROUNDS
Behavioral Health Interdisciplinary Rounds     Patient Name: Edwin Mcginnis  Age: 65 y.o. Room/Bed:  740/02  Primary Diagnosis: [facility]ffective disorder ([facility]   Admission Status: Involuntary Commitment     Readmission within 30 days: no  Power of  in place: no  Patient requires a blocked bed: no          Reason for blocked bed: n/a    VTE Prophylaxis: Not indicated  Mobility needs/Fall risk: yes   Nutritional Plan: no  Consults:        Labs/Testing due today?: no    Sleep hours:  5.5 hrs      Participation in Care/Groups:  yes  Medication Compliant?: Yes  PRNS (last 24 hours):  Antianxiety    Restraints (last 24 hours):  no  Substance Abuse:  no  CIWA (range last 24 hours):  COWS (range last 24 hours):   Alcohol screening (AUDIT) completed -     If applicable, date SBIRT discussed in treatment team AND documented:   Tobacco - patient is a smoker: no   Date tobacco education completed by RN: 5/29/17  24 hour chart check complete: Yes    Patient goal(s) for today:   Treatment team focus/goals:   LOS:  40  Expected LOS: TBD  Psychiatric [address] -  yes   Name of Decision maker if patient has Psychiatric Care Directive : Navi Denis  Patient was offered information   Financial concerns/prescription coverage:  yes  Date of last family contact:       Family requesting physician contact today:    Discharge plan: Placement       Outpatient provider(s): TBD    Participating treatment team members: PEGGY Carballo; Dr. Apoorva Gallegos MD; Ruth Ann Rodgers RN

## 2017-06-27 NOTE — BH NOTES
GROUP THERAPY PROGRESS NOTE    Ashley Charles participated in a morning Process Group on the General Unit with a focus identifying feelings,   planning for the day, and learning more about DBT concepts on \"Emotion Regulation. \"    .   Group time: 75 minutes. Personal goal for participation: To increase the capacity to improve ones mood, set personal goals,   and understand more about basic activities to help regulate emotions. Goal orientation: The patients will be able to identify their feelings and develop a plan for   structuring their day. They were also presented with a summary sheet on emotional regulation,   in regards to focusing on one goal per day, taking physical care of oneself, and recognizing   and/or building positive experiences. The didactic portion of the session focused on these three  concepts; 1) defining and focusing on one goal per day; 2) taking care of ones physical   maintenance and basic needs - sleep, nutrition, and exercise; and 3) finding and building on   positive experiences. Group therapy participation: With prompting, this patient minimally participated in the group. Therapeutic interventions reviewed and discussed: The group members were asked to   identify an emotion they are having and/or let the group know what they want to focus on for the   day as they continue to make discharge plans. The group members reviewed three DBT   suggestions regarding emotional regulation, in regards to focusing on one goal per day, taking   physical care of oneself, and recognizing and/or building positive experiences. It was suggested   that these three concepts can be seen as headings for their list of coping skills. The group   members were also provided worksheets on the topic discussed for their review and use on   their own time.      Impression of participation: The patient stayed in group only a couple of minutes before   getting up and saying she needed to return to the Acute Unit to meet with her treatment team.   The patient expressed no SI/HI nor overt psychotic symptoms in this group. She was not  in this group long enough to add much to the conversation. She was quiet after introducing  herself and she did not return to the group after leaving. Her affect was anxious and she  continues to finalize her aftercare plans.

## 2017-06-27 NOTE — BH NOTES
GROUP THERAPY PROGRESS NOTE    Dewayne Kawasaki participated in a Morning Process Group on the Geriatric Unit, with a focus identifying feelings, planning for the day, and singing. Group time: 40 minutes. Personal goal for participation: To increase the capacity to shift ones mood, prepare for the day, and share in group singing. Goal orientation: The patient will be able to prepare for the day through group singing. Group therapy participation: When prompted, this patient minimally participated in the group. Therapeutic interventions reviewed and discussed: The group members were introduce themselves by first names and participate in group singing as a way to increase their oxygen and blood flow and begin their day on a positive note. They were also asked to join in singing several songs. Impression of participation: The patient joined the group for a couple of minutes about half way through the session. She said her  was attempting to line up a placement in [facility] for her and that four options are being pursued. She did not stay long in this group because she said she wanted to say goodbye to a couple of patients who are planning to leave the General Unit today. She expressed no SI/HI nor overt psychosis. Her affect was euphoric and slightly anxious. The content of thought was coherent and matched the content of her speech.

## 2017-06-27 NOTE — BH NOTES
1530:  Patient initially received as she is ambulating in the hallway back to the [facility]. She is smiling and greets oncoming staff cordially. After sitting briefly in the Dining Room, patient states \"I think I will take a short nap before dinner trays come\". She is now resting comfortably in bed. Will maintain q 15 minute safety checks. 1620:  Portable x-ray here for patient's chest x-ray. Patient compliant once rationale explained that it's to clear her physically for placement - patient verbalized understanding. Will continue to monitor. 2215:  Patient has been pleasant, cooperative and appropriately interactive with staff and peers throughout this shift. She does voice some concerns R/T her placement asking whether or not there will be a piano at the facility. Will continue to monitor.

## 2017-06-27 NOTE — PROGRESS NOTES
Problem: Falls - Risk of  Goal: *Absence of falls  Outcome: Progressing Towards Goal  No falls    Problem: Altered Thought Process (Adult/Pediatric)  Goal: *STG: Participates in treatment plan  Outcome: Progressing Towards Goal  Reviews meds, pt is out and social on the unit with peers and staff. Pt is labile and easily upset when talk about discharge comes up. Pt worried about placement. Pt daily goal is to attend all groups. Goal: *STG: Remains safe in hospital  Outcome: Progressing Towards Goal  Denies SI, no self-harming behaviors  Goal: *STG: Seeks staff when feelings of anxiety and fear arise  Outcome: Progressing Towards Goal  Pt is able to verbalize feelings of anxiety and feeling overwhelmed. Goal: *STG: Complies with medication therapy  Outcome: Progressing Towards Goal  Compliant  Goal: *STG: Attends activities and groups  Outcome: Progressing Towards Goal  Engaged  Goal: Interventions  Outcome: Progressing Towards Goal  Staff is focused on effective coping skills education and limit setting     1052 pt was walking in the halls when she started complaining of SOB, and her heart murmur. No distress noted. Able to verablize to nurses that she was having an anxiety attack. Nursing listened to heart, and noted no irregularity. Vitals stables   BP: 178/88  O2: 100%  HR: 76    1102 Medicated with Ativan PRN - needed reassurance by staff.  Pt is out in the day room

## 2017-06-27 NOTE — PROGRESS NOTES
Problem: Altered Thought Process (Adult/Pediatric)  Goal: *STG: Remains safe in hospital  Outcome: Progressing Towards Goal  Received pt resting in bed with eyes closed. Respirations regular, even and unlabored. Bed alarm is on, bed in low position and call bell within pt's reach. NAD. Q-15 checks for safety. -24 hr chart review completed.

## 2017-06-28 LAB
GLUCOSE BLD STRIP.AUTO-MCNC: 115 MG/DL (ref 65–100)
GLUCOSE BLD STRIP.AUTO-MCNC: 90 MG/DL (ref 65–100)
SERVICE CMNT-IMP: ABNORMAL
SERVICE CMNT-IMP: NORMAL

## 2017-06-28 PROCEDURE — 65220000003 HC RM SEMIPRIVATE PSYCH

## 2017-06-28 PROCEDURE — 74011250637 HC RX REV CODE- 250/637: Performed by: PSYCHIATRY & NEUROLOGY

## 2017-06-28 PROCEDURE — 74011250637 HC RX REV CODE- 250/637: Performed by: HOSPITALIST

## 2017-06-28 PROCEDURE — 82962 GLUCOSE BLOOD TEST: CPT

## 2017-06-28 RX ORDER — POLYETHYLENE GLYCOL 3350 17 G/17G
17 POWDER, FOR SOLUTION ORAL DAILY
Status: DISCONTINUED | OUTPATIENT
Start: 2017-06-28 | End: 2017-08-16 | Stop reason: HOSPADM

## 2017-06-28 RX ADMIN — NAPROXEN 250 MG: 250 TABLET ORAL at 08:25

## 2017-06-28 RX ADMIN — SITAGLIPTIN 100 MG: 100 TABLET, FILM COATED ORAL at 08:25

## 2017-06-28 RX ADMIN — DIVALPROEX SODIUM 1000 MG: 500 TABLET, FILM COATED, EXTENDED RELEASE ORAL at 21:08

## 2017-06-28 RX ADMIN — HYDROCHLOROTHIAZIDE 25 MG: 25 TABLET ORAL at 08:25

## 2017-06-28 RX ADMIN — ACETAMINOPHEN 650 MG: 325 TABLET, FILM COATED ORAL at 02:47

## 2017-06-28 RX ADMIN — NAPROXEN 250 MG: 250 TABLET ORAL at 18:00

## 2017-06-28 RX ADMIN — ATORVASTATIN CALCIUM 20 MG: 20 TABLET, FILM COATED ORAL at 08:27

## 2017-06-28 RX ADMIN — CARBAMAZEPINE 200 MG: 200 TABLET, EXTENDED RELEASE ORAL at 18:00

## 2017-06-28 RX ADMIN — DOCUSATE SODIUM 100 MG: 100 CAPSULE, LIQUID FILLED ORAL at 18:00

## 2017-06-28 RX ADMIN — AMLODIPINE BESYLATE 10 MG: 5 TABLET ORAL at 08:26

## 2017-06-28 RX ADMIN — CARBAMAZEPINE 200 MG: 200 TABLET, EXTENDED RELEASE ORAL at 08:30

## 2017-06-28 RX ADMIN — TRIHEXYPHENIDYL HYDROCHLORIDE 2 MG: 2 TABLET ORAL at 18:00

## 2017-06-28 RX ADMIN — DOCUSATE SODIUM 100 MG: 100 CAPSULE, LIQUID FILLED ORAL at 08:24

## 2017-06-28 RX ADMIN — LORAZEPAM 1 MG: 1 TABLET ORAL at 15:42

## 2017-06-28 RX ADMIN — PANTOPRAZOLE SODIUM 40 MG: 40 TABLET, DELAYED RELEASE ORAL at 06:46

## 2017-06-28 RX ADMIN — MAGNESIUM HYDROXIDE 30 ML: 400 SUSPENSION ORAL at 02:48

## 2017-06-28 RX ADMIN — TRIHEXYPHENIDYL HYDROCHLORIDE 2 MG: 2 TABLET ORAL at 21:10

## 2017-06-28 RX ADMIN — LORAZEPAM 1 MG: 1 TABLET ORAL at 02:58

## 2017-06-28 RX ADMIN — ZOLPIDEM TARTRATE 12.5 MG: 6.25 TABLET, EXTENDED RELEASE ORAL at 21:08

## 2017-06-28 RX ADMIN — POLYETHYLENE GLYCOL 3350 17 G: 17 POWDER, FOR SOLUTION ORAL at 11:29

## 2017-06-28 RX ADMIN — TRIHEXYPHENIDYL HYDROCHLORIDE 2 MG: 2 TABLET ORAL at 08:27
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
## 2017-06-28 NOTE — BH NOTES
1520:  Patient received as she is sitting up on the side of her bed. She appears as if she has just woken up and does not respond to greetings from oncoming staff. She appears sad, and, when asked how she is doing today, patient states \"Everyone is going home except me - she's going home tomorrow\" indicating her roommate. Reassurance and emotional support provided - patient reminded that she just had her chest x-ray for placement and that we are working on finding her a nice place. Will maintain q 15 minute safety checks.

## 2017-06-28 NOTE — BH NOTES
GROUP THERAPY PROGRESS NOTE    Harlan Mclean is participating in [place]. Group time: 15 minutes    Personal goal for participation: Patient made a goal \"to work on discharge plans\"    Goal orientation: personal    Group therapy participation: active    Therapeutic interventions reviewed and discussed: reviewed unit schedule , rules and goals    Impression of participation: Required prompting and encouragement to attend and participate in community. She is preoccupied with \"finding a place to live\". A & O x 4.

## 2017-06-28 NOTE — BH NOTES
GROUP THERAPY PROGRESS NOTE    Pérez Carrillo is participating in Recreational Therapy.      Group time: 30 minutes    Personal goal for participation: Work on a craft      Goal orientation: relaxation    Group therapy participation: active    Therapeutic interventions reviewed and discussed: provided a ink and stamp pad craft    Impression of participation: required prompting and encouragement to participate in craft

## 2017-06-28 NOTE — PROGRESS NOTES
Problem: Altered Thought Process (Adult/Pediatric)  Goal: *STG: Participates in treatment plan  Outcome: Progressing Towards Goal  Received this morning alert and oriented. Smiling and greeted staff approprietly. Thoughts are clear and organized. Complaining of constipation,took in some warm prune juice. Able to do own cares with minimal assistance. Able to follow directions. Medications reviewed. Appears less anxious than yesterday, not tearful and no discution about discharge at present. Patient voiced her goal is to concentrate on finding a place to stay after discharge and,\"Just enjoying the day. \"  Goal: *STG: Remains safe in hospital  Outcome: Progressing Towards Goal  Remains safe in the hospital. Using walker to ambulate. Bed alarm is on the bed. Staff to remind patient to use  Tap bell when she needs assistance. Goal: *STG: Seeks staff when feelings of anxiety and fear arise  Outcome: Progressing Towards Goal  Able to express her feelings/mood to staff in a appropriate manner. Presently appears less anxious than yesterday. Goal: *STG: Complies with medication therapy  Outcome: Progressing Towards Goal  Remains medication and meal compliant. Goal: *STG: Attends activities and groups  Outcome: Progressing Towards Goal  Attending groups and stays out on the unit socializing with staff and other patients in a pleasant manner. Goal: Interventions  Outcome: Progressing Towards Goal  Re-enforce patient behavior,encourage unit activities and continue to provide emotional comfort as needed.

## 2017-06-28 NOTE — PROGRESS NOTES
Problem: Falls - Risk of  Goal: *Absence of falls  Outcome: Progressing Towards Goal  Patient is ambulating safely and steadily while using her walker appropriately. She has remained free from falls/injury throughout this shift.

## 2017-06-28 NOTE — BH NOTES
Behavioral Health Interdisciplinary Rounds     Patient Name: Paola Betancur  Age: 65 y.o. Room/Bed:  740/02  Primary Diagnosis: [facility]   Admission Status: Involuntary Commitment     Readmission within 30 days: no  Power of  in place: no  Patient requires a blocked bed: no          Reason for blocked bed: na    VTE Prophylaxis: Not indicated  Mobility needs/Fall risk: yes    Nutritional Plan: no  Consults:          Labs/Testing due today?: no    Sleep hours:  6.5+    (broken sleep)      Participation in Care/Groups:  yes  Medication Compliant?: Yes  PRNS (last 24 hours):  Antianxiety    Restraints (last 24 hours):  no  Substance Abuse:  no  CIWA (range last 24 hours): na COWS (range last 24 hours): na  Alcohol screening (AUDIT) completed -     If applicable, date SBIRT discussed in treatment team AND documented: na  Tobacco - patient is a smoker: yes   Date tobacco education completed by RN: 5/29/17  24 hour chart check complete: yes     Patient goal(s) for today: Work on craft project  Treatment team focus/goals: Continue working on placement  LOS:  41  Expected LOS: TBD  [address] -  yes   Name of Decision maker if patient has [address] Directive --Ilana Jefferson  Patient was offered information --pt completed  Financial concerns/prescription coverage:  Yes--Medicaid  Date of last family contact:       Family requesting physician contact today:  no  Discharge plan: placement       Outpatient provider(s): TBD    Participating treatment team members: Paola PipeNery MSW; Dr. Timoteo Barron MD; Ewelina Juan, ESTER; Radha Vazquez, UmeshD

## 2017-06-28 NOTE — BH NOTES
GROUP THERAPY PROGRESS NOTE    Akin Brito participated in a Morning Process Group on the Geriatric Unit, with a focus identifying feelings, planning for the day, and singing. Group time: 45 minutes. Personal goal for participation: To increase the capacity to shift ones mood, prepare for the day, and share in group singing. Goal orientation: The patient will be able to prepare for the day through group singing. Group therapy participation: When prompted, this patient participated in the group. Therapeutic interventions reviewed and discussed: The group members were introduce themselves by first names and participate in group singing as a way to increase their oxygen and blood flow and begin their day on a positive note. They were also asked to join in singing several songs. Impression of participation: The patient said she ws feeling \"so - so\" this morning, because she said she was constipated. Her affect was depressed and anxious. She was worried about her past living situations and the loss of her meager possessions. She expressed no SI/HI and no overt psychosis. She seems frustrated that her placement is taking more time than she anticipated.

## 2017-06-28 NOTE — BH NOTES
PRN Medication Documentation    Specific patient behavior that led to need for PRN medication: 0244  Pt c/o Arthritis ache & constipation. Requested meds for this. Staff interventions attempted prior to PRN being given: Distraction; emotional support. Pt was on toilet trying to have a BM. PRN medication given: 0247  Tylenol 650 mg PO & 0248  MOM 30 cc PO for constipation relief. Patient response/effectiveness of PRN medication: Monitoring continues. PRN Medication Documentation    Specific patient behavior that led to need for PRN medication: 2314  Requested Ativan to help her relax so she can get back to sleep. Staff interventions attempted prior to PRN being given: Distraction; emotional support. Room already darkened. Pad on bed changed & linens straightened. PRN medication given: 0258  Ativan 1 mg PO  Patient response/effectiveness of PRN medication: Monitoring continues. 0330  Pt laying in bed with eyes closed, breathing easily; appears to be asleep. No distress noted. Monitoring continues.
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
## 2017-06-28 NOTE — BH NOTES
PRN Medication Documentation    Specific patient behavior that led to need for PRN medication: crying,anxiety,irritable  Staff interventions attempted prior to PRN being given: coping skills,redirection  PRN medication given: ativan  Patient response/effectiveness of PRN medication:good, aware

## 2017-06-28 NOTE — BH NOTES
GROUP THERAPY PROGRESS NOTE    Ashley Call is participating in Leisure-Creative Group.      Group time: 15 minutes    Personal goal for participation: decrease anxiety    Goal orientation: relaxation    Group therapy participation: active    Therapeutic interventions reviewed and discussed:     Impression of participation: good

## 2017-06-28 NOTE — BH NOTES
PRN Medication Documentation    Specific patient behavior that led to need for PRN medication: Patient has c/o of constipation; has been to [facility] 2 x but unsuccessful   Staff interventions attempted prior to PRN being given: Encourage water, fluids, walking, high fiber food   PRN medication given: 1129 Miralax  Mixed with water  For constipation  Patient response/effectiveness of PRN medication: will monitor  1229 Patient has not had a BM.    1245 Patient had a small, brown, formed, soft BM. Med is starting to work.

## 2017-06-29 LAB
GLUCOSE BLD STRIP.AUTO-MCNC: 100 MG/DL (ref 65–100)
GLUCOSE BLD STRIP.AUTO-MCNC: 113 MG/DL (ref 65–100)
SERVICE CMNT-IMP: ABNORMAL
SERVICE CMNT-IMP: NORMAL

## 2017-06-29 PROCEDURE — 74011250637 HC RX REV CODE- 250/637: Performed by: PSYCHIATRY & NEUROLOGY

## 2017-06-29 PROCEDURE — 65220000003 HC RM SEMIPRIVATE PSYCH

## 2017-06-29 PROCEDURE — 74011250637 HC RX REV CODE- 250/637: Performed by: HOSPITALIST

## 2017-06-29 PROCEDURE — 82962 GLUCOSE BLOOD TEST: CPT

## 2017-06-29 RX ADMIN — CARBAMAZEPINE 200 MG: 200 TABLET, EXTENDED RELEASE ORAL at 17:04

## 2017-06-29 RX ADMIN — LORAZEPAM 1 MG: 1 TABLET ORAL at 18:12

## 2017-06-29 RX ADMIN — DOCUSATE SODIUM 100 MG: 100 CAPSULE, LIQUID FILLED ORAL at 17:04

## 2017-06-29 RX ADMIN — SITAGLIPTIN 100 MG: 100 TABLET, FILM COATED ORAL at 08:51

## 2017-06-29 RX ADMIN — HYDROCHLOROTHIAZIDE 25 MG: 25 TABLET ORAL at 08:52

## 2017-06-29 RX ADMIN — NAPROXEN 250 MG: 250 TABLET ORAL at 17:04

## 2017-06-29 RX ADMIN — ZOLPIDEM TARTRATE 12.5 MG: 6.25 TABLET, EXTENDED RELEASE ORAL at 21:10

## 2017-06-29 RX ADMIN — NAPROXEN 250 MG: 250 TABLET ORAL at 08:51

## 2017-06-29 RX ADMIN — TRIHEXYPHENIDYL HYDROCHLORIDE 2 MG: 2 TABLET ORAL at 08:52

## 2017-06-29 RX ADMIN — DIVALPROEX SODIUM 1000 MG: 500 TABLET, FILM COATED, EXTENDED RELEASE ORAL at 21:10

## 2017-06-29 RX ADMIN — TRIHEXYPHENIDYL HYDROCHLORIDE 2 MG: 2 TABLET ORAL at 21:11

## 2017-06-29 RX ADMIN — TRIHEXYPHENIDYL HYDROCHLORIDE 2 MG: 2 TABLET ORAL at 17:05

## 2017-06-29 RX ADMIN — AMLODIPINE BESYLATE 10 MG: 5 TABLET ORAL at 08:49

## 2017-06-29 RX ADMIN — ATORVASTATIN CALCIUM 20 MG: 20 TABLET, FILM COATED ORAL at 08:50

## 2017-06-29 RX ADMIN — CARBAMAZEPINE 200 MG: 200 TABLET, EXTENDED RELEASE ORAL at 08:51

## 2017-06-29 RX ADMIN — PANTOPRAZOLE SODIUM 40 MG: 40 TABLET, DELAYED RELEASE ORAL at 06:51

## 2017-06-29 NOTE — PROGRESS NOTES
Follow up visit at request of patient. No immediate need, rather a desire to share her desire for wholeness within her family, for physical healing, and for discharge soon. Offered spoken prayer to address these needs. Will revisit as needed. [facility]'s Staff  ([facility] Patient Care Specialist)   Paging Service [address] [place] [phone])

## 2017-06-29 NOTE — BH NOTES
PRN Medication Documentation    Specific patient behavior that led to need for PRN medication:pt request,anxiety*  Staff interventions attempted prior to PRN being given: coping skills  PRN medication given: ativan  Patient response/effectiveness of PRN medication: good

## 2017-06-29 NOTE — PROGRESS NOTES
Attended and was actively involved in the [address] [place] [address] group where the topic for the discussion was journeys and unconditional love.     Jay Fonseca  [place]'s Staff  ([facility]  Patient Care Specialist)   Paging Service [phone])

## 2017-06-29 NOTE — BH NOTES
1520:  Patient received as she is sitting at the Dining Room table. She greets oncoming staff cordially and is tearful with her head down on the table stating \"Everyone gets to go but me\" R/T her roommate's discharge earlier today. Patient reassured that her time will come very soon, and that we are working on a good placement for her. She requests assistance with phone calls to her daughter which is provided. Will maintain q 15 minute safety checks. 2010:  Patient enjoying the game shows on TV as well as the snacks and popcorn with peers. Will continue to monitor.

## 2017-06-29 NOTE — BH NOTES
Behavioral Health Interdisciplinary Rounds     Patient Name: Rubi Santiago  Age: 65 y.o. Room/Bed:  740/02  Primary Diagnosis: [facility]   Admission Status: Involuntary Commitment     Readmission within 30 days: no  Power of  in place: no  Patient requires a blocked bed: no          Reason for blocked bed: na    VTE Prophylaxis: Not indicated  Mobility needs/Fall risk: yes    Nutritional Plan: no  Consults:          Labs/Testing due today?: no    Sleep hours:  6.5+       Participation in Care/Groups:  yes  Medication Compliant?: Yes  PRNS (last 24 hours):  Antianxiety and Pain & constipation (med was effective)    Restraints (last 24 hours):  no  Substance Abuse:  no  CIWA (range last 24 hours): na COWS (range last 24 hours): na  Alcohol screening (AUDIT) completed -     If applicable, date SBIRT discussed in treatment team AND documented: na  Tobacco - patient is a smoker: yes   Date tobacco education completed by RN: 5/29/17  24 hour chart check complete: yes     Patient goal(s) for today: Call daughter  Treatment team focus/goals: Continue working on placement; send [facility] to Assisted [facility] at [facility]  LOS:  42  Expected LOS: TBD  [address] -  yes   Name of Decision maker if patient has Psychiatric Care Directive --Corina Jones  Patient was offered information --pt completed  Financial concerns/prescription coverage:  Medicaid  Date of last family contact:       Family requesting physician contact today:  no  Discharge plan: placement       Outpatient provider(s): TBD    Participating treatment team members: Dax Singh MSW; Dr. Britt Pack MD; Amanda Polo RN; Luna Lugo, UmeshD

## 2017-06-29 NOTE — BH NOTES
GROUP THERAPY PROGRESS NOTE    Celina Gonzales participated in a Morning Process Group on the Geriatric Unit, with a focus identifying feelings, planning for the day, and singing. Group time: 50 minutes. Personal goal for participation: To increase the capacity to shift ones mood, prepare for the day, and share in group singing. Goal orientation: The patient will be able to prepare for the day through group singing. Group therapy participation: When prompted, this patient participated in the group. Therapeutic interventions reviewed and discussed: The group members were introduce themselves by first names and participate in group singing as a way to increase their oxygen and blood flow and begin their day on a positive note. They were also asked to join in singing several songs. Impression of participation: The patient was active and hyper-verbal. Her affect was labile, sometimes euphoric and sometimes sad over the loss death of her mother. She said she had been able to see and speak with her mother before she . She was easily distracted in group and was focused on almost anything going on in the open area near the group area. Her ability to stay focused was limited. No SI/HI or overt psychosis were noted. She was generally oriented and her thinking jumped from what was immediately in front of her or her losses, both personal and possessions. She hopes she will be able to be situated in an [place] nearer her family in the [facility]. She is preparing herself for her upcoming discharge by thinking about what living in an senior living has been like for her. Having family around may help her feel less isolated.

## 2017-06-29 NOTE — BH NOTES
[facility] 60  Master Treatment Plan for Pérez Carrillo    Date Treatment Plan Initi6/29/17    Treatment Plan Modalities:  Type of Modality Amount  (x minutes) Frequency (x/week) Duration (x days) Name of Responsible Staff   [address] [facility] meetings to encourage peer interactions 1600  Genaro Ralph psychotherapy to assist in building coping skills and internal controls 60 7 1 Samuel Carter LCSW   Therapeutic activity groups to build coping skills 60 7 1 Samuel Carter LCSW   Psychoeducation in group setting to address:   Medication education   C/Vinod Hagan RN   Coping skills         Relaxation techniques         Symptom management         Discharge planning   15 7 1    Spirituality    60 2 [facility]   60 1 1 Vol.    Recovery/AA/NA   60 1 1 Vol   Physician medication management   15 7 1    Family meeting/discharge planning

## 2017-06-29 NOTE — PROGRESS NOTES
Problem: Falls - Risk of  Goal: *Absence of falls  Outcome: Progressing Towards Goal  No falls    Problem: Altered Thought Process (Adult/Pediatric)  Goal: *STG: Participates in treatment plan  Outcome: Progressing Towards Goal  Review meds, pt is out and social on the unit with peers and staff. Pt is ambulatory with walker. Pt mood is labile and voices frustration with \"everyone leaving but me. \" Med and meal compliant.  Pt daily goal is to attend groups  Goal: *STG: Remains safe in hospital  Outcome: Progressing Towards Goal  Denies SI, no self-harming behaviors  Goal: *STG: Complies with medication therapy  Outcome: Progressing Towards Goal  Compliant  Goal: *STG: Attends activities and groups  Outcome: Progressing Towards Goal  Engaged  Goal: Interventions  Outcome: Progressing Towards Goal  Staff is focused on limit setting, effective coping skills education and placement

## 2017-06-30 LAB
GLUCOSE BLD STRIP.AUTO-MCNC: 118 MG/DL (ref 65–100)
GLUCOSE BLD STRIP.AUTO-MCNC: 98 MG/DL (ref 65–100)
SERVICE CMNT-IMP: ABNORMAL
SERVICE CMNT-IMP: NORMAL

## 2017-06-30 PROCEDURE — 74011250637 HC RX REV CODE- 250/637: Performed by: HOSPITALIST

## 2017-06-30 PROCEDURE — 82962 GLUCOSE BLOOD TEST: CPT

## 2017-06-30 PROCEDURE — 65220000003 HC RM SEMIPRIVATE PSYCH

## 2017-06-30 PROCEDURE — 74011250637 HC RX REV CODE- 250/637: Performed by: PSYCHIATRY & NEUROLOGY

## 2017-06-30 RX ADMIN — CARBAMAZEPINE 200 MG: 200 TABLET, EXTENDED RELEASE ORAL at 08:15

## 2017-06-30 RX ADMIN — DOCUSATE SODIUM 100 MG: 100 CAPSULE, LIQUID FILLED ORAL at 17:36

## 2017-06-30 RX ADMIN — AMLODIPINE BESYLATE 10 MG: 5 TABLET ORAL at 08:18

## 2017-06-30 RX ADMIN — TRIHEXYPHENIDYL HYDROCHLORIDE 2 MG: 2 TABLET ORAL at 22:07

## 2017-06-30 RX ADMIN — BENZTROPINE MESYLATE 1 MG: 1 TABLET ORAL at 13:52

## 2017-06-30 RX ADMIN — CARBAMAZEPINE 200 MG: 200 TABLET, EXTENDED RELEASE ORAL at 17:43

## 2017-06-30 RX ADMIN — TRIHEXYPHENIDYL HYDROCHLORIDE 2 MG: 2 TABLET ORAL at 17:43

## 2017-06-30 RX ADMIN — TRIHEXYPHENIDYL HYDROCHLORIDE 2 MG: 2 TABLET ORAL at 08:15

## 2017-06-30 RX ADMIN — SITAGLIPTIN 100 MG: 100 TABLET, FILM COATED ORAL at 08:18

## 2017-06-30 RX ADMIN — NAPROXEN 250 MG: 250 TABLET ORAL at 17:36

## 2017-06-30 RX ADMIN — PANTOPRAZOLE SODIUM 40 MG: 40 TABLET, DELAYED RELEASE ORAL at 06:44

## 2017-06-30 RX ADMIN — ZOLPIDEM TARTRATE 12.5 MG: 6.25 TABLET, EXTENDED RELEASE ORAL at 21:06

## 2017-06-30 RX ADMIN — NAPROXEN 250 MG: 250 TABLET ORAL at 08:18

## 2017-06-30 RX ADMIN — ATORVASTATIN CALCIUM 20 MG: 20 TABLET, FILM COATED ORAL at 08:18

## 2017-06-30 RX ADMIN — DIVALPROEX SODIUM 1000 MG: 500 TABLET, FILM COATED, EXTENDED RELEASE ORAL at 21:06
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
## 2017-06-30 NOTE — PROGRESS NOTES
Problem: Altered Thought Process (Adult/Pediatric)  Goal: *STG: Seeks staff when feelings of anxiety and fear arise  Outcome: Progressing Towards Goal  Patient continues to be sad and depressed about all of her roommates and others being discharged. She is tearful and distressed seeking reassurance and comfort from staff and requesting some medication for anxiety.

## 2017-06-30 NOTE — PROGRESS NOTES
Problem: Altered Thought Process (Adult/Pediatric)  Goal: *STG: Participates in treatment plan  Outcome: Progressing Towards Goal  Patient is cooperative. Patient med and meal compliant. Patient goal: \"to go to groups\". Staff goal: to continue to encourage patient to continue being active in her care. Mood is labile. Goal: Interventions  Outcome: Progressing Towards Goal  Medication management. Q 15 min safety monitoring. Group therapy.

## 2017-06-30 NOTE — BH NOTES
GROUP THERAPY PROGRESS NOTE    Sim Hidalgo participated in a Process Group on the General Unit with a focus identifying feelings, planning for the day, and learning more about DBT concepts of \"Elf Help\" and the importance of self-care. Group time: 65 minutes. Personal goal for participation: To identify feelings and increase the capacity to take care of oneself first as a primary coping skill. Goal orientation: The patient will be able to introduce themselves to their peers, identify their feelings, and consider the importance of taking time for ones self-care as an important part of life planning and coping skill development. Group therapy participation: With minimal prompting, this patient actively participated in the group. Therapeutic interventions reviewed and discussed: The group members were asked to begin by introducing themselves and sharing about their feelings. They were then asked to  take turns reading from an outline of twenty-two behavioral suggestions from DBT, called the [address] [place] [address], were reviewed with the group members and discussed as a group. The suggestions came with drawings of elves engaged in the activities described. These suggestions included: 1) trusting yourself (wise mind); 2) take a day off to retreat (observe and describe); 3) talk and play with children or adults (participate); 4) when you cant stop thinking, feel (non-judgmental stance); 5) stay in the present by recognizing when anxiety gets you caught up in a future or a past (one mindfully); 6) take time to think before just jumping in to solve a problem (efficiently);  7) when angry, let yourself feel the anger (the function of emotion); 8) make time for play (adult pleasant activities); 9) when sad, think about what would be comforting (accepting and engaging the opposite); 10) put yourself first (self-soothe); 11) when uncomfortable being alone, think about being with others and decide whether to make it happen (improve imagery); 12) take time to wonder (improve meaning); 13) carve out time to be lazy (improve relaxation); 14) notice the sun and the moon as they rise and set (improve one thing at a time); 15) nothing is usually the hardest thing to do - but often it is the best (guidelines for accepting reality); 16) when things are in chaos and one is in a frenzy, ask yourself Stacy Gaston is right about now?  (turning your mind); 17) learn to stand back and let others take their turn as leaders (radical acceptance); 18) when someone turns you down, it usually has to do with them and not you - ask someone else for what you need (using objective effectiveness); 19) when wanting to talk with someone new and are scared, breathe - dont rehearse, just plunge and if it doesnt go well, you can stop (using relationship effectiveness); 20) when you see someone elses hurt face, breathe - you are not responsible for making other people happy (no apologies); 21) when you want something from someone else, ask - asking is being true to yourself (be truthful); 22) when you are hurt, tell the person who hurt you (situations for interpersonal effectiveness). The group members were provided a summary sheet of the DBT information discussed, which they could also review on their own time. Impression of participation: The patient said she was grateful for a peer who shared some information with her yesterday and that she was working on going home. The patient needed frequent re-direction when she drifted off topic. She said she wanted to also work on staying quiet more often and listening. She was not very successful with the latter goal without frequent re-direction. The patient was hyper-verbal. Her affect was euphoric and anxious. Her thinking was scattered. The patient expressed no SI/HI nor grossy or overt psychotic symptoms. Her thoughts may have been racing.

## 2017-06-30 NOTE — BH NOTES
Behavioral Health Interdisciplinary Rounds     Patient Name: Akin Brito  Age: 65 y.o. Room/Bed:  740/02  Primary Diagnosis: Schizoaffective disorder ([facility]   Admission Status: Involuntary Commitment     Readmission within 30 days: no  Power of  in place: no  Patient requires a blocked bed: no          Reason for blocked bed: na    VTE Prophylaxis: Not indicated  Mobility needs/Fall risk: yes    Nutritional Plan: no  Consults:          Labs/Testing due today?: no    Sleep hours:  6.5+   (Broken sleep)      Participation in Care/Groups:  yes  Medication Compliant?: Selective  PRNS (last 24 hours):  Antianxiety    Restraints (last 24 hours):  no  Substance Abuse:  no  CIWA (range last 24 hours): na COWS (range last 24 hours): na  Alcohol screening (AUDIT) completed -     If applicable, date SBIRT discussed in treatment team AND documented: na  Tobacco - patient is a smoker: yes   Date tobacco education completed by RN: 5/29/17  24 hour chart check complete: yes     Patient goal(s) for today:   Treatment team focus/goals: Continue working on placement; send [facility] to [facility] at [facility]  LOS:  43  Expected LOS: TBD  Psychiatric [address] -  yes   Name of Decision maker if patient has Psychiatric Care Directive --Celia Sears  Patient was offered information --pt completed  Financial concerns/prescription coverage:  Medicaid  Date of last family contact:       Family requesting physician contact today:  no  Discharge plan: placement       Outpatient provider(s): [facility]    Participating treatment team members: Akin Brito, PEGGY Flores; Dr. Shaina Lucero MD; Sophia Younger, Nurse extern

## 2017-06-30 NOTE — BH NOTES
GROUP THERAPY PROGRESS NOTE    Yovany Vásquez is participating in Recreational Therapy. Group time: 20 minutes    Personal goal for participation: Complete one craft project for relaxation    Goal orientation: relaxation    Group therapy participation: active    Therapeutic interventions reviewed and discussed: Provided craft project    Impression of participation: Patient was able to complete one craft project.

## 2017-06-30 NOTE — BH NOTES
GROUP THERAPY PROGRESS NOTE    Pérez Carrillo participated in a Morning Process Group on the Geriatric Unit, with a focus identifying feelings, planning for the day, and singing. Group time: 50 minutes. Personal goal for participation: To increase the capacity to shift ones mood, prepare for the day, and share in group singing. Goal orientation: The patient will be able to prepare for the day through group singing. Group therapy participation: When prompted, this patient participated in the group. Therapeutic interventions reviewed and discussed: The group members were introduce themselves by first names and participate in group singing as a way to increase their oxygen and blood flow and begin their day on a positive note. They were also asked to join in singing several songs. Impression of participation: The patient said she was feeling \"good\" and that she \"might be going home. \" She admitted this was mostly a hope base on the fact that her treatment team  smiled at her this morning. The patient sang along with the group and recommended a couple of songs. She had trouble staying focused to complete many of the songs she began. She remains easily distracted. No SI/HI or gross overt psychosis noted. Her affect was euphoric and anxious. Her thinking was tangential and she accepted re-direction but continued to have difficulty staying on task. She did accomplish speaking with one of her daughters in [place] yesterday, but it sounded like that daughter was not able to provide a place for the patient to live.

## 2017-06-30 NOTE — PROGRESS NOTES
Problem: Falls - Risk of  Goal: *Absence of falls  Outcome: Progressing Towards Goal  No falls. Problem: Altered Thought Process (Adult/Pediatric)  Goal: *STG: Participates in treatment plan  Outcome: Progressing Towards Goal  Review meds, pt is out and social on the unit with peers and staff. Pt is med and meal compliant. Pt is pleasant and cooperative this morning. Pt daily goal is talk to her daughter and speak with tx team about placement. Goal: *STG: Remains safe in hospital  Outcome: Progressing Towards Goal  Denies SI, no self-harming behaviors. Goal: *STG: Seeks staff when feelings of anxiety and fear arise  Outcome: Progressing Towards Goal  Pt is able to verbalize feelings of anxiety and seeks staff out. Goal: *STG: Complies with medication therapy  Outcome: Progressing Towards Goal  Compliant  Goal: *STG: Attends activities and groups  Outcome: Progressing Towards Goal  Engaged. Goal: Interventions  Outcome: Progressing Towards Goal  Staff is focused on limit setting and effective coping skills education.

## 2017-06-30 NOTE — BH NOTES
PRN Medication Documentation  At 7884  Specific patient behavior that led to need for PRN medication: c/o facial twitching   Staff interventions attempted prior to PRN being given: none   PRN medication given: cogentin 1 mg po prn   Patient response/effectiveness of PRN medication: at 1430 medication helpful per pt

## 2017-06-30 NOTE — BH NOTES
0510  Pt has been pleasant & cooperative during this shift when awake. At beginning of shift, pt was trying to walk for brp (used her walker), but had her eyes closed, so staff assisted her getting to/from bathroom. Pt cleaned herself off when incontinent & staff changed pads & got her items for washing off+fresh pull-ups. Pt presently resting in bed; no distress noted. Monitoring continues.

## 2017-07-01 LAB
GLUCOSE BLD STRIP.AUTO-MCNC: 111 MG/DL (ref 65–100)
GLUCOSE BLD STRIP.AUTO-MCNC: 87 MG/DL (ref 65–100)
SERVICE CMNT-IMP: ABNORMAL
SERVICE CMNT-IMP: NORMAL

## 2017-07-01 PROCEDURE — 74011250637 HC RX REV CODE- 250/637: Performed by: PSYCHIATRY & NEUROLOGY

## 2017-07-01 PROCEDURE — 82962 GLUCOSE BLOOD TEST: CPT

## 2017-07-01 PROCEDURE — 74011250637 HC RX REV CODE- 250/637: Performed by: HOSPITALIST

## 2017-07-01 PROCEDURE — 65220000003 HC RM SEMIPRIVATE PSYCH

## 2017-07-01 RX ADMIN — AMLODIPINE BESYLATE 10 MG: 5 TABLET ORAL at 08:36

## 2017-07-01 RX ADMIN — TRIHEXYPHENIDYL HYDROCHLORIDE 2 MG: 2 TABLET ORAL at 16:32

## 2017-07-01 RX ADMIN — DOCUSATE SODIUM 100 MG: 100 CAPSULE, LIQUID FILLED ORAL at 08:39

## 2017-07-01 RX ADMIN — POLYETHYLENE GLYCOL 3350 17 G: 17 POWDER, FOR SOLUTION ORAL at 08:46

## 2017-07-01 RX ADMIN — ACETAMINOPHEN 650 MG: 325 TABLET, FILM COATED ORAL at 15:38

## 2017-07-01 RX ADMIN — NAPROXEN 250 MG: 250 TABLET ORAL at 08:39

## 2017-07-01 RX ADMIN — CARBAMAZEPINE 200 MG: 200 TABLET, EXTENDED RELEASE ORAL at 17:05

## 2017-07-01 RX ADMIN — CARBAMAZEPINE 200 MG: 200 TABLET, EXTENDED RELEASE ORAL at 08:39

## 2017-07-01 RX ADMIN — SITAGLIPTIN 100 MG: 100 TABLET, FILM COATED ORAL at 08:38

## 2017-07-01 RX ADMIN — TRIHEXYPHENIDYL HYDROCHLORIDE 2 MG: 2 TABLET ORAL at 21:12

## 2017-07-01 RX ADMIN — NAPROXEN 250 MG: 250 TABLET ORAL at 17:08

## 2017-07-01 RX ADMIN — HYDROCHLOROTHIAZIDE 25 MG: 25 TABLET ORAL at 08:37

## 2017-07-01 RX ADMIN — TRIHEXYPHENIDYL HYDROCHLORIDE 2 MG: 2 TABLET ORAL at 08:39

## 2017-07-01 RX ADMIN — PANTOPRAZOLE SODIUM 40 MG: 40 TABLET, DELAYED RELEASE ORAL at 06:52

## 2017-07-01 RX ADMIN — ZOLPIDEM TARTRATE 12.5 MG: 6.25 TABLET, EXTENDED RELEASE ORAL at 21:12

## 2017-07-01 RX ADMIN — ATORVASTATIN CALCIUM 20 MG: 20 TABLET, FILM COATED ORAL at 08:37

## 2017-07-01 RX ADMIN — DIVALPROEX SODIUM 1000 MG: 500 TABLET, FILM COATED, EXTENDED RELEASE ORAL at 21:10

## 2017-07-01 RX ADMIN — DOCUSATE SODIUM 100 MG: 100 CAPSULE, LIQUID FILLED ORAL at 17:06

## 2017-07-01 NOTE — PROGRESS NOTES
Problem: Altered Thought Process (Adult/Pediatric)  Goal: *STG: Remains safe in hospital  Outcome: Progressing Towards Goal  Received patient appears to sleep. Respiratory even and unlabored. NAD noted. Will continue to monitor patient for safety q15 minutes. 0000 24 Hour chart check completed.

## 2017-07-01 NOTE — PROGRESS NOTES
Problem: Altered Thought Process (Adult/Pediatric)  Goal: *STG: Participates in treatment plan  Outcome: Progressing Towards Goal  Pt is calm and cooperative, appears sad and anxious at times. Social on both units, occasionally seeks attention from staff. Verbalizes needs appropriately. Med compliant. Pt attended group this evening. C/o and staff noted tics on left side of face, occasional tremor.

## 2017-07-01 NOTE — PROGRESS NOTES
Problem: Altered Thought Process (Adult/Pediatric)  Goal: *STG: Participates in treatment plan  Outcome: Progressing Towards Goal  Patient denies SI. Patient mood is labile but has been cooperative with staff. At times is manipulative and demanding with staff. Patient goal: \"to talk to the doctor and go to groups\". Staff goal: to assist patient with learning coping skills. Treatment team: With Dr. Harley Lind:     Discussed plan of care with patient. Goal: Interventions  Outcome: Progressing Towards Goal  Medication management. Q 15 min safety rounds. Group therapy.

## 2017-07-01 NOTE — INTERDISCIPLINARY ROUNDS
Behavioral Health Interdisciplinary Rounds     Patient Name: Rabon Habermann  Age: 65 y.o.   Room/Bed:  740/02  Primary Diagnosis: [facility]ffective disorder ([facility]   Admission Status: Involuntary Commitment     Readmission within 30 days: no  Power of  in place: no  Patient requires a blocked bed: no          Reason for blocked bed: na    VTE Prophylaxis: Not indicated  Mobility needs/Fall risk: yes    Nutritional Plan: no  Consults:          Labs/Testing due today?: no    Sleep hours: 5       Participation in Care/Groups:  yes  Medication Compliant?: Yes  PRNS (last 24 hours): None    Restraints (last 24 hours):  no  Substance Abuse:  no  CIWA (range last 24 hours): na COWS (range last 24 hours): na  Alcohol screening (AUDIT) completed -     If applicable, date SBIRT discussed in treatment team AND documented: na  Tobacco - patient is a smoker: yes   Date tobacco education completed by RN: yes  24 hour chart check complete: yes     Patient goal(s) for today:   Treatment team focus/goals:   LOS:  44  Expected LOS:   [address] -       Name of Decision maker if patient has Psychiatric Care Directive   Patient was offered information   Financial concerns/prescription coverage:    Date of last family contact:       Family requesting physician contact today:      Discharge plan:        Outpatient provider(s):     Participating treatment team members: Rabon Habermann, * (assigned SW),

## 2017-07-02 LAB
GLUCOSE BLD STRIP.AUTO-MCNC: 101 MG/DL (ref 65–100)
GLUCOSE BLD STRIP.AUTO-MCNC: 99 MG/DL (ref 65–100)
SERVICE CMNT-IMP: ABNORMAL
SERVICE CMNT-IMP: NORMAL

## 2017-07-02 PROCEDURE — 82962 GLUCOSE BLOOD TEST: CPT

## 2017-07-02 PROCEDURE — 65220000003 HC RM SEMIPRIVATE PSYCH

## 2017-07-02 PROCEDURE — 74011250637 HC RX REV CODE- 250/637: Performed by: PSYCHIATRY & NEUROLOGY

## 2017-07-02 PROCEDURE — 74011250637 HC RX REV CODE- 250/637: Performed by: HOSPITALIST

## 2017-07-02 RX ADMIN — ACETAMINOPHEN 650 MG: 325 TABLET, FILM COATED ORAL at 01:09

## 2017-07-02 RX ADMIN — DIVALPROEX SODIUM 500 MG: 500 TABLET, FILM COATED, EXTENDED RELEASE ORAL at 20:57

## 2017-07-02 RX ADMIN — NAPROXEN 250 MG: 250 TABLET ORAL at 17:19

## 2017-07-02 RX ADMIN — HYDROCHLOROTHIAZIDE 25 MG: 25 TABLET ORAL at 08:21

## 2017-07-02 RX ADMIN — CARBAMAZEPINE 200 MG: 200 TABLET, EXTENDED RELEASE ORAL at 17:18

## 2017-07-02 RX ADMIN — CARBAMAZEPINE 200 MG: 200 TABLET, EXTENDED RELEASE ORAL at 08:23

## 2017-07-02 RX ADMIN — ATORVASTATIN CALCIUM 20 MG: 20 TABLET, FILM COATED ORAL at 08:21

## 2017-07-02 RX ADMIN — SITAGLIPTIN 100 MG: 100 TABLET, FILM COATED ORAL at 08:22

## 2017-07-02 RX ADMIN — ZOLPIDEM TARTRATE 12.5 MG: 6.25 TABLET, EXTENDED RELEASE ORAL at 20:56

## 2017-07-02 RX ADMIN — TRIHEXYPHENIDYL HYDROCHLORIDE 2 MG: 2 TABLET ORAL at 20:57

## 2017-07-02 RX ADMIN — ALUMINUM HYDROXIDE, MAGNESIUM HYDROXIDE, AND SIMETHICONE 30 ML: 200; 200; 20 SUSPENSION ORAL at 01:22

## 2017-07-02 RX ADMIN — AMLODIPINE BESYLATE 10 MG: 5 TABLET ORAL at 08:21

## 2017-07-02 RX ADMIN — ACETAMINOPHEN 650 MG: 325 TABLET, FILM COATED ORAL at 13:28

## 2017-07-02 RX ADMIN — PANTOPRAZOLE SODIUM 40 MG: 40 TABLET, DELAYED RELEASE ORAL at 06:34

## 2017-07-02 RX ADMIN — TRIHEXYPHENIDYL HYDROCHLORIDE 2 MG: 2 TABLET ORAL at 08:22

## 2017-07-02 RX ADMIN — DOCUSATE SODIUM 100 MG: 100 CAPSULE, LIQUID FILLED ORAL at 17:19

## 2017-07-02 RX ADMIN — TRIHEXYPHENIDYL HYDROCHLORIDE 2 MG: 2 TABLET ORAL at 17:19

## 2017-07-02 RX ADMIN — NAPROXEN 250 MG: 250 TABLET ORAL at 08:21

## 2017-07-02 NOTE — PROGRESS NOTES
Problem: Falls - Risk of  Goal: *Absence of falls  Outcome: Progressing Towards Goal  Lying quietly in bed with eyes closed , Respirations even and unlabored , NAd  noted .   Tap bell within reach at bedside, Side rail up x1 , bed alarm on , Q15 min safety checks continue     0109  - PRN Medication Documentation    Specific patient behavior that led to need for PRN medication: pain 9/10 , both knees   Staff interventions attempted prior to PRN being given: reposition   PRN medication given: tylenal 650 mg po   Patient response/effectiveness of PRN medication:  0205- Appears to sleep , good results noted   PRN Medication Documentation    Specific patient behavior that led to need for PRN medication: c/o of indigestion   Staff interventions attempted prior to PRN being given: gingerale given    PRN medication given: Mylanta   Patient response/effectiveness of PRN medication:  Good results noted

## 2017-07-02 NOTE — PROGRESS NOTES
Problem: Altered Thought Process (Adult/Pediatric)  Goal: *STG: Participates in treatment plan  Outcome: Progressing Towards Goal  Patient denies SI. Patient mood is labile and affect is labile. At times appears sad and then is smiling and talking with staff. Patient goal: \"to go to groups\". Staff goal: to encourage patient to continue being active with treatment. Treatment team occurred with Dr. Aram Finnegan today: Reviewed plan of care. Goal: Interventions  Outcome: Progressing Towards Goal  Medication management. Q 15 min safety rounds. Group therapy.

## 2017-07-02 NOTE — BH NOTES
GROUP THERAPY PROGRESS NOTE    Sim Hidalgo is participating in Coping Skills Group. Group time: 30 minutes    Personal goal for participation: Patient made a goal \"to work on discharge plans\". Goal orientation: personal    Group therapy participation: active    Management and [employer]. Impression of participation: Participated in entire group. A & O x 4. Appropriate social interactions. Motivated. Pleasant, polite and cooperative.
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
## 2017-07-02 NOTE — INTERDISCIPLINARY ROUNDS
Behavioral Health Interdisciplinary Rounds     Patient Name: Pérez Brito  Age: 65 y.o.   Room/Bed:  740/02  Primary Diagnosis: [facility]ffect[facility] disorder ([facility]   Admission Status: Involuntary Commitment     Readmission within 30 days: no  Power of  in place: no  Patient requires a blocked bed: no          Reason for blocked bed:     VTE Prophylaxis: Not indicated  Mobility needs/Fall risk: yes    Nutritional Plan: no  Consults:        Labs/Testing due today?: no    Sleep hours:  4+hours       Participation in Care/Groups:  yes  Medication Compliant?: Yes  PRNS (last 24 hours): Pain    Restraints (last 24 hours):  no  Substance Abuse:  no  CIWA (range last 24 hours):  COWS (range last 24 hours):   Alcohol screening (AUDIT) completed -     If applicable, date SBIRT discussed in treatment team AND documented:   Tobacco - patient is a smoker: yes   Date tobacco education completed by RN: yes  24 hour chart check complete: yes     Patient goal(s) for today:   Treatment team focus/goals:   LOS:  45  Expected LOS:   [address] -     Name of Decision maker if patient has Psychiatric Care Directive  Patient was offered information   Financial concerns/prescription coverage:    Date of last family contact:      Family requesting physician contact today:  Discharge plan:       Outpatient provider(s):     Participating treatment team members: Pérez Brito, * (assigned SW),

## 2017-07-03 LAB
GLUCOSE BLD STRIP.AUTO-MCNC: 103 MG/DL (ref 65–100)
GLUCOSE BLD STRIP.AUTO-MCNC: 116 MG/DL (ref 65–100)
SERVICE CMNT-IMP: ABNORMAL
SERVICE CMNT-IMP: ABNORMAL

## 2017-07-03 PROCEDURE — 74011250637 HC RX REV CODE- 250/637: Performed by: HOSPITALIST

## 2017-07-03 PROCEDURE — 82962 GLUCOSE BLOOD TEST: CPT

## 2017-07-03 PROCEDURE — 74011250637 HC RX REV CODE- 250/637: Performed by: PSYCHIATRY & NEUROLOGY

## 2017-07-03 PROCEDURE — 65220000003 HC RM SEMIPRIVATE PSYCH

## 2017-07-03 RX ADMIN — AMLODIPINE BESYLATE 10 MG: 5 TABLET ORAL at 09:48

## 2017-07-03 RX ADMIN — ATORVASTATIN CALCIUM 20 MG: 20 TABLET, FILM COATED ORAL at 09:48

## 2017-07-03 RX ADMIN — CARBAMAZEPINE 200 MG: 200 TABLET, EXTENDED RELEASE ORAL at 17:55

## 2017-07-03 RX ADMIN — DOCUSATE SODIUM 100 MG: 100 CAPSULE, LIQUID FILLED ORAL at 17:55

## 2017-07-03 RX ADMIN — DIVALPROEX SODIUM 1000 MG: 500 TABLET, FILM COATED, EXTENDED RELEASE ORAL at 21:05

## 2017-07-03 RX ADMIN — NAPROXEN 250 MG: 250 TABLET ORAL at 17:55

## 2017-07-03 RX ADMIN — CARBAMAZEPINE 200 MG: 200 TABLET, EXTENDED RELEASE ORAL at 09:50

## 2017-07-03 RX ADMIN — TRIHEXYPHENIDYL HYDROCHLORIDE 2 MG: 2 TABLET ORAL at 16:50

## 2017-07-03 RX ADMIN — ZOLPIDEM TARTRATE 12.5 MG: 6.25 TABLET, EXTENDED RELEASE ORAL at 21:35

## 2017-07-03 RX ADMIN — LORAZEPAM 1 MG: 1 TABLET ORAL at 03:53

## 2017-07-03 RX ADMIN — TRIHEXYPHENIDYL HYDROCHLORIDE 2 MG: 2 TABLET ORAL at 21:06

## 2017-07-03 RX ADMIN — POLYETHYLENE GLYCOL 3350 17 G: 17 POWDER, FOR SOLUTION ORAL at 09:51

## 2017-07-03 RX ADMIN — NAPROXEN 250 MG: 250 TABLET ORAL at 09:50

## 2017-07-03 RX ADMIN — PANTOPRAZOLE SODIUM 40 MG: 40 TABLET, DELAYED RELEASE ORAL at 06:44

## 2017-07-03 RX ADMIN — TRIHEXYPHENIDYL HYDROCHLORIDE 2 MG: 2 TABLET ORAL at 09:50

## 2017-07-03 RX ADMIN — SITAGLIPTIN 100 MG: 100 TABLET, FILM COATED ORAL at 09:50

## 2017-07-03 NOTE — PROGRESS NOTES
Problem: Altered Thought Process (Adult/Pediatric)  Goal: *STG: Participates in treatment plan  Outcome: Progressing Towards Goal  Pt is calm and cooperative, smiling, occasionally appears anxious. Social on Geriatric and General units, attends groups, med compliant and eats well. Expresses desire to find a place to live, seems goal oriented, contemplating employment. Good insight, able to use coping skills - distraction, seeking staff, reframing - when anxious. Staff will continue to encourage coping skills and goal setting. Q 15 min checks.

## 2017-07-03 NOTE — BH NOTES
0117  Pt has been in bed since this shift started; appears to be asleep. No distress noted. Monitoring continues. PRN Medication Documentation    Specific patient behavior that led to need for PRN medication: 0345  Pt c/o feeling nervous; unable to get back to sleep. Requested Ativan to help her be able to rest & get back to sleep. Staff interventions attempted prior to PRN being given: Distraction; emotional support; room already darkened; bed straightened. PRN medication given: 0353  Ativan 1 mg PO  Patient response/effectiveness of PRN medication: Monitoring continues. 0450  Pt calm, but has not gone back to sleep. She has been getting oob for brp & having incontinence of 1 small BM & the rest have been urine incontinences. Monitoring continues.

## 2017-07-03 NOTE — PROGRESS NOTES
Problem: Altered Thought Process (Adult/Pediatric)  Goal: *STG: Participates in treatment plan  Outcome: Progressing Towards Goal  Patient participates in treatment team and plans. A & O x 4. Continues to be preoccupied with discharge plans. Med compliant. Goal: *STG: Remains safe in hospital  Outcome: Progressing Towards Goal  Patient has remained safe while in hospital.  Goal: *STG: Seeks staff when feelings of anxiety and fear arise  Outcome: Progressing Towards Goal  Patient does seek staff when feeling anxious or fearful. Goal: *STG: Complies with medication therapy  Outcome: Progressing Towards Goal  Patient is med compliant. Goal: *STG: Attends activities and groups  Outcome: Progressing Towards Goal  Patient does attend community, process and recreational group. Goal: *STG: Demonstrates ability to understand and use improved judgment in daily activities and relationships  Outcome: Progressing Towards Goal  Patient does have improved judgment in activities and relationships. Goal: Interventions  Outcome: Progressing Towards Goal  Patient encouraged to express feelings regarding discharge plans. She denies hallucinations, SI and HI.

## 2017-07-03 NOTE — PROGRESS NOTES
Problem: Altered Thought Process (Adult/Pediatric)  Goal: *STG: Participates in treatment plan  Outcome: Progressing Towards Goal  Pt is cooperative and labile, smiling and social or quiet with sad affect. Participating in groups on General unit and med compliant. Seeks staff for needs with appropriate interaction and logical progression.

## 2017-07-03 NOTE — BH NOTES
1966 Received patient resting quietly in bed with eye open. Greeted oncoming staff. A & O x 4. NAD. On q 15 min. safety checks.
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
## 2017-07-03 NOTE — PROGRESS NOTES
Problem: Falls - Risk of  Goal: *Absence of falls  Outcome: Progressing Towards Goal  Patient has not experienced any falls on this shift. A & O x 4. Wears gripper socks. Tap bell on night stand. Bed alarm working. Ambulates with walker. Able to ask staff for assistance.

## 2017-07-03 NOTE — BH NOTES
GROUP THERAPY PROGRESS NOTE    Ashley Charles participated in a Morning Process Group on the Geriatric Unit, with a focus identifying feelings, planning for the day, and singing. Group time: 50 minutes. Personal goal for participation: To increase the capacity to shift ones mood, prepare for the day, and share in group singing. Goal orientation: The patient will be able to prepare for the day through group singing. Group therapy participation: When prompted, this patient partially participated in the group. Therapeutic interventions reviewed and discussed: The group members were introduce themselves by first names and participate in group singing as a way to increase their oxygen and blood flow and begin their day on a positive note. They were also asked to join in singing several songs. Impression of participation: The patient said she was \"rather good\" today. She left the group to go to her room after the first song and returned with about half the session left. Her affect was mildly euphoric and anxious, although not quite as anxious as last week. No SI/HI nor overt psychosis were noted. She participated when in the group and sang along with several of the songs.

## 2017-07-03 NOTE — BH NOTES
Behavioral Health Interdisciplinary Rounds     Patient Name: Basilio Kuhn  Age: 65 y.o.   Room/Bed:  740/02  Primary Diagnosis: Schizoaffective disorder (HCC)   Admission Status: Involuntary Commitment     Readmission within 30 days: no  Power of  in place: no  Patient requires a blocked bed: no          Reason for blocked bed: na    VTE Prophylaxis: Not indicated  Mobility needs/Fall risk: yes    Nutritional Plan: no  Consults:          Labs/Testing due today?: no    Sleep hours: 4.75+       Participation in Care/Groups:  yes  Medication Compliant?: Yes  PRNS (last 24 hours): Pain & indigestion    Restraints (last 24 hours):  no  Substance Abuse:  no  CIWA (range last 24 hours): na COWS (range last 24 hours): na  Alcohol screening (AUDIT) completed -     If applicable, date SBIRT discussed in treatment team AND documented: na  Tobacco - patient is a smoker: yes   Date tobacco education completed by RN: 5/29/17  24 hour chart check complete:  yes    Patient goal(s) for today:   Treatment team focus/goals: Continue working on placement  LOS:  46  Expected LOS: TBD  Psychiatric Advanced Care Directives -  yes   Name of Decision maker if patient has Psychiatric Care Directive --Francine Grew  Patient was offered information --pt completed  Financial concerns/prescription coverage:  Medicaid  Date of last family contact:       Family requesting physician contact today:  no  Discharge plan: placement       Outpatient provider(s): [facility]    Participating treatment team members: Basilio Kuhn, PEGGY Gonzalez; Dr. Jeyson Garcia MD; Bolivar Bustillo RN; Wendy Lopez, UmeshD

## 2017-07-04 LAB
GLUCOSE BLD STRIP.AUTO-MCNC: 109 MG/DL (ref 65–100)
GLUCOSE BLD STRIP.AUTO-MCNC: 93 MG/DL (ref 65–100)
SERVICE CMNT-IMP: ABNORMAL
SERVICE CMNT-IMP: NORMAL

## 2017-07-04 PROCEDURE — 82962 GLUCOSE BLOOD TEST: CPT

## 2017-07-04 PROCEDURE — 65220000003 HC RM SEMIPRIVATE PSYCH

## 2017-07-04 PROCEDURE — 74011250637 HC RX REV CODE- 250/637: Performed by: PSYCHIATRY & NEUROLOGY

## 2017-07-04 PROCEDURE — 74011250637 HC RX REV CODE- 250/637: Performed by: HOSPITALIST

## 2017-07-04 RX ADMIN — CARBAMAZEPINE 200 MG: 200 TABLET, EXTENDED RELEASE ORAL at 10:07

## 2017-07-04 RX ADMIN — ATORVASTATIN CALCIUM 20 MG: 20 TABLET, FILM COATED ORAL at 10:05

## 2017-07-04 RX ADMIN — SITAGLIPTIN 100 MG: 100 TABLET, FILM COATED ORAL at 10:05

## 2017-07-04 RX ADMIN — NAPROXEN 250 MG: 250 TABLET ORAL at 10:05

## 2017-07-04 RX ADMIN — TRIHEXYPHENIDYL HYDROCHLORIDE 2 MG: 2 TABLET ORAL at 21:39

## 2017-07-04 RX ADMIN — LORAZEPAM 1 MG: 1 TABLET ORAL at 01:20

## 2017-07-04 RX ADMIN — PANTOPRAZOLE SODIUM 40 MG: 40 TABLET, DELAYED RELEASE ORAL at 06:31

## 2017-07-04 RX ADMIN — ZOLPIDEM TARTRATE 12.5 MG: 6.25 TABLET, EXTENDED RELEASE ORAL at 21:37

## 2017-07-04 RX ADMIN — DIVALPROEX SODIUM 1000 MG: 500 TABLET, FILM COATED, EXTENDED RELEASE ORAL at 21:38

## 2017-07-04 RX ADMIN — CARBAMAZEPINE 200 MG: 200 TABLET, EXTENDED RELEASE ORAL at 16:57

## 2017-07-04 RX ADMIN — TRIHEXYPHENIDYL HYDROCHLORIDE 2 MG: 2 TABLET ORAL at 10:07

## 2017-07-04 RX ADMIN — AMLODIPINE BESYLATE 10 MG: 5 TABLET ORAL at 10:05

## 2017-07-04 RX ADMIN — TRIHEXYPHENIDYL HYDROCHLORIDE 2 MG: 2 TABLET ORAL at 17:00

## 2017-07-04 RX ADMIN — NAPROXEN 250 MG: 250 TABLET ORAL at 17:00

## 2017-07-04 RX ADMIN — HYDROCHLOROTHIAZIDE 25 MG: 25 TABLET ORAL at 10:05

## 2017-07-04 RX ADMIN — ACETAMINOPHEN 650 MG: 325 TABLET, FILM COATED ORAL at 13:29

## 2017-07-04 NOTE — BH NOTES
Behavioral Health Interdisciplinary Rounds     Patient Name: Yovany Vásquez  Age: 65 y.o. Room/Bed:  740/02  Primary Diagnosis: Schizoaffective disorder ([facility] [address]   Admission Status: Involuntary Commitment     Readmission within 30 days: no  Power of  in place: no  Patient requires a blocked bed: no          Reason for blocked bed: na    VTE Prophylaxis: Not indicated  Mobility needs/Fall risk: yes    Nutritional Plan: no  Consults:          Labs/Testing due today?: no    Sleep hours:  4.75+      Participation in Care/Groups:  yes  Medication Compliant?: Selective  PRNS (last 24 hours): Antianxiety    Restraints (last 24 hours):  no  Substance Abuse:  no  CIWA (range last 24 hours): na COWS (range last 24 hours): na  Alcohol screening (AUDIT) completed -     If applicable, date SBIRT discussed in treatment team AND documented: na  Tobacco - patient is a smoker: yes   Date tobacco education completed by RN: 5/29/17  24 hour chart check complete: yes     Patient goal(s) for today:   Treatment team focus/goals: Continue working on placement  LOS:  47  Expected LOS: TBD  Psychiatric [address] -  yes   Name of Decision maker if patient has Psychiatric Care Directive --John Payne  Patient was offered information --pt completed  Financial concerns/prescription coverage:  Medicaid  Date of last family contact:       Family requesting physician contact today:  no  Discharge plan: placement       Outpatient provider(s): [facility]    Participating treatment team members: Yovany Vásquez, Dr. eMrced Steve;  Deion Hernandez RN; Meche Chambers LCSW

## 2017-07-04 NOTE — PROGRESS NOTES
Problem: Falls - Risk of  Goal: *Absence of falls  Outcome: Progressing Towards Goal  Patient is ambulating steadily while using her walker appropriately. She has been free from falls/inuries throughout this shift.

## 2017-07-04 NOTE — PROGRESS NOTES
Actively participated in [facility] spirituality group focused on reading and reflection of \"the Prodigal Son\". [facility] Staff  (Alma Oneal Patient Care Specialist)   Paging Service [phone])

## 2017-07-04 NOTE — PROGRESS NOTES
Hospitalist consult called, Ted Crotez, NP is being paged. Patient is requesting that Hydrodiuril be discontinued as it appears to be causing her frequent urination. The NP stated she will check with hospitalist and see if they can discontinue Hydrodiuril to see if patient's BP remains stable, without actually having a hospitalist consult. If they discontinue Hydrodiuril and patient's BP increases, we can advise Hospitalist and have a consult then. Ted Cortez will advise.
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
## 2017-07-04 NOTE — BH NOTES
PRN Medication Documentation  At 1329  Specific patient behavior that led to need for PRN medication: c/o pain to right knee 7/10  Staff interventions attempted prior to PRN being given: relaxation  PRN medication given: Tylenol 650 mg po prn   Patient response/effectiveness of PRN medication: 1359 pain 0/10 medication effective

## 2017-07-04 NOTE — BH NOTES
PRN Medication Documentation    Specific patient behavior that led to need for PRN medication: 0110  Pt requested PO Ativan to help her relax so she could go back to sleep. Staff interventions attempted prior to PRN being given: Room already darkened. Distraction; emotional support; brp to void done. Bed linens straightened. PRN medication given: 0120  Ativan 1 mg PO  Patient response/effectiveness of PRN medication: Monitoring continues. 0215  Pt remains awake, but is calm. No distress noted. Steady on feet using her walker when oob. Pt denies pain or dizziness. Monitoring continues. 0245  Pt laying in bed with eyes closed, breathing easily; appears to be asleep. Monitoring continues. 0500  Pt started walking toward bathroom, using her walker. Pt lost her balance, but was caught & steadied by nurse. Pt said she was surprised about losing her balance. Pt was steady for rest of walking to/from bathroom (using her walker). Monitoring continues. 0902  Pt has lost her balance twice this shift & nurse had to steady her. Encouraged to not get oob during night shift w/o a staff member walking with her. Monitoring continues.

## 2017-07-04 NOTE — PROGRESS NOTES
Problem: Altered Thought Process (Adult/Pediatric)  Goal: *STG: Complies with medication therapy  Outcome: Progressing Towards Goal  Patient complies with medication therapy, pleasant, smiling, appropriate, out on the unit, meal compliant, remains on Q15 min safety checks.

## 2017-07-04 NOTE — BH NOTES
1530:  Patient received as she is sitting in the Dining Room coloring pictures and watching TV. She greets oncoming staff cordially and appears to be in good spirits with special requests for dinner this evening. She voices no other complaints/concerns at this time. Will maintain q 15 minute safety checks. 2110:  Patient received phone call from her sister Gabriella Masters that she said \"just brightened my night\". Patient excited and chatty on the phone. Will continue to monitor.

## 2017-07-04 NOTE — PROGRESS NOTES
Consult placed for hospitalist as patient was refusing HCTZ for high blood pressure as it was making her pee to much. On chart review patient blood pressure was stable yesterday without the HCTZ. SBP has been <130 for last two days. Recommending stopping HCTZ and monitoring blood pressure. If systolic blood pressure >298, or if patient develops chest pain, headache, or swelling please re-consult and we will come see for formal consult.      Reginald Murry   Hospitalist NP   7/4/2017

## 2017-07-05 LAB
GLUCOSE BLD STRIP.AUTO-MCNC: 92 MG/DL (ref 65–100)
GLUCOSE BLD STRIP.AUTO-MCNC: 94 MG/DL (ref 65–100)
SERVICE CMNT-IMP: NORMAL
SERVICE CMNT-IMP: NORMAL

## 2017-07-05 PROCEDURE — 74011250637 HC RX REV CODE- 250/637: Performed by: PSYCHIATRY & NEUROLOGY

## 2017-07-05 PROCEDURE — 74011250637 HC RX REV CODE- 250/637: Performed by: HOSPITALIST

## 2017-07-05 PROCEDURE — 65220000003 HC RM SEMIPRIVATE PSYCH

## 2017-07-05 PROCEDURE — 82962 GLUCOSE BLOOD TEST: CPT

## 2017-07-05 RX ADMIN — ZOLPIDEM TARTRATE 12.5 MG: 6.25 TABLET, EXTENDED RELEASE ORAL at 21:04

## 2017-07-05 RX ADMIN — NAPROXEN 250 MG: 250 TABLET ORAL at 08:42

## 2017-07-05 RX ADMIN — CARBAMAZEPINE 200 MG: 200 TABLET, EXTENDED RELEASE ORAL at 17:19

## 2017-07-05 RX ADMIN — PANTOPRAZOLE SODIUM 40 MG: 40 TABLET, DELAYED RELEASE ORAL at 06:52

## 2017-07-05 RX ADMIN — ATORVASTATIN CALCIUM 20 MG: 20 TABLET, FILM COATED ORAL at 08:40

## 2017-07-05 RX ADMIN — DOCUSATE SODIUM 100 MG: 100 CAPSULE, LIQUID FILLED ORAL at 08:41

## 2017-07-05 RX ADMIN — TRIHEXYPHENIDYL HYDROCHLORIDE 2 MG: 2 TABLET ORAL at 21:05

## 2017-07-05 RX ADMIN — AMLODIPINE BESYLATE 10 MG: 5 TABLET ORAL at 08:39

## 2017-07-05 RX ADMIN — NAPROXEN 250 MG: 250 TABLET ORAL at 17:16

## 2017-07-05 RX ADMIN — LORAZEPAM 1 MG: 1 TABLET ORAL at 01:42

## 2017-07-05 RX ADMIN — ACETAMINOPHEN 650 MG: 325 TABLET, FILM COATED ORAL at 01:16

## 2017-07-05 RX ADMIN — TRIHEXYPHENIDYL HYDROCHLORIDE 2 MG: 2 TABLET ORAL at 17:19

## 2017-07-05 RX ADMIN — TRIHEXYPHENIDYL HYDROCHLORIDE 2 MG: 2 TABLET ORAL at 08:43

## 2017-07-05 RX ADMIN — POLYETHYLENE GLYCOL 3350 17 G: 17 POWDER, FOR SOLUTION ORAL at 08:42

## 2017-07-05 RX ADMIN — CARBAMAZEPINE 200 MG: 200 TABLET, EXTENDED RELEASE ORAL at 08:44

## 2017-07-05 RX ADMIN — ACETAMINOPHEN 650 MG: 325 TABLET, FILM COATED ORAL at 19:45

## 2017-07-05 RX ADMIN — DIVALPROEX SODIUM 1000 MG: 500 TABLET, FILM COATED, EXTENDED RELEASE ORAL at 21:03

## 2017-07-05 RX ADMIN — SITAGLIPTIN 100 MG: 100 TABLET, FILM COATED ORAL at 08:43

## 2017-07-05 NOTE — PROGRESS NOTES
Problem: Altered Thought Process (Adult/Pediatric)  Goal: *STG: Participates in treatment plan  Outcome: Progressing Towards Goal   Is alert and oriented. Has been pleasant and cooperative. Thoughts are clear and organized. Attending groups on the general and geriatric unit. Is medication and meal compliant. Able to follow directions. Goal: *STG: Remains safe in hospital  Outcome: Progressing Towards Goal  Remains safe on the unit. Gait is stable with walker. Bed alarm is on the bed and call bell with in reach for patient. Goal: *STG: Complies with medication therapy  Outcome: Progressing Towards Goal  Is medication and meal compliant.

## 2017-07-05 NOTE — PROGRESS NOTES
Problem: Falls - Risk of  Goal: *Absence of falls  Outcome: Progressing Towards Goal  Lying quietly in bed with eyes closed , respirations even and unlabored , NAd noted   Bed alarm on for safety , Tapbell at bedside within reach , bathroom light on and Q15 min safety checks continue     0120- PRN Medication Documentation    Specific patient behavior that led to need for PRN medication: pt request for pain medication for jorge alberto.  Knee pain   Staff interventions attempted prior to PRN being given: Pt up and ambulating   PRN medication given: Tylenol 650 mg po   Patient response/effectiveness of PRN medication: good results noted   PRN Medication Documentation  MEWS =1   Specific patient behavior that led to need for PRN medication: pt request for anxiety medication   Staff interventions attempted prior to PRN being given:  Up to bathroom , given a snack   PRN medication given: Ativan 1 mg po   Patient response/effectiveness of PRN medication: good results noted able to return to sleep

## 2017-07-05 NOTE — INTERDISCIPLINARY ROUNDS
Behavioral Health Interdisciplinary Rounds     Patient Name: Basilio Kuhn  Age: 65 y.o. Room/Bed:  740/02  Primary Diagnosis: Schizoaffective disorder ([facility] [address]   Admission Status: Involuntary Commitment , court ordered medications     Readmission within 30 days: no  Power of  in place: no  Patient requires a blocked bed: no          Reason for blocked bed:     VTE Prophylaxis: Not indicated  Mobility needs/Fall risk: yes    Nutritional Plan: no  Consults:       Labs/Testing due today?: no    Sleep hours: 6+ hours       Participation in Care/Groups:  yes  Medication Compliant?: Yes  PRNS (last 24 hours): Antianxiety and Pain    Restraints (last 24 hours):  no  Substance Abuse:  no  CIWA (range last 24 hours): COWS (range last 24 hours):   Alcohol screening (AUDIT) completed -     If applicable, date SBIRT discussed in treatment team AND documented:   Tobacco - patient is a smoker: yes   Date tobacco education completed by RN: 5/29/17  24 hour chart check complete: yes     Patient goal(s) for today:   Treatment team focus/goals: Continue looking for placement  LOS:  48  Expected LOS: TBD  Psychiatric [address] -  No  Name of Decision maker if patient has Psychiatric Care Directive: None  Patient was offered information, patient declined.    Financial concerns/prescription coverage: Medicaid/Medicare  Date of last family contact:      Family requesting physician contact today: No  Discharge plan: Placement       Outpatient provider(s): [facility]    Participating treatment team members: PEGGY Castañeda; Dr. Jeyson Garcia MD; Michael Aleman, RN

## 2017-07-05 NOTE — BH NOTES
ALYSA has been unsuccessful in finding placement for Pt in the [address] [place] [address]. On Friday, ALYSA will call Letty Muñiz; [phone]), Emmy Leone at [facility]; [phone]), and [facility] for [address] [place] [address]; [phone]), as these places were closed/unable to take voicemail when ALYSA called. ALYSA will plan to call Pt's daughter on Friday to discuss the possibility of placing Pt in the [facility].

## 2017-07-05 NOTE — BH NOTES
1520:  Patient received on first rounds as she is up in her room. She is dressed in a bright top that matches her lipstick and greets oncoming staff with a bright smile. She is in good spirits and voices no complaints or concerns at this time. Will maintain q 15 minute safety checks. 2130:  Patient had some ice cream for snacks over on the General Unit and is now complaining of stomach cramps and diarrhea. Assistance provided - will continue to monitor.
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
## 2017-07-05 NOTE — BH NOTES
GROUP THERAPY PROGRESS NOTE    Dewayne Kawasaki participated in a Morning Process Group on the Geriatric Unit, with a focus identifying feelings, planning for the day, and singing. Group time: 45 minutes. Personal goal for participation: To increase the capacity to shift ones mood, prepare for the day, and share in group singing. Goal orientation: The patient will be able to prepare for the day through group singing. Group therapy participation: When prompted, this patient minimally participated in the group. Therapeutic interventions reviewed and discussed: The group members were introduce themselves by first names and participate in group singing as a way to increase their oxygen and blood flow and begin their day on a positive note. They were also asked to join in singing several songs. Impression of participation: The patient said she was feeling tired and stayed with the group for about five minutes, before heading off towards her room. She expressed no SI/HI and no overt psychosis. Her affect was congruent with the content of her speech and she acknowledged getting poor sleep last evening. Her mood was calm but tired.

## 2017-07-06 LAB
GLUCOSE BLD STRIP.AUTO-MCNC: 78 MG/DL (ref 65–100)
GLUCOSE BLD STRIP.AUTO-MCNC: 87 MG/DL (ref 65–100)
GLUCOSE BLD STRIP.AUTO-MCNC: 94 MG/DL (ref 65–100)
SERVICE CMNT-IMP: NORMAL

## 2017-07-06 PROCEDURE — 65220000003 HC RM SEMIPRIVATE PSYCH

## 2017-07-06 PROCEDURE — 82962 GLUCOSE BLOOD TEST: CPT

## 2017-07-06 PROCEDURE — 74011250637 HC RX REV CODE- 250/637: Performed by: HOSPITALIST

## 2017-07-06 PROCEDURE — 74011250637 HC RX REV CODE- 250/637: Performed by: PSYCHIATRY & NEUROLOGY

## 2017-07-06 RX ADMIN — TRIHEXYPHENIDYL HYDROCHLORIDE 2 MG: 2 TABLET ORAL at 08:49

## 2017-07-06 RX ADMIN — TRIHEXYPHENIDYL HYDROCHLORIDE 2 MG: 2 TABLET ORAL at 21:13

## 2017-07-06 RX ADMIN — AMLODIPINE BESYLATE 10 MG: 5 TABLET ORAL at 08:44

## 2017-07-06 RX ADMIN — SITAGLIPTIN 100 MG: 100 TABLET, FILM COATED ORAL at 08:44

## 2017-07-06 RX ADMIN — NAPROXEN 250 MG: 250 TABLET ORAL at 17:12

## 2017-07-06 RX ADMIN — PANTOPRAZOLE SODIUM 40 MG: 40 TABLET, DELAYED RELEASE ORAL at 06:56

## 2017-07-06 RX ADMIN — ZOLPIDEM TARTRATE 12.5 MG: 6.25 TABLET, EXTENDED RELEASE ORAL at 21:10

## 2017-07-06 RX ADMIN — CARBAMAZEPINE 200 MG: 200 TABLET, EXTENDED RELEASE ORAL at 08:49

## 2017-07-06 RX ADMIN — TRIHEXYPHENIDYL HYDROCHLORIDE 2 MG: 2 TABLET ORAL at 17:12

## 2017-07-06 RX ADMIN — NAPROXEN 250 MG: 250 TABLET ORAL at 08:43

## 2017-07-06 RX ADMIN — ATORVASTATIN CALCIUM 20 MG: 20 TABLET, FILM COATED ORAL at 08:43

## 2017-07-06 RX ADMIN — DIVALPROEX SODIUM 1000 MG: 500 TABLET, FILM COATED, EXTENDED RELEASE ORAL at 21:10

## 2017-07-06 RX ADMIN — CARBAMAZEPINE 200 MG: 200 TABLET, EXTENDED RELEASE ORAL at 17:12

## 2017-07-06 NOTE — PROGRESS NOTES
Problem: Falls - Risk of  Goal: *Absence of falls  Outcome: Progressing Towards Goal  Patient is ambulating quickly and steadily while using her walker appropriately. She has remained free from falls/injury throughout this shift. Problem: Altered Thought Process (Adult/Pediatric)  Goal: *STG: Attends activities and groups  Outcome: Progressing Towards Goal  Patient has been visible on the unit throughout this shift despite the fact that she complains of a headache. She has been playing the piano and singing along with peers on the General Unit as well.

## 2017-07-06 NOTE — BH NOTES
[address] [facility] 60  Master Treatment Plan for Rabon Habermann    Date Treatment Plan Initiated: 07/06/17    Treatment Plan Modalities:  Type of Modality Amount  (x minutes) Frequency (x/week) Duration (x days) Name of Responsible Staff   710 [facility] meetings to encourage peer interactions 15 7 1 Kishor Call RN   Group psychotherapy to assist in building coping skills and internal controls 60 7 1 Suman Sun LCSW   Therapeutic activity groups to build coping skills 60 7 1 Suman Sun LCSw   Psychoeducation in group setting to address:   Medication education   15 7 1 Kishor Call RN   Coping skills         Relaxation techniques         Symptom management         Discharge planning         Spirituality    61 2 1 rosangela YADAV   60 1 1 /volunteer   Recovery/AA/NA   61 3 1 Volunteer   Physician medication management   15 7 1 Dr. Matthew Elkins   Family meeting/discharge planning                                        Problem: Falls - Risk of  Goal will be met by 07/07/17  Goal: *Absence of falls  Outcome: Progressing Towards Goal  Patient is absent of falls.     Problem: Altered Thought Process (Adult/Pediatric)  Goal will be met by 07/07/17  Goal: *STG: Complies with medication therapy  Outcome: Progressing Towards Goal  Patient complies with medication therapy     Goal: *STG: Attends activities and groups  Goal will be met by 07/07/17  Outcome: Progressing Towards Goal  Patient attends activities and groups on the nicole as well as general unit. Smiling, calm, cooperative, appropriate.   Patient is med and meal compliant, fair insight, thoughts organized, remains on Q15 min safety checks.

## 2017-07-06 NOTE — BH NOTES
0715 Received patient resting in bed with eyes open. A & O x 4. Greeted staff with a smile. NAD. On q 15 min. Safety checks. 2171 VS taken and WNL. Patient ate 1/2 breakfast than went to BR; had two loose stools. Writer assisted patient with cleaning and changed into clean depends. Informed patient not to drink juice and milk and ice cream.     0920 Patient went to BR. She had another BM. Stool formed. 230 [facility] for consult.

## 2017-07-06 NOTE — PROGRESS NOTES
Problem: Falls - Risk of  Goal: *Absence of falls  Outcome: Progressing Towards Goal  2315 pt appears asleep in bed. Respirations even and unlabored. Bed alarm on, call bell within reach, door remains open. Will continue to monitor with Q 15 safety checks.

## 2017-07-06 NOTE — PROGRESS NOTES
Participated in [facility] group. Focus of today's group was asking for help. [id number] [facility] Staff  (Alma Oneal Patient Care Specialist)   Paging Service [phone])

## 2017-07-06 NOTE — PROGRESS NOTES
Problem: Altered Thought Process (Adult/Pediatric)  Goal: *STG: Remains safe in hospital  Patient is calm and cooperative. Greeted staff with a smile. No distress noted. Will continue to monitor.

## 2017-07-06 NOTE — INTERDISCIPLINARY ROUNDS
Behavioral Health Interdisciplinary Rounds     Patient Name: Paola Betancur  Age: 65 y.o. Room/Bed:  740/02  Primary Diagnosis: Schizoaffective disorder ([facility] [address]   Admission Status: Involuntary Commitment and Forced Medication Order     Readmission within 30 days: no  Power of  in place: no  Patient requires a blocked bed: no          Reason for blocked bed: n/a    VTE Prophylaxis: Not indicated  Mobility needs/Fall risk: yes    Nutritional Plan: no  Consults: no         Labs/Testing due today?: no    Sleep hours: 5       Participation in Care/Groups:  yes  Medication Compliant?: Yes  PRNS (last 24 hours): Pain    Restraints (last 24 hours):  no  Substance Abuse:  no  CIWA (range last 24 hours):  COWS (range last 24 hours):   Alcohol screening (AUDIT) completed -     If applicable, date SBIRT discussed in treatment team AND documented:   Tobacco - patient is a smoker: yes   Date tobacco education completed by RN: 5/29/17  24 hour chart check complete: yes     Patient goal(s) for today:   Treatment team focus/goals: SW called and left a message for [facility] regarding a possible admission.     LOS:  49  Expected LOS: TBD   Psychiatric [address] -  Yes   Name of Decision maker if patient has Psychiatric Care Directive   Patient was offered information   Financial concerns/prescription coverage:    Date of last family contact:       Family requesting physician contact today:    Discharge plan: adult home placement        Outpatient provider(s): placement     Participating treatment team members: Yancy Rodriguez RN

## 2017-07-06 NOTE — PROGRESS NOTES
Problem: Falls - Risk of  Goal: *Absence of falls  Outcome: Progressing Towards Goal  Patient is absent of falls. Problem: Altered Thought Process (Adult/Pediatric)  Goal: *STG: Complies with medication therapy  Outcome: Progressing Towards Goal  Patient complies with medication therapy    Goal: *STG: Attends activities and groups  Outcome: Progressing Towards Goal  Patient attends activities and groups on the nicole as well as general unit. Smiling, calm, cooperative, appropriate. Patient is med and meal compliant, fair insight, thoughts organized, remains on Q15 min safety checks.
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
## 2017-07-06 NOTE — BH NOTES
GROUP THERAPY PROGRESS NOTE    eClina Gonzales participated in an Afternoon Process Group on the Geriatric Unit, with a focus identifying feelings, planning for the day, and singing. Group time: 45 minutes. Personal goal for participation: To increase the capacity to shift ones mood, prepare for the day, and share in group singing. Goal orientation: The patient will be able to prepare for the day through group singing. Group therapy participation: When prompted, this patient participated in the group. Therapeutic interventions reviewed and discussed: The group members were introduce themselves by first names and participate in group singing as a way to increase their oxygen and blood flow and begin their day on a positive note. They were also asked to join in singing several songs. Impression of participation: The patient said she had not been feeling too well this morning because, she thought, of some ice cream she had yesterday. She indicated she was feeling better this afternoon and was looking forward to signing. Soon after the group started she went and got her reading glasses from her room. She expressed no SI/HI and no overt psychosis. She was alert and appeared fully oriented. Her affect was mildly euphoric and her mood was as bright and engaged as I have seen her. She suggested several songs and sang along.

## 2017-07-07 LAB
GLUCOSE BLD STRIP.AUTO-MCNC: 124 MG/DL (ref 65–100)
GLUCOSE BLD STRIP.AUTO-MCNC: 80 MG/DL (ref 65–100)
SERVICE CMNT-IMP: ABNORMAL
SERVICE CMNT-IMP: NORMAL

## 2017-07-07 PROCEDURE — 74011250637 HC RX REV CODE- 250/637: Performed by: NURSE PRACTITIONER

## 2017-07-07 PROCEDURE — 82962 GLUCOSE BLOOD TEST: CPT

## 2017-07-07 PROCEDURE — 74011250637 HC RX REV CODE- 250/637: Performed by: PSYCHIATRY & NEUROLOGY

## 2017-07-07 PROCEDURE — 74011250636 HC RX REV CODE- 250/636: Performed by: PSYCHIATRY & NEUROLOGY

## 2017-07-07 PROCEDURE — 65220000003 HC RM SEMIPRIVATE PSYCH

## 2017-07-07 PROCEDURE — 74011250637 HC RX REV CODE- 250/637: Performed by: HOSPITALIST

## 2017-07-07 RX ORDER — FLUPHENAZINE DECANOATE 25 MG/ML
25 INJECTION, SOLUTION INTRAMUSCULAR; SUBCUTANEOUS ONCE
Status: COMPLETED | OUTPATIENT
Start: 2017-07-07 | End: 2017-07-07

## 2017-07-07 RX ORDER — LISINOPRIL 10 MG/1
10 TABLET ORAL DAILY
Status: DISCONTINUED | OUTPATIENT
Start: 2017-07-07 | End: 2017-08-16 | Stop reason: HOSPADM

## 2017-07-07 RX ADMIN — DOCUSATE SODIUM 100 MG: 100 CAPSULE, LIQUID FILLED ORAL at 17:31

## 2017-07-07 RX ADMIN — TRIHEXYPHENIDYL HYDROCHLORIDE 2 MG: 2 TABLET ORAL at 16:20

## 2017-07-07 RX ADMIN — CARBAMAZEPINE 200 MG: 200 TABLET, EXTENDED RELEASE ORAL at 17:32

## 2017-07-07 RX ADMIN — ACETAMINOPHEN 650 MG: 325 TABLET, FILM COATED ORAL at 12:34

## 2017-07-07 RX ADMIN — TRIHEXYPHENIDYL HYDROCHLORIDE 2 MG: 2 TABLET ORAL at 08:59

## 2017-07-07 RX ADMIN — FLUPHENAZINE DECANOATE 25 MG: 25 INJECTION, SOLUTION INTRAMUSCULAR; SUBCUTANEOUS at 13:48

## 2017-07-07 RX ADMIN — PANTOPRAZOLE SODIUM 40 MG: 40 TABLET, DELAYED RELEASE ORAL at 06:30

## 2017-07-07 RX ADMIN — CARBAMAZEPINE 200 MG: 200 TABLET, EXTENDED RELEASE ORAL at 08:59

## 2017-07-07 RX ADMIN — ACETAMINOPHEN 650 MG: 325 TABLET, FILM COATED ORAL at 04:39

## 2017-07-07 RX ADMIN — ZOLPIDEM TARTRATE 12.5 MG: 6.25 TABLET, EXTENDED RELEASE ORAL at 20:53

## 2017-07-07 RX ADMIN — NAPROXEN 250 MG: 250 TABLET ORAL at 17:31

## 2017-07-07 RX ADMIN — LISINOPRIL 10 MG: 10 TABLET ORAL at 12:13

## 2017-07-07 RX ADMIN — AMLODIPINE BESYLATE 10 MG: 5 TABLET ORAL at 08:59

## 2017-07-07 RX ADMIN — NAPROXEN 250 MG: 250 TABLET ORAL at 08:58

## 2017-07-07 RX ADMIN — TRIHEXYPHENIDYL HYDROCHLORIDE 2 MG: 2 TABLET ORAL at 20:54

## 2017-07-07 RX ADMIN — SITAGLIPTIN 100 MG: 100 TABLET, FILM COATED ORAL at 08:59

## 2017-07-07 RX ADMIN — DIVALPROEX SODIUM 1000 MG: 500 TABLET, FILM COATED, EXTENDED RELEASE ORAL at 20:54

## 2017-07-07 RX ADMIN — ATORVASTATIN CALCIUM 20 MG: 20 TABLET, FILM COATED ORAL at 08:59

## 2017-07-07 NOTE — PROGRESS NOTES
ATSP secondary to increase urinary output associated with diuretic  Blood pressure 147/89, pulse 66, temperature 98.2 °F (36.8 °C), resp. rate 16, height 5' 5\" (1.651 m), weight 76.2 kg (167 lb 14.4 oz), SpO2 99 %. The patient is a 81-year-old [facility] female with past medical history of paranoid   schizophrenia, hypertension and diabetes mellitus. The behavioral team requested evaluation of htn regiment as this patient complains of frequent urination associated with hctz/  Patient UA on 6/26/17 was without pathology. A/p   HTN- fair control   - suggest d/c ATC naprosyn as this can cause LE swelling and increase SBP  - D/c HCTZ   -add lisinopril for BP control as well as renal protection  - continue amlodipine  - the hospitalist team continues to be available for questions.    Howard Lindquist NP

## 2017-07-07 NOTE — PROGRESS NOTES
07/07/17 1155   Vital Signs   Temp 97.4 °F (36.3 °C)   Temp Source Oral   Pulse (Heart Rate) 66   Heart Rate Source Monitor   Resp Rate 16   O2 Sat (%) 100 %   Level of Consciousness Alert   BP (!) 159/96   MAP (Calculated) 117   BP 1 Method Automatic   MEWS Score 1   Pain 1   Pain Scale 1 Numeric (0 - 10)   Pain Intensity 1 0   POSS Scale   Opioid Sedation Scale 1   Oxygen Therapy   O2 Device Room air   giving scheduled BP medication

## 2017-07-07 NOTE — PROGRESS NOTES
Problem: Altered Thought Process (Adult/Pediatric)  Goal: *STG: Participates in treatment plan  Outcome: Progressing Towards Goal  Received this morning alert and oriented. Greeted staff with a smile. Completed own am cares by self. Out at the dining room-reviewed medications. Thoughts are clear and organized. Is medication and meal compliant. Attending groups on the geriatric and general units. Staff to re-assure and informe patient about discharge plan as information is brought tot he staff. Goal: *STG: Remains safe in hospital  Outcome: Progressing Towards Goal  Remains safe in the hosital. Walking with walker and gait is stable. Bed alarm is on the bed as well. Goal: *STG: Complies with medication therapy  Outcome: Progressing Towards Goal  Is medication and meal complaint. Goal: *STG: Attends activities and groups  Outcome: Progressing Towards Goal  Attending groups and takes part in activities on the units. Goal: *STG: Demonstrates ability to understand and use improved judgment in daily activities and relationships  Outcome: Progressing Towards Goal  Using good judgment on a daily basis. Is able to understand ideas/concepts. Thoughts are clear and organized. Able to follow directions.

## 2017-07-07 NOTE — BH NOTES
0030 Pt appears asleep in bed. Respirations even and unlabored. Bed alarm on, call bell within reach, door remains open. Will continue to monitor with Q 15 safety checks.     0440 PRN Medication Documentation    Specific patient behavior that led to need for PRN medication: knee pain  Staff interventions attempted prior to PRN being given: ambulation, repositioning  PRN medication given: tylenol   Patient response/effectiveness of PRN medication: 3886 Pt appears asleep

## 2017-07-07 NOTE — DIABETES MGMT
DTC Progress Note    Recommendations/ Comments: Pt's chart reviewed due to hypoglycemia. Pt with a blood sugar of 78 mg/dl yesterday afternoon. If appropriate, please consider changing correction scale to high sensitivity. Also, please have nurse document po intake. Chart reviewed on [facility]. Patient is a 65 y.o. female with known diabetes on Januvia 100 mg once a day at home. A1c:   Lab Results   Component Value Date/Time    Hemoglobin A1c 6.0 05/27/2017 09:40 AM    Hemoglobin A1c 8.0 05/07/2015 09:20 AM       Recent Glucose Results:   Lab Results   Component Value Date/Time    GLUCPOC 80 07/07/2017 08:10 AM    GLUCPOC 87 07/06/2017 04:45 PM    GLUCPOC 78 07/06/2017 04:28 PM        Lab Results   Component Value Date/Time    Creatinine 1.05 06/22/2017 04:30 AM     Estimated Creatinine Clearance: 57.5 mL/min (based on Cr of 1.05). Active Orders   Diet    DIET REGULAR        PO intake: No data found. Current hospital DM medication: Januvia 100 mg once a day and correction scale Humalog, normal sensitivity. Will continue to follow as needed.     Thank you  Tarun Carrasco RD, CDE

## 2017-07-07 NOTE — BH NOTES
GROUP THERAPY PROGRESS NOTE    Destiney Found participated in a Afternoon Activity Group on the Geriatric Unit, with a focus on group singing and mood lifting. Group time: 60 minutes. Personal goal for participation: To increase the capacity to shift ones mood and share in group singing. Goal orientation: The patient will be able to enjoy music through either listening or singing. Group therapy participation: When prompted, this patient actively participated in the group. Therapeutic interventions reviewed and discussed: The group members were asked to select songs from the unit songbook and either listen or sing along. Impression of participation: The patient selected several songs for the group to sing and sang along with most of them. She smiled and seemed to enjoy listening and participating in the music.

## 2017-07-07 NOTE — INTERDISCIPLINARY ROUNDS
Behavioral Health Interdisciplinary Rounds     Patient Name: Rubi Santiago  Age: 65 y.o. Room/Bed:  740/02  Primary [facility]: [facility]   Admission Status: Involuntary Commitment and Forced Medication Order     Readmission within 30 days: no  Power of  in place: no  Patient requires a blocked bed: no          Reason for blocked bed:     VTE Prophylaxis: Not indicated  Mobility needs/Fall risk: yes    Nutritional Plan: no  Consults: no         Labs/Testing due today?: no    Sleep hours: 6.75         Participation in Care/Groups:  yes  Medication Compliant?: Yes  PRNS (last 24 hours): Pain   Restraints (last 24 hours):  no  Substance Abuse:  no  CIWA (range last 24 hours):  COWS (range last 24 hours):   Alcohol screening (AUDIT) completed -     If applicable, date SBIRT discussed in treatment team AND documented:   Tobacco - patient is a smoker: yes   Date tobacco education completed by RN: 5/29/17  24 hour chart check complete: yes     Patient goal(s) for today:   Treatment team focus/goals: Send application to [facility]  LOS:  50  Expected LOS: TBD  [address] -  Yes  Name of Decision maker if patient has Psychiatric Care Directive: Hamida Acevedo  Patient was offered information, patient declined.    Financial concerns/prescription coverage: Medicaid/Medicare  Date of last family contact:       Family requesting physician contact today:  No  Discharge plan: Placement       Outpatient provider(s): TBD    Participating treatment team members: PEGGY Dumont; Dr. Britt Pack MD; Amanda Polo RN

## 2017-07-07 NOTE — BH NOTES
1545:  Patient initially received as she is resting quietly in bed. She greets oncoming staff cordially with a big smile and then later comes to the [employer] to watch TV. She voices no complaints or concerns at this time except a wish for popcorn later this evening \"if everything works out alright\". Will maintain q 15 minute safety checks.

## 2017-07-08 LAB
GLUCOSE BLD STRIP.AUTO-MCNC: 116 MG/DL (ref 65–100)
GLUCOSE BLD STRIP.AUTO-MCNC: 88 MG/DL (ref 65–100)
SERVICE CMNT-IMP: ABNORMAL
SERVICE CMNT-IMP: NORMAL

## 2017-07-08 PROCEDURE — 74011250637 HC RX REV CODE- 250/637: Performed by: HOSPITALIST

## 2017-07-08 PROCEDURE — 74011250637 HC RX REV CODE- 250/637: Performed by: PSYCHIATRY & NEUROLOGY

## 2017-07-08 PROCEDURE — 82962 GLUCOSE BLOOD TEST: CPT

## 2017-07-08 PROCEDURE — 74011250637 HC RX REV CODE- 250/637: Performed by: NURSE PRACTITIONER

## 2017-07-08 PROCEDURE — 65220000003 HC RM SEMIPRIVATE PSYCH

## 2017-07-08 RX ADMIN — LISINOPRIL 10 MG: 10 TABLET ORAL at 08:21

## 2017-07-08 RX ADMIN — NAPROXEN 250 MG: 250 TABLET ORAL at 16:46

## 2017-07-08 RX ADMIN — ACETAMINOPHEN 650 MG: 325 TABLET, FILM COATED ORAL at 14:55

## 2017-07-08 RX ADMIN — TRIHEXYPHENIDYL HYDROCHLORIDE 2 MG: 2 TABLET ORAL at 08:22

## 2017-07-08 RX ADMIN — SITAGLIPTIN 100 MG: 100 TABLET, FILM COATED ORAL at 08:21

## 2017-07-08 RX ADMIN — TRIHEXYPHENIDYL HYDROCHLORIDE 2 MG: 2 TABLET ORAL at 16:46

## 2017-07-08 RX ADMIN — PANTOPRAZOLE SODIUM 40 MG: 40 TABLET, DELAYED RELEASE ORAL at 06:58

## 2017-07-08 RX ADMIN — ATORVASTATIN CALCIUM 20 MG: 20 TABLET, FILM COATED ORAL at 08:21

## 2017-07-08 RX ADMIN — CARBAMAZEPINE 200 MG: 200 TABLET, EXTENDED RELEASE ORAL at 17:31

## 2017-07-08 RX ADMIN — NAPROXEN 250 MG: 250 TABLET ORAL at 08:21

## 2017-07-08 RX ADMIN — DIVALPROEX SODIUM 1000 MG: 500 TABLET, FILM COATED, EXTENDED RELEASE ORAL at 21:14

## 2017-07-08 RX ADMIN — CARBAMAZEPINE 200 MG: 200 TABLET, EXTENDED RELEASE ORAL at 08:22

## 2017-07-08 RX ADMIN — AMLODIPINE BESYLATE 10 MG: 5 TABLET ORAL at 08:21

## 2017-07-08 RX ADMIN — TRIHEXYPHENIDYL HYDROCHLORIDE 2 MG: 2 TABLET ORAL at 21:15

## 2017-07-08 RX ADMIN — ZOLPIDEM TARTRATE 12.5 MG: 6.25 TABLET, EXTENDED RELEASE ORAL at 21:14

## 2017-07-08 NOTE — BH NOTES
1520:  Patient received as she is sitting in the [employer] [facility]  with peers. She greets oncoming staff cordially and requests permission to turn on the TV. She is pleasant and cooperative - appropriately interactive with staff and peers. Will maintain q 15 minute safety checks.
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
## 2017-07-08 NOTE — BH NOTES
Behavioral Health Interdisciplinary Rounds     Patient Name: Dewayne Kawasaki  Age: 65 y.o.   Room/Bed:  740/02  Primary Diagnosis: Schizoaffective disorder (HCC)   Admission Status: Involuntary Commitment     Readmission within 30 days: no  Power of  in place: no  Patient requires a blocked bed: no          Reason for blocked bed: na    VTE Prophylaxis: Not indicated  Mobility needs/Fall risk: yes    Nutritional Plan: no  Consults: no         Labs/Testing due today?: no    Sleep hours:  6.5+      Participation in Care/Groups:  yes  Medication Compliant?: Selective  PRNS (last 24 hours): Pain    Restraints (last 24 hours):  no  Substance Abuse:  no  CIWA (range last 24 hours): na COWS (range last 24 hours): na  Alcohol screening (AUDIT) completed -     If applicable, date SBIRT discussed in treatment team AND documented: na  Tobacco - patient is a smoker: yes   Date tobacco education completed by RN: 5/29/17  24 hour chart check complete: yes     Patient goal(s) for today: Play piano  Treatment team focus/goals: Call [address]  LOS:  51  Expected LOS: TBD  Psychiatric Advanced Care Directives -  yes   Name of Decision maker if patient has Psychiatric Care Directive Marybeth Chang  Patient was offered information --pt declined  Financial concerns/prescription coverage:  Medicaid/Medicare  Date of last family contact:       Family requesting physician contact today:  no  Discharge plan: Placement       Outpatient provider(s): [facility]    Participating treatment team members: Dewayne Kawasaki, Beatrice Manor, MSW; Dr. Yomaira Rodriguez MD; Patricia Bass, RN

## 2017-07-08 NOTE — BH NOTES
On the unit throughout shift  Sense of humor intact  Social and friendly with staff and peers  Smiling   appreciative of staff assistance  Played piano on the general unit  Mood upbeat

## 2017-07-08 NOTE — PROGRESS NOTES
Problem: Altered Thought Process (Adult/Pediatric)  Goal: *STG: Attends activities and groups  Outcome: Progressing Towards Goal  Pt very active and appropriate in mileu.  Played piano with peers
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
## 2017-07-08 NOTE — BH NOTES
0700  Pt has been pleasant & cooperative this morning. No distress noted. Steady on feet using her walker when walking for brp. Monitoring continues.

## 2017-07-08 NOTE — PROGRESS NOTES
Problem: Altered Thought Process (Adult/Pediatric)  Goal: *STG: Complies with medication therapy  Outcome: Progressing Towards Goal  Patient complies with medication therapy. Smiling, cooperative, out on the unit, appropriate with staff and peers, med and meal compliant, remains on Q15 min safety checks.

## 2017-07-09 LAB
GLUCOSE BLD STRIP.AUTO-MCNC: 83 MG/DL (ref 65–100)
GLUCOSE BLD STRIP.AUTO-MCNC: 88 MG/DL (ref 65–100)
SERVICE CMNT-IMP: NORMAL
SERVICE CMNT-IMP: NORMAL

## 2017-07-09 PROCEDURE — 74011250637 HC RX REV CODE- 250/637: Performed by: NURSE PRACTITIONER

## 2017-07-09 PROCEDURE — 74011250637 HC RX REV CODE- 250/637: Performed by: PSYCHIATRY & NEUROLOGY

## 2017-07-09 PROCEDURE — 82962 GLUCOSE BLOOD TEST: CPT

## 2017-07-09 PROCEDURE — 74011250637 HC RX REV CODE- 250/637: Performed by: HOSPITALIST

## 2017-07-09 PROCEDURE — 65220000003 HC RM SEMIPRIVATE PSYCH

## 2017-07-09 RX ORDER — LOPERAMIDE HYDROCHLORIDE 2 MG/1
2 CAPSULE ORAL
Status: DISCONTINUED | OUTPATIENT
Start: 2017-07-09 | End: 2017-08-16 | Stop reason: HOSPADM

## 2017-07-09 RX ADMIN — CARBAMAZEPINE 200 MG: 200 TABLET, EXTENDED RELEASE ORAL at 08:26

## 2017-07-09 RX ADMIN — TRIHEXYPHENIDYL HYDROCHLORIDE 2 MG: 2 TABLET ORAL at 16:50

## 2017-07-09 RX ADMIN — LISINOPRIL 10 MG: 10 TABLET ORAL at 08:26

## 2017-07-09 RX ADMIN — SITAGLIPTIN 100 MG: 100 TABLET, FILM COATED ORAL at 08:27

## 2017-07-09 RX ADMIN — ACETAMINOPHEN 650 MG: 325 TABLET, FILM COATED ORAL at 11:36

## 2017-07-09 RX ADMIN — ZOLPIDEM TARTRATE 12.5 MG: 6.25 TABLET, EXTENDED RELEASE ORAL at 21:08

## 2017-07-09 RX ADMIN — TRIHEXYPHENIDYL HYDROCHLORIDE 2 MG: 2 TABLET ORAL at 21:10

## 2017-07-09 RX ADMIN — TRIHEXYPHENIDYL HYDROCHLORIDE 2 MG: 2 TABLET ORAL at 08:26

## 2017-07-09 RX ADMIN — NAPROXEN 250 MG: 250 TABLET ORAL at 08:27

## 2017-07-09 RX ADMIN — ATORVASTATIN CALCIUM 20 MG: 20 TABLET, FILM COATED ORAL at 08:27

## 2017-07-09 RX ADMIN — DIVALPROEX SODIUM 1000 MG: 500 TABLET, FILM COATED, EXTENDED RELEASE ORAL at 21:08

## 2017-07-09 RX ADMIN — LOPERAMIDE HYDROCHLORIDE 2 MG: 2 CAPSULE ORAL at 15:12

## 2017-07-09 RX ADMIN — CARBAMAZEPINE 200 MG: 200 TABLET, EXTENDED RELEASE ORAL at 17:00

## 2017-07-09 RX ADMIN — LORAZEPAM 1 MG: 1 TABLET ORAL at 01:07

## 2017-07-09 RX ADMIN — NAPROXEN 250 MG: 250 TABLET ORAL at 16:50

## 2017-07-09 RX ADMIN — AMLODIPINE BESYLATE 10 MG: 5 TABLET ORAL at 08:27

## 2017-07-09 RX ADMIN — PANTOPRAZOLE SODIUM 40 MG: 40 TABLET, DELAYED RELEASE ORAL at 06:38

## 2017-07-09 NOTE — BH NOTES
PRN Medication Documentation    Specific patient behavior that led to need for PRN medication: diarrhea  Staff interventions attempted prior to PRN being given:   PRN medication given: 2 mg Imodium PO  Patient response/effectiveness of PRN medication:

## 2017-07-09 NOTE — PROGRESS NOTES
Problem: Altered Thought Process (Adult/Pediatric)  Goal: *STG: Attends activities and groups  Outcome: Progressing Towards Goal  Patient attends activities and groups, out on the unit, plays piano on general unit, smiling,cooperative, pleasant, appropriate, med and meal compliant, remains on Q15 min safety checks.

## 2017-07-09 NOTE — BH NOTES
2330-patient asleep in bed at this time. Respirations regular and even. Continue to monitor q 15 minutes for safety. PRN Medication Documentation at 0107    Specific patient behavior that led to need for PRN medication: c/o anxiety  Staff interventions attempted prior to PRN being given: dimmed lights, quiet environment  PRN medication given: Ativan 1mg PO  Patient response/effectiveness of PRN medication: effective, as patient asleep     0600-Slept 6.5+ hours. Prn meds given as noted above.
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
## 2017-07-09 NOTE — PROGRESS NOTES
Problem: Altered Thought Process (Adult/Pediatric)  Goal: *STG: Remains safe in hospital  Outcome: Progressing Towards Goal  Pt in bed at change of shift. Up for dinner, appetite good. Pleasant affect. Behavior and conversation appropriate. States no more loose stools. Remains free of falls. Will continue to monitor.

## 2017-07-09 NOTE — BH NOTES
PRN Medication Documentation  At 1136  Specific patient behavior that led to need for PRN medication: c/o HA pain 7/10   Staff interventions attempted prior to PRN being given: relaxation  PRN medication given: Tylenol 650 mg po prn   Patient response/effectiveness of PRN medication: at 1200 med effective pain 0/10

## 2017-07-10 LAB
GLUCOSE BLD STRIP.AUTO-MCNC: 87 MG/DL (ref 65–100)
GLUCOSE BLD STRIP.AUTO-MCNC: 94 MG/DL (ref 65–100)
SERVICE CMNT-IMP: NORMAL
SERVICE CMNT-IMP: NORMAL

## 2017-07-10 PROCEDURE — 74011250637 HC RX REV CODE- 250/637: Performed by: HOSPITALIST

## 2017-07-10 PROCEDURE — 74011250637 HC RX REV CODE- 250/637: Performed by: PSYCHIATRY & NEUROLOGY

## 2017-07-10 PROCEDURE — 74011250637 HC RX REV CODE- 250/637: Performed by: NURSE PRACTITIONER

## 2017-07-10 PROCEDURE — 65220000003 HC RM SEMIPRIVATE PSYCH

## 2017-07-10 PROCEDURE — 82962 GLUCOSE BLOOD TEST: CPT

## 2017-07-10 RX ADMIN — CARBAMAZEPINE 200 MG: 200 TABLET, EXTENDED RELEASE ORAL at 17:37

## 2017-07-10 RX ADMIN — ATORVASTATIN CALCIUM 20 MG: 20 TABLET, FILM COATED ORAL at 09:31

## 2017-07-10 RX ADMIN — ZOLPIDEM TARTRATE 12.5 MG: 6.25 TABLET, EXTENDED RELEASE ORAL at 21:49

## 2017-07-10 RX ADMIN — PANTOPRAZOLE SODIUM 40 MG: 40 TABLET, DELAYED RELEASE ORAL at 09:32

## 2017-07-10 RX ADMIN — LORAZEPAM 1 MG: 1 TABLET ORAL at 03:47

## 2017-07-10 RX ADMIN — CARBAMAZEPINE 200 MG: 200 TABLET, EXTENDED RELEASE ORAL at 09:33

## 2017-07-10 RX ADMIN — LISINOPRIL 10 MG: 10 TABLET ORAL at 09:30

## 2017-07-10 RX ADMIN — TRIHEXYPHENIDYL HYDROCHLORIDE 2 MG: 2 TABLET ORAL at 09:33

## 2017-07-10 RX ADMIN — TRIHEXYPHENIDYL HYDROCHLORIDE 2 MG: 2 TABLET ORAL at 17:37

## 2017-07-10 RX ADMIN — AMLODIPINE BESYLATE 10 MG: 5 TABLET ORAL at 09:31

## 2017-07-10 RX ADMIN — NAPROXEN 250 MG: 250 TABLET ORAL at 09:31

## 2017-07-10 RX ADMIN — DIVALPROEX SODIUM 1000 MG: 500 TABLET, FILM COATED, EXTENDED RELEASE ORAL at 21:49

## 2017-07-10 RX ADMIN — TRIHEXYPHENIDYL HYDROCHLORIDE 2 MG: 2 TABLET ORAL at 21:52

## 2017-07-10 RX ADMIN — NAPROXEN 250 MG: 250 TABLET ORAL at 17:37

## 2017-07-10 RX ADMIN — SITAGLIPTIN 100 MG: 100 TABLET, FILM COATED ORAL at 09:31

## 2017-07-10 NOTE — PROGRESS NOTES
Problem: Falls - Risk of  Goal: *Absence of falls  Outcome: Progressing Towards Goal  Patient is ambulating to/from the bathroom steadily while using her walker appropriately. She has remained free from falls/injury throughout this shift.

## 2017-07-10 NOTE — BH NOTES
1115 While assisting patient in shower, patient reported \"my roommate stays in bed all day and night, she never goes to groups, I can't have any time alone in room\". Redirected patient to focus on self, that she has made great progress. When patient walked into DR, she reported \"My boy friend is calling my room mate, messing with her\". Redirected patient that room mate has not received any phone calls. 1216 Patient is eating lunch on General Unit. 1314 Patient ate 100% at meals.   She showered with one staff assist.

## 2017-07-10 NOTE — BH NOTES
2300:  Patient received as she appears to be sleeping comfortably in bed. Her respirations are even/unlabored, and there are no signs/symptoms of pain or distress at this time. Will maintain q 15 minute safety checks.

## 2017-07-10 NOTE — PROGRESS NOTES
Problem: Altered Thought Process (Adult/Pediatric)  Goal: *STG: Participates in treatment plan  Outcome: Progressing Towards Goal  Pt is cooperative with sad affect. Isolating to room this evening. Pt seems paranoid of roommate, watches her intently when they are both in the room. Med compliant, good appetite. Verbalizes no delusions or hallucinations. 1945: Pt is out on general unit, social, talkative.

## 2017-07-10 NOTE — BH NOTES
GROUP THERAPY PROGRESS NOTE    Eveline Hammond did not participate in a Morning Process Group on the Geriatric Unit with a focus on shifting ones feelings to a positive note and preparing for the day through group singing. She did not come out of her room.

## 2017-07-10 NOTE — PROGRESS NOTES
Problem: Altered Thought Process (Adult/Pediatric)  Goal: *STG: Remains safe in hospital  Outcome: Progressing Towards Goal  Pt remains safe in hospital on q 15 min safety checks. Goal: *STG: Complies with medication therapy  Outcome: Progressing Towards Goal  Pt stated goal: take scheduled medications. Pt verbalizes understanding of medication education provided. Goal: *STG: Attends activities and groups  Outcome: Progressing Towards Goal  Pt stated goal: attend groups on general and nicole. Pt refuses morning process group held on nicole unit. Pt eating 100% of breakfast. Ambulating with walker. Smiling euthymic. Goal oriented. Positive local thought focus. Asking for assistance appropriately. Goal: shower today. Pt states improved sleep last night after receiving  PRN po 1 mg Ativan at 0347. Pt tearful in morning. Verbalizing stressors: roommate isolating to room, missing family, feelings of loneliness, discharge pacement. Pt encouraged to eat lunch on general unit, increase activities, and music therapy. Pt mood more hopeful. Pt smiling calm, pleasant. Brighter affect. Pt tolerates well.

## 2017-07-10 NOTE — BH NOTES
Behavioral Health Interdisciplinary Rounds     Patient Name: Harlan Mclean  Age: 65 y.o. Room/Bed:  740/02  Primary Diagnosis: [facility]   Admission Status: Involuntary Commitment    Readmission within 30 days: no  Power of  in place: no  Patient requires a blocked bed: no          Reason for blocked bed: N/A    VTE Prophylaxis: Not indicated  Mobility needs/Fall risk: yes    Nutritional Plan: no  Consults: No         Labs/Testing due today?: no    Sleep hours:  3.0 + Hours      Participation in Care/Groups:  yes  Medication Compliant?: Selective  PRNS (last 24 hours):  Antianxiety, Pain, Antidiarrheal    Restraints (last 24 hours):  no  Substance Abuse:  no  CIWA (range last 24 hours): N/A COWS (range last 24 hours): N/A  Alcohol screening (AUDIT) completed -     If applicable, date SBIRT discussed in treatment team AND documented: N/A  Tobacco - patient is a smoker: yes   Date tobacco education completed by RN: 5/29/17  24 hour chart check complete: yes     Patient goal(s) for today:   Treatment team focus/goals: follow up on placement  LOS:  53  Expected LOS: TBD  Psychiatric [address] -  yes   Name of Decision maker if patient has Psychiatric Care Directive: Elias Mendoza  Patient was offered information Patient Declined  Financial concerns/prescription coverage:  no and Medicare and Medicaid  Date of last family contact:       Family requesting physician contact today:  no  Discharge plan: Placement       Outpatient provider(s): TBD    Participating treatment team members: PEGGY Mckeon; Dr. Job Yap MD; Jose Malone RN

## 2017-07-10 NOTE — BH NOTES
PRN Medication Documentation    Specific patient behavior that led to need for PRN medication: Patient restless - up/down frequently to the bathroom. She states \"I can't get to sleep. Staff interventions attempted prior to PRN being given: Darkening the room, toileting  PRN medication given: 1 mg PRN Ativan per patient's request  Patient response/effectiveness of PRN medication:  Patient sleeping with the hour.

## 2017-07-10 NOTE — BH NOTES
GROUP THERAPY PROGRESS NOTE    Dewayne Kawasaki is participating in [place]. Group time: 10 minutes    Personal goal for participation: Patient made a goal \"to take a shower today\". Goal orientation: personal    Group therapy participation: active    Therapeutic interventions reviewed and discussed: Reviewed unit schedule, rules and goals    Impression of participation: Patient reported \"feeling tired and sleepy because I had to take Ativan to help me go to sleep\". Low energy.  Requested to return to bed after breakfast.

## 2017-07-11 LAB
GLUCOSE BLD STRIP.AUTO-MCNC: 100 MG/DL (ref 65–100)
GLUCOSE BLD STRIP.AUTO-MCNC: 111 MG/DL (ref 65–100)
SERVICE CMNT-IMP: ABNORMAL
SERVICE CMNT-IMP: NORMAL

## 2017-07-11 PROCEDURE — 74011250637 HC RX REV CODE- 250/637: Performed by: PSYCHIATRY & NEUROLOGY

## 2017-07-11 PROCEDURE — 82962 GLUCOSE BLOOD TEST: CPT

## 2017-07-11 PROCEDURE — 65220000003 HC RM SEMIPRIVATE PSYCH

## 2017-07-11 PROCEDURE — 74011250637 HC RX REV CODE- 250/637: Performed by: HOSPITALIST

## 2017-07-11 PROCEDURE — 74011250637 HC RX REV CODE- 250/637: Performed by: NURSE PRACTITIONER

## 2017-07-11 RX ADMIN — TRIHEXYPHENIDYL HYDROCHLORIDE 2 MG: 2 TABLET ORAL at 08:52

## 2017-07-11 RX ADMIN — TRIHEXYPHENIDYL HYDROCHLORIDE 2 MG: 2 TABLET ORAL at 17:36

## 2017-07-11 RX ADMIN — ZOLPIDEM TARTRATE 12.5 MG: 6.25 TABLET, EXTENDED RELEASE ORAL at 21:07

## 2017-07-11 RX ADMIN — NAPROXEN 250 MG: 250 TABLET ORAL at 17:35

## 2017-07-11 RX ADMIN — DOCUSATE SODIUM 100 MG: 100 CAPSULE, LIQUID FILLED ORAL at 17:35

## 2017-07-11 RX ADMIN — TRIHEXYPHENIDYL HYDROCHLORIDE 2 MG: 2 TABLET ORAL at 21:07

## 2017-07-11 RX ADMIN — PANTOPRAZOLE SODIUM 40 MG: 40 TABLET, DELAYED RELEASE ORAL at 06:41

## 2017-07-11 RX ADMIN — SITAGLIPTIN 100 MG: 100 TABLET, FILM COATED ORAL at 08:52

## 2017-07-11 RX ADMIN — DIVALPROEX SODIUM 1000 MG: 500 TABLET, FILM COATED, EXTENDED RELEASE ORAL at 21:07

## 2017-07-11 RX ADMIN — CARBAMAZEPINE 200 MG: 200 TABLET, EXTENDED RELEASE ORAL at 08:53

## 2017-07-11 RX ADMIN — AMLODIPINE BESYLATE 10 MG: 5 TABLET ORAL at 08:52

## 2017-07-11 RX ADMIN — ATORVASTATIN CALCIUM 20 MG: 20 TABLET, FILM COATED ORAL at 08:51

## 2017-07-11 RX ADMIN — CARBAMAZEPINE 200 MG: 200 TABLET, EXTENDED RELEASE ORAL at 17:35

## 2017-07-11 RX ADMIN — LISINOPRIL 10 MG: 10 TABLET ORAL at 08:52

## 2017-07-11 RX ADMIN — POLYETHYLENE GLYCOL 3350 17 G: 17 POWDER, FOR SOLUTION ORAL at 08:51

## 2017-07-11 RX ADMIN — ACETAMINOPHEN 650 MG: 325 TABLET, FILM COATED ORAL at 04:29

## 2017-07-11 RX ADMIN — NAPROXEN 250 MG: 250 TABLET ORAL at 08:52

## 2017-07-11 NOTE — BH NOTES
0720Received patient resting in bed with eyes open. Greeted staff with a smile. NAD. On q 15 min. Safety checks.

## 2017-07-11 NOTE — PROGRESS NOTES
Problem: Altered Thought Process (Adult/Pediatric)  Goal: *STG: Attends activities and groups  Outcome: Progressing Towards Goal  Patient attends activities and groups, participated in process group this morning. Patient is smiling, depressed, cooperative, appropriate with staff and peers, isolating to bed between meals and group, patient is med and meal compliant, remains on Q15 min safety checks.

## 2017-07-11 NOTE — PROGRESS NOTES
Problem: Falls - Risk of  Goal: *Absence of falls  Outcome: Progressing Towards Goal  Patient has not experienced any falls on this shift. A & O x 4. Wears gripper socks. Ambulates with walker. Bed alarm working. She is able to verbalize her needs to staff. Problem: Altered Thought Process (Adult/Pediatric)  Goal: *STG: Participates in treatment plan  Outcome: Progressing Towards Goal  Participates in treatment team and plans. Asked appropriate questions regarding discharge plans. Med and meal compliant.

## 2017-07-11 NOTE — INTERDISCIPLINARY ROUNDS
Behavioral Health Interdisciplinary Rounds     Patient Name: Eveline Hammond  Age: 65 y.o. Room/Bed:  740/02  Primary Diagnosis: [facility]   Admission Status: Involuntary Commitment     Readmission within 30 days: no  Power of  in place: no  Patient requires a blocked bed: no          Reason for blocked bed:     VTE Prophylaxis: Not indicated  Mobility needs/Fall risk: yes    Nutritional Plan: no  Consults: no         Labs/Testing due today?: no    Sleep hours: 6.5       Participation in Care/Groups:  yes  Medication Compliant?: Yes (refused colace)   PRNS (last 24 hours): None    Restraints (last 24 hours):  no  Substance Abuse:  no  CIWA (range last 24 hours):  COWS (range last 24 hours):   Alcohol screening (AUDIT) completed -     If applicable, date SBIRT discussed in treatment team AND documented: N/A  Tobacco - patient is a smoker: yes   Date tobacco education completed by RN: 5/29/17  24 hour chart check complete: yes     Patient goal(s) for today:   Treatment team focus/goals: Follow-up on [address] [facility]; send referral to [facility]  LOS:  54  Expected LOS: TBD  Psychiatric [address] - No  Name of Decision maker if patient has Psychiatric Care Directive: None   Patient was offered information, patient declined.    Financial concerns/prescription coverage: Medicaid/Medicare  Date of last family contact:       Family requesting physician contact today: No  Discharge plan: Placement       Outpatient provider(s): TBD    Participating treatment team members: PEGGY Elias; Dr. Stu Julian MD; Ally Dumont RN; Umesh PatriciaD
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
## 2017-07-11 NOTE — BH NOTES
GROUP THERAPY PROGRESS NOTE    Destiney Found participated in a Morning Process Group on the Geriatric Unit, with a focus identifying feelings, planning for the day, and singing. Group time: 40 minutes. Personal goal for participation: To increase the capacity to shift ones mood, prepare for the day, and share in group singing. Goal orientation: The patient will be able to prepare for the day through group singing. Group therapy participation: When prompted, this patient actively participated in the group. Therapeutic interventions reviewed and discussed: The group members were introduce themselves by first names and participate in group singing as a way to increase their oxygen and blood flow and begin their day on a positive note. They were also asked to join in singing several songs. Impression of participation: The patient said she was \"focused on being joyful\" and that she hoped she might be closer to going into an assisted living residence. The patient suggested and sang along with a most of the songs. She expressed no SI/HI and no overt psychosis. Her affect was euphoric. Her mood was calm. Her thinking was coherent and relevant. She appeared fully oriented.

## 2017-07-11 NOTE — BH NOTES
2315 Pt appears asleep in bed. Respirations even and unlabored. Bed alarm on, call bell within reach. Will coninue to monitor with Q 15 safety checks. 0430 PRN Medication Documentation    Specific patient behavior that led to need for PRN medication: knee pain, headache  Staff interventions attempted prior to PRN being given: reposition, toileting  PRN medication given: Tylenol   Patient response/effectiveness of PRN medication: 0520 Pt appears asleep in bed. NAD noted.

## 2017-07-12 LAB
GLUCOSE BLD STRIP.AUTO-MCNC: 116 MG/DL (ref 65–100)
GLUCOSE BLD STRIP.AUTO-MCNC: 97 MG/DL (ref 65–100)
SERVICE CMNT-IMP: ABNORMAL
SERVICE CMNT-IMP: NORMAL

## 2017-07-12 PROCEDURE — 74011250637 HC RX REV CODE- 250/637: Performed by: NURSE PRACTITIONER

## 2017-07-12 PROCEDURE — 74011250637 HC RX REV CODE- 250/637: Performed by: HOSPITALIST

## 2017-07-12 PROCEDURE — 74011250637 HC RX REV CODE- 250/637: Performed by: PSYCHIATRY & NEUROLOGY

## 2017-07-12 PROCEDURE — 65220000003 HC RM SEMIPRIVATE PSYCH

## 2017-07-12 PROCEDURE — 82962 GLUCOSE BLOOD TEST: CPT

## 2017-07-12 RX ADMIN — LORAZEPAM 1 MG: 1 TABLET ORAL at 01:48

## 2017-07-12 RX ADMIN — LISINOPRIL 10 MG: 10 TABLET ORAL at 08:47

## 2017-07-12 RX ADMIN — NAPROXEN 250 MG: 250 TABLET ORAL at 17:09

## 2017-07-12 RX ADMIN — PANTOPRAZOLE SODIUM 40 MG: 40 TABLET, DELAYED RELEASE ORAL at 06:39

## 2017-07-12 RX ADMIN — CARBAMAZEPINE 200 MG: 200 TABLET, EXTENDED RELEASE ORAL at 08:49

## 2017-07-12 RX ADMIN — ZOLPIDEM TARTRATE 12.5 MG: 6.25 TABLET, EXTENDED RELEASE ORAL at 21:36

## 2017-07-12 RX ADMIN — TRIHEXYPHENIDYL HYDROCHLORIDE 2 MG: 2 TABLET ORAL at 21:38

## 2017-07-12 RX ADMIN — DIVALPROEX SODIUM 1000 MG: 500 TABLET, FILM COATED, EXTENDED RELEASE ORAL at 21:37

## 2017-07-12 RX ADMIN — ATORVASTATIN CALCIUM 20 MG: 20 TABLET, FILM COATED ORAL at 08:47

## 2017-07-12 RX ADMIN — SITAGLIPTIN 100 MG: 100 TABLET, FILM COATED ORAL at 08:47

## 2017-07-12 RX ADMIN — ACETAMINOPHEN 650 MG: 325 TABLET, FILM COATED ORAL at 20:03

## 2017-07-12 RX ADMIN — AMLODIPINE BESYLATE 10 MG: 5 TABLET ORAL at 08:47

## 2017-07-12 RX ADMIN — CARBAMAZEPINE 200 MG: 200 TABLET, EXTENDED RELEASE ORAL at 17:28

## 2017-07-12 RX ADMIN — NAPROXEN 250 MG: 250 TABLET ORAL at 08:47

## 2017-07-12 RX ADMIN — TRIHEXYPHENIDYL HYDROCHLORIDE 2 MG: 2 TABLET ORAL at 08:49

## 2017-07-12 RX ADMIN — TRIHEXYPHENIDYL HYDROCHLORIDE 2 MG: 2 TABLET ORAL at 17:09

## 2017-07-12 NOTE — BH NOTES
GROUP THERAPY PROGRESS NOTE    Merced Minaya participated in a Morning Process Group on the Geriatric Unit, with a focus identifying feelings, planning for the day, and singing. Group time: 45 minutes. Personal goal for participation: To increase the capacity to shift ones mood, prepare for the day, and share in group singing. Goal orientation: The patient will be able to prepare for the day through group singing. Group therapy participation: When prompted, this patient partially participated in the group. Therapeutic interventions reviewed and discussed: The group members were introduce themselves by first names and participate in group singing as a way to increase their oxygen and blood flow and begin their day on a positive note. They were also asked to join in singing several songs. Impression of participation: The patient joined the group about ten minutes and stayed with the group for about ten minutes, before returning to her room. She suggested the group's first song, but did not stay long for the rest of the singing. Her affect was mildly euphoric and she said she was focused on learning more about her potential FILIBERTO. She expressed no SI/HI and no overt psychosis. She was partially engaged in this morning's session on this unit.

## 2017-07-12 NOTE — BH NOTES
GROUP THERAPY PROGRESS NOTE    Angelina Carrera participated in a Process Group on the General Unit with a focus identifying feelings,   planning for the day, and learning more about DBT concepts of \"Interpersonal Effectiveness and using  the [address] as a long-term personal treatment plan. .   Group time: 65 minutes. Personal goal for participation: To increase the capacity to improve ones mood, set personal goals,   and understand more about basic activities to successfully state and get ones needs met. Goal orientation: The patients will be able to identify their feelings and develop a goal for   themselves for their day. The didactic portion of the session covered two primary topics - one on interpersonal   effectiveness and the second on the [address] as a long-term personal treatment plan. First, they were also asked to review a handout sheet on interpersonal effectiveness and asking   for something necessary. Three main areas of focus were explored in the interactive didactic   session: 1) defining what it is one wants (describe the current situation, express your feelings   and opinions, assert  yourself by asking and being willing to say No. 2) keeping a good   relationship with the person you are asking (be gentle and courteous in ones approach - no   attacks, threats, or judging; listen and be interested in the other person; validate the other   persons feelings and needs; and use an easy manner - be diplomatic and use humor   when you can); and 3) maintaining ones self-respect (be fair to yourself and to the other   person; no apologies for being alive or making a request at all, stick to your values, and be   truthful - dont lie, act helpless, exaggerate, or make excuses).     Second they were asked to consider filling in on their own after group the following elements   of a DBT house drawing with multiple levels:  1) foundation - values that govern your life;   2) first floor with a door - behaviors would like to manage and feel more control over or areas   you want to change; the door represents an opportunity to list or draw things that you keep   hidden from others; 3) second floor - list or draw emotions you want to experience more often,  more fully, or in a more healthy fashion; 4) third floor - a list of things that make you happy or   want to feel happy about; 5) attic - list or draw what  a Life Jenny Belling would look like. There  is also a roof, where people and things that protect you can be listed. The chimney provides an   opportunity to list ways in which you blow off steam. The billboard allows one to post those things   in one's life they are proud of and the walls of the house provide an opportunity to list those people   and things that provide support. The patients were also provided copies of the DBT summary on  interpersonal effectiveness and the [address] that they could complete on their own. Group therapy participation: With prompting, this patient partially participated in the group. Therapeutic interventions reviewed and discussed: The group members were asked to   identify an emotion they are having and/or let the group know what they want to focus on for the   day as they continue to make discharge plans. The group members took turns reading and   reviewing the summary sheet on Interpersonal Effectiveness and the elements of the [address]. Impression of participation: The patient joined the group about senior living through. She shared  her name with her peers and said she had been taking a shower (her reason for coming into  the group late). She listened attentively but did not add much to the group's conversation and  she decided not to take a copy of the handout with her, \"because I have trouble reading without  my glasses. \" The patient expressed no current SI/HI and no overt psychotic symptoms in this group.   Her affect was mildly euphoric and slightly anxious. Her mood was cooperative and restrained.
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
## 2017-07-12 NOTE — PROGRESS NOTES
Problem: Altered Thought Process (Adult/Pediatric)  Goal: *STG: Participates in treatment plan  Outcome: Progressing Towards Goal  Pt mood is sad with congruent affect. Calm and cooperative. Pt has slept more on this shift than shifts prior. Appears anxious or  overwhelmed when surrounded by many people and returns to room. Watches roommates intently. Appropriate and seeks staff for needs and is med compliant. Denies hallucinations.
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
## 2017-07-12 NOTE — PROGRESS NOTES
Problem: Altered Thought Process (Adult/Pediatric)  Goal: *STG: Participates in treatment plan  Outcome: Progressing Towards Goal  Pt participating in treatment plan. Stated goal: meet with doctor. Goal: *STG: Seeks staff when feelings of anxiety and fear arise  Outcome: Progressing Towards Goal  Pt states feeling well rested. Smiling oriented, euthymic mood. Hopeful engaged. Bright affect. Tolerating roommate and peers well. Pt is positive influence to milieu. Goal: *STG: Complies with medication therapy  Outcome: Progressing Towards Goal  Pt compliant with medications. States understanding of education provided. Staff will monitory and educated continuous. Goal: *STG: Attends activities and groups  Outcome: Progressing Towards Goal  Pt showered with assistance pt staff. Eating meals with peers. Engaged in groups; tolerates well.

## 2017-07-12 NOTE — BH NOTES
Behavioral Health Interdisciplinary Rounds     Patient Name: Michael Barkley  Age: 65 y.o. Room/Bed:  740/02  Primary Diagnosis: Schizoaffective disorder ([facility] [address]   Admission Status: Involuntary Commitment     Readmission within 30 days: no  Power of  in place: no  Patient requires a blocked bed: no          Reason for blocked bed: na    VTE Prophylaxis: Not indicated  Mobility needs/Fall risk: yes    Nutritional Plan: no  Consults: no         Labs/Testing due today?: no    Sleep hours:  5+      Participation in Care/Groups:  yes  Medication Compliant?: Yes  PRNS (last 24 hours): Antianxiety and Pain    Restraints (last 24 hours):  no  Substance Abuse:  no  CIWA (range last 24 hours): na COWS (range last 24 hours): na  Alcohol screening (AUDIT) completed -     If applicable, date SBIRT discussed in treatment team AND documented: na  Tobacco - patient is a smoker: yes   Date tobacco education completed by RN: 5/29/17  24 hour chart check complete: yes     Patient goal(s) for today: Craft projects  Treatment team focus/goals:  Follow-up on [address]  LOS:  55  Expected LOS: TBD  Psychiatric Advanced Care Directives -  no   Name of Decision maker if patient has Psychiatric Care Directive --none  Patient was offered information --pt declined  Financial concerns/prescription coverage:  Medicaid/Medicare  Date of last family contact:       Family requesting physician contact today:  no  Discharge plan: placement       Outpatient provider(s): TBD    Participating treatment team members: PEGGY Dailey; Dr. Mike Miramontes MD; Rommel Cobb RN; Shelby Moya, UmeshD

## 2017-07-12 NOTE — BH NOTES
1530:  Patient received as she is sitting in the Dining Room watching TV with peers. She greets oncoming staff cordially and proudly shows off the necklace and bracelet set that she made today. Patient states that she was hoping that she would leave today. She voices no other complaints or concerns at this time. Will maintain q 15 minute safety checks.

## 2017-07-12 NOTE — BH NOTES
PRN Medication Documentation    Specific patient behavior that led to need for PRN medication: 0140  Pt requested Ativan PO to help her get back to sleep. Staff interventions attempted prior to PRN being given: Distraction; emotional support; her room is already dark, & she did brp. PRN medication given: 0148  Ativan 1 mg PO  Patient response/effectiveness of PRN medication: Monitoring continues. 0235  Pt laying in bed with eyes closed, breathing easily; appears to be asleep. Monitoring continues.

## 2017-07-13 LAB
GLUCOSE BLD STRIP.AUTO-MCNC: 107 MG/DL (ref 65–100)
GLUCOSE BLD STRIP.AUTO-MCNC: 90 MG/DL (ref 65–100)
SERVICE CMNT-IMP: ABNORMAL
SERVICE CMNT-IMP: NORMAL

## 2017-07-13 PROCEDURE — 82962 GLUCOSE BLOOD TEST: CPT

## 2017-07-13 PROCEDURE — 74011250637 HC RX REV CODE- 250/637: Performed by: PSYCHIATRY & NEUROLOGY

## 2017-07-13 PROCEDURE — 74011250637 HC RX REV CODE- 250/637: Performed by: NURSE PRACTITIONER

## 2017-07-13 PROCEDURE — 65220000003 HC RM SEMIPRIVATE PSYCH

## 2017-07-13 PROCEDURE — 74011250637 HC RX REV CODE- 250/637: Performed by: HOSPITALIST

## 2017-07-13 RX ADMIN — NAPROXEN 250 MG: 250 TABLET ORAL at 17:00

## 2017-07-13 RX ADMIN — ATORVASTATIN CALCIUM 20 MG: 20 TABLET, FILM COATED ORAL at 08:03

## 2017-07-13 RX ADMIN — SITAGLIPTIN 100 MG: 100 TABLET, FILM COATED ORAL at 08:03

## 2017-07-13 RX ADMIN — TRIHEXYPHENIDYL HYDROCHLORIDE 2 MG: 2 TABLET ORAL at 08:04

## 2017-07-13 RX ADMIN — TRIHEXYPHENIDYL HYDROCHLORIDE 2 MG: 2 TABLET ORAL at 17:00

## 2017-07-13 RX ADMIN — PANTOPRAZOLE SODIUM 40 MG: 40 TABLET, DELAYED RELEASE ORAL at 06:38

## 2017-07-13 RX ADMIN — CARBAMAZEPINE 200 MG: 200 TABLET, EXTENDED RELEASE ORAL at 17:00

## 2017-07-13 RX ADMIN — ZOLPIDEM TARTRATE 12.5 MG: 6.25 TABLET, EXTENDED RELEASE ORAL at 21:00

## 2017-07-13 RX ADMIN — DIVALPROEX SODIUM 1000 MG: 500 TABLET, FILM COATED, EXTENDED RELEASE ORAL at 21:00

## 2017-07-13 RX ADMIN — LISINOPRIL 10 MG: 10 TABLET ORAL at 08:01

## 2017-07-13 RX ADMIN — NAPROXEN 250 MG: 250 TABLET ORAL at 08:03

## 2017-07-13 RX ADMIN — TRIHEXYPHENIDYL HYDROCHLORIDE 2 MG: 2 TABLET ORAL at 21:00

## 2017-07-13 RX ADMIN — DOCUSATE SODIUM 100 MG: 100 CAPSULE, LIQUID FILLED ORAL at 08:03

## 2017-07-13 RX ADMIN — ACETAMINOPHEN 650 MG: 325 TABLET, FILM COATED ORAL at 16:15

## 2017-07-13 RX ADMIN — LORAZEPAM 1 MG: 1 TABLET ORAL at 18:01

## 2017-07-13 RX ADMIN — AMLODIPINE BESYLATE 10 MG: 5 TABLET ORAL at 08:03

## 2017-07-13 RX ADMIN — CARBAMAZEPINE 200 MG: 200 TABLET, EXTENDED RELEASE ORAL at 08:04
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
## 2017-07-13 NOTE — BH NOTES
GROUP THERAPY PROGRESS NOTE    Yovany Vásquez did not participate in a Morning Process Group on the Geriatric Unit with a focus on shifting ones feelings to a positive note and preparing for the day through group singing.

## 2017-07-13 NOTE — BH NOTES
PRN Medication Documentation    Specific patient behavior that led to need for PRN medication:anxiety,restless,pt request  Staff interventions attempted prior to PRN being given: coping skills  PRN medication given: ativan  Patient response/effectiveness of PRN medication: good

## 2017-07-13 NOTE — BH NOTES
2929  Pt presently laying in bed with her eyes closed, breathing easily; appears to be asleep. No distress noted. When pt had gotten up during this shift for brp, she said she was thinking about asking for an Ativan to help her get back to sleep, but decided to try to not use it & see if she could sleep on her own. Pt has been steady on her feet using her walker. No distress noted. Pt has been incontinent of urine twice this shift, but cleaned self well. Monitoring continues.

## 2017-07-13 NOTE — BH NOTES
Behavioral Health Interdisciplinary Rounds     Patient Name: Ashley Charles  Age: 65 y.o. Room/Bed:  740/02  Primary Diagnosis: Schizoaffective disorder ([facility]   Admission Status: Involuntary Commitment     Readmission within 30 days: no  Power of  in place: no  Patient requires a blocked bed: no          Reason for blocked bed: na    VTE Prophylaxis: Not indicated  Mobility needs/Fall risk: yes    Nutritional Plan: no  Consults: no         Labs/Testing due today?: no    Sleep hours:  6.25+      Participation in Care/Groups:  yes  Medication Compliant?: Selective  PRNS (last 24 hours): Antianxiety and Pain    Restraints (last 24 hours):  no  Substance Abuse:  no  CIWA (range last 24 hours): na COWS (range last 24 hours): na  Alcohol screening (AUDIT) completed -     If applicable, date SBIRT discussed in treatment team AND documented: na  Tobacco - patient is a smoker: yes   Date tobacco education completed by RN: 5/29/17  24 hour chart check complete: yes     Patient goal(s) for today: Craft projects  Treatment team focus/goals:  Follow up on 2015 [address]  LOS:  56  Expected LOS: TBD  Psychiatric Advanced Care Directives -  no   Name of Decision maker if patient has Psychiatric Care Directive --none  Patient was offered information --pt declined  Financial concerns/prescription coverage:  Medicaid/Medicare  Date of last family contact:       Family requesting physician contact today:  no  Discharge plan: placement       Outpatient provider(s): TBD    Participating treatment team members: Ashley Charles, * (assigned SW),

## 2017-07-13 NOTE — PROGRESS NOTES
[facility] 60  Master Treatment Plan for Edwin Mcginnis    Date Treatment Plan Initiated: 7/13/17    Treatment Plan Modalities:  Type of Modality Amount  (x minutes) Frequency (x/week) Duration (x days) Name of Responsible Staff   [address] meetings to encourage peer interactions [address] psychotherapy to assist in building coping skills and internal controls 60 7 1 Samuel Stoner   Therapeutic activity groups to build coping skills 60 7 1 Samuel Stoner   Psychoeducation in group setting to address:   Medication education   15 7 1 [address]  activities [facility]     Symptom management         Discharge planning   61 2 [address]   Spirituality    61 2 [facility]   60 1 1 volunteer   Recovery/AA/NA         Physician medication management   13 7 1 Dr. Apoorva Gallegos   Family meeting/discharge planning                                              Problem: Falls - Risk of these goals will be met by 7/23/17  Goal: *Absence of falls  Outcome: Progressing Towards Goal  No falls  Goal: *Knowledge of fall prevention  Outcome: Progressing Towards Goal  Using walker, hand rail and bed alarm appropriately    Problem: Altered Thought Process (Adult/Pediatric) these goals will be met by 7/23/17  Goal: *STG: Participates in treatment plan  Outcome: Progressing Towards Goal  Engaged and participating in tx plan. Daily goal is to shower, talk with female peer, watch a game show and talk w group today  Goal: *STG: Remains safe in hospital  Outcome: Progressing Towards Goal  No injury or harm  Goal: *STG: Seeks staff when feelings of anxiety and fear arise  Outcome: Progressing Towards Goal  Denies anxiety, able to recognize feeling jame, gratitude and slight frustration over not having permanent housing yet.    Goal: *STG: Complies with medication therapy  Outcome: Resolved/Met Date Met:  07/13/17  compliant  Goal: *STG: Attends activities and groups  Outcome: Progressing Towards Goal  Engaged and particiaptes  Goal: *STG: Demonstrates ability to understand and use improved judgment in daily activities and relationships  Outcome: Progressing Towards Goal  Increase thought organization, decrease in tangential conversation and thinking, demonstrates appropriate behaviors, independent with self care and asking for assistance appropriately  Goal: Interventions  Outcome: Progressing Towards Goal  Focus is on encouraging progress towards goals, offering support

## 2017-07-13 NOTE — BH NOTES
GROUP THERAPY PROGRESS NOTE    Darcy Anderson participated in a Process Group with a focus identifying feelings, planning for the rest of the day, and reviewing DBT skills for managing emotional distress. Group time: 85 minutes. Personal goal for participation: To increase the capacity to improve ones mood, structure, and coping capacity. Goal orientation: The patient will be able to identify their feelings, develop a plan for structuring their day, and begin to appreciate the possibility of successfully managing distress and other forms of intense emotions. Group therapy participation: With prompting, this patient partially participated in the group. Therapeutic interventions reviewed and discussed: The group members were asked to introduce themselves to each other and to see if they could identify an emotion they are having and/or let the group know what they want to focus on for the day as they continue to make discharge plans. The group members also reviewed five suggested areas of DBT options when experiencing intense emotions: distraction, improve the moment, self-soothe, pros and cons, and radical acceptance. They were asked to take turns reading from the handout and discussing its contents. At the end of group the patients were provided a summary of the topics covered. Impression of participation: The patient said she was \"feeling good\" and that she wanted to focus on not isolating. She also said she wanted to communicate with her team  to find out if there were any additional information in regards to her new FILIBERTO. Her affect was mildly euphoric, with some anxiety. Her mood was cooperative. She appeared alert and generally oriented. The patient expressed no SI/HI and no overt psychotic symptoms. She left the group after the first twenty minutes saying she was going back to the Geriatric Unit.

## 2017-07-13 NOTE — PROGRESS NOTES
Problem: Altered Thought Process (Adult/Pediatric)  Goal: *STG: Complies with medication therapy  Outcome: Progressing Towards Goal  Patient has been pleasant/cooperative, medication and meal compliant and visible on the unit throughout this shift. She has been appropriately interactive with staff and peers.

## 2017-07-13 NOTE — BH NOTES
1530:  Patient received as she is sitting in the [address] She greets oncoming staff cordially, making jokes and teasing staff. She voices no complaints or concerns at this time. Will maintain q 15 minute safety checks.

## 2017-07-14 LAB
GLUCOSE BLD STRIP.AUTO-MCNC: 141 MG/DL (ref 65–100)
GLUCOSE BLD STRIP.AUTO-MCNC: 97 MG/DL (ref 65–100)
SERVICE CMNT-IMP: ABNORMAL
SERVICE CMNT-IMP: NORMAL

## 2017-07-14 PROCEDURE — 82962 GLUCOSE BLOOD TEST: CPT

## 2017-07-14 PROCEDURE — 74011250637 HC RX REV CODE- 250/637: Performed by: PSYCHIATRY & NEUROLOGY

## 2017-07-14 PROCEDURE — 65220000003 HC RM SEMIPRIVATE PSYCH

## 2017-07-14 PROCEDURE — 74011250637 HC RX REV CODE- 250/637: Performed by: HOSPITALIST

## 2017-07-14 PROCEDURE — 74011250637 HC RX REV CODE- 250/637: Performed by: NURSE PRACTITIONER

## 2017-07-14 RX ADMIN — DOCUSATE SODIUM 100 MG: 100 CAPSULE, LIQUID FILLED ORAL at 10:10

## 2017-07-14 RX ADMIN — LISINOPRIL 10 MG: 10 TABLET ORAL at 10:10

## 2017-07-14 RX ADMIN — CARBAMAZEPINE 200 MG: 200 TABLET, EXTENDED RELEASE ORAL at 10:13

## 2017-07-14 RX ADMIN — PANTOPRAZOLE SODIUM 40 MG: 40 TABLET, DELAYED RELEASE ORAL at 07:09

## 2017-07-14 RX ADMIN — ATORVASTATIN CALCIUM 20 MG: 20 TABLET, FILM COATED ORAL at 10:10

## 2017-07-14 RX ADMIN — CARBAMAZEPINE 200 MG: 200 TABLET, EXTENDED RELEASE ORAL at 17:59

## 2017-07-14 RX ADMIN — TRIHEXYPHENIDYL HYDROCHLORIDE 2 MG: 2 TABLET ORAL at 17:59

## 2017-07-14 RX ADMIN — DOCUSATE SODIUM 100 MG: 100 CAPSULE, LIQUID FILLED ORAL at 17:59

## 2017-07-14 RX ADMIN — TRIHEXYPHENIDYL HYDROCHLORIDE 2 MG: 2 TABLET ORAL at 20:48

## 2017-07-14 RX ADMIN — ACETAMINOPHEN 650 MG: 325 TABLET, FILM COATED ORAL at 15:10

## 2017-07-14 RX ADMIN — ZOLPIDEM TARTRATE 12.5 MG: 6.25 TABLET, EXTENDED RELEASE ORAL at 20:46

## 2017-07-14 RX ADMIN — TRIHEXYPHENIDYL HYDROCHLORIDE 2 MG: 2 TABLET ORAL at 10:13

## 2017-07-14 RX ADMIN — SITAGLIPTIN 100 MG: 100 TABLET, FILM COATED ORAL at 10:10

## 2017-07-14 RX ADMIN — AMLODIPINE BESYLATE 10 MG: 5 TABLET ORAL at 10:09

## 2017-07-14 RX ADMIN — NAPROXEN 250 MG: 250 TABLET ORAL at 10:10

## 2017-07-14 RX ADMIN — NAPROXEN 250 MG: 250 TABLET ORAL at 17:59

## 2017-07-14 RX ADMIN — DIVALPROEX SODIUM 1000 MG: 500 TABLET, FILM COATED, EXTENDED RELEASE ORAL at 20:46

## 2017-07-14 NOTE — BH NOTES
GROUP THERAPY PROGRESS NOTE    Destiney Found did not participate in a Morning Process Group on the Geriatric Unit, with a focus on identifying feelings and planning for the day.

## 2017-07-14 NOTE — BH NOTES
1545:  Patient initially received as she is resting in bed. She greets oncoming staff cordially and voices no concerns/complaints. Later, she is sitting in the [address] watching TV and chatting with peers. She is pleasant and cooperative with no voiced complaints or concerns at this time. Will maintain q 15 minute safety checks.

## 2017-07-14 NOTE — PROGRESS NOTES
Problem: Falls - Risk of  Goal: *Absence of falls  Outcome: Progressing Towards Goal  Using walker appropriately no falls  Goal: *Knowledge of fall prevention  Outcome: Progressing Towards Goal  Using hand rail, bed alarm and walker appropriately    Problem: Altered Thought Process (Adult/Pediatric)  Goal: *STG: Participates in treatment plan  Outcome: Progressing Towards Goal  Engaged and participating in tx plan. Hopeful and grateful and easily shares w staff. Daily goal is to increase walking and socialization w peers  Goal: *STG: Remains safe in hospital  Outcome: Progressing Towards Goal  No harm or injury  Goal: *STG: Seeks staff when feelings of anxiety and fear arise  Outcome: Progressing Towards Goal  Denies anxiety or fear  Goal: *STG: Attends activities and groups  Outcome: Progressing Towards Goal  Engaged and participating  Goal: *STG: Demonstrates ability to understand and use improved judgment in daily activities and relationships  Outcome: Progressing Towards Goal  Improve thought process and does not voice delusional thinking at this time. Demonstrates appropriate behaviors and ability to follow staff direction.

## 2017-07-14 NOTE — PROGRESS NOTES
Problem: Altered Thought Process (Adult/Pediatric)  Goal: *STG: Attends activities and groups  Outcome: Progressing Towards Goal  Patient has been visible on the unit throughout this shift. She has been med/meal compliant and appropriately interactive with staff and peers.

## 2017-07-14 NOTE — BH NOTES
Behavioral Health Interdisciplinary Rounds     Patient Name: Genaro Prakash  Age: 65 y.o. Room/Bed:  740/02  Primary [facility]: [facility]   Admission Status: Involuntary Commitment     Readmission within 30 days: no  Power of  in place: no  Patient requires a blocked bed: no          Reason for blocked bed: na    VTE Prophylaxis: Not indicated  Mobility needs/Fall risk: yes    Nutritional Plan: no  Consults: no         Labs/Testing due today?: no    Sleep hours:  6.5+      Participation in Care/Groups:  yes  Medication Compliant?: selective  PRNS (last 24 hours): Antianxiety and Pain    Restraints (last 24 hours):  no  Substance Abuse:  no  CIWA (range last 24 hours): na COWS (range last 24 hours): na  Alcohol screening (AUDIT) completed -     If applicable, date SBIRT discussed in treatment team AND documented: na  Tobacco - patient is a smoker: yes   Date tobacco education completed by RN: 5/29/17  24 hour chart check complete: yes     Patient goal(s) for today: Craft projects  Treatment team focus/goals:  Follow up on 2015 [address]  LOS:  57  Expected LOS: TBD  Psychiatric [address] -  no   Name of Decision maker if patient has Psychiatric Care Directive --none  Patient was offered information --pt declined  Financial concerns/prescription coverage:  Medicaid/Medicare  Date of last family contact:       Family requesting physician contact today:  no  Discharge plan: placement       Outpatient provider(s): TBD    Participating treatment team members: Will Hoffman MSW; Dr. Tico Porter MD; Kristel Green RN; Sarita Santiago, UmeshD

## 2017-07-15 LAB
GLUCOSE BLD STRIP.AUTO-MCNC: 106 MG/DL (ref 65–100)
GLUCOSE BLD STRIP.AUTO-MCNC: 80 MG/DL (ref 65–100)
SERVICE CMNT-IMP: ABNORMAL
SERVICE CMNT-IMP: NORMAL

## 2017-07-15 PROCEDURE — 82962 GLUCOSE BLOOD TEST: CPT

## 2017-07-15 PROCEDURE — 74011250637 HC RX REV CODE- 250/637: Performed by: PSYCHIATRY & NEUROLOGY

## 2017-07-15 PROCEDURE — 74011250637 HC RX REV CODE- 250/637: Performed by: NURSE PRACTITIONER

## 2017-07-15 PROCEDURE — 74011250637 HC RX REV CODE- 250/637: Performed by: HOSPITALIST

## 2017-07-15 PROCEDURE — 65220000003 HC RM SEMIPRIVATE PSYCH

## 2017-07-15 RX ADMIN — DOCUSATE SODIUM 100 MG: 100 CAPSULE, LIQUID FILLED ORAL at 08:42

## 2017-07-15 RX ADMIN — NAPROXEN 250 MG: 250 TABLET ORAL at 08:42

## 2017-07-15 RX ADMIN — ACETAMINOPHEN 650 MG: 325 TABLET, FILM COATED ORAL at 12:28

## 2017-07-15 RX ADMIN — TRIHEXYPHENIDYL HYDROCHLORIDE 2 MG: 2 TABLET ORAL at 08:46

## 2017-07-15 RX ADMIN — SITAGLIPTIN 100 MG: 100 TABLET, FILM COATED ORAL at 08:42

## 2017-07-15 RX ADMIN — DIVALPROEX SODIUM 1000 MG: 500 TABLET, FILM COATED, EXTENDED RELEASE ORAL at 21:19

## 2017-07-15 RX ADMIN — CARBAMAZEPINE 200 MG: 200 TABLET, EXTENDED RELEASE ORAL at 17:01

## 2017-07-15 RX ADMIN — ATORVASTATIN CALCIUM 20 MG: 20 TABLET, FILM COATED ORAL at 08:42

## 2017-07-15 RX ADMIN — DOCUSATE SODIUM 100 MG: 100 CAPSULE, LIQUID FILLED ORAL at 17:00

## 2017-07-15 RX ADMIN — TRIHEXYPHENIDYL HYDROCHLORIDE 2 MG: 2 TABLET ORAL at 17:00

## 2017-07-15 RX ADMIN — NAPROXEN 250 MG: 250 TABLET ORAL at 17:00

## 2017-07-15 RX ADMIN — ZOLPIDEM TARTRATE 12.5 MG: 6.25 TABLET, EXTENDED RELEASE ORAL at 21:18

## 2017-07-15 RX ADMIN — TRIHEXYPHENIDYL HYDROCHLORIDE 2 MG: 2 TABLET ORAL at 21:18

## 2017-07-15 RX ADMIN — CARBAMAZEPINE 200 MG: 200 TABLET, EXTENDED RELEASE ORAL at 08:46

## 2017-07-15 RX ADMIN — LISINOPRIL 10 MG: 10 TABLET ORAL at 08:42

## 2017-07-15 RX ADMIN — PANTOPRAZOLE SODIUM 40 MG: 40 TABLET, DELAYED RELEASE ORAL at 07:03

## 2017-07-15 RX ADMIN — AMLODIPINE BESYLATE 10 MG: 5 TABLET ORAL at 08:42

## 2017-07-15 NOTE — INTERDISCIPLINARY ROUNDS
Behavioral Health Interdisciplinary Rounds     Patient Name: Basilio Kuhn  Age: 65 y.o.   Room/Bed:  740/02  Primary Diagnosis: Schizoaffective disorder ([facility] [address]   Admission Status: Involuntary Commitment     Readmission within 30 days: no  Power of  in place: no  Patient requires a blocked bed: no          Reason for blocked bed:     VTE Prophylaxis: Not indicated  Mobility needs/Fall risk: yes    Nutritional Plan: no  Consults:          Labs/Testing due today?: no    Sleep hours:  6.25      Participation in Care/Groups:  no  Medication Compliant?: Yes  PRNS (last 24 hours): Pain    Restraints (last 24 hours):  no  Substance Abuse:  no  CIWA (range last 24 hours):  COWS (range last 24 hours):   Alcohol screening (AUDIT) completed -     If applicable, date SBIRT discussed in treatment team AND documented:   Tobacco - patient is a smoker: yes      Date tobacco education completed by RN: 5/29/17  24 hour chart check complete: yes     Patient goal(s) for today:   Treatment team focus/goals:   LOS:  58  Expected LOS:   [address] -  no   Name of Decision maker if patient has Psychiatric Care Directive   Patient was offered information   Financial concerns/prescription coverage:    Date of last family contact:       Family requesting physician contact today:   Discharge plan:        Outpatient provider(s):    Participating treatment team members: Basilio Kuhn

## 2017-07-15 NOTE — PROGRESS NOTES
Problem: Altered Thought Process (Adult/Pediatric)  Goal: *STG: Participates in treatment plan  Outcome: Progressing Towards Goal  Pt is labile, cooperative. Appropriate, social with staff and peers. Stares intently at roommate. Sad affect, pt has been isolating to room. Med and meal compliant, seeks staff for needs.

## 2017-07-15 NOTE — PROGRESS NOTES
Problem: Altered Thought Process (Adult/Pediatric)  Goal: *STG: Remains safe in hospital  Outcome: Progressing Towards Goal  2300- Received patient resting quietly in bed with eyes closed. Respirations even and unlabored, NAD noted. Will monitor every 15 minutes.

## 2017-07-15 NOTE — PROGRESS NOTES
Problem: Altered Thought Process (Adult/Pediatric)  Goal: *STG: Seeks staff when feelings of anxiety and fear arise  Outcome: Progressing Towards Goal  When confronted with an agitated, combative patient on the unit this evening, this patient demonstrated good insight and coping skills by stating that she was going to her room to get out of the [address] for a while until things settled down.

## 2017-07-15 NOTE — BH NOTES
0715 Received patient resting in bed with eyes open. A & O x 4. NAD. Greeted on-coming staff with a smile. On q 15 min. Safety checks.

## 2017-07-15 NOTE — PROGRESS NOTES
Problem: Falls - Risk of  Goal: *Absence of falls  Outcome: Progressing Towards Goal  Patient remains free from falls. Will continue to monitor and provide support as needed. Problem: Altered Thought Process (Adult/Pediatric)  Goal: *STG: Participates in treatment plan  Outcome: Progressing Towards Goal  Patient denies SI/HI. Patient is out of her room intermittently with walker. Patient engages with staff and peers and is able to verbalize needs appropriately. Goal: *STG: Attends activities and groups  Outcome: Progressing Towards Goal  Patient attended group this morning. Will continue to encourage group attendance and participation in unit activities. 12:28  PRN Medication Documentation    Specific patient behavior that led to need for PRN medication: patient c/o pain 10/10 lower abdomen and headache  Staff interventions attempted prior to PRN being given: distraction, relaxation  PRN medication given: PRN Tylenol 650 mg PO  Patient response/effectiveness of PRN medication: Patient reports improvement in discomfort.
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
## 2017-07-16 LAB
GLUCOSE BLD STRIP.AUTO-MCNC: 103 MG/DL (ref 65–100)
GLUCOSE BLD STRIP.AUTO-MCNC: 93 MG/DL (ref 65–100)
SERVICE CMNT-IMP: ABNORMAL
SERVICE CMNT-IMP: NORMAL

## 2017-07-16 PROCEDURE — 82962 GLUCOSE BLOOD TEST: CPT

## 2017-07-16 PROCEDURE — 74011250637 HC RX REV CODE- 250/637: Performed by: NURSE PRACTITIONER

## 2017-07-16 PROCEDURE — 74011250637 HC RX REV CODE- 250/637: Performed by: PSYCHIATRY & NEUROLOGY

## 2017-07-16 PROCEDURE — 74011250637 HC RX REV CODE- 250/637: Performed by: HOSPITALIST

## 2017-07-16 PROCEDURE — 65220000003 HC RM SEMIPRIVATE PSYCH

## 2017-07-16 RX ADMIN — TRIHEXYPHENIDYL HYDROCHLORIDE 2 MG: 2 TABLET ORAL at 09:03

## 2017-07-16 RX ADMIN — CARBAMAZEPINE 200 MG: 200 TABLET, EXTENDED RELEASE ORAL at 09:03

## 2017-07-16 RX ADMIN — CARBAMAZEPINE 200 MG: 200 TABLET, EXTENDED RELEASE ORAL at 17:11

## 2017-07-16 RX ADMIN — ATORVASTATIN CALCIUM 20 MG: 20 TABLET, FILM COATED ORAL at 09:03

## 2017-07-16 RX ADMIN — ACETAMINOPHEN 650 MG: 325 TABLET, FILM COATED ORAL at 11:55

## 2017-07-16 RX ADMIN — NAPROXEN 250 MG: 250 TABLET ORAL at 09:02

## 2017-07-16 RX ADMIN — LORAZEPAM 1 MG: 1 TABLET ORAL at 00:14

## 2017-07-16 RX ADMIN — ACETAMINOPHEN 650 MG: 325 TABLET, FILM COATED ORAL at 06:41

## 2017-07-16 RX ADMIN — PANTOPRAZOLE SODIUM 40 MG: 40 TABLET, DELAYED RELEASE ORAL at 06:42

## 2017-07-16 RX ADMIN — LISINOPRIL 10 MG: 10 TABLET ORAL at 09:03

## 2017-07-16 RX ADMIN — TRIHEXYPHENIDYL HYDROCHLORIDE 2 MG: 2 TABLET ORAL at 17:09

## 2017-07-16 RX ADMIN — DOCUSATE SODIUM 100 MG: 100 CAPSULE, LIQUID FILLED ORAL at 09:02

## 2017-07-16 RX ADMIN — DIVALPROEX SODIUM 1000 MG: 500 TABLET, FILM COATED, EXTENDED RELEASE ORAL at 20:59

## 2017-07-16 RX ADMIN — TRIHEXYPHENIDYL HYDROCHLORIDE 2 MG: 2 TABLET ORAL at 22:25

## 2017-07-16 RX ADMIN — SITAGLIPTIN 100 MG: 100 TABLET, FILM COATED ORAL at 09:03

## 2017-07-16 RX ADMIN — ZOLPIDEM TARTRATE 12.5 MG: 6.25 TABLET, EXTENDED RELEASE ORAL at 21:00

## 2017-07-16 RX ADMIN — AMLODIPINE BESYLATE 10 MG: 5 TABLET ORAL at 09:03

## 2017-07-16 RX ADMIN — NAPROXEN 250 MG: 250 TABLET ORAL at 17:09

## 2017-07-16 NOTE — PROGRESS NOTES
Problem: Altered Thought Process (Adult/Pediatric)  Goal: *STG: Participates in treatment plan  Outcome: Progressing Towards Goal  Pt is sad with congruent affect. States her mood is related to her length of stay. Pleasant, appropriate, and engaged with peers. Medication compliant. Verbalizes no delusions or hallucinations, denies SI.    2038: Pt brighter this evening, socializing, eating, participating in activities.

## 2017-07-16 NOTE — BH NOTES
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       Signed By:   Marycruz Cox MD  7/16/2017

## 2017-07-16 NOTE — BH NOTES
PRN Medication Documentation  11:55  Specific patient behavior that led to need for PRN medication: patient c/o headache  Staff interventions attempted prior to PRN being given: relaxation, distraction  PRN medication given: Tylenol 650 mg PO PRN  Patient response/effectiveness of PRN medication: Patient was able to take a nap and after waking remarked that she felt better.

## 2017-07-16 NOTE — PROGRESS NOTES
Problem: Falls - Risk of  Goal: *Absence of falls  Outcome: Progressing Towards Goal  Patient has not experienced any falls. Problem: Altered Thought Process (Adult/Pediatric)  Goal: *STG: Participates in treatment plan  Outcome: Progressing Towards Goal  Patient participates in treatment team and plans. She is anxious to be discharged to the [facility] area closer to home. Goal: *STG: Remains safe in hospital  Outcome: Progressing Towards Goal  Patient has been safe on unit. Goal: *STG: Seeks staff when feelings of anxiety and fear arise  Outcome: Progressing Towards Goal  Patient does seek out staff when anxious. Goal: *STG: Attends activities and groups  Outcome: Progressing Towards Goal  Attends  Groups and activities.

## 2017-07-16 NOTE — BH NOTES
0715 Received patient resting in bed with eyes open. Greeted on-coming staff with a smile. NAD. On q 15. min safety checks. 1120 Patient has been visible in DR, socializing appropriately with peers. VS taken and WNL. Med compliant. Patient is watching Yazidism hour on TV.

## 2017-07-16 NOTE — INTERDISCIPLINARY ROUNDS
Behavioral Health Interdisciplinary Rounds     Patient Name: Darcy Anderson  Age: 65 y.o. Room/Bed:  740/02  Primary Diagnosis: [facility]   Admission Status: Involuntary Commitment     Readmission within 30 days: no  Power of  in place: no  Patient requires a blocked bed: no          Reason for blocked bed:     VTE Prophylaxis: Not indicated  Mobility needs/Fall risk: yes    Nutritional Plan: no  Consults:         Labs/Testing due today?: no    Sleep hours: 6 hours        Participation in Care/Groups:  no  Medication Compliant?: Yes  PRNS (last 24 hours):  Antianxiety    Restraints (last 24 hours):  no  Substance Abuse:  no  CIWA (range last 24 hours):  COWS (range last 24 hours):   Alcohol screening (AUDIT) completed -     If applicable, date SBIRT discussed in treatment team AND documented:   Tobacco - patient is a smoker: yes   Date tobacco education completed by RN: 5/29/`17  24 hour chart check complete: yes    Patient goal(s) for today:  Treatment team focus/goals:   LOS:  59  Expected LOS:   [address] -    Name of Decision maker if patient has Psychiatric Care Directive   Patient was offered information  Financial concerns/prescription coverage:   Date of last family contact:      Family requesting physician contact today  Discharge plan:        Outpatient provider(s):     Participating treatment team members: Darcy Anderson, * (assigned SW),

## 2017-07-16 NOTE — BH NOTES
PRN Medication Documentation  MEWS=1  Specific patient behavior that led to need for PRN medication: Pt request for Ativan   Staff interventions attempted prior to PRN being given:Verbalize thoughts  =  states she is 2 days from being in the hospital 2 months and no place to go yet .  Kaden Márquez are taking good care of me here \"   PRN medication given:Ativan 1 mg po    Patient response/effectiveness of PRN medication: good results noted , sleep hours documented
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
## 2017-07-17 LAB
GLUCOSE BLD STRIP.AUTO-MCNC: 100 MG/DL (ref 65–100)
GLUCOSE BLD STRIP.AUTO-MCNC: 83 MG/DL (ref 65–100)
SERVICE CMNT-IMP: NORMAL
SERVICE CMNT-IMP: NORMAL

## 2017-07-17 PROCEDURE — 74011250637 HC RX REV CODE- 250/637: Performed by: PSYCHIATRY & NEUROLOGY

## 2017-07-17 PROCEDURE — 65220000003 HC RM SEMIPRIVATE PSYCH

## 2017-07-17 PROCEDURE — 74011250637 HC RX REV CODE- 250/637: Performed by: HOSPITALIST

## 2017-07-17 PROCEDURE — 82962 GLUCOSE BLOOD TEST: CPT

## 2017-07-17 PROCEDURE — 74011250637 HC RX REV CODE- 250/637: Performed by: NURSE PRACTITIONER

## 2017-07-17 RX ADMIN — PANTOPRAZOLE SODIUM 40 MG: 40 TABLET, DELAYED RELEASE ORAL at 06:45

## 2017-07-17 RX ADMIN — ZOLPIDEM TARTRATE 12.5 MG: 6.25 TABLET, EXTENDED RELEASE ORAL at 21:29

## 2017-07-17 RX ADMIN — SITAGLIPTIN 100 MG: 100 TABLET, FILM COATED ORAL at 10:02

## 2017-07-17 RX ADMIN — NAPROXEN 250 MG: 250 TABLET ORAL at 17:18

## 2017-07-17 RX ADMIN — NAPROXEN 250 MG: 250 TABLET ORAL at 10:02

## 2017-07-17 RX ADMIN — DOCUSATE SODIUM 100 MG: 100 CAPSULE, LIQUID FILLED ORAL at 10:02

## 2017-07-17 RX ADMIN — CARBAMAZEPINE 200 MG: 200 TABLET, EXTENDED RELEASE ORAL at 17:18

## 2017-07-17 RX ADMIN — ACETAMINOPHEN 650 MG: 325 TABLET, FILM COATED ORAL at 02:42

## 2017-07-17 RX ADMIN — LISINOPRIL 10 MG: 10 TABLET ORAL at 10:02

## 2017-07-17 RX ADMIN — TRIHEXYPHENIDYL HYDROCHLORIDE 2 MG: 2 TABLET ORAL at 17:17

## 2017-07-17 RX ADMIN — CARBAMAZEPINE 200 MG: 200 TABLET, EXTENDED RELEASE ORAL at 10:02

## 2017-07-17 RX ADMIN — AMLODIPINE BESYLATE 10 MG: 5 TABLET ORAL at 10:03

## 2017-07-17 RX ADMIN — DIVALPROEX SODIUM 1000 MG: 500 TABLET, FILM COATED, EXTENDED RELEASE ORAL at 21:29

## 2017-07-17 RX ADMIN — LORAZEPAM 1 MG: 1 TABLET ORAL at 02:42

## 2017-07-17 RX ADMIN — TRIHEXYPHENIDYL HYDROCHLORIDE 2 MG: 2 TABLET ORAL at 10:02

## 2017-07-17 RX ADMIN — ACETAMINOPHEN 650 MG: 325 TABLET, FILM COATED ORAL at 14:44

## 2017-07-17 RX ADMIN — TRIHEXYPHENIDYL HYDROCHLORIDE 2 MG: 2 TABLET ORAL at 21:29

## 2017-07-17 RX ADMIN — ATORVASTATIN CALCIUM 20 MG: 20 TABLET, FILM COATED ORAL at 10:02

## 2017-07-17 NOTE — INTERDISCIPLINARY ROUNDS
Behavioral Health Interdisciplinary Rounds     Patient Name: Merced Minaya  Age: 65 y.o. Room/Bed:  740/02  Primary Diagnosis: [facility]   Admission Status: Involuntary Commitment     Readmission within 30 days: no  Power of  in place: no  Patient requires a blocked bed: no          Reason for blocked bed: n/a    VTE Prophylaxis: Not indicated  Mobility needs/Fall risk: yes    Nutritional Plan: no  Consults: no         Labs/Testing due today?: no    Sleep hours: 5.5       Participation in Care/Groups:  yes  Medication Compliant?: Yes  PRNS (last 24 hours): Pain, Anxiety  Restraints (last 24 hours):  no  Substance Abuse:  no  CIWA (range last 24 hours):  COWS (range last 24 hours):   Alcohol screening (AUDIT) completed -     If applicable, date SBIRT discussed in treatment team AND documented:   Tobacco - patient is a smoker: yes   Date tobacco education completed by RN: 5/29/17  24 hour chart check complete: yes     Patient goal(s) for today:   Treatment team focus/goals: Visit tomorrow from [facility]  LOS:  60  Expected LOS: TBD  Psychiatric Advanced Care Directives -  Yes  Name of Decision maker if patient has Psychiatric Care Directive: Dustin Rodriguez  Patient was offered information, patient declined. Financial concerns/prescription coverage: Medicaid;  Medicare  Date of last family contact: 7/14 SW spoke to daughter    Family requesting physician contact today: No  Discharge plan: Placement      Outpatient provider(s): TBD    Participating treatment team members: PEGGY Barrett; Dr. Demarcus Hurtado MD; Dimple Do RN; Leanen Byrne, UmeshD

## 2017-07-17 NOTE — BH NOTES
GROUP THERAPY PROGRESS NOTE    Merced Minaya did not participate in a Morning Process Group on the Geriatric Unit with a focus on shifting ones feelings to a positive note and preparing for the day through group singing.

## 2017-07-17 NOTE — BH NOTES
GROUP THERAPY PROGRESS NOTE    Hezekiah Fothergill attended an afternoon General Unit Group Therapy session on the topic of \"Nurturing Yourself. \"     Group time: 50 minutes. Personal goal for participation: Develop more coping skills in regards to self-care. Goal orientation: The group members were asked to speak to the eight areas (physical nurturing, adult relationships, my voice, creativity, self-compassion, contributions/meaning, and limit setting) in the nurturing-self wheel. Group therapy participation: With prompting, the patient partially participated in the session. Therapeutic interventions reviewed and discussed: At the beginning of this group, the members were provided a summary of the information discussed during the session. Impression of participation: This patient said admitted she had not always taken good care of her physical needs and her adult relationships. She added that she did not think she had trouble claiming her authentic voice and that she is much better at speaking up for her needs than she was earlier in her life. She needed to be directed a couple of times from starting sidebar conversations but took the redirection appropriately.

## 2017-07-17 NOTE — PROGRESS NOTES
Problem: Falls - Risk of  Goal: *Absence of falls  Outcome: Progressing Towards Goal  2315 Pt appears asleep in bed. Respirations even and unlabored. Bed alarm on, call bell within reach. Will continue to monitor with Q 15 safety checks. 0245 PRN Medication Documentation    Specific patient behavior that led to need for PRN medication: knee/head pain, anxiety  Staff interventions attempted prior to PRN being given: calm environment, therapeutic listening, reposition  PRN medication given: Tylenol 650 mg po, Ativan 1 mg po  Patient response/effectiveness of PRN medication: 0335 pt appears asleep in bed. Respirations even and unlabored.

## 2017-07-17 NOTE — PROGRESS NOTES
Problem: Falls - Risk of  Goal: *Absence of falls  Outcome: Progressing Towards Goal  Pt visible in milieu. Pleasant, appropriate, cooperative. States she is more optimistic about having placement found for her. Spends time on general unit. Ambulates steadily with walker. Remains free of falls this shift thus far. Will continue to monitor.

## 2017-07-17 NOTE — PROGRESS NOTES
Problem: Falls - Risk of  Goal: *Absence of falls  Outcome: Progressing Towards Goal  Patient remains absent of falls, smiling, out on the general unit talking with peers, playing piano. Patient is med and meal compliant, remains on Q15 min safety checks.

## 2017-07-17 NOTE — PROGRESS NOTES
Problem: Altered Thought Process (Adult/Pediatric)  Goal: *STG: Participates in treatment plan  Outcome: Progressing Towards Goal  Patient participates in treatment team. A & O x 4. Reviewed meds. Med and meal compliant. Patient reported \"I am happy that  is finding me a place to live\". Attended groups on General unit. Played piano on general unit. Pleasant, polite, and cooperative.

## 2017-07-18 LAB
ALBUMIN SERPL BCP-MCNC: 3.4 G/DL (ref 3.5–5)
ALBUMIN/GLOB SERPL: 1 {RATIO} (ref 1.1–2.2)
ALP SERPL-CCNC: 70 U/L (ref 45–117)
ALT SERPL-CCNC: 14 U/L (ref 12–78)
ANION GAP BLD CALC-SCNC: 8 MMOL/L (ref 5–15)
AST SERPL W P-5'-P-CCNC: 9 U/L (ref 15–37)
BILIRUB SERPL-MCNC: 0.2 MG/DL (ref 0.2–1)
BUN SERPL-MCNC: 25 MG/DL (ref 6–20)
BUN/CREAT SERPL: 25 (ref 12–20)
CALCIUM SERPL-MCNC: 8.4 MG/DL (ref 8.5–10.1)
CARBAMAZEPINE SERPL-MCNC: 6.8 UG/ML (ref 4–12)
CHLORIDE SERPL-SCNC: 108 MMOL/L (ref 97–108)
CO2 SERPL-SCNC: 27 MMOL/L (ref 21–32)
CREAT SERPL-MCNC: 1 MG/DL (ref 0.55–1.02)
ERYTHROCYTE [DISTWIDTH] IN BLOOD BY AUTOMATED COUNT: 12.9 % (ref 11.5–14.5)
GLOBULIN SER CALC-MCNC: 3.3 G/DL (ref 2–4)
GLUCOSE BLD STRIP.AUTO-MCNC: 102 MG/DL (ref 65–100)
GLUCOSE BLD STRIP.AUTO-MCNC: 90 MG/DL (ref 65–100)
GLUCOSE SERPL-MCNC: 93 MG/DL (ref 65–100)
HCT VFR BLD AUTO: 35.8 % (ref 35–47)
HGB BLD-MCNC: 11.8 G/DL (ref 11.5–16)
MCH RBC QN AUTO: 33.4 PG (ref 26–34)
MCHC RBC AUTO-ENTMCNC: 33 G/DL (ref 30–36.5)
MCV RBC AUTO: 101.4 FL (ref 80–99)
PLATELET # BLD AUTO: 165 K/UL (ref 150–400)
POTASSIUM SERPL-SCNC: 4.2 MMOL/L (ref 3.5–5.1)
PROT SERPL-MCNC: 6.7 G/DL (ref 6.4–8.2)
RBC # BLD AUTO: 3.53 M/UL (ref 3.8–5.2)
SERVICE CMNT-IMP: ABNORMAL
SERVICE CMNT-IMP: NORMAL
SODIUM SERPL-SCNC: 143 MMOL/L (ref 136–145)
VALPROATE SERPL-MCNC: 78 UG/ML (ref 50–100)
WBC # BLD AUTO: 4.4 K/UL (ref 3.6–11)

## 2017-07-18 PROCEDURE — 36415 COLL VENOUS BLD VENIPUNCTURE: CPT | Performed by: PSYCHIATRY & NEUROLOGY

## 2017-07-18 PROCEDURE — 85027 COMPLETE CBC AUTOMATED: CPT | Performed by: PSYCHIATRY & NEUROLOGY

## 2017-07-18 PROCEDURE — 74011250637 HC RX REV CODE- 250/637: Performed by: HOSPITALIST

## 2017-07-18 PROCEDURE — 65220000003 HC RM SEMIPRIVATE PSYCH

## 2017-07-18 PROCEDURE — 74011250637 HC RX REV CODE- 250/637: Performed by: NURSE PRACTITIONER

## 2017-07-18 PROCEDURE — 80053 COMPREHEN METABOLIC PANEL: CPT | Performed by: PSYCHIATRY & NEUROLOGY

## 2017-07-18 PROCEDURE — 74011250637 HC RX REV CODE- 250/637: Performed by: PSYCHIATRY & NEUROLOGY

## 2017-07-18 PROCEDURE — 80164 ASSAY DIPROPYLACETIC ACD TOT: CPT | Performed by: PSYCHIATRY & NEUROLOGY

## 2017-07-18 PROCEDURE — 82962 GLUCOSE BLOOD TEST: CPT

## 2017-07-18 PROCEDURE — 80156 ASSAY CARBAMAZEPINE TOTAL: CPT | Performed by: PSYCHIATRY & NEUROLOGY

## 2017-07-18 RX ORDER — FLUPHENAZINE DECANOATE 25 MG/ML
25 INJECTION, SOLUTION INTRAMUSCULAR; SUBCUTANEOUS EVERY 2 WEEKS
Status: DISCONTINUED | OUTPATIENT
Start: 2017-07-21 | End: 2017-08-16 | Stop reason: HOSPADM

## 2017-07-18 RX ADMIN — TRIHEXYPHENIDYL HYDROCHLORIDE 2 MG: 2 TABLET ORAL at 09:43

## 2017-07-18 RX ADMIN — SITAGLIPTIN 100 MG: 100 TABLET, FILM COATED ORAL at 09:43

## 2017-07-18 RX ADMIN — DIVALPROEX SODIUM 1000 MG: 500 TABLET, FILM COATED, EXTENDED RELEASE ORAL at 21:02

## 2017-07-18 RX ADMIN — TRIHEXYPHENIDYL HYDROCHLORIDE 2 MG: 2 TABLET ORAL at 21:02

## 2017-07-18 RX ADMIN — LISINOPRIL 10 MG: 10 TABLET ORAL at 09:43

## 2017-07-18 RX ADMIN — ZOLPIDEM TARTRATE 12.5 MG: 6.25 TABLET, EXTENDED RELEASE ORAL at 21:02

## 2017-07-18 RX ADMIN — CARBAMAZEPINE 200 MG: 200 TABLET, EXTENDED RELEASE ORAL at 09:44

## 2017-07-18 RX ADMIN — ATORVASTATIN CALCIUM 20 MG: 20 TABLET, FILM COATED ORAL at 09:44

## 2017-07-18 RX ADMIN — TRIHEXYPHENIDYL HYDROCHLORIDE 2 MG: 2 TABLET ORAL at 16:42

## 2017-07-18 RX ADMIN — NAPROXEN 250 MG: 250 TABLET ORAL at 16:42

## 2017-07-18 RX ADMIN — ACETAMINOPHEN 650 MG: 325 TABLET, FILM COATED ORAL at 14:24

## 2017-07-18 RX ADMIN — CARBAMAZEPINE 200 MG: 200 TABLET, EXTENDED RELEASE ORAL at 17:27

## 2017-07-18 RX ADMIN — POLYETHYLENE GLYCOL 3350 17 G: 17 POWDER, FOR SOLUTION ORAL at 09:43

## 2017-07-18 RX ADMIN — NAPROXEN 250 MG: 250 TABLET ORAL at 09:44

## 2017-07-18 RX ADMIN — PANTOPRAZOLE SODIUM 40 MG: 40 TABLET, DELAYED RELEASE ORAL at 06:29

## 2017-07-18 RX ADMIN — ACETAMINOPHEN 650 MG: 325 TABLET, FILM COATED ORAL at 20:31

## 2017-07-18 RX ADMIN — AMLODIPINE BESYLATE 10 MG: 5 TABLET ORAL at 09:43

## 2017-07-18 NOTE — PROGRESS NOTES
Problem: Altered Thought Process (Adult/Pediatric)  Goal: *STG: Participates in treatment plan  Outcome: Progressing Towards Goal  Came out on the unit alert and oriented. Is pleasant,cooperative with staff. Recognized staff after writer had been gone on vacation. Asking appropriate questions to staff about her vacation. Affect is bright,patient is smiling. Eating breakfast,attending general group. Able to follow directions. Gait stable with walker. Staff to continue to encourage unit/group activities. Answer questions about possible discharge plans as they come up to keep patient informed. Goal: *STG: Remains safe in hospital  Outcome: Progressing Towards Goal  Remains safe on the unit. Walking with walker. Bed alarm is on. Goal: *STG: Attends activities and groups  Outcome: Progressing Towards Goal  Attending groups and takes part in unit activities.

## 2017-07-18 NOTE — BH NOTES
GROUP THERAPY PROGRESS NOTE    Paola Betancur participated in a morning Process Group on the General Unit with a focus identifying feelings,   planning for the day, and learning more about DBT concepts on \"Emotion Regulation. \"    .   Group time: 60 minutes. Personal goal for participation: To increase the capacity to improve ones mood, set personal goals,   and understand more about basic activities to help regulate emotions. Goal orientation: The patients will be able to identify their feelings and develop a plan for   structuring their day. They were also presented with a summary sheet on emotional regulation,   in regards to focusing on one goal per day, taking physical care of oneself, and recognizing   and/or building positive experiences. The didactic portion of the session focused on these three  concepts; 1) defining and focusing on one goal per day; 2) taking care of ones physical   maintenance and basic needs - sleep, nutrition, and exercise; and 3) finding and building on   positive experiences. Group therapy participation: With prompting, this patient participated in the group. Therapeutic interventions reviewed and discussed: The group members were asked to   identify an emotion they are having and/or let the group know what they want to focus on for the   day as they continue to make discharge plans. The group members reviewed three DBT   suggestions regarding emotional regulation, in regards to focusing on one goal per day, taking   physical care of oneself, and recognizing and/or building positive experiences. It was suggested   that these three concepts can be seen as headings for their list of coping skills. The group   members were also provided worksheets on the topic discussed for their review and use on   their own time.      Impression of participation: The patient said she feeling \"a little poorly\" earlier in the morning but   that she wanted to make this session, even though she joined the group about ten minutes late. Her mood was anxious and she got up and left the group, only to return relatively quickly. She  waited for her peers to share before being prompted to speak. She said she was focused on   \"getting out of here\" and that she might be talking with a representative from an [facility] later today. The patient's affect was anxious. Her mood was congruent with her statements. She was alert  and talking about her anxiety and her concern about moving to a new nursing home after her discharge. The patient expressed no SI/HI and no overt psychotic symptoms in this group.

## 2017-07-18 NOTE — PROGRESS NOTES
Laboratory Monitoring for Divalproex/Valproic Acid and Carbamazepine    This patient is currently prescribed the following medication(s):   Current Facility-Administered Medications   Medication Dose Route Frequency    lisinopril (PRINIVIL, ZESTRIL) tablet 10 mg  10 mg Oral DAILY    polyethylene glycol (MIRALAX) packet 17 g  17 g Oral DAILY    trihexyphenidyl (ARTANE) tablet 2 mg  2 mg Oral TID    docusate sodium (COLACE) capsule 100 mg  100 mg Oral BID    pantoprazole (PROTONIX) tablet 40 mg  40 mg Oral ACB    naproxen (NAPROSYN) tablet 250 mg  250 mg Oral BID WITH MEALS    divalproex ER (DEPAKOTE ER) 24 hour tablet 1,000 mg  1,000 mg Oral QHS    insulin lispro (HUMALOG) injection   SubCUTAneous BID    carBAMazepine XR (TEGretol XR) tablet 200 mg  200 mg Oral BID    zolpidem CR (AMBIEN CR) tablet 12.5 mg  12.5 mg Oral QHS    SITagliptin (JANUVIA) tablet 100 mg  100 mg Oral DAILY    atorvastatin (LIPITOR) tablet 20 mg  20 mg Oral DAILY    amLODIPine (NORVASC) tablet 10 mg  10 mg Oral DAILY       The following labs have been completed for monitoring of valproic acid and carbamazepine:    Valproic Acid Serum Concentration  Lab Results   Component Value Date/Time    Valproic acid 78 07/18/2017 05:27 AM       Carbamazepine Serum Concentration  Lab Results   Component Value Date/Time    Carbamazepine 6.8 07/18/2017 05:27 AM         Renal Function, Hepatic Function and Chemistry  Lab Results   Component Value Date/Time    Sodium 143 07/18/2017 05:27 AM    Potassium 4.2 07/18/2017 05:27 AM    Chloride 108 07/18/2017 05:27 AM    CO2 27 07/18/2017 05:27 AM    Anion gap 8 07/18/2017 05:27 AM    BUN 25 07/18/2017 05:27 AM    Creatinine 1.00 07/18/2017 05:27 AM    BUN/Creatinine ratio 25 07/18/2017 05:27 AM    Bilirubin, total 0.2 07/18/2017 05:27 AM    Protein, total 6.7 07/18/2017 05:27 AM    Albumin 3.4 07/18/2017 05:27 AM    Globulin 3.3 07/18/2017 05:27 AM    A-G Ratio 1.0 07/18/2017 05:27 AM    ALT (SGPT) 14 07/18/2017 05:27 AM    Alk. phosphatase 70 07/18/2017 05:27 AM       Hematology  Lab Results   Component Value Date/Time    WBC 4.4 07/18/2017 05:27 AM    RBC 3.53 07/18/2017 05:27 AM    HGB 11.8 07/18/2017 05:27 AM    HCT 35.8 07/18/2017 05:27 AM    .4 07/18/2017 05:27 AM    MCH 33.4 07/18/2017 05:27 AM    MCHC 33.0 07/18/2017 05:27 AM    RDW 12.9 07/18/2017 05:27 AM    PLATELET 754 47/76/0768 05:27 AM       Assessment/Plan:  Valproic acid serum concentration is within therapeutic range. Carbamazepine serum concentration is within therapeutic range. CMP and CBC are within normal limits. No further laboratory monitoring for divalproex or carbamazepine is needed at this time.          Dickson Polanoc, PharmD, [address], BCPS

## 2017-07-18 NOTE — INTERDISCIPLINARY ROUNDS
Behavioral Health Interdisciplinary Rounds     Patient Name: Thi Ortez  Age: 65 y.o.   Room/Bed:  740/02  Primary Diagnosis: Schizoaffective disorder (H[facility])   Admission Status: Involuntary Commitment     Readmission within 30 days: no  Power of  in place: no  Patient requires a blocked bed: no          Reason for blocked bed: n/a    VTE Prophylaxis: Not indicated  Mobility needs/Fall risk: yes    Nutritional Plan: no  Consults: no         Labs/Testing due today?: yes: CBC, CMP, Valproic Acid, Carbamazepine - collected     Sleep hours: 6         Participation in Care/Groups:  yes  Medication Compliant?: Yes (refused colace and miralax)    PRNS (last 24 hours): Pain    Restraints (last 24 hours):  no  Substance Abuse:  no  CIWA (range last 24 hours):  COWS (range last 24 hours):   Alcohol screening (AUDIT) completed -     If applicable, date SBIRT discussed in treatment team AND documented:   Tobacco - patient is a smoker: yes   Date tobacco education completed by RN: 5/29/17  24 hour chart check complete: yes     Patient goal(s) for today:   Treatment team focus/goals:   LOS:  61  Expected LOS:   [address] -    Name of Decision maker if patient has Psychiatric Care Directive   Patient was offered information   Financial concerns/prescription coverage:    Date of last family contact:       Family requesting physician contact today:    Discharge plan:        Outpatient provider(s):     Participating treatment team members: Thi Ortez, * (assigned SW),

## 2017-07-18 NOTE — BH NOTES
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 or related services, or equivalent services.     EXPECTED DISCHARGE DATE/DAY: TBD     DISPOSITION: Home       Signed By:   Mars Garay MD  7/17/2017

## 2017-07-18 NOTE — PROGRESS NOTES
Problem: Falls - Risk of  Goal: *Absence of falls  Outcome: Progressing Towards Goal  2330 Pt appears asleep in bed. Respirations even and unlabored. Bed alarm on, door remains open. Will continue to monitor with Q 15 safety checks.

## 2017-07-18 NOTE — BH NOTES
GROUP THERAPY PROGRESS NOTE    Lam Dwain did not participate in a Morning Process Group on the Geriatric Unit with a focus on shifting ones feelings to a positive note and preparing for the day through group singing.

## 2017-07-18 NOTE — BH NOTES
1540:  Patient received as she is sitting in the [facility] watching TV and chatting with peers. She greets oncoming staff cordially with a bright smile and voices no concerns or complaints at this time. Will maintain q 15 minute safety checks.

## 2017-07-18 NOTE — PROGRESS NOTES
MsAma Jennifer Potter was an active participant in spirituality group. Rev. Gearline Dakins Alben Moat, M.Div, [facility]

## 2017-07-19 LAB
GLUCOSE BLD STRIP.AUTO-MCNC: 118 MG/DL (ref 65–100)
GLUCOSE BLD STRIP.AUTO-MCNC: 92 MG/DL (ref 65–100)
SERVICE CMNT-IMP: ABNORMAL
SERVICE CMNT-IMP: NORMAL

## 2017-07-19 PROCEDURE — 74011250637 HC RX REV CODE- 250/637: Performed by: NURSE PRACTITIONER

## 2017-07-19 PROCEDURE — 74011250637 HC RX REV CODE- 250/637: Performed by: PSYCHIATRY & NEUROLOGY

## 2017-07-19 PROCEDURE — 65220000003 HC RM SEMIPRIVATE PSYCH

## 2017-07-19 PROCEDURE — 74011250637 HC RX REV CODE- 250/637: Performed by: HOSPITALIST

## 2017-07-19 PROCEDURE — 82962 GLUCOSE BLOOD TEST: CPT

## 2017-07-19 RX ORDER — TAMSULOSIN HYDROCHLORIDE 0.4 MG/1
0.4 CAPSULE ORAL DAILY
Status: DISCONTINUED | OUTPATIENT
Start: 2017-07-19 | End: 2017-07-19

## 2017-07-19 RX ORDER — OXYBUTYNIN CHLORIDE 5 MG/1
5 TABLET ORAL 2 TIMES DAILY
Status: COMPLETED | OUTPATIENT
Start: 2017-07-19 | End: 2017-07-23

## 2017-07-19 RX ADMIN — CARBAMAZEPINE 200 MG: 200 TABLET, EXTENDED RELEASE ORAL at 18:00

## 2017-07-19 RX ADMIN — TRIHEXYPHENIDYL HYDROCHLORIDE 2 MG: 2 TABLET ORAL at 10:13

## 2017-07-19 RX ADMIN — DIVALPROEX SODIUM 1000 MG: 500 TABLET, FILM COATED, EXTENDED RELEASE ORAL at 21:00

## 2017-07-19 RX ADMIN — DOCUSATE SODIUM 100 MG: 100 CAPSULE, LIQUID FILLED ORAL at 10:13

## 2017-07-19 RX ADMIN — CARBAMAZEPINE 200 MG: 200 TABLET, EXTENDED RELEASE ORAL at 10:12

## 2017-07-19 RX ADMIN — LORAZEPAM 1 MG: 1 TABLET ORAL at 22:36

## 2017-07-19 RX ADMIN — SITAGLIPTIN 100 MG: 100 TABLET, FILM COATED ORAL at 10:13

## 2017-07-19 RX ADMIN — LORAZEPAM 1 MG: 1 TABLET ORAL at 02:45

## 2017-07-19 RX ADMIN — LISINOPRIL 10 MG: 10 TABLET ORAL at 10:13

## 2017-07-19 RX ADMIN — TRIHEXYPHENIDYL HYDROCHLORIDE 2 MG: 2 TABLET ORAL at 16:06

## 2017-07-19 RX ADMIN — NAPROXEN 250 MG: 250 TABLET ORAL at 16:07

## 2017-07-19 RX ADMIN — ATORVASTATIN CALCIUM 20 MG: 20 TABLET, FILM COATED ORAL at 10:13

## 2017-07-19 RX ADMIN — PANTOPRAZOLE SODIUM 40 MG: 40 TABLET, DELAYED RELEASE ORAL at 06:48

## 2017-07-19 RX ADMIN — OXYBUTYNIN CHLORIDE 5 MG: 5 TABLET ORAL at 14:15

## 2017-07-19 RX ADMIN — TRIHEXYPHENIDYL HYDROCHLORIDE 2 MG: 2 TABLET ORAL at 21:00

## 2017-07-19 RX ADMIN — OXYBUTYNIN CHLORIDE 5 MG: 5 TABLET ORAL at 18:00

## 2017-07-19 RX ADMIN — POLYETHYLENE GLYCOL 3350 17 G: 17 POWDER, FOR SOLUTION ORAL at 10:12

## 2017-07-19 RX ADMIN — AMLODIPINE BESYLATE 10 MG: 5 TABLET ORAL at 10:13

## 2017-07-19 RX ADMIN — NAPROXEN 250 MG: 250 TABLET ORAL at 10:11

## 2017-07-19 RX ADMIN — ZOLPIDEM TARTRATE 12.5 MG: 6.25 TABLET, EXTENDED RELEASE ORAL at 21:00

## 2017-07-19 NOTE — BH NOTES
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 notes. Suicide risk assessment completed and patient deemed to be of low risk for suicide at this time. The following regarding medications was addressed during rounds with patient:   the risks and benefits of the proposed medication. The patient was given the opportunity to ask questions. Informed consent given to the use of the above medications. Will continue to adjust psychiatric and non-psychiatric medications (see above \"medication\" section and orders section for details) as deemed appropriate & based upon diagnoses and response to treatment. I will continue to order blood tests/labs and diagnostic tests as deemed appropriate and review results as they become available (see orders for details and above listed lab/test results). I will order psychiatric records from previous [facility] hospitals to further elucidate the nature of patient's psychopathology and review once available. I will gather additional collateral information from friends, family and o/p treatment team to further elucidate the nature of patient's psychopathology and baselline level of psychiatric functioning.          I certify that this patient's inpatient psychiatric hospital services furnished since the previous certification were, and continue to be, required for treatment that could reasonably be expected to improve the patient's condition, or for diagnostic study, and that the patient continues to need, on a daily basis, active treatment furnished directly by or requiring the supervision of inpatient psychiatric facility personnel. In addition, the hospital records show that services furnished were intensive treatment services, admission or related services, or equivalent services.     EXPECTED DISCHARGE DATE/DAY: TBD     DISPOSITION: Home       Signed By:   Lluvia Yen MD  7/18/2017

## 2017-07-19 NOTE — BH NOTES
GROUP THERAPY PROGRESS NOTE    Sim Hidalgo participated in an early Afternoon Coping Skills on the General Unit, with a focus on singing and building positive experiences as a coping skill. Group time: 60 minutes. Personal goal for participation: To increase the capacity to shift ones mood, prepare for the rest of the day, and share in group singing. Goal orientation: The patient will participate in group singing. Group therapy participation: When prompted, this patient participated in the group. Therapeutic interventions reviewed and discussed: The group members were join in singing several songs and talk about what ideas and thoughts came up for them during the song. Impression of participation: The patient joined the group about five minutes after the group began. However, she sang with the group and suggested several songs for the group to sing. She participated in the sing of numerous songs including Over [place], [place] the [place], How Much is that Doggie in the Window, This [address], This [facility] is [employer], Jaignacia Espinoza are My [facility],  etc. The patients affect was mildly euphoric and the her mood was cooperative. She seemed to enjoy the singing opportunity.

## 2017-07-19 NOTE — PROGRESS NOTES
Problem: Falls - Risk of  Goal: *Absence of falls  Outcome: Progressing Towards Goal  2315 Pt appears asleep in bed. Respirations even and unlabored. NAD noted. Bed alarm on, call bell within reach. Will continue to monitor with Q 15 safety checks. 0245 PRN Medication Documentation    Specific patient behavior that led to need for PRN medication: anxiety  Staff interventions attempted prior to PRN being given: calm environment, addressing needs   PRN medication given: Ativan 1 mg po  Patient response/effectiveness of PRN medication: 0340 Pt resting quietly in bed.

## 2017-07-19 NOTE — BH NOTES
GROUP THERAPY PROGRESS NOTE    Darcy Anderson did not participate in a Morning Process Group on the Geriatric Unit with a focus on shifting ones feelings to a positive note and preparing for the day through group singing.

## 2017-07-19 NOTE — INTERDISCIPLINARY ROUNDS
Behavioral Health Interdisciplinary Rounds     Patient Name: Ricardo Stovall  Age: 65 y.o.   Room/Bed:  740/02  Primary Diagnosis: [facility]   Admission Status: Involuntary Commitment     Readmission within 30 days: no  Power of  in place: no  Patient requires a blocked bed: no          Reason for blocked bed: n/a    VTE Prophylaxis: Not indicated  Mobility needs/Fall risk: yes    Nutritional Plan: no  Consults: no         Labs/Testing due today?: no    Sleep hours: 6.5         Participation in Care/Groups:  yes  Medication Compliant?: Yes (refused colace)   PRNS (last 24 hours): Pain, Antianxiety   Restraints (last 24 hours):  no  Substance Abuse:  no  CIWA (range last 24 hours):  COWS (range last 24 hours):   Alcohol screening (AUDIT) completed -     If applicable, date SBIRT discussed in treatment team AND documented:   Tobacco - patient is a smoker: yes   Date tobacco education completed by RN: 5/29/17  24 hour chart check complete: yes     Patient goal(s) for today:   Treatment team focus/goals:   LOS:  62  Expected LOS:   [address] -    Name of Decision maker if patient has Psychiatric Care Directive   Patient was offered information   Financial concerns/prescription coverage:    Date of last family contact:       Family requesting physician contact today:    Discharge plan:        Outpatient provider(s):     Participating treatment team members: Ricardo Stovall, * (assigned SW),

## 2017-07-19 NOTE — PROGRESS NOTES
Problem: Altered Thought Process (Adult/Pediatric)  Goal: *STG: Participates in treatment plan  Outcome: Progressing Towards Goal  Awoke alert and oriented. Medications reviewed this morning. Is medication and meal compliant. Pleasant and cooperative with staff and others. Thoughts are clear and organized. Is able to follow directions. Is active out on the unit,walking with walker-gait stable. Staff to continue to provide activities  to occupy patient and promote groups for a positive environment. Goal: *STG: Remains safe in hospital  Outcome: Progressing Towards Goal  Walking with walker,gait stable. Bed alarm is on the bed. Goal: *STG: Attends activities and groups  Outcome: Progressing Towards Goal  Attends groups and activities on the general and geriatric unit. Is pleasant and cooperative with others.

## 2017-07-19 NOTE — BH NOTES
1530:  Patient initially received as she is sitting in the [employer] chatting with peers. Later, she is out on the General Group participating in the [employer] Group. Patient is pleasant and cheerful, although a bit more subdued today than usual.  She voices no complaints or concerns at this time. Will maintain q 15 minute safety checks.

## 2017-07-20 LAB
GLUCOSE BLD STRIP.AUTO-MCNC: 103 MG/DL (ref 65–100)
GLUCOSE BLD STRIP.AUTO-MCNC: 115 MG/DL (ref 65–100)
SERVICE CMNT-IMP: ABNORMAL
SERVICE CMNT-IMP: ABNORMAL

## 2017-07-20 PROCEDURE — 74011250637 HC RX REV CODE- 250/637: Performed by: HOSPITALIST

## 2017-07-20 PROCEDURE — 74011250637 HC RX REV CODE- 250/637: Performed by: NURSE PRACTITIONER

## 2017-07-20 PROCEDURE — 74011250637 HC RX REV CODE- 250/637: Performed by: PSYCHIATRY & NEUROLOGY

## 2017-07-20 PROCEDURE — 65220000003 HC RM SEMIPRIVATE PSYCH

## 2017-07-20 PROCEDURE — 82962 GLUCOSE BLOOD TEST: CPT

## 2017-07-20 RX ADMIN — TRIHEXYPHENIDYL HYDROCHLORIDE 2 MG: 2 TABLET ORAL at 21:08

## 2017-07-20 RX ADMIN — OXYBUTYNIN CHLORIDE 5 MG: 5 TABLET ORAL at 08:55

## 2017-07-20 RX ADMIN — ATORVASTATIN CALCIUM 20 MG: 20 TABLET, FILM COATED ORAL at 08:23

## 2017-07-20 RX ADMIN — ACETAMINOPHEN 650 MG: 325 TABLET, FILM COATED ORAL at 01:41

## 2017-07-20 RX ADMIN — ZOLPIDEM TARTRATE 12.5 MG: 6.25 TABLET, EXTENDED RELEASE ORAL at 21:08

## 2017-07-20 RX ADMIN — DIVALPROEX SODIUM 1000 MG: 500 TABLET, FILM COATED, EXTENDED RELEASE ORAL at 21:08

## 2017-07-20 RX ADMIN — LISINOPRIL 10 MG: 10 TABLET ORAL at 08:23

## 2017-07-20 RX ADMIN — NAPROXEN 250 MG: 250 TABLET ORAL at 16:34

## 2017-07-20 RX ADMIN — SITAGLIPTIN 100 MG: 100 TABLET, FILM COATED ORAL at 08:23

## 2017-07-20 RX ADMIN — TRIHEXYPHENIDYL HYDROCHLORIDE 2 MG: 2 TABLET ORAL at 16:34

## 2017-07-20 RX ADMIN — CARBAMAZEPINE 200 MG: 200 TABLET, EXTENDED RELEASE ORAL at 16:35

## 2017-07-20 RX ADMIN — DOCUSATE SODIUM 100 MG: 100 CAPSULE, LIQUID FILLED ORAL at 16:35

## 2017-07-20 RX ADMIN — TRIHEXYPHENIDYL HYDROCHLORIDE 2 MG: 2 TABLET ORAL at 08:23

## 2017-07-20 RX ADMIN — OXYBUTYNIN CHLORIDE 5 MG: 5 TABLET ORAL at 16:35

## 2017-07-20 RX ADMIN — NAPROXEN 250 MG: 250 TABLET ORAL at 08:23

## 2017-07-20 RX ADMIN — CARBAMAZEPINE 200 MG: 200 TABLET, EXTENDED RELEASE ORAL at 08:55

## 2017-07-20 RX ADMIN — POLYETHYLENE GLYCOL 3350 17 G: 17 POWDER, FOR SOLUTION ORAL at 08:23

## 2017-07-20 RX ADMIN — PANTOPRAZOLE SODIUM 40 MG: 40 TABLET, DELAYED RELEASE ORAL at 06:45

## 2017-07-20 RX ADMIN — AMLODIPINE BESYLATE 10 MG: 5 TABLET ORAL at 08:23

## 2017-07-20 NOTE — PROGRESS NOTES
Problem: Altered Thought Process (Adult/Pediatric)  Goal: *STG: Remains safe in hospital  Outcome: Progressing Towards Goal  Patient in bed resting quietly with eyes closed. Respirations regular, even and unlabored. NAD. Q-15 checks for safety. -24 chart review completed.

## 2017-07-20 NOTE — BH NOTES
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 patient's psychopathology and baselline level of psychiatric functioning. I certify that this patient's inpatient psychiatric hospital services furnished since the previous certification were, and continue to be, required for treatment that could reasonably be expected to improve the patient's condition, or for diagnostic study, and that the patient continues to need, on a daily basis, active treatment furnished directly by or requiring the supervision of inpatient psychiatric facility personnel. In addition, the hospital records show that services furnished were intensive treatment services, admission or related services, or equivalent services.     EXPECTED DISCHARGE DATE/DAY: TBD     DISPOSITION: Home       Signed By:   Sameer Osuna MD  7/19/2017

## 2017-07-20 NOTE — BH NOTES
PRN Medication Documentation    Specific patient behavior that led to need for PRN medication: anxiety  Staff interventions attempted prior to PRN being given: resting  PRN medication given: Ativan  Patient response/effectiveness of PRN medication: @2336, pt resting in bed quietly

## 2017-07-20 NOTE — BH NOTES
GROUP THERAPY PROGRESS NOTE    Dewayne Kawasaki did not participate in a Morning Process Group on the Geriatric Unit with a focus on shifting ones feelings to a positive note and preparing for the day through group singing.

## 2017-07-20 NOTE — PROGRESS NOTES
Problem: Altered Thought Process (Adult/Pediatric)  Goal: *STG: Participates in treatment plan  Outcome: Progressing Towards Goal  Is alert and oriented. Reviewed all medications this morning. Thoughts are clear and organized. Patient is pleasant and cooperative with staff and others. Able to follow directions. Requesting a shower later today. Presently resting, declined coming out for the first group. Todays goal remains to take a shower. Goal: *STG: Remains safe in hospital  Outcome: Progressing Towards Goal  Remains safe on the unit. Gait stable with walker. Bed alarm on the bed. Goal: *STG: Attends activities and groups  Outcome: Progressing Towards Goal  Declined the first group this morning on the geriatric unit,but came out for breakfast and has been interactive with staff and others. Staff to continue to encourage unit and group activities.     [facility]  Master Treatment Plan for Cullen Roque    Date Treatment Plan Initiated:7/20/2017    Treatment Plan Modalities:  Type of Modality Amount  (x minutes) Frequency (x/week) Duration (x days) Name of Responsible Staff   Community & wrap-up meetings to encourage peer interactions 15 7 7   VALENTIN Nuñez RN   Group psychotherapy to assist in building coping skills and internal controls 30 7 7 Samuel Stoner LCSW   Therapeutic activity groups to build coping skills 30 7 7 Samuel Stoner LCSW   Psychoeducation in group setting to address:   Medication education   15 7 7 Gene NORMAN   Coping skills         Relaxation techniques         Symptom management         Discharge planning   15 7 7    Spirituality    45 7 7 vol   VICENTA   45 7 7 vol   Recovery/AA/NA   45 7 7 vol   Physician medication management   15 7 7 Dr. Marshel Blizzard meeting/discharge planning

## 2017-07-20 NOTE — INTERDISCIPLINARY ROUNDS
Behavioral Health Interdisciplinary Rounds     Patient Name: Evangelina Davis  Age: 65 y.o.   Room/Bed:  740/02  Primary Diagnosis: Schizoaffective disorder ([facility]   Admission Status: Involuntary Commitment     Readmission within 30 days: no  Power of  in place: no  Patient requires a blocked bed: no          Reason for blocked bed:     VTE Prophylaxis: Not indicated  Mobility needs/Fall risk: yes    Nutritional Plan: no  Consults:        Labs/Testing due today?: no    Sleep hours:  6 hours       Participation in Care/Groups:  yes  Medication Compliant?: Yes  PRNS (last 24 hours): Ativan     Restraints (last 24 hours):  no  Substance Abuse:  no  CIWA (range last 24 hours):  COWS (range last 24 hours):   Alcohol screening (AUDIT) completed -     If applicable, date SBIRT discussed in treatment team AND documented:   Tobacco - patient is a smoker: yes    Date tobacco education completed by RN: 5/29/17  24 hour chart check complete: yes     Patient goal(s) for today:   Treatment team focus/goals: Placement  LOS:  62  Expected LOS: TBD  Psychiatric Advanced Care Directives -   Name of Decision maker if patient has Psychiatric Care Directive   Patient was offered information   Financial concerns/prescription coverage: Medicaid and medicare  Date of last family contact:

## 2017-07-20 NOTE — PROGRESS NOTES
Ms. Izabella Das was an active participant in spirituality group. Rev. Fanny Garcia M.Div, [facility]

## 2017-07-20 NOTE — PROGRESS NOTES
MUSIC THERAPY GROUP PROGRESS NOTE        7/20/2017    The patient Genaro deluna 65 y.o. female participated in [address] [place] [address] group on [address] unit    Group time: 11:00-11:35am; 35 min    Personal goal for participation:     Goal orientation: Social    Group therapy participation: Active    Therapeutic interventions: Sing along, music for relaxation, movement to music, group discussion    Impression of participation: The patient actively participated in [address] [place] [address].     JOSE Alas (Music Therapist-Board Certified)  Spiritual Care Department  Referral-based service

## 2017-07-20 NOTE — BH NOTES
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 gather additional collateral information from friends, family and o/p treatment team to further elucidate the nature of patient's psychopathology and baselline level of psychiatric functioning. I certify that this patient's inpatient psychiatric hospital services furnished since the previous certification were, and continue to be, required for treatment that could reasonably be expected to improve the patient's condition, or for diagnostic study, and that the patient continues to need, on a daily basis, active treatment furnished directly by or requiring the supervision of inpatient psychiatric facility personnel. In addition, the hospital records show that services furnished were intensive treatment services, admission or related services, or equivalent services.     EXPECTED DISCHARGE DATE/DAY: TBD     DISPOSITION: Home       Signed By:   Cailin Chu MD  7/20/2017

## 2017-07-20 NOTE — BH NOTES
PRN Medication Documentation  Pain scale 8/10  Specific patient behavior that led to need for PRN medication: c/o of lower leg pain both legs    Staff interventions attempted prior to PRN being given:  Ordered PRN medication for pain   PRN medication given: Tylenol 65-0 mg po   Patient response/effectiveness of PRN medication: will reassess in 1 hour

## 2017-07-20 NOTE — BH NOTES
1545:  Patient initially received as she is sitting in the Day Room on the General Unit watching TV with peers. She greets oncoming staff cordially. Later, back on the unit, patient is in good spirits and voice no complaints or concerns at this time. Will maintain q 15 minute safety checks.

## 2017-07-21 LAB
GLUCOSE BLD STRIP.AUTO-MCNC: 116 MG/DL (ref 65–100)
GLUCOSE BLD STRIP.AUTO-MCNC: 99 MG/DL (ref 65–100)
SERVICE CMNT-IMP: ABNORMAL
SERVICE CMNT-IMP: NORMAL

## 2017-07-21 PROCEDURE — 74011250637 HC RX REV CODE- 250/637: Performed by: NURSE PRACTITIONER

## 2017-07-21 PROCEDURE — 74011250636 HC RX REV CODE- 250/636: Performed by: PSYCHIATRY & NEUROLOGY

## 2017-07-21 PROCEDURE — 74011250637 HC RX REV CODE- 250/637: Performed by: HOSPITALIST

## 2017-07-21 PROCEDURE — 74011250637 HC RX REV CODE- 250/637: Performed by: PSYCHIATRY & NEUROLOGY

## 2017-07-21 PROCEDURE — 65220000003 HC RM SEMIPRIVATE PSYCH

## 2017-07-21 PROCEDURE — 82962 GLUCOSE BLOOD TEST: CPT

## 2017-07-21 RX ORDER — THERA TABS 400 MCG
1 TAB ORAL DAILY
Status: DISCONTINUED | OUTPATIENT
Start: 2017-07-21 | End: 2017-08-16 | Stop reason: HOSPADM

## 2017-07-21 RX ORDER — THERA TABS 400 MCG
1 TAB ORAL DAILY
Status: DISCONTINUED | OUTPATIENT
Start: 2017-07-21 | End: 2017-07-21 | Stop reason: SDUPTHER

## 2017-07-21 RX ADMIN — CARBAMAZEPINE 200 MG: 200 TABLET, EXTENDED RELEASE ORAL at 08:41

## 2017-07-21 RX ADMIN — ACETAMINOPHEN 650 MG: 325 TABLET, FILM COATED ORAL at 12:09

## 2017-07-21 RX ADMIN — ATORVASTATIN CALCIUM 20 MG: 20 TABLET, FILM COATED ORAL at 08:42

## 2017-07-21 RX ADMIN — OXYBUTYNIN CHLORIDE 5 MG: 5 TABLET ORAL at 08:41

## 2017-07-21 RX ADMIN — THERA TABS 1 TABLET: TAB at 12:09

## 2017-07-21 RX ADMIN — AMLODIPINE BESYLATE 10 MG: 5 TABLET ORAL at 08:42

## 2017-07-21 RX ADMIN — FLUPHENAZINE DECANOATE 25 MG: 25 INJECTION, SOLUTION INTRAMUSCULAR; SUBCUTANEOUS at 14:16

## 2017-07-21 RX ADMIN — PANTOPRAZOLE SODIUM 40 MG: 40 TABLET, DELAYED RELEASE ORAL at 06:38

## 2017-07-21 RX ADMIN — TRIHEXYPHENIDYL HYDROCHLORIDE 2 MG: 2 TABLET ORAL at 08:42

## 2017-07-21 RX ADMIN — POLYETHYLENE GLYCOL 3350 17 G: 17 POWDER, FOR SOLUTION ORAL at 08:42

## 2017-07-21 RX ADMIN — CARBAMAZEPINE 200 MG: 200 TABLET, EXTENDED RELEASE ORAL at 17:53

## 2017-07-21 RX ADMIN — SITAGLIPTIN 100 MG: 100 TABLET, FILM COATED ORAL at 08:43

## 2017-07-21 RX ADMIN — ZOLPIDEM TARTRATE 12.5 MG: 6.25 TABLET, EXTENDED RELEASE ORAL at 20:25

## 2017-07-21 RX ADMIN — NAPROXEN 250 MG: 250 TABLET ORAL at 08:43

## 2017-07-21 RX ADMIN — DIVALPROEX SODIUM 1000 MG: 500 TABLET, FILM COATED, EXTENDED RELEASE ORAL at 20:25

## 2017-07-21 RX ADMIN — LISINOPRIL 10 MG: 10 TABLET ORAL at 08:43

## 2017-07-21 RX ADMIN — NAPROXEN 250 MG: 250 TABLET ORAL at 17:50

## 2017-07-21 RX ADMIN — TRIHEXYPHENIDYL HYDROCHLORIDE 2 MG: 2 TABLET ORAL at 20:26

## 2017-07-21 RX ADMIN — TRIHEXYPHENIDYL HYDROCHLORIDE 2 MG: 2 TABLET ORAL at 16:41

## 2017-07-21 RX ADMIN — OXYBUTYNIN CHLORIDE 5 MG: 5 TABLET ORAL at 17:51

## 2017-07-21 NOTE — BH NOTES
Received pt  On unit. No voiced complaints or acute distress at present  Sl irritable with confused room mate.

## 2017-07-21 NOTE — PROGRESS NOTES
Problem: Falls - Risk of  Goal: *Absence of falls  Outcome: Progressing Towards Goal  Patient has been fall free today.   She is capable of getting up and handling her hygiene needs, but must be monitored when moving around the unit,

## 2017-07-21 NOTE — INTERDISCIPLINARY ROUNDS
Behavioral Health Interdisciplinary Rounds     Patient Name: Hezekiah Fothergill  Age: 65 y.o. Room/Bed:  740/02  Primary Diagnosis: Schizoaffective disorder ([facility] 75.)   Admission Status: Involuntary Commitment     Readmission within 30 days: no  Power of  in place: no  Patient requires a blocked bed: no          Reason for blocked bed:     VTE Prophylaxis: No  Mobility needs/Fall risk: yes    Nutritional Plan: no  Consults:          Labs/Testing due today?: no    Sleep hours: 4.15       Participation in Care/Groups:  yes  Medication Compliant?: Yes  PRNS (last 24 hours): None    Restraints (last 24 hours):  no  Substance Abuse:    CIWA (range last 24 hours):  COWS (range last 24 hours):   Alcohol screening (AUDIT) completed -     If applicable, date SBIRT discussed in treatment team AND documented:   Tobacco - patient is a smoker: no   Date tobacco education completed by RN: N/A  24 hour chart check complete: yes     Patient goal(s) for today: Participate in groups on Nabil and General  Treatment team focus/goals: Prolixin injection  LOS:  64  Expected LOS: TBD  Psychiatric Advanced Care Directives -  no   Name of Decision maker if patient has Psychiatric Care Directive: None   Patient was offered information, patient declined.   Financial concerns/prescription coverage[place]  Date of last family contact: 7/14 SW spoke to daughter    Family requesting physician contact today:  no  Discharge plan: Placement       Outpatient provider(s): TBD    Participating treatment team members: PEGGY Yan; Dr. Dedra Sánchez MD; Deana Babin RN

## 2017-07-21 NOTE — PROGRESS NOTES
Problem: Altered Thought Process (Adult/Pediatric)  Goal: *STG: Participates in treatment plan  Outcome: Progressing Towards Goal  Visible in milieu. Cooperative with all aspects of care. Has been very tolerant of room mate's disruptive behavior. Will continue to monitor.

## 2017-07-21 NOTE — PROGRESS NOTES
Problem: Altered Thought Process (Adult/Pediatric)  Goal: *STG: Attends activities and groups  Outcome: Progressing Towards Goal  Patient attends activities and groups.

## 2017-07-21 NOTE — PROGRESS NOTES
Problem: Falls - Risk of  Goal: *Absence of falls  Outcome: Progressing Towards Goal  Patient is ambulating safely and steadily on the unit while using her walker appropriately. She has remained free from falls/injuries throughout this shift. Problem: Altered Thought Process (Adult/Pediatric)  Goal: *STG: Attends activities and groups  Outcome: Progressing Towards Goal  Patient has been visible on the unit throughout this shift. She has been appropriately interactive with staff and peers and medication/meal compliant.

## 2017-07-21 NOTE — PROGRESS NOTES
Podiatry consult called to [facility] at [phone]. Per Alma Wylie, they will not do consults to clip toenails, even for diabetic patients.

## 2017-07-21 NOTE — PROGRESS NOTES
PRN Medication Documentation    Specific patient behavior that led to need for PRN medication: Pt. C/O of posterior bilateral knee pain  Staff interventions attempted prior to PRN being given: Therapeutic communication, Education, exercise.   PRN medication given: Tylenol 650 mg PO  Patient response/effectiveness of PRN medication: Pt is ambulating with walker

## 2017-07-21 NOTE — PROGRESS NOTES
Problem: Falls - Risk of  Goal: *Absence of falls  Outcome: Progressing Towards Goal  No falls. Pt using walker appropriately. Problem: Altered Thought Process (Adult/Pediatric)  Goal: *STG: Participates in treatment plan  Outcome: Progressing Towards Goal  Review meds. Pt is out and social on the unit with staff and peers. Pt is med and meal compliant. Pt is attending groups. Pt speech is clear and appropriate volume. Pt is able to follow commands and the unit schedule. Pt affect is euthymic. Pt daily goal is to ask about discharge and \"stay close to her room\"  Goal: *STG: Remains safe in hospital  Outcome: Progressing Towards Goal  Denies SI, no self-harming behavior  Goal: *STG: Seeks staff when feelings of anxiety and fear arise  Outcome: Progressing Towards Goal  Pt is able to verbalize needs to staff members. Goal: *STG: Attends activities and groups  Outcome: Progressing Towards Goal  Engaged  Goal: Interventions  Outcome: Progressing Towards Goal  Staff is focused on limit setting and effective coping skills education.

## 2017-07-21 NOTE — BH NOTES
GROUP THERAPY PROGRESS NOTE    Sim Hidalgo participated in a Morning Process Group on the Geriatric Unit, with a focus identifying feelings, planning for the day, and singing. Group time: 45 minutes. Personal goal for participation: To increase the capacity to shift ones mood, prepare for the day, and share in group singing. Goal orientation: The patient will be able to prepare for the day through group singing. Group therapy participation: When prompted, this patient actively participated in the group. Therapeutic interventions reviewed and discussed: The group members were introduce themselves by first names and participate in group singing as a way to increase their oxygen and blood flow and begin their day on a positive note. They were also asked to join in singing several songs. Impression of participation: The patient said she was feeling \"OK\" and that she was interested in joining in the singing. She picked out several songs and encouraged her peers to do the same. She sang along with almost all the songs. She expressed no SI/HI and no overt psychosis. Her affect was mildly euphoric and her mood was cooperative and supportive.

## 2017-07-22 LAB
GLUCOSE BLD STRIP.AUTO-MCNC: 100 MG/DL (ref 65–100)
GLUCOSE BLD STRIP.AUTO-MCNC: 104 MG/DL (ref 65–100)
SERVICE CMNT-IMP: ABNORMAL
SERVICE CMNT-IMP: NORMAL

## 2017-07-22 PROCEDURE — 74011250637 HC RX REV CODE- 250/637: Performed by: HOSPITALIST

## 2017-07-22 PROCEDURE — 74011250637 HC RX REV CODE- 250/637: Performed by: PSYCHIATRY & NEUROLOGY

## 2017-07-22 PROCEDURE — 82962 GLUCOSE BLOOD TEST: CPT

## 2017-07-22 PROCEDURE — 74011250637 HC RX REV CODE- 250/637: Performed by: NURSE PRACTITIONER

## 2017-07-22 PROCEDURE — 65220000003 HC RM SEMIPRIVATE PSYCH

## 2017-07-22 RX ADMIN — TRIHEXYPHENIDYL HYDROCHLORIDE 2 MG: 2 TABLET ORAL at 21:29

## 2017-07-22 RX ADMIN — TRIHEXYPHENIDYL HYDROCHLORIDE 2 MG: 2 TABLET ORAL at 08:05

## 2017-07-22 RX ADMIN — DIVALPROEX SODIUM 1000 MG: 500 TABLET, FILM COATED, EXTENDED RELEASE ORAL at 21:29

## 2017-07-22 RX ADMIN — DOCUSATE SODIUM 100 MG: 100 CAPSULE, LIQUID FILLED ORAL at 17:26

## 2017-07-22 RX ADMIN — POLYETHYLENE GLYCOL 3350 17 G: 17 POWDER, FOR SOLUTION ORAL at 10:17

## 2017-07-22 RX ADMIN — OXYBUTYNIN CHLORIDE 5 MG: 5 TABLET ORAL at 08:07

## 2017-07-22 RX ADMIN — PANTOPRAZOLE SODIUM 40 MG: 40 TABLET, DELAYED RELEASE ORAL at 06:45

## 2017-07-22 RX ADMIN — CARBAMAZEPINE 200 MG: 200 TABLET, EXTENDED RELEASE ORAL at 17:26

## 2017-07-22 RX ADMIN — LISINOPRIL 10 MG: 10 TABLET ORAL at 08:06

## 2017-07-22 RX ADMIN — OXYBUTYNIN CHLORIDE 5 MG: 5 TABLET ORAL at 17:26

## 2017-07-22 RX ADMIN — CARBAMAZEPINE 200 MG: 200 TABLET, EXTENDED RELEASE ORAL at 08:07

## 2017-07-22 RX ADMIN — ZOLPIDEM TARTRATE 12.5 MG: 6.25 TABLET, EXTENDED RELEASE ORAL at 21:29

## 2017-07-22 RX ADMIN — NAPROXEN 250 MG: 250 TABLET ORAL at 08:05

## 2017-07-22 RX ADMIN — AMLODIPINE BESYLATE 10 MG: 5 TABLET ORAL at 08:05

## 2017-07-22 RX ADMIN — TRIHEXYPHENIDYL HYDROCHLORIDE 2 MG: 2 TABLET ORAL at 17:28

## 2017-07-22 RX ADMIN — SITAGLIPTIN 100 MG: 100 TABLET, FILM COATED ORAL at 08:05

## 2017-07-22 RX ADMIN — ACETAMINOPHEN 650 MG: 325 TABLET, FILM COATED ORAL at 10:46

## 2017-07-22 RX ADMIN — NAPROXEN 250 MG: 250 TABLET ORAL at 17:26

## 2017-07-22 RX ADMIN — ATORVASTATIN CALCIUM 20 MG: 20 TABLET, FILM COATED ORAL at 08:05

## 2017-07-22 RX ADMIN — THERA TABS 1 TABLET: TAB at 08:06

## 2017-07-22 NOTE — BH NOTES
1525:  Patient initially received as she was sitting in the Day Room on the General Unit. Staff is working to encourage patient to transfer rooms to [phone], and she finally agrees for her safety. She is then assisted with moving her belongings and getting her bed in order. Patient is pleasant and cooperative with no voiced complaints or concerns at this time. Will maintain q 15 minute safety checks.

## 2017-07-22 NOTE — PROGRESS NOTES
Problem: Falls - Risk of  Goal: *Absence of falls  Outcome: Progressing Towards Goal  Patient remains absent of falls. Patient is smiling, pleasant, cooperative, appropriate with staff and peers, thoughts are organized. Patient is out on the unit, med and meal compliant, completing her own ADLs, remains on Q15 min safety checks.

## 2017-07-22 NOTE — BH NOTES
Behavioral Health Interdisciplinary Rounds     Patient Name: Mariel Ball  Age: 65 y.o.   Room/Bed:  740/02  Primary Diagnosis: Schizoaffective disorder (HCC)   Admission Status: Involuntary Commitment     Readmission within 30 days: no  Power of  in place: no  Patient requires a blocked bed: no          Reason for blocked bed: na    VTE Prophylaxis: No  Mobility needs/Fall risk: yes    Nutritional Plan: no  Consults: Podiatry         Labs/Testing due today?: no    Sleep hours:  5.75+      Participation in Care/Groups:  yes  Medication Compliant?: Selective  PRNS (last 24 hours): Pain    Restraints (last 24 hours):  no  Substance Abuse:  no  CIWA (range last 24 hours): na COWS (range last 24 hours): na  Alcohol screening (AUDIT) completed -     If applicable, date SBIRT discussed in treatment team AND documented: na  Tobacco - patient is a smoker: no   Date tobacco education completed by RN: carla  24 hour chart check complete: yes     Patient goal(s) for today: Participate in Groups on Nabil & General  Treatment team focus/goals: Prolixin injection  LOS:  65  Expected LOS: TBD  Psychiatric Advanced Care Directives -  no   Name of Decision maker if patient has Psychiatric Care Directive --none  Patient was offered information --pt declined  Financial concerns/prescription coverage:  [place]  Date of last family contact: 7/14 SW spoke to daughter      Family requesting physician contact today:  no  Discharge plan: placement       Outpatient provider(s): TBD    Participating treatment team members: Mariel Ball, * (assigned SW),

## 2017-07-22 NOTE — BH NOTES
26  Pt has been pleasant & cooperative whenever awake this shift. Steady on feet using her walker when up. Monitoring continues.

## 2017-07-23 PROCEDURE — 74011250637 HC RX REV CODE- 250/637: Performed by: PSYCHIATRY & NEUROLOGY

## 2017-07-23 PROCEDURE — 82962 GLUCOSE BLOOD TEST: CPT

## 2017-07-23 PROCEDURE — 65220000003 HC RM SEMIPRIVATE PSYCH

## 2017-07-23 PROCEDURE — 74011250637 HC RX REV CODE- 250/637: Performed by: HOSPITALIST

## 2017-07-23 PROCEDURE — 74011250637 HC RX REV CODE- 250/637: Performed by: NURSE PRACTITIONER

## 2017-07-23 RX ORDER — OXYBUTYNIN CHLORIDE 5 MG/1
10 TABLET, EXTENDED RELEASE ORAL DAILY
Status: DISCONTINUED | OUTPATIENT
Start: 2017-07-24 | End: 2017-07-25

## 2017-07-23 RX ADMIN — CARBAMAZEPINE 200 MG: 200 TABLET, EXTENDED RELEASE ORAL at 17:46

## 2017-07-23 RX ADMIN — ZOLPIDEM TARTRATE 12.5 MG: 6.25 TABLET, EXTENDED RELEASE ORAL at 21:07

## 2017-07-23 RX ADMIN — LISINOPRIL 10 MG: 10 TABLET ORAL at 08:10

## 2017-07-23 RX ADMIN — THERA TABS 1 TABLET: TAB at 08:10

## 2017-07-23 RX ADMIN — AMLODIPINE BESYLATE 10 MG: 5 TABLET ORAL at 08:10

## 2017-07-23 RX ADMIN — PANTOPRAZOLE SODIUM 40 MG: 40 TABLET, DELAYED RELEASE ORAL at 06:44

## 2017-07-23 RX ADMIN — TRIHEXYPHENIDYL HYDROCHLORIDE 2 MG: 2 TABLET ORAL at 08:10

## 2017-07-23 RX ADMIN — NAPROXEN 250 MG: 250 TABLET ORAL at 08:10

## 2017-07-23 RX ADMIN — OXYBUTYNIN CHLORIDE 5 MG: 5 TABLET ORAL at 10:22

## 2017-07-23 RX ADMIN — NAPROXEN 250 MG: 250 TABLET ORAL at 16:26

## 2017-07-23 RX ADMIN — TRIHEXYPHENIDYL HYDROCHLORIDE 2 MG: 2 TABLET ORAL at 16:26

## 2017-07-23 RX ADMIN — CARBAMAZEPINE 200 MG: 200 TABLET, EXTENDED RELEASE ORAL at 10:22

## 2017-07-23 RX ADMIN — ACETAMINOPHEN 650 MG: 325 TABLET, FILM COATED ORAL at 12:24

## 2017-07-23 RX ADMIN — SITAGLIPTIN 100 MG: 100 TABLET, FILM COATED ORAL at 08:10

## 2017-07-23 RX ADMIN — DIVALPROEX SODIUM 1000 MG: 500 TABLET, FILM COATED, EXTENDED RELEASE ORAL at 21:07

## 2017-07-23 RX ADMIN — ATORVASTATIN CALCIUM 20 MG: 20 TABLET, FILM COATED ORAL at 08:10

## 2017-07-23 RX ADMIN — TRIHEXYPHENIDYL HYDROCHLORIDE 2 MG: 2 TABLET ORAL at 21:07

## 2017-07-23 NOTE — BH NOTES
Behavioral Health Interdisciplinary Rounds     Patient Name: Evangeilna Davis  Age: 65 y.o.   Room/Bed:  733/  Primary Diagnosis: Schizoaffective disorder (HCC)   Admission Status: Involuntary Commitment     Readmission within 30 days: no  Power of  in place: no  Patient requires a blocked bed: no          Reason for blocked bed: na    VTE Prophylaxis: No  Mobility needs/Fall risk: yes    Nutritional Plan: no  Consults: Podiatry         Labs/Testing due today?: no    Sleep hours:  7.5+      Participation in Care/Groups:  yes  Medication Compliant?: Yes, except occasionally Colace  PRNS (last 24 hours): Pain    Restraints (last 24 hours):  no  Substance Abuse:  no  CIWA (range last 24 hours): na COWS (range last 24 hours): na  Alcohol screening (AUDIT) completed -     If applicable, date SBIRT discussed in treatment team AND documented: na  Tobacco - patient is a smoker: no   Date tobacco education completed by RN: carla  24 hour chart check complete: yes     Patient goal(s) for today: Participate in Groups on Nabil & General  Treatment team focus/goals: Prolixin injection  LOS:  66  Expected LOS: TBD  Psychiatric Advanced Care Directives -  no   Name of Decision maker if patient has Psychiatric Care Directive --none  Patient was offered information --pt declined  Financial concerns/prescription coverage:  [place] [facility]  Date of last family contact: 7/14  SW spoke to daughter      Family requesting physician contact today:  no  Discharge plan: placement       Outpatient provider(s): TBD    Participating treatment team members: Evangelina Davis, * (assigned SW),

## 2017-07-23 NOTE — BH NOTES
12:25  PRN Medication Documentation    Specific patient behavior that led to need for PRN medication: patient c/o bilateral leg/knee discomfort  Staff interventions attempted prior to PRN being given: relaxation, repositioning  PRN medication given: Tylenol 650 mg PO PRN  Patient response/effectiveness of PRN medication: Patient reports some relief for leg/knee discomfort.

## 2017-07-23 NOTE — BH NOTES
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 details) as deemed appropriate & based upon diagnoses and response to treatment. I will continue to order blood tests/labs and diagnostic tests as deemed appropriate and review results as they become available (see orders for details and above listed lab/test results). I will order psychiatric records from previous [facility] hospitals to further elucidate the nature of patient's psychopathology and review once available. I will gather additional collateral information from friends, family and o/p treatment team to further elucidate the nature of patient's psychopathology and baselline level of psychiatric functioning. I certify that this patient's inpatient psychiatric hospital services furnished since the previous certification were, and continue to be, required for treatment that could reasonably be expected to improve the patient's condition, or for diagnostic study, and that the patient continues to need, on a daily basis, active treatment furnished directly by or requiring the supervision of inpatient psychiatric facility personnel.  In addition, the hospital records show that services furnished were intensive treatment services, admission or related services, or equivalent services.     EXPECTED DISCHARGE DATE/DAY: TBD     DISPOSITION: Home       Signed By:   Dash Demarco MD  7/22/2017

## 2017-07-23 NOTE — PROGRESS NOTES
Problem: Falls - Risk of  Goal: *Absence of falls  Outcome: Progressing Towards Goal  Pt visible in milieu. Ambulating in hallway and spending time on general unit. Conversation and behavior appropriate. Pleasant on approach. Will continue to monitor.

## 2017-07-23 NOTE — PROGRESS NOTES
Problem: Altered Thought Process (Adult/Pediatric)  Goal: *STG: Attends activities and groups  Outcome: Progressing Towards Goal  Patient has been visible on the unit throughout this shift. She has been appropriately interactive with staff and peers and med/meal compliant.

## 2017-07-23 NOTE — BH NOTES
GROUP THERAPY PROGRESS NOTE    Celina Gonzales is participating in Leisure-Creative Group.      Group time: 15 minutes    Personal goal for participation: decrease anxiety    Goal orientation: relaxation    Group therapy participation: active    Therapeutic interventions reviewed and discussed:     Impression of participation: good

## 2017-07-23 NOTE — PROGRESS NOTES
Problem: Altered Thought Process (Adult/Pediatric)  Goal: *STG: Participates in treatment plan  Outcome: Progressing Towards Goal  Pt participated in treatment team. Visible on the unit and interacting with peers. Calm and cooperative. Goal: *STG: Remains safe in hospital  Outcome: Progressing Towards Goal  Remains safe in the hospital. Denies A/V hallucinations. Denies SI/HI at this time. Goal: *STG: Seeks staff when feelings of anxiety and fear arise  Outcome: Progressing Towards Goal  Able to communicate with staff when anxious. Goal: *STG: Attends activities and groups  Outcome: Progressing Towards Goal  Attends groups on the general unit.

## 2017-07-24 LAB
GLUCOSE BLD STRIP.AUTO-MCNC: 86 MG/DL (ref 65–100)
GLUCOSE BLD STRIP.AUTO-MCNC: 94 MG/DL (ref 65–100)
SERVICE CMNT-IMP: NORMAL
SERVICE CMNT-IMP: NORMAL

## 2017-07-24 PROCEDURE — 82962 GLUCOSE BLOOD TEST: CPT

## 2017-07-24 PROCEDURE — 74011250637 HC RX REV CODE- 250/637: Performed by: PSYCHIATRY & NEUROLOGY

## 2017-07-24 PROCEDURE — 65220000003 HC RM SEMIPRIVATE PSYCH

## 2017-07-24 PROCEDURE — 74011250637 HC RX REV CODE- 250/637: Performed by: HOSPITALIST

## 2017-07-24 PROCEDURE — 74011250637 HC RX REV CODE- 250/637: Performed by: NURSE PRACTITIONER

## 2017-07-24 RX ORDER — LIDOCAINE 50 MG/G
1 PATCH TOPICAL EVERY 24 HOURS
Status: DISCONTINUED | OUTPATIENT
Start: 2017-07-24 | End: 2017-08-15

## 2017-07-24 RX ADMIN — AMLODIPINE BESYLATE 10 MG: 5 TABLET ORAL at 08:59

## 2017-07-24 RX ADMIN — ZOLPIDEM TARTRATE 12.5 MG: 6.25 TABLET, EXTENDED RELEASE ORAL at 21:32

## 2017-07-24 RX ADMIN — SITAGLIPTIN 100 MG: 100 TABLET, FILM COATED ORAL at 08:59

## 2017-07-24 RX ADMIN — ATORVASTATIN CALCIUM 20 MG: 20 TABLET, FILM COATED ORAL at 09:00

## 2017-07-24 RX ADMIN — TRIHEXYPHENIDYL HYDROCHLORIDE 2 MG: 2 TABLET ORAL at 09:00

## 2017-07-24 RX ADMIN — ACETAMINOPHEN 650 MG: 325 TABLET, FILM COATED ORAL at 17:56

## 2017-07-24 RX ADMIN — DIVALPROEX SODIUM 1000 MG: 500 TABLET, FILM COATED, EXTENDED RELEASE ORAL at 21:31

## 2017-07-24 RX ADMIN — TRIHEXYPHENIDYL HYDROCHLORIDE 2 MG: 2 TABLET ORAL at 21:31

## 2017-07-24 RX ADMIN — NAPROXEN 250 MG: 250 TABLET ORAL at 16:10

## 2017-07-24 RX ADMIN — POLYETHYLENE GLYCOL 3350 17 G: 17 POWDER, FOR SOLUTION ORAL at 09:00

## 2017-07-24 RX ADMIN — CARBAMAZEPINE 200 MG: 200 TABLET, EXTENDED RELEASE ORAL at 17:55

## 2017-07-24 RX ADMIN — OXYBUTYNIN CHLORIDE 10 MG: 5 TABLET, EXTENDED RELEASE ORAL at 08:59

## 2017-07-24 RX ADMIN — LORAZEPAM 1 MG: 1 TABLET ORAL at 03:09

## 2017-07-24 RX ADMIN — THERA TABS 1 TABLET: TAB at 09:00

## 2017-07-24 RX ADMIN — PANTOPRAZOLE SODIUM 40 MG: 40 TABLET, DELAYED RELEASE ORAL at 06:34

## 2017-07-24 RX ADMIN — CARBAMAZEPINE 200 MG: 200 TABLET, EXTENDED RELEASE ORAL at 09:00

## 2017-07-24 RX ADMIN — LISINOPRIL 10 MG: 10 TABLET ORAL at 09:00

## 2017-07-24 RX ADMIN — NAPROXEN 250 MG: 250 TABLET ORAL at 09:00

## 2017-07-24 RX ADMIN — TRIHEXYPHENIDYL HYDROCHLORIDE 2 MG: 2 TABLET ORAL at 16:10

## 2017-07-24 NOTE — BH NOTES
0300:  PRN Medication Documentation    Specific patient behavior that led to need for PRN medication: Patient observed on rounds to be awake and up in the bathroom  Staff interventions attempted prior to PRN being given: Reorient patient to time and Encourage patient to get more sleep before breakfast.  Patient requests Ativan at this time stating that it helps her sleep/   PRN medication given: 1 mg PO Ativan    0400:  Patient response/effectiveness of PRN medication:  Patient appears to be sleeping comfortably at this time. Will continue to monitor.

## 2017-07-24 NOTE — BH NOTES
GROUP THERAPY PROGRESS NOTE    Harlan Mclean participated in a Morning Process Group on the General Unit with a focus on identifying feelings, planning for the day, and learning more about DBT concepts of \"Mindfulness. \"     Group time: 65 minutes. Personal goal for participation: To identify feelings and increase the capacity to manage ones ability to cope. Goal orientation: The patient will be able to introduce themselves to their peers, identify their feelings, and consider the importance of coping skill development. The group session also included a didactic section and an opportunity for patients to respond. Group therapy participation: This patient partially participated in the therapy session. Therapeutic interventions reviewed and discussed: The patients were encouraged to identify themselves by their first names, acknowledge their feelings, and define a goal for their day. This was followed by a didactic session on DBT mindsets. These concepts included:   1) One-Mindfully: In the moment on the front and the following suggestions on the back of the card: a) Just do one thing at a time; b) Let go of distractions; c) Come back to the moment and what you are doing; and d) Do each thing with all your attention. 2) Effectiveness: Focus on what works: a) Do what needs to be done; b) Play by the rules (I am not an exception) and consider the context; c) Act skillfully within the given situation; d) Keep an eye on your objectives (and do what is necessary); e) Let go of vengeance, useless anger, and the need to be righteous. 3) Observe: Just notice: a) Have a Ray mind; b) Control your attention, cling to nothing; c) Be alert; d) Step inside and observe; e) Watch thoughts come and go; f) Notice what flows through your senses.    4) Nonjudgmental Stance: a) Be aware but dont evaluate; b) Sort opinions from facts; c) Accept each moment; d) Acknowledge the helpful and the harmful, but dont  it; e) Dont  your judging. 5) Wise Mind: a) Integration of emotion mind and reasonable mind; b) Allows intuition; c) Find it in the belly, the center of your head, or by following your breath. At the end of the session all the group members were provided a summary of the topics discussed and a worksheet to review on their own time. Impression of participation: The patient said she was feeling \"good\" and that she was working on her discharge plans. She added that she was waiting to hear about her acceptance in a local [facility]. The patient expressed no SI/HI and displayed no overt psychotic symptoms in group. Her affect was anxious and her mood was mildly euphoric until she nodded to sleep about retirement through the group.

## 2017-07-24 NOTE — BH NOTES
2300:  Patient received as she is resting quietly in bed. She greets oncoming staff cordially and reports that everyone could hear her former roommate acting out earlier this evening. She states that she is glad that she was moved to another room. She voices no complaints or concerns at this time. Will maintain q 15 minute safety checks.

## 2017-07-24 NOTE — BH NOTES
GROUP THERAPY PROGRESS NOTE    Nicole Whiting is participating in Reflections    Group time: 15 minutes    Personal goal for participation:     Goal orientation: community    Group therapy participation: active    Therapeutic interventions reviewed and discussed:     Impression of participation: Patient stated her day was ''good'',''I feel that the medications is doing what they are supposed to do'', ''I was glad to see my daughter''.

## 2017-07-24 NOTE — BH NOTES
Post Fall Documentation      Pérez Carrillo unwitnessed fall occurred on 7/24/17 at 0530. The answers to the following questions summarize the fall:     · In the patient's own words,:  · What was he/she doing when he/she fell? \" I was trying to rush to the bathroom so I wouldn't wet on myself\"    · What are his/her complaints? Patient states that her Left Arm hurts a little bit, but she refuses to allow staff to assess that or her bottom stating \"I am ok\"     · Nurse:  · Document observation, treatment, conversation, follow-up, and patient response. Patient observed awake in bed on rounds just moments before. She got up, turned off her bed alarm, closed her door on the way to the bathroom and fell in the doorway. She was not using her walker stating \"I was in a hurry - trying to rush so I wouldn't wet on myself\"    · What was the patient's condition when found (i.e., pain, symptoms, cuts, bruises)? Reported Left Arm \"hurts a little bit\", but refused to allow staff to assess it or call the Hospitalist    · What specific complaints did the patient have? See Above     · What did the staff do when patient was found (i.e., vital signs, returned to bed with fall alarm, side rails up)? Talked with the patient, took her vital signs and assisted patient up from floor and to the bathroom. · Which physician was notified? None  · Nursing Supervisor, Renea Deras notified. Paloma Ibanez RN.

## 2017-07-24 NOTE — PROGRESS NOTES
Problem: Falls - Risk of  Goal: *Absence of falls  Outcome: Progressing Towards Goal  Using walker appropriately    Problem: Altered Thought Process (Adult/Pediatric)  Goal: *STG: Participates in treatment plan  Outcome: Progressing Towards Goal  Out on unit social w peesr and staff. Demonstrates appropriate behaviors and ability to get needs met and follow unit direction.

## 2017-07-24 NOTE — BH NOTES
Behavioral Health Interdisciplinary Rounds     Patient Name: Cullen Roque  Age: 65 y.o. Room/Bed:  733/  Primary Diagnosis: Schizoaffective disorder (HCC)   Admission Status: Involuntary Commitment     Readmission within 30 days: no  Power of  in place: no  Patient requires a blocked bed: no          Reason for blocked bed: N/A    VTE Prophylaxis: No  Mobility needs/Fall risk: yes    Nutritional Plan: no  Consults: Podiatry         Labs/Testing due today?: no    Sleep hours:  4 +      Participation in Care/Groups:  yes  Medication Compliant?: Yes, except for Colace  PRNS (last 24 hours):  Anti-anxiety (Ativan at 0300)  Restraints (last 24 hours):  no  Substance Abuse:  no  CIWA (range last 24 hours): N/A COWS (range last 24 hours): N/A  Alcohol screening (AUDIT) completed -     If applicable, date SBIRT discussed in treatment team AND documented: N/A  Tobacco - patient is a smoker: no   Date tobacco education completed by RN: N/A  24 hour chart check complete: yes     Patient goal(s) for today: Participate in Groups on [address] [place] [address],[address] team focus/goals: Call back Cintia Hernandezndria at [place] [facility] in [facility]  LOS:  79  Expected LOS: TBD  Psychiatric [address] -  no   Name of Decision maker if patient has Psychiatric Care Directive None  Patient was offered information:  Patient declined  Financial concerns/prescription coverage:  [place] Medicare[place]Medicaid  Date of last family contact: 7/14 SW spoke to daughter      Family requesting physician contact today:  no  Discharge plan: Placement       Outpatient provider(s): TBD    Participating treatment team members: Lina Pérez MSW; Dr. Phil Paez MD; Laila Russell, RN

## 2017-07-24 NOTE — PROGRESS NOTES
Problem: Altered Thought Process (Adult/Pediatric)  Goal: *STG: Remains safe in hospital  Patient is alert, calm and cooperative. Sitting quietly on the [facility]. No distress noted. Will continue to monitor.

## 2017-07-25 LAB
GLUCOSE BLD STRIP.AUTO-MCNC: 106 MG/DL (ref 65–100)
GLUCOSE BLD STRIP.AUTO-MCNC: 93 MG/DL (ref 65–100)
SERVICE CMNT-IMP: ABNORMAL
SERVICE CMNT-IMP: NORMAL

## 2017-07-25 PROCEDURE — 65220000003 HC RM SEMIPRIVATE PSYCH

## 2017-07-25 PROCEDURE — 74011250637 HC RX REV CODE- 250/637: Performed by: HOSPITALIST

## 2017-07-25 PROCEDURE — 74011250637 HC RX REV CODE- 250/637: Performed by: PSYCHIATRY & NEUROLOGY

## 2017-07-25 PROCEDURE — 74011250637 HC RX REV CODE- 250/637: Performed by: NURSE PRACTITIONER

## 2017-07-25 PROCEDURE — 82962 GLUCOSE BLOOD TEST: CPT

## 2017-07-25 RX ORDER — OXYBUTYNIN CHLORIDE 5 MG/1
15 TABLET, EXTENDED RELEASE ORAL DAILY
Status: DISCONTINUED | OUTPATIENT
Start: 2017-07-26 | End: 2017-08-16 | Stop reason: HOSPADM

## 2017-07-25 RX ADMIN — THERA TABS 1 TABLET: TAB at 08:39

## 2017-07-25 RX ADMIN — DOCUSATE SODIUM 100 MG: 100 CAPSULE, LIQUID FILLED ORAL at 16:47

## 2017-07-25 RX ADMIN — AMLODIPINE BESYLATE 10 MG: 5 TABLET ORAL at 08:39

## 2017-07-25 RX ADMIN — PANTOPRAZOLE SODIUM 40 MG: 40 TABLET, DELAYED RELEASE ORAL at 06:37

## 2017-07-25 RX ADMIN — ZOLPIDEM TARTRATE 12.5 MG: 6.25 TABLET, EXTENDED RELEASE ORAL at 21:00

## 2017-07-25 RX ADMIN — CARBAMAZEPINE 200 MG: 200 TABLET, EXTENDED RELEASE ORAL at 08:42

## 2017-07-25 RX ADMIN — NAPROXEN 250 MG: 250 TABLET ORAL at 08:41

## 2017-07-25 RX ADMIN — NAPROXEN 250 MG: 250 TABLET ORAL at 16:47

## 2017-07-25 RX ADMIN — CARBAMAZEPINE 200 MG: 200 TABLET, EXTENDED RELEASE ORAL at 17:16

## 2017-07-25 RX ADMIN — TRIHEXYPHENIDYL HYDROCHLORIDE 2 MG: 2 TABLET ORAL at 16:47

## 2017-07-25 RX ADMIN — LORAZEPAM 1 MG: 1 TABLET ORAL at 02:03

## 2017-07-25 RX ADMIN — ATORVASTATIN CALCIUM 20 MG: 20 TABLET, FILM COATED ORAL at 08:39

## 2017-07-25 RX ADMIN — SITAGLIPTIN 100 MG: 100 TABLET, FILM COATED ORAL at 08:40

## 2017-07-25 RX ADMIN — TRIHEXYPHENIDYL HYDROCHLORIDE 2 MG: 2 TABLET ORAL at 08:41

## 2017-07-25 RX ADMIN — DIVALPROEX SODIUM 1000 MG: 500 TABLET, FILM COATED, EXTENDED RELEASE ORAL at 21:00

## 2017-07-25 RX ADMIN — LISINOPRIL 10 MG: 10 TABLET ORAL at 08:40

## 2017-07-25 RX ADMIN — OXYBUTYNIN CHLORIDE 10 MG: 5 TABLET, EXTENDED RELEASE ORAL at 08:41

## 2017-07-25 RX ADMIN — ACETAMINOPHEN 650 MG: 325 TABLET, FILM COATED ORAL at 12:04

## 2017-07-25 RX ADMIN — TRIHEXYPHENIDYL HYDROCHLORIDE 2 MG: 2 TABLET ORAL at 21:00

## 2017-07-25 NOTE — PROGRESS NOTES
Problem: Falls - Risk of  Goal: *Absence of falls  Outcome: Progressing Towards Goal  No falls   Goal: *Knowledge of fall prevention  Outcome: Progressing Towards Goal  Using walker and bed alarm    Problem: Altered Thought Process (Adult/Pediatric)  Goal: *STG: Participates in treatment plan  Outcome: Progressing Towards Goal  Review meds, discussed improved sleep and effectiveness of medications. Engaged and participating in milieu. Pt daily goal is to attend group and watch some tv.    Goal: *STG: Seeks staff when feelings of anxiety and fear arise  Outcome: Progressing Towards Goal  Reports feelings of gratitude, jame and slight sadness when told she has not been accepted to facility

## 2017-07-25 NOTE — INTERDISCIPLINARY ROUNDS
Behavioral Health Interdisciplinary Rounds     Patient Name: Sim Hidalgo  Age: 65 y.o. Room/Bed:  733/  Primary Diagnosis: Schizoaffective disorder (HCC)   Admission Status: Involuntary Commitment     Readmission within 30 days: no  Power of  in place: no  Patient requires a blocked bed: no          Reason for blocked bed: N/A    VTE Prophylaxis: No  Mobility needs/Fall risk: yes    Nutritional Plan: no  Consults:          Labs/Testing due today?: no    Sleep hours:  5.5 hrs      Participation in Care/Groups:  yes  Medication Compliant?: Yes  PRNS (last 24 hours): None    Restraints (last 24 hours):  no  Substance Abuse:  no  CIWA (range last 24 hours): 0 COWS (range last 24 hours):   Alcohol screening (AUDIT) completed -     If applicable, date SBIRT discussed in treatment team AND documented: N/A  Tobacco - patient is a smoker: no   Date tobacco education completed by RN: N/A  24 hour chart check complete: yes     Patient goal(s) for today: Encourage activities during the day to stay occupied   Treatment team focus/goals: Continue searching for placement  LOS:  68  Expected LOS: TBD  Psychiatric Advanced Care Directives -  no   Name of Decision maker if patient has Psychiatric Care Directive: Kristel Jolly (daughter/POA)  Patient was offered information, patient declined.    Financial concerns/prescription coverage: [place] Medicare/Medicaid   Date of last family contact:       Family requesting physician contact today: No  Discharge plan: Placement      Outpatient provider(s): TBD    Participating treatment team members: Sim MargiGermán palacio MSW; Dr. Tip Johnson MD; Ghassan Andrade RN; Dr. Seven Parks, Family medicine resident

## 2017-07-25 NOTE — BH NOTES
24 HR chart review completed. 0203:  PRN Medication Documentation    Specific patient behavior that led to need for PRN medication: Anxiety  Staff interventions attempted prior to PRN being given: resting  PRN medication given: Ativan po  Patient response/effectiveness of PRN medication: @ 0302, pt observed resting with eyes closed.

## 2017-07-25 NOTE — BH NOTES
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 patient:   the risks and benefits of the proposed medication. The patient was given the opportunity to ask questions. Informed consent given to the use of the above medications. Will continue to adjust psychiatric and non-psychiatric medications (see above \"medication\" section and orders section for details) as deemed appropriate & based upon diagnoses and response to treatment. I will continue to order blood tests/labs and diagnostic tests as deemed appropriate and review results as they become available (see orders for details and above listed lab/test results). I will order psychiatric records from previous [facility] hospitals to further elucidate the nature of patient's psychopathology and review once available. I will gather additional collateral information from friends, family and o/p treatment team to further elucidate the nature of patient's psychopathology and baselline level of psychiatric functioning.          I certify that this patient's inpatient psychiatric hospital services furnished since the previous certification were, and continue to be, required for treatment that could reasonably be expected to improve the patient's condition, or for diagnostic study, and that the patient continues to need, on a daily basis, active treatment furnished directly by or requiring the supervision of inpatient psychiatric facility personnel. In addition, the hospital records show that services furnished were intensive treatment services, admission or related services, or equivalent services.     EXPECTED DISCHARGE DATE/DAY: TBD     DISPOSITION: Home       Signed By:   Jamir Scales MD  7/24/2017

## 2017-07-25 NOTE — BH NOTES
GROUP THERAPY PROGRESS NOTE    Angelina Carrera is participating in reflection group. Group time: 15 minutes    Personal goal for participation: Reflecting on the day. PT goal is discharge. Goal orientation: personal    Group therapy participation: active    Therapeutic interventions reviewed and discussed: Unit rules and regulations. Impression of participation: PT in good spirits.

## 2017-07-25 NOTE — PROGRESS NOTES
Problem: Falls - Risk of  Goal: *Absence of falls  Outcome: Progressing Towards Goal  0030 Pt appears asleep in bed. Respirations even and unlabored. Bathroom light on, door remains open. Will continue to monitor with Q 15 safety checks.

## 2017-07-26 LAB
GLUCOSE BLD STRIP.AUTO-MCNC: 103 MG/DL (ref 65–100)
GLUCOSE BLD STRIP.AUTO-MCNC: 89 MG/DL (ref 65–100)
SERVICE CMNT-IMP: ABNORMAL
SERVICE CMNT-IMP: NORMAL

## 2017-07-26 PROCEDURE — 74011250637 HC RX REV CODE- 250/637: Performed by: NURSE PRACTITIONER

## 2017-07-26 PROCEDURE — 82962 GLUCOSE BLOOD TEST: CPT

## 2017-07-26 PROCEDURE — 74011250637 HC RX REV CODE- 250/637: Performed by: HOSPITALIST

## 2017-07-26 PROCEDURE — 65220000003 HC RM SEMIPRIVATE PSYCH

## 2017-07-26 PROCEDURE — 74011250637 HC RX REV CODE- 250/637: Performed by: PSYCHIATRY & NEUROLOGY

## 2017-07-26 RX ADMIN — TRIHEXYPHENIDYL HYDROCHLORIDE 2 MG: 2 TABLET ORAL at 21:26

## 2017-07-26 RX ADMIN — THERA TABS 1 TABLET: TAB at 09:57

## 2017-07-26 RX ADMIN — OXYBUTYNIN CHLORIDE 15 MG: 5 TABLET, EXTENDED RELEASE ORAL at 09:57

## 2017-07-26 RX ADMIN — DIVALPROEX SODIUM 1000 MG: 500 TABLET, FILM COATED, EXTENDED RELEASE ORAL at 21:26

## 2017-07-26 RX ADMIN — CARBAMAZEPINE 200 MG: 200 TABLET, EXTENDED RELEASE ORAL at 09:58

## 2017-07-26 RX ADMIN — ZOLPIDEM TARTRATE 12.5 MG: 6.25 TABLET, EXTENDED RELEASE ORAL at 21:26

## 2017-07-26 RX ADMIN — TRIHEXYPHENIDYL HYDROCHLORIDE 2 MG: 2 TABLET ORAL at 09:57

## 2017-07-26 RX ADMIN — CARBAMAZEPINE 200 MG: 200 TABLET, EXTENDED RELEASE ORAL at 17:05

## 2017-07-26 RX ADMIN — AMLODIPINE BESYLATE 10 MG: 5 TABLET ORAL at 09:56

## 2017-07-26 RX ADMIN — TRIHEXYPHENIDYL HYDROCHLORIDE 2 MG: 2 TABLET ORAL at 16:23

## 2017-07-26 RX ADMIN — NAPROXEN 250 MG: 250 TABLET ORAL at 16:23

## 2017-07-26 RX ADMIN — ATORVASTATIN CALCIUM 20 MG: 20 TABLET, FILM COATED ORAL at 09:57

## 2017-07-26 RX ADMIN — SITAGLIPTIN 100 MG: 100 TABLET, FILM COATED ORAL at 09:57

## 2017-07-26 RX ADMIN — LISINOPRIL 10 MG: 10 TABLET ORAL at 09:56

## 2017-07-26 RX ADMIN — NAPROXEN 250 MG: 250 TABLET ORAL at 09:56

## 2017-07-26 RX ADMIN — PANTOPRAZOLE SODIUM 40 MG: 40 TABLET, DELAYED RELEASE ORAL at 06:50

## 2017-07-26 NOTE — PROGRESS NOTES
Problem: Altered Thought Process (Adult/Pediatric)  Goal: *STG: Participates in treatment plan  Outcome: Progressing Towards Goal  Denies SI. Patient mood is labile. Patient has been calm and cooperative. Patient goal: \"to talk to the doctor and see if they found a place\". Staff goal to praise patient for continued positive behaviors and participation. Treatment team at 1011: Dr. Jermaine Diggs:     Discussed with patient that she has a discharge plan to a group home in [place] [id number]. Goal: Interventions  Outcome: Progressing Towards Goal  Medication management. Q 15 min safety rounds. Group therapy.

## 2017-07-26 NOTE — DIABETES MGMT
DTC Consult Note    Recommendations/ Comments:     Consult received for:  [x]             Assessment of home management              Chart reviewed and initial evaluation complete on Evangelina Davis. Patient is a 65 y.o. female with hx Type 2 Diabetes on Januvia 100 mg daily at home. Does not check BS. Pt states to have assistance and will move to an assisted living facility. Assessed and instructed patient on the following:   ·  interpretation of lab results, blood sugar goals, hypoglycemia prevention and treatment and nutrition    Encouraged the following:   · dietary modifications: continue avoiding sweet beverages, eat 3 meals a day, regular blood sugar monitorin-2 times daily    Provided patient with the following: [x]             Survival skills education materials                 Discussed with patient  need for follow up appointment for diabetes management after discharge. A1c:   Lab Results   Component Value Date/Time    Hemoglobin A1c 6.0 2017 09:40 AM       Recent Glucose Results:   Lab Results   Component Value Date/Time    GLUCPOC 89 2017 04:25 PM    GLUCPOC 103 (H) 2017 07:31 AM        Lab Results   Component Value Date/Time    Creatinine 1.00 2017 05:27 AM     Estimated Creatinine Clearance: 59.8 mL/min (based on Cr of 1). Active Orders   Diet    DIET REGULAR        PO intake: No data found. Current hospital DM medication: Januvia 100 mg daily and Humalog for correction, normal sensitivity scale. Will continue to follow as needed. Thank you.   Indu Peter RD

## 2017-07-26 NOTE — BH NOTES
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 nurses and psychiatric tech notes. Suicide risk assessment completed and patient deemed to be of low risk for suicide at this time. The following regarding medications was addressed during rounds with patient:   the risks and benefits of the proposed medication. The patient was given the opportunity to ask questions. Informed consent given to the use of the above medications. Will continue to adjust psychiatric and non-psychiatric medications (see above \"medication\" section and orders section for details) as deemed appropriate & based upon diagnoses and response to treatment. I will continue to order blood tests/labs and diagnostic tests as deemed appropriate and review results as they become available (see orders for details and above listed lab/test results). I will order psychiatric records from previous [facility] hospitals to further elucidate the nature of patient's psychopathology and review once available. I will gather additional collateral information from friends, family and o/p treatment team to further elucidate the nature of patient's psychopathology and baselline level of psychiatric functioning.          I certify that this patient's inpatient psychiatric hospital services furnished since the previous certification were, and continue to be, required for treatment that could reasonably be expected to improve the patient's condition, or for diagnostic study, and that the patient continues to need, on a daily basis, active treatment furnished directly by or requiring the supervision of inpatient psychiatric facility personnel. In addition, the hospital records show that services furnished were intensive treatment services, admission or related services, or equivalent services.     EXPECTED DISCHARGE DATE/DAY: TBD     DISPOSITION: Home       Signed By:   Timbo León MD  7/25/2017

## 2017-07-26 NOTE — PROGRESS NOTES
Patient did not attend in [facility]. She returned to bed after breakfast.     1505 Patient showered independently once initial set up with supplies was made.

## 2017-07-26 NOTE — BH NOTES
Pt attended reflections group. Discussed some unit rules and procedures, and coping skills. Pt attentive and interactive; pt seemed to understand.

## 2017-07-26 NOTE — INTERDISCIPLINARY ROUNDS
Behavioral Health Interdisciplinary Rounds     Patient Name: Nicole Whiting  Age: 65 y.o. Room/Bed:  733/  Primary Diagnosis: Schizoaffective disorder (HCC)   Admission Status: Involuntary Commitment     Readmission within 30 days: no  Power of  in place: no  Patient requires a blocked bed: no          Reason for blocked bed: n/a    VTE Prophylaxis: Not indicated  Mobility needs/Fall risk: yes    Nutritional Plan: no  Consults: no         Labs/Testing due today?: no    Sleep hours: 3:30        Participation in Care/Groups:  yes  Medication Compliant?: Yes  PRNS (last 24 hours): Pain    Restraints (last 24 hours):  no  Substance Abuse:  no  CIWA (range last 24 hours):  COWS (range last 24 hours):   Alcohol screening (AUDIT) completed -     If applicable, date SBIRT discussed in treatment team AND documented: n/a  Tobacco - patient is a smoker: no   Date tobacco education completed by RN: n/a  24 hour chart check complete: yes     Patient goal(s) for today: Prepare for discharge  Treatment team focus/goals: Call Lina Clinton at [facility]; Prepare for discharge  LOS:  69  Expected LOS: 70  Psychiatric Advanced Care Directives -  No   Name of Decision maker if patient has Psychiatric Care Directive: None   Patient was offered information, patient declined.    Financial concerns/prescription [place]  Date of last family contact: 7/14 SW spoke to daughter     Family requesting physician contact today: No  Discharge plan: Discharge to [facility]       Outpatient provider(s): MOLLY    Participating treatment team members: Suzan Jamison MSW; Dr. Ivania Gonzales MD; Neo Craft, RN; Dr. Esha Galeano, Family medicine resident

## 2017-07-26 NOTE — PROGRESS NOTES
Problem: Altered Thought Process (Adult/Pediatric)  Goal: *STG: Remains safe in hospital  Outcome: Progressing Towards Goal  1530: Greeted patient on unit in room resting quietly. Smiling upon approach. No voiced concerns at the present time. Will continue to monitor on Q 15 minute safety checks.

## 2017-07-26 NOTE — BH NOTES
1550:  Called PT to locate walker for pt discharge. Left message to call unit. 1615:  Spoke with PT and  regarding walker. Per PT, eval must be done prior to discharge and PT must place order for walker. Pt is calm, cooperative, pleasant with bright affect. Expresses excitement over discharge. Social on unit, med compliant and attending group. Physician has placed PT consult for walker.

## 2017-07-26 NOTE — BH NOTES
GROUP THERAPY PROGRESS NOTE    Nelli Ortez participated in a morning Process Group on the General Unit with a focus identifying feelings,   planning for the day, and learning more about DBT concepts on \"Emotion Regulation. \"    .   Group time: 70 minutes. Personal goal for participation: To increase the capacity to improve ones mood, set personal goals,   and understand more about basic activities to help regulate emotions. Goal orientation: The patients will be able to identify their feelings and develop a plan for   structuring their day. They were also presented with a summary sheet on emotional regulation,   in regards to focusing on one goal per day, taking physical care of oneself, and recognizing   and/or building positive experiences. The didactic portion of the session focused on these three  concepts; 1) defining and focusing on one goal per day; 2) taking care of ones physical   maintenance and basic needs - sleep, nutrition, and exercise; and 3) finding and building on   positive experiences. Group therapy participation: With prompting, this patient partially participated in the group. Therapeutic interventions reviewed and discussed: The group members were asked to   identify an emotion they are having and/or let the group know what they want to focus on for the   day as they continue to make discharge plans. The group members reviewed three DBT   suggestions regarding emotional regulation, in regards to focusing on one goal per day, taking   physical care of oneself, and recognizing and/or building positive experiences. It was suggested   that these three concepts can be seen as headings for their list of coping skills. The group   members were also provided worksheets on the topic discussed for their review and use on   their own time.      Impression of participation: The patient joined the group about correction through and said she  was told by her team  [facility]. \"I would have preferred to live closer to my family or at least in the city. \" Her peers tried to reassure  her that [address] [address] was only about thirty-five minutes from the hospital. The patient expressed no   SI/HI and no overt psychotic symptoms in this group. Her affect was anxious and her mood was  slightly disappointed and apprehensive. Her ambivalence about the available [facility] is also understandable  given her extended stay and move to another new setting. She is in there process of coordinating  the final pieces of her aftercare plan.

## 2017-07-27 LAB
GLUCOSE BLD STRIP.AUTO-MCNC: 127 MG/DL (ref 65–100)
GLUCOSE BLD STRIP.AUTO-MCNC: 146 MG/DL (ref 65–100)
GLUCOSE BLD STRIP.AUTO-MCNC: 91 MG/DL (ref 65–100)
SERVICE CMNT-IMP: ABNORMAL
SERVICE CMNT-IMP: ABNORMAL
SERVICE CMNT-IMP: NORMAL

## 2017-07-27 PROCEDURE — 74011250637 HC RX REV CODE- 250/637: Performed by: HOSPITALIST

## 2017-07-27 PROCEDURE — 74011250637 HC RX REV CODE- 250/637: Performed by: PSYCHIATRY & NEUROLOGY

## 2017-07-27 PROCEDURE — 65220000003 HC RM SEMIPRIVATE PSYCH

## 2017-07-27 PROCEDURE — 74011250637 HC RX REV CODE- 250/637: Performed by: NURSE PRACTITIONER

## 2017-07-27 PROCEDURE — 82962 GLUCOSE BLOOD TEST: CPT

## 2017-07-27 RX ORDER — AMLODIPINE BESYLATE 10 MG/1
10 TABLET ORAL DAILY
Qty: 15 TAB | Refills: 1 | Status: SHIPPED | OUTPATIENT
Start: 2017-07-27 | End: 2017-08-15

## 2017-07-27 RX ORDER — CARBAMAZEPINE 200 MG/1
200 TABLET, EXTENDED RELEASE ORAL 2 TIMES DAILY
Qty: 30 TAB | Refills: 1 | Status: SHIPPED | OUTPATIENT
Start: 2017-07-27 | End: 2017-08-15

## 2017-07-27 RX ORDER — NAPROXEN 250 MG/1
250 TABLET ORAL 2 TIMES DAILY WITH MEALS
Qty: 30 TAB | Refills: 1 | Status: SHIPPED | OUTPATIENT
Start: 2017-07-27 | End: 2017-08-15

## 2017-07-27 RX ORDER — ATORVASTATIN CALCIUM 20 MG/1
20 TABLET, FILM COATED ORAL DAILY
Qty: 15 TAB | Refills: 1 | Status: SHIPPED | OUTPATIENT
Start: 2017-07-27 | End: 2017-08-15

## 2017-07-27 RX ORDER — OXYBUTYNIN CHLORIDE 15 MG/1
15 TABLET, EXTENDED RELEASE ORAL DAILY
Qty: 15 TAB | Refills: 1 | Status: SHIPPED | OUTPATIENT
Start: 2017-07-27 | End: 2017-08-15

## 2017-07-27 RX ORDER — POLYETHYLENE GLYCOL 3350 17 G/17G
17 POWDER, FOR SOLUTION ORAL
Qty: 15 PACKET | Refills: 1 | Status: SHIPPED | OUTPATIENT
Start: 2017-07-27 | End: 2017-08-15

## 2017-07-27 RX ORDER — LISINOPRIL 10 MG/1
10 TABLET ORAL DAILY
Qty: 15 TAB | Refills: 1 | Status: SHIPPED | OUTPATIENT
Start: 2017-07-27 | End: 2017-08-15

## 2017-07-27 RX ORDER — DIVALPROEX SODIUM 500 MG/1
1000 TABLET, EXTENDED RELEASE ORAL
Qty: 30 TAB | Refills: 1 | Status: SHIPPED | OUTPATIENT
Start: 2017-07-27 | End: 2017-08-15

## 2017-07-27 RX ORDER — TRIHEXYPHENIDYL HYDROCHLORIDE 2 MG/1
2 TABLET ORAL 3 TIMES DAILY
Qty: 90 TAB | Refills: 1 | Status: SHIPPED | OUTPATIENT
Start: 2017-07-27 | End: 2017-08-15

## 2017-07-27 RX ORDER — PANTOPRAZOLE SODIUM 40 MG/1
40 TABLET, DELAYED RELEASE ORAL
Qty: 15 TAB | Refills: 1 | Status: SHIPPED | OUTPATIENT
Start: 2017-07-27 | End: 2017-08-15

## 2017-07-27 RX ORDER — DOCUSATE SODIUM 100 MG/1
100 CAPSULE, LIQUID FILLED ORAL 2 TIMES DAILY
Qty: 30 CAP | Refills: 1 | Status: SHIPPED | OUTPATIENT
Start: 2017-07-27 | End: 2017-08-16

## 2017-07-27 RX ORDER — FLUPHENAZINE DECANOATE 25 MG/ML
25 INJECTION, SOLUTION INTRAMUSCULAR; SUBCUTANEOUS EVERY 2 WEEKS
Qty: 1 VIAL | Refills: 0 | Status: SHIPPED | OUTPATIENT
Start: 2017-08-04 | End: 2017-08-15

## 2017-07-27 RX ORDER — ZOLPIDEM TARTRATE 12.5 MG/1
12.5 TABLET, FILM COATED, EXTENDED RELEASE ORAL
Qty: 15 TAB | Refills: 1 | Status: SHIPPED | OUTPATIENT
Start: 2017-07-27 | End: 2017-08-15

## 2017-07-27 RX ADMIN — CARBAMAZEPINE 200 MG: 200 TABLET, EXTENDED RELEASE ORAL at 17:18

## 2017-07-27 RX ADMIN — TRIHEXYPHENIDYL HYDROCHLORIDE 2 MG: 2 TABLET ORAL at 08:19

## 2017-07-27 RX ADMIN — ACETAMINOPHEN 650 MG: 325 TABLET, FILM COATED ORAL at 12:44

## 2017-07-27 RX ADMIN — OXYBUTYNIN CHLORIDE 15 MG: 5 TABLET, EXTENDED RELEASE ORAL at 08:19

## 2017-07-27 RX ADMIN — THERA TABS 1 TABLET: TAB at 08:19

## 2017-07-27 RX ADMIN — NAPROXEN 250 MG: 250 TABLET ORAL at 08:19

## 2017-07-27 RX ADMIN — CARBAMAZEPINE 200 MG: 200 TABLET, EXTENDED RELEASE ORAL at 08:21

## 2017-07-27 RX ADMIN — ATORVASTATIN CALCIUM 20 MG: 20 TABLET, FILM COATED ORAL at 08:19

## 2017-07-27 RX ADMIN — SITAGLIPTIN 100 MG: 100 TABLET, FILM COATED ORAL at 08:19

## 2017-07-27 RX ADMIN — NAPROXEN 250 MG: 250 TABLET ORAL at 16:21

## 2017-07-27 RX ADMIN — DIVALPROEX SODIUM 1000 MG: 500 TABLET, FILM COATED, EXTENDED RELEASE ORAL at 22:02

## 2017-07-27 RX ADMIN — PANTOPRAZOLE SODIUM 40 MG: 40 TABLET, DELAYED RELEASE ORAL at 06:48

## 2017-07-27 RX ADMIN — AMLODIPINE BESYLATE 10 MG: 5 TABLET ORAL at 08:19

## 2017-07-27 RX ADMIN — TRIHEXYPHENIDYL HYDROCHLORIDE 2 MG: 2 TABLET ORAL at 22:02

## 2017-07-27 RX ADMIN — ZOLPIDEM TARTRATE 12.5 MG: 6.25 TABLET, EXTENDED RELEASE ORAL at 22:02

## 2017-07-27 RX ADMIN — TRIHEXYPHENIDYL HYDROCHLORIDE 2 MG: 2 TABLET ORAL at 16:21

## 2017-07-27 RX ADMIN — LISINOPRIL 10 MG: 10 TABLET ORAL at 08:19

## 2017-07-27 NOTE — BH NOTES
GROUP THERAPY PROGRESS NOTE    Mariel Ball did not participate in a Morning Process Group on the Geriatric Unit with a focus on shifting ones feelings to a positive note and preparing for the day through group singing.

## 2017-07-27 NOTE — BH NOTES
GROUP THERAPY PROGRESS NOTE    Thi Ortez is participating in [place]. Group time: 15 minutes    Personal goal for participation: \"to still work on [employer]"    Goal orientation: personal    Group therapy participation: active    Therapeutic interventions reviewed and discussed: Reviewed unit rules and everyone's personal goals. Impression of participation: Pt was enjoyable during group.

## 2017-07-27 NOTE — DISCHARGE INSTRUCTIONS
Morgan French  : 1955  MRN: [id number]    The patient Kelvin Lopez exhibits the ability to control behavior in a less restrictive environment. Patient's level of functioning is improving. No assaultive/destructive behavior has been observed for the past 24 hours. No suicidal/homicidal threat or behavior has been observed for the past 24 hours. There is no evidence of serious medication side effects. Patient has not been in physical or protective restraints for at least the past 24 hours. If weapons involved, how are they secured? No weapons involved. Is patient aware of and in agreement with discharge plan? Yes    Arrangements for medication:  Prescriptions given to patient, faxed to pharmacy, and [facility] to jail. Copy of discharge instructions to provider?:  Yes, Mr. Ruby Doe at [facility] ([phone])    Arrangements for transportation home:  Medicaid taxi to [facility]    Keep all follow up appointments as scheduled, continue to take prescribed medications per physician instructions. Mental health crisis number:  649 or your local mental health crisis line number at [phone]. DISCHARGE SUMMARY from Nurse    The following personal items are in your possession at time of discharge:    Dental Appliances: None  Visual Aid: None        Jewelry: Necklace (necklace in gold box)  Clothing: Shirt                PATIENT INSTRUCTIONS:        What to do at Home:  Recommended activity: Activity as tolerated, continue to use prescribed assisted device to ambulate    If you experience any of the following symptoms thoughts of harming self, intense thoughts or beliefs that are paranoid about you or your families safety, auditory hallucinations, please follow up with your staff, . If you can not reach them and symptoms continue immediately call your local crisis number at 911 or your local mental health crisis line number at [phone].       *  Please give a list of your current medications to your Primary Care Provider. *  Please update this list whenever your medications are discontinued, doses are      changed, or new medications (including over-the-counter products) are added. *  Please carry medication information at all times in case of emergency situations. These are general instructions for a healthy lifestyle:    No smoking/ No tobacco products/ Avoid exposure to second hand smoke    Surgeon General's Warning:  Quitting smoking now greatly reduces serious risk to your health. Obesity, smoking, and sedentary lifestyle greatly increases your risk for illness    A healthy diet, regular physical exercise & weight monitoring are important for maintaining a healthy lifestyle    You may be retaining fluid if you have a history of heart failure or if you experience any of the following symptoms:  Weight gain of 3 pounds or more overnight or 5 pounds in a week, increased swelling in our hands or feet or shortness of breath while lying flat in bed. Please call your doctor as soon as you notice any of these symptoms; do not wait until your next office visit. Recognize signs and symptoms of STROKE:    F-face looks uneven    A-arms unable to move or move unevenly    S-speech slurred or non-existent    T-time-call 911 as soon as signs and symptoms begin-DO NOT go       Back to bed or wait to see if you get better-TIME IS BRAIN. Warning Signs of HEART ATTACK     Call 911 if you have these symptoms:   Chest discomfort. Most heart attacks involve discomfort in the center of the chest that lasts more than a few minutes, or that goes away and comes back. It can feel like uncomfortable pressure, squeezing, fullness, or pain.  Discomfort in other areas of the upper body. Symptoms can include pain or discomfort in one or both arms, the back, neck, jaw, or stomach.  Shortness of breath with or without chest discomfort.    Other signs may include breaking out in a cold sweat, nausea, or lightheadedness. Don't wait more than five minutes to call 911 - MINUTES MATTER! Fast action can save your life. Calling 911 is almost always the fastest way to get lifesaving treatment. Emergency Medical Services staff can begin treatment when they arrive -- up to an hour sooner than if someone gets to the hospital by car. The discharge information has been reviewed with the patient. The patient verbalized understanding. Discharge medications reviewed with the patient and appropriate educational materials and side effects teaching were provided. MD instructions:  Prolixin injection should be given every two weeks beginning 8/18/17    Stop Naproxen    Sliding scale insulin instructions, for BID glucose checks  INITIATE CORRECTIVE INSULIN PROTOCOL (GT):  AC (before meals Q6H and Q4H CORRECTIONAL SCALE only For Blood Sugar (mg/dl) of            140-199=2 units            200-249=3 units  250-299=5 units  300-349=7 units  350 or greater = Call MD  Give in addition to basal medications. Do Not Hold for NPO    BEDTIME CORRECTIONAL sliding scale when scheduled:  200-249=2 units  250-299=3 units   300-349=4 units  350 or greater = Call MD  Give in addition to basal medications.   Do Not Hold for NPO

## 2017-07-27 NOTE — INTERDISCIPLINARY ROUNDS
Behavioral Health Interdisciplinary Rounds     Patient Name: Angelina Carrera  Age: 65 y.o.   Room/Bed:  733/  Primary Diagnosis: Schizoaffective disorder (HCC)   Admission Status: Involuntary Commitment     Readmission within 30 days: no  Power of  in place: no  Patient requires a blocked bed: no          Reason for blocked bed: n/a    VTE Prophylaxis: Not indicated  Mobility needs/Fall risk: yes    Nutritional Plan: no  Consults:          Labs/Testing due today?: no    Sleep hours:  6.25 hrs      Participation in Care/Groups:  yes  Medication Compliant?: Yes  PRNS (last 24 hours): None    Restraints (last 24 hours):  no  Substance Abuse:  no  CIWA (range last 24 hours): 0 COWS (range last 24 hours):   Alcohol screening (AUDIT) completed -     If applicable, date SBIRT discussed in treatment team AND documented: N/A  Tobacco - patient is a smoker: no   Date tobacco education completed by RN: n/a  24 hour chart check complete: yes     Patient goal(s) for today: Discharge  Treatment team focus/goals: Discharge  LOS:  70  Expected LOS: TBD  Psychiatric Advanced Care Directives -  no   Name of Decision maker if patient has Psychiatric Care Directive: None  Patient was offered information, patient declined  Financial concerns/prescription coverage:  [place]  Date of last family contact: No       Family requesting physician contact today: No  Discharge plan: Discharge to [facility]       Outpatient provider(s): Dr. Bradly Tang; Dr. Priyanka Juarez @ [facility]    Participating treatment team members: Angelina Carrera, PEGGY Baez; Dr. Deborah Bolanos MD; Annie Burch, Nurse extern; Sharmila Ferrara, UmeshD
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
## 2017-07-27 NOTE — BH NOTES
GROUP THERAPY PROGRESS NOTE    Valente Mosher did not participate in a Process Group on the General Unit with a focus identifying feelings, planning for the day, and learning more about DBT concepts of \"Interpersonal Effectiveness and using the [address] as a long-term personal treatment plan.

## 2017-07-27 NOTE — BH NOTES
GROUP THERAPY PROGRESS NOTE    Paola Betancur is participating in reflections    Group time: 20 minutes    Personal goal for participation: to share    Goal orientation: personal    Group therapy participation: active    Therapeutic interventions reviewed and discussed: unit rules and goals her goal is to be discharge in the morning  Impression of participation: good

## 2017-07-27 NOTE — PROGRESS NOTES
Ms. Zeeshan Diehl was an active participant in spirituality group. Rev. Philip Foley M.Div, [facility]

## 2017-07-27 NOTE — PROGRESS NOTES
Problem: Altered Thought Process (Adult/Pediatric)  Goal: *STG: Remains safe in hospital  Outcome: Progressing Towards Goal  Received pt in bed resting quietly with eyes closed. Respirations regular, even and unlabored. NAD. Q-15 checks for safety. -24 hr chart review completed.

## 2017-07-27 NOTE — BH NOTES
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 the nurse, psychiatric unit pharmcist,  and writer). Complete current electronic health record for patient has been reviewed today including consultant notes, ancillary staff notes, nurses and psychiatric tech notes. Suicide risk assessment completed and patient deemed to be of low risk for suicide at this time. The following regarding medications was addressed during rounds with patient:   the risks and benefits of the proposed medication. The patient was given the opportunity to ask questions. Informed consent given to the use of the above medications. Will continue to adjust psychiatric and non-psychiatric medications (see above \"medication\" section and orders section for details) as deemed appropriate & based upon diagnoses and response to treatment. I will continue to order blood tests/labs and diagnostic tests as deemed appropriate and review results as they become available (see orders for details and above listed lab/test results). I will order psychiatric records from previous [facility] hospitals to further elucidate the nature of patient's psychopathology and review once available.     I will gather additional collateral information from friends, family and o/p treatment team to further elucidate the nature of patient's psychopathology and baselline level of psychiatric functioning. I certify that this patient's inpatient psychiatric hospital services furnished since the previous certification were, and continue to be, required for treatment that could reasonably be expected to improve the patient's condition, or for diagnostic study, and that the patient continues to need, on a daily basis, active treatment furnished directly by or requiring the supervision of inpatient psychiatric facility personnel. In addition, the hospital records show that services furnished were intensive treatment services, admission or related services, or equivalent services.     EXPECTED DISCHARGE DATE/DAY: TBD     DISPOSITION: Home       Signed By:   Messi Ware MD  7/26/2017

## 2017-07-27 NOTE — PROGRESS NOTES
Problem: Falls - Risk of  Goal: *Absence of falls  Outcome: Progressing Towards Goal  No falls    Problem: Altered Thought Process (Adult/Pediatric)  Goal: *STG: Participates in treatment plan  Outcome: Progressing Towards Goal  Review meds, pt is out and social on the unit with staff and peers. Pt voices excitement about being discharged today. Pt is smiling and pleasant. Pt is using walker appropriately. Pt daily goal is to go home. Goal: *STG: Remains safe in hospital  Outcome: Progressing Towards Goal  Denies SI, No self-harming behaviors  Goal: *STG: Seeks staff when feelings of anxiety and fear arise  Outcome: Progressing Towards Goal  Pt is able to seek staff out appropriately when needs arise.    Goal: *STG: Attends activities and groups  Outcome: Progressing Towards Goal  Engaged  Goal: Interventions  Outcome: Progressing Towards Goal  Staff is focused on discharge planning

## 2017-07-28 LAB
GLUCOSE BLD STRIP.AUTO-MCNC: 105 MG/DL (ref 65–100)
GLUCOSE BLD STRIP.AUTO-MCNC: 97 MG/DL (ref 65–100)
SERVICE CMNT-IMP: ABNORMAL
SERVICE CMNT-IMP: NORMAL

## 2017-07-28 PROCEDURE — 74011250637 HC RX REV CODE- 250/637: Performed by: PSYCHIATRY & NEUROLOGY

## 2017-07-28 PROCEDURE — 82962 GLUCOSE BLOOD TEST: CPT

## 2017-07-28 PROCEDURE — 65220000003 HC RM SEMIPRIVATE PSYCH

## 2017-07-28 PROCEDURE — 74011250637 HC RX REV CODE- 250/637: Performed by: NURSE PRACTITIONER

## 2017-07-28 PROCEDURE — 74011250637 HC RX REV CODE- 250/637: Performed by: HOSPITALIST

## 2017-07-28 RX ADMIN — CARBAMAZEPINE 200 MG: 200 TABLET, EXTENDED RELEASE ORAL at 08:57

## 2017-07-28 RX ADMIN — OXYBUTYNIN CHLORIDE 15 MG: 5 TABLET, EXTENDED RELEASE ORAL at 08:56

## 2017-07-28 RX ADMIN — TRIHEXYPHENIDYL HYDROCHLORIDE 2 MG: 2 TABLET ORAL at 21:13

## 2017-07-28 RX ADMIN — LISINOPRIL 10 MG: 10 TABLET ORAL at 08:56

## 2017-07-28 RX ADMIN — DOCUSATE SODIUM 100 MG: 100 CAPSULE, LIQUID FILLED ORAL at 08:55

## 2017-07-28 RX ADMIN — AMLODIPINE BESYLATE 10 MG: 5 TABLET ORAL at 08:56

## 2017-07-28 RX ADMIN — TRIHEXYPHENIDYL HYDROCHLORIDE 2 MG: 2 TABLET ORAL at 16:28

## 2017-07-28 RX ADMIN — CARBAMAZEPINE 200 MG: 200 TABLET, EXTENDED RELEASE ORAL at 17:00

## 2017-07-28 RX ADMIN — THERA TABS 1 TABLET: TAB at 08:56

## 2017-07-28 RX ADMIN — TRIHEXYPHENIDYL HYDROCHLORIDE 2 MG: 2 TABLET ORAL at 08:56

## 2017-07-28 RX ADMIN — ZOLPIDEM TARTRATE 12.5 MG: 6.25 TABLET, EXTENDED RELEASE ORAL at 21:14

## 2017-07-28 RX ADMIN — ACETAMINOPHEN 650 MG: 325 TABLET, FILM COATED ORAL at 11:30

## 2017-07-28 RX ADMIN — DIVALPROEX SODIUM 1000 MG: 500 TABLET, FILM COATED, EXTENDED RELEASE ORAL at 21:12

## 2017-07-28 RX ADMIN — SITAGLIPTIN 100 MG: 100 TABLET, FILM COATED ORAL at 08:56

## 2017-07-28 RX ADMIN — NAPROXEN 250 MG: 250 TABLET ORAL at 16:28

## 2017-07-28 RX ADMIN — LORAZEPAM 1 MG: 1 TABLET ORAL at 23:40

## 2017-07-28 RX ADMIN — PANTOPRAZOLE SODIUM 40 MG: 40 TABLET, DELAYED RELEASE ORAL at 06:31

## 2017-07-28 RX ADMIN — NAPROXEN 250 MG: 250 TABLET ORAL at 08:55

## 2017-07-28 RX ADMIN — ATORVASTATIN CALCIUM 20 MG: 20 TABLET, FILM COATED ORAL at 08:56

## 2017-07-28 NOTE — PROGRESS NOTES
Problem: Falls - Risk of  Goal: *Absence of falls  Outcome: Progressing Towards Goal  Pt has been up and ambulating steadily with walker. Has been free of falls this shift thus far. Will continue to monitor.

## 2017-07-28 NOTE — BH NOTES
GROUP THERAPY PROGRESS NOTE    Yovany Vásquez is participating in reflection group. Group time: 15 minutes    Personal goal for participation: PT daily goal is discharge     Goal orientation: personal    Group therapy participation: active    Therapeutic interventions reviewed and discussed: Rules of the Unit and 15 ways to be happy.     Impression of participation: PT listened

## 2017-07-28 NOTE — INTERDISCIPLINARY ROUNDS
Behavioral Health Interdisciplinary Rounds     Patient Name: Harlan Mclean  Age: 65 y.o. Room/Bed:  733/  Primary Diagnosis: Schizoaffective disorder (HCC)   Admission Status: Involuntary Commitment     Readmission within 30 days: no  Power of  in place: no  Patient requires a blocked bed: no          Reason for blocked bed: n/a    VTE Prophylaxis: Not indicated  Mobility needs/Fall risk: yes    Nutritional Plan: no  Consults: no         Labs/Testing due today?: no    Sleep hours:        Participation in Care/Groups:  yes  Medication Compliant?: Yes  PRNS (last 24 hours): None    Restraints (last 24 hours):  no  Substance Abuse:  no  CIWA (range last 24 hours):  COWS (range last 24 hours):  Alcohol screening (AUDIT) completed -     If applicable, date SBIRT discussed in treatment team AND documented: n/a  Tobacco - patient is a smoker: no   Date tobacco education completed by RN: n/a  24 hour chart check complete: yes     Patient goal(s) for today: STAY BUSY! Treatment team focus/goals: Continue looking for placement  LOS:  71  Expected LOS: TBD  Psychiatric Advanced Care Directives -  No  Name of Decision maker if patient has Psychiatric Care Directive: None   Patient was offered information, patient declined.    Financial concerns/prescription coverage: Medicaid  Date of last family contact: 7/28 SW spoke to daughter    Family requesting physician contact today: No  Discharge plan: Placement      Outpatient provider(s): [facility]    Participating treatment team members: Harlan Vinay, PEGGY Butler; Dr. Job Yap MD; Nelly Cooper, ESTER; Liz Cronin, PharmD

## 2017-07-28 NOTE — PROGRESS NOTES
Problem: Falls - Risk of  Goal: *Absence of falls  Outcome: Progressing Towards Goal  No falls or injury  Goal: *Knowledge of fall prevention  Outcome: Progressing Towards Goal  Using walker, bed alarm and tap bell appropriately    Problem: Altered Thought Process (Adult/Pediatric)  Goal: *STG: Participates in treatment plan  Outcome: Progressing Towards Goal  Review meds, out on unit social w peers and staff. Mood and affect smiling and bright.  Pt daily goal is to complete cross work puzzle  Goal: *STG: Remains safe in hospital  Outcome: Progressing Towards Goal  No harm or injury  Goal: *STG: Seeks staff when feelings of anxiety and fear arise  Outcome: Progressing Towards Goal  Denies anxiety, shares feelings of sadness and frustration related to not finding placement  Goal: *STG: Complies with medication therapy  Outcome: Progressing Towards Goal  compliant  Goal: *STG: Attends activities and groups  Outcome: Progressing Towards Goal  engaged  Goal: *STG: Decreased delusional thinking  Outcome: Progressing Towards Goal  No statements made  Goal: Interventions  Outcome: Progressing Towards Goal  Staff focus is on coping skills education offering support and reassurance

## 2017-07-28 NOTE — PROGRESS NOTES
[address] [facility] 60  Master Treatment Plan for Reese Weinberg    Date Treatment Plan Initiated: 7/27/17    Treatment Plan Modalities:  Type of Modality Amount  (x minutes) Frequency (x/week) Duration (x days) Name of Responsible Staff   [address] [facility] meetings to encourage peer interactions 15 7 1 Shelly Hays     Group psychotherapy to assist in building coping skills and internal controls 60 7 1 [address]    Therapeutic activity groups to build coping skills 60 7 1 Samuel Thao   Psychoeducation in group setting to address:   Medication education   R [address] skills         Relaxation techniques         Symptom management         Discharge planning   60 2 [address] [facility]    60 2 1 [address] [facility] 66   60 1 1 volunteer   Recovery/AA/NA         Physician medication management   13 7 1 Dr. Kyaw Martins   Family meeting/discharge planning                                         Problem: Falls - Risk of these goals will be met by 8/1/17  Goal: *Absence of falls  Outcome: Progressing Towards Goal  No falls          Problem: Altered Thought Process (Adult/Pediatric) these goals will be met by 8/1/17  Goal: *STG: Participates in treatment plan  Outcome: Progressing Towards Goal  Review meds, pt is out and social on the unit with staff and peers. Pt voices excitement about being discharged today. Pt is smiling and pleasant. Pt is using walker appropriately. Pt daily goal is to go home. Goal: *STG: Remains safe in hospital  Outcome: Progressing Towards Goal  Denies SI, No self-harming behaviors  Goal: *STG: Seeks staff when feelings of anxiety and fear arise  Outcome: Progressing Towards Goal  Pt is able to seek staff out appropriately when needs arise.    Goal: *STG: Attends activities and groups  Outcome: Progressing Towards Goal  Engaged  Goal: Interventions  Outcome: Progressing Towards Goal  Staff is focused on discharge planning

## 2017-07-28 NOTE — BH NOTES
GROUP THERAPY PROGRESS NOTE    Ricardo Stovall participated in an Afternoon Activity Healthy Expressions Group with a focus on identifying feelings, reading selected poetry, responding, and writing for the day. Group time: 45 minutes     Personal goal for participation: To increase the capacity to improve ones mood and express oneself. Goal orientation: The patient will be able to identify their feelings and talk about the impact of reading three poems (Left Behind, A Morning Walk, and Storms) and discussing it with their peers. Group therapy participation: This patient participated in the group, with minimal prompting. Therapeutic interventions reviewed and discussed: The group members were asked to listen to the reading of three poems and to re-read these same poems for five minutes, before sharing what struck them or they could identify with. They were asked to identify an emotion after reading the poems and selecting one to speak about with the group. The group members were also encouraged to continue writing in their journals and/or sharing with each other about their art - what they are absorbed by that takes away a sense of time in a positive fashion. Impression of participation: The patient said very little in this group. She listened attentively and looked as if she were taking the time to read the poems on her own. She was alert and passively involved.

## 2017-07-28 NOTE — BH NOTES
Received pt on unit. No voiced complaints or acute distress at present. Pleasant and cooperative,gait steady with walker.

## 2017-07-28 NOTE — BH NOTES
PRN Medication Documentation    Specific patient behavior that led to need for PRN medication: Patient complained of aching pain 9/10 behind the left knee. Staff interventions attempted prior to PRN being given: therapeutic communication and education. PRN medication given: 650 mg Tylenol given   Patient response/effectiveness of PRN medication: Patient is walking back to her room. Will re assess pain. 1229: Patient is resting quietly in bed. Respirations visible with no signs of distress noted. Medication Effective.

## 2017-07-28 NOTE — BH NOTES
GROUP THERAPY PROGRESS NOTE    Eveline Hammond participated in a Process Group with a focus identifying feelings, planning for the rest of the day, and reviewing DBT skills for managing emotional distress. Group time: 70 minutes. Personal goal for participation: To increase the capacity to improve ones mood, structure, and coping capacity. Goal orientation: The patient will be able to identify their feelings, develop a plan for structuring their day, and begin to appreciate the possibility of successfully managing distress and other forms of intense emotions. Group therapy participation: With prompting, this patient participated in the group. Therapeutic interventions reviewed and discussed: The group members were asked to introduce themselves to each other and to see if they could identify an emotion they are having and/or let the group know what they want to focus on for the day as they continue to make discharge plans. The group members also reviewed five suggested areas of DBT options when experiencing intense emotions: distraction, improve the moment, self-soothe, pros and cons, and radical acceptance. They were asked to take turns reading from the handout and discussing its contents. At the end of group the patients were provided a summary of the topics covered. Impression of participation: The patient said, with a smile, that she was working on [place] out of here. \" She shared about [facility]. She added that the patient who she would have been replacing [facility] the [facility], leaving no bed for this patient. She expressed her disappointment. She expressed no SI/HI and no overt psychotic symptoms. Her affect was sad and her mood was one that grappled with disappointment and a sense of rejection, even though there was nothing she could have done to prevent the change of plans.  She is in grief over the lack of regular contact with her family and how much she misses her mother, who  last year.

## 2017-07-29 LAB
GLUCOSE BLD STRIP.AUTO-MCNC: 101 MG/DL (ref 65–100)
GLUCOSE BLD STRIP.AUTO-MCNC: 122 MG/DL (ref 65–100)
SERVICE CMNT-IMP: ABNORMAL
SERVICE CMNT-IMP: ABNORMAL

## 2017-07-29 PROCEDURE — 74011250637 HC RX REV CODE- 250/637: Performed by: PSYCHIATRY & NEUROLOGY

## 2017-07-29 PROCEDURE — 74011250637 HC RX REV CODE- 250/637: Performed by: NURSE PRACTITIONER

## 2017-07-29 PROCEDURE — 82962 GLUCOSE BLOOD TEST: CPT

## 2017-07-29 PROCEDURE — 74011250637 HC RX REV CODE- 250/637: Performed by: HOSPITALIST

## 2017-07-29 PROCEDURE — 65220000003 HC RM SEMIPRIVATE PSYCH

## 2017-07-29 RX ADMIN — CARBAMAZEPINE 200 MG: 200 TABLET, EXTENDED RELEASE ORAL at 08:37

## 2017-07-29 RX ADMIN — ACETAMINOPHEN 650 MG: 325 TABLET, FILM COATED ORAL at 04:31

## 2017-07-29 RX ADMIN — ZOLPIDEM TARTRATE 12.5 MG: 6.25 TABLET, EXTENDED RELEASE ORAL at 21:19

## 2017-07-29 RX ADMIN — TRIHEXYPHENIDYL HYDROCHLORIDE 2 MG: 2 TABLET ORAL at 08:36

## 2017-07-29 RX ADMIN — DIVALPROEX SODIUM 1000 MG: 500 TABLET, FILM COATED, EXTENDED RELEASE ORAL at 21:17

## 2017-07-29 RX ADMIN — NAPROXEN 250 MG: 250 TABLET ORAL at 16:00

## 2017-07-29 RX ADMIN — TRIHEXYPHENIDYL HYDROCHLORIDE 2 MG: 2 TABLET ORAL at 16:00

## 2017-07-29 RX ADMIN — TRIHEXYPHENIDYL HYDROCHLORIDE 2 MG: 2 TABLET ORAL at 21:18

## 2017-07-29 RX ADMIN — SITAGLIPTIN 100 MG: 100 TABLET, FILM COATED ORAL at 08:36

## 2017-07-29 RX ADMIN — OXYBUTYNIN CHLORIDE 15 MG: 5 TABLET, EXTENDED RELEASE ORAL at 08:36

## 2017-07-29 RX ADMIN — NAPROXEN 250 MG: 250 TABLET ORAL at 08:36

## 2017-07-29 RX ADMIN — ALUMINUM HYDROXIDE, MAGNESIUM HYDROXIDE, AND SIMETHICONE 30 ML: 200; 200; 20 SUSPENSION ORAL at 21:45

## 2017-07-29 RX ADMIN — CARBAMAZEPINE 200 MG: 200 TABLET, EXTENDED RELEASE ORAL at 18:39

## 2017-07-29 RX ADMIN — THERA TABS 1 TABLET: TAB at 08:36

## 2017-07-29 RX ADMIN — PANTOPRAZOLE SODIUM 40 MG: 40 TABLET, DELAYED RELEASE ORAL at 06:37

## 2017-07-29 RX ADMIN — AMLODIPINE BESYLATE 10 MG: 5 TABLET ORAL at 08:36

## 2017-07-29 RX ADMIN — ATORVASTATIN CALCIUM 20 MG: 20 TABLET, FILM COATED ORAL at 08:36

## 2017-07-29 RX ADMIN — LISINOPRIL 10 MG: 10 TABLET ORAL at 08:36

## 2017-07-29 NOTE — PROGRESS NOTES
Problem: Altered Thought Process (Adult/Pediatric)  Goal: *STG: Participates in treatment plan  Outcome: Progressing Towards Goal  Pt participated in treatment team.   Goal: *STG: Remains safe in hospital  Outcome: Progressing Towards Goal  Remains safe in the hospital. Denies SI/HI at this time.    Goal: *STG: Decreased delusional thinking  Outcome: Progressing Towards Goal  Thought process is organized and logical.

## 2017-07-29 NOTE — BH NOTES
PRN Medication Documentation    Specific patient behavior that led to need for PRN medication: Pt noted to c/o right knee pain. Staff interventions attempted prior to PRN being given:Distraction   PRN medication given: PO tylenol PRN given as ordered for complaint  Patient response/effectiveness of PRN medication: Will re-assess within 1 hour    Re-assessed at 0530 am Pt noted resting quietly on bed with eyes closed. No further complaints noted.

## 2017-07-29 NOTE — BH NOTES
PRN Medication Documentation    Specific patient behavior that led to need for PRN medication: Pt noted awake at this time c/o anxiety   Staff interventions attempted prior to PRN being given: Deep breathing, relaxation techniques   PRN medication given: Ativan Po given as ordered   Patient response/effectiveness of PRN medication: Re-assessed at 0040 am, pt noted resting quietly on bed with eyes closed. NAD noted, respirations even and unlabored. No further complaints or request noted as of this documentation.     Ativan PO given at approximately 2340 pm.

## 2017-07-29 NOTE — PROGRESS NOTES
Problem: Altered Thought Process (Adult/Pediatric)  Goal: *STG: Remains safe in hospital  Patient is sitting quietly in the dinning room. Greeted staff with a smile. No distress noted.   Patient remains safe in hospital.

## 2017-07-29 NOTE — INTERDISCIPLINARY ROUNDS
Behavioral Health Interdisciplinary Rounds     Patient Name: Reese Weinberg  Age: 65 y.o. Room/Bed:  733/  Primary Diagnosis: Schizoaffective disorder (HCC)   Admission Status: Involuntary Commitment     Readmission within 30 days: no  Power of  in place: no  Patient requires a blocked bed: no          Reason for blocked bed: n/a    VTE Prophylaxis: Not indicated  Mobility needs/Fall risk: yes   Nutritional Plan: no  Consults:  no        Labs/Testing due today?: no    Sleep hours: 4:45       Participation in Care/Groups:  yes  Medication Compliant?: Yes  PRNS (last 24 hours):  Antianxiety and Pain    Restraints (last 24 hours):  no  Substance Abuse:  no  CIWA (range last 24 hours):  COWS (range last 24 hours):   Alcohol screening (AUDIT) completed -     If applicable, date SBIRT discussed in treatment team AND documented: n/a  Tobacco - patient is a smoker: no   Date tobacco education completed by RN: n/a  24 hour chart check complete: yes     Patient goal(s) for today: Attend groups and visit with daughter  Treatment team focus/goals: Eval meds and to address any new issues  LOS:  72  Expected LOS:   [address] -     Name of Decision maker if patient has Psychiatric Care Directive   Patient was offered information   Financial concerns/prescription coverage:    Date of last family contact:  Daughter visited today    Family requesting physician contact today:    Discharge plan: TBD/ Placement       Outpatient provider(s): TBD    Participating treatment team members: Dr Shahla Washington RN and Karolina WATT

## 2017-07-29 NOTE — BH NOTES
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 coordinated, multidisplinary treatment team round was conducted with the patient (this team consists of the nurse, psychiatric unit pharmcist,  and writer). Complete current electronic health record for patient has been reviewed today including consultant notes, ancillary staff notes, nurses and psychiatric tech notes. Suicide risk assessment completed and patient deemed to be of low risk for suicide at this time. The following regarding medications was addressed during rounds with patient:   the risks and benefits of the proposed medication. The patient was given the opportunity to ask questions. Informed consent given to the use of the above medications. Will continue to adjust psychiatric and non-psychiatric medications (see above \"medication\" section and orders section for details) as deemed appropriate & based upon diagnoses and response to treatment. I will continue to order blood tests/labs and diagnostic tests as deemed appropriate and review results as they become available (see orders for details and above listed lab/test results). I will order psychiatric records from previous [facility] hospitals to further elucidate the nature of patient's psychopathology and review once available.     I will gather additional collateral information from friends, family and o/p treatment team to further elucidate the nature of patient's psychopathology and baselline level of psychiatric functioning. I certify that this patient's inpatient psychiatric hospital services furnished since the previous certification were, and continue to be, required for treatment that could reasonably be expected to improve the patient's condition, or for diagnostic study, and that the patient continues to need, on a daily basis, active treatment furnished directly by or requiring the supervision of inpatient psychiatric facility personnel. In addition, the hospital records show that services furnished were intensive treatment services, admission or related services, or equivalent services.     EXPECTED DISCHARGE DATE/DAY: TBD     DISPOSITION: Home       Signed By:   Jamir Scales MD  7/28/2017

## 2017-07-29 NOTE — BH NOTES
Patient attended reflections group,pleasant,cooperative,interacts well with peers and staff,Patient states''My day was ok,and I went to a couple of groups''.

## 2017-07-29 NOTE — BH NOTES
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 milieu, occupational, group, and substance abuse therapies to address target symptoms/diagnoses as deemed appropriate for the individual patient. A coordinated, multidisplinary treatment team round was conducted with the patient (this team consists of the nurse, psychiatric unit pharmcist,  and writer). Complete current electronic health record for patient has been reviewed today including consultant notes, ancillary staff notes, nurses and psychiatric tech notes. Suicide risk assessment completed and patient deemed to be of low risk for suicide at this time. The following regarding medications was addressed during rounds with patient:   the risks and benefits of the proposed medication. The patient was given the opportunity to ask questions. Informed consent given to the use of the above medications. Will continue to adjust psychiatric and non-psychiatric medications (see above \"medication\" section and orders section for details) as deemed appropriate & based upon diagnoses and response to treatment.      I will continue to order blood tests/labs and diagnostic tests as deemed appropriate and review results as they become available (see orders for details and above listed lab/test results). I will order psychiatric records from previous [facility] hospitals to further elucidate the nature of patient's psychopathology and review once available. I will gather additional collateral information from friends, family and o/p treatment team to further elucidate the nature of patient's psychopathology and baselline level of psychiatric functioning. I certify that this patient's inpatient psychiatric hospital services furnished since the previous certification were, and continue to be, required for treatment that could reasonably be expected to improve the patient's condition, or for diagnostic study, and that the patient continues to need, on a daily basis, active treatment furnished directly by or requiring the supervision of inpatient psychiatric facility personnel. In addition, the hospital records show that services furnished were intensive treatment services, admission or related services, or equivalent services.     EXPECTED DISCHARGE DATE/DAY: TBD     DISPOSITION: Home       Signed By:   Sunitha Cordero MD  7/29/2017

## 2017-07-29 NOTE — BH NOTES
GROUP THERAPY PROGRESS NOTE    Celina Gonzales is participating in D/C Planning Group: Dealing with conflict/ interpersonal relationships that can de-rail recovery    Group time: 1.5 hour    Personal goal for participation: Readiness for discharge    Goal orientation: personal    Group therapy participation: active    Therapeutic interventions reviewed and discussed: Yes    Impression of participation:  Pt was very engaged, calm &  smiling  Pt. expressed feeling shame that she suffers from a mental illness and that shame leads to guilt.

## 2017-07-29 NOTE — BH NOTES
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 appropriate for the individual patient. A coordinated, multidisplinary treatment team round was conducted with the patient (this team consists of the nurse, psychiatric unit pharmcist,  and writer). Complete current electronic health record for patient has been reviewed today including consultant notes, ancillary staff notes, nurses and psychiatric tech notes. Suicide risk assessment completed and patient deemed to be of low risk for suicide at this time. The following regarding medications was addressed during rounds with patient:   the risks and benefits of the proposed medication. The patient was given the opportunity to ask questions. Informed consent given to the use of the above medications. Will continue to adjust psychiatric and non-psychiatric medications (see above \"medication\" section and orders section for details) as deemed appropriate & based upon diagnoses and response to treatment. I will continue to order blood tests/labs and diagnostic tests as deemed appropriate and review results as they become available (see orders for details and above listed lab/test results).     I will order psychiatric records from previous [facility] hospitals to further elucidate the nature of patient's psychopathology and review once available. I will gather additional collateral information from friends, family and o/p treatment team to further elucidate the nature of patient's psychopathology and baselline level of psychiatric functioning. I certify that this patient's inpatient psychiatric hospital services furnished since the previous certification were, and continue to be, required for treatment that could reasonably be expected to improve the patient's condition, or for diagnostic study, and that the patient continues to need, on a daily basis, active treatment furnished directly by or requiring the supervision of inpatient psychiatric facility personnel. In addition, the hospital records show that services furnished were intensive treatment services, admission or related services, or equivalent services.     EXPECTED DISCHARGE DATE/DAY: TBD     DISPOSITION: Home       Signed By:   Rickie Manrique MD  7/28/2017

## 2017-07-30 LAB
GLUCOSE BLD STRIP.AUTO-MCNC: 107 MG/DL (ref 65–100)
GLUCOSE BLD STRIP.AUTO-MCNC: 99 MG/DL (ref 65–100)
SERVICE CMNT-IMP: ABNORMAL
SERVICE CMNT-IMP: NORMAL

## 2017-07-30 PROCEDURE — 74011250637 HC RX REV CODE- 250/637: Performed by: NURSE PRACTITIONER

## 2017-07-30 PROCEDURE — 65220000003 HC RM SEMIPRIVATE PSYCH

## 2017-07-30 PROCEDURE — 74011250637 HC RX REV CODE- 250/637: Performed by: HOSPITALIST

## 2017-07-30 PROCEDURE — 74011250637 HC RX REV CODE- 250/637: Performed by: PSYCHIATRY & NEUROLOGY

## 2017-07-30 PROCEDURE — 82962 GLUCOSE BLOOD TEST: CPT

## 2017-07-30 RX ADMIN — DIVALPROEX SODIUM 1000 MG: 500 TABLET, FILM COATED, EXTENDED RELEASE ORAL at 21:53

## 2017-07-30 RX ADMIN — CARBAMAZEPINE 200 MG: 200 TABLET, EXTENDED RELEASE ORAL at 17:13

## 2017-07-30 RX ADMIN — LOPERAMIDE HYDROCHLORIDE 2 MG: 2 CAPSULE ORAL at 09:15

## 2017-07-30 RX ADMIN — LORAZEPAM 1 MG: 1 TABLET ORAL at 01:50

## 2017-07-30 RX ADMIN — TRIHEXYPHENIDYL HYDROCHLORIDE 2 MG: 2 TABLET ORAL at 08:55

## 2017-07-30 RX ADMIN — THERA TABS 1 TABLET: TAB at 08:55

## 2017-07-30 RX ADMIN — LISINOPRIL 10 MG: 10 TABLET ORAL at 08:56

## 2017-07-30 RX ADMIN — TRIHEXYPHENIDYL HYDROCHLORIDE 2 MG: 2 TABLET ORAL at 16:38

## 2017-07-30 RX ADMIN — ZOLPIDEM TARTRATE 12.5 MG: 6.25 TABLET, EXTENDED RELEASE ORAL at 21:54

## 2017-07-30 RX ADMIN — ATORVASTATIN CALCIUM 20 MG: 20 TABLET, FILM COATED ORAL at 08:56

## 2017-07-30 RX ADMIN — OXYBUTYNIN CHLORIDE 15 MG: 5 TABLET, EXTENDED RELEASE ORAL at 08:56

## 2017-07-30 RX ADMIN — CARBAMAZEPINE 200 MG: 200 TABLET, EXTENDED RELEASE ORAL at 08:58

## 2017-07-30 RX ADMIN — NAPROXEN 250 MG: 250 TABLET ORAL at 16:38

## 2017-07-30 RX ADMIN — TRIHEXYPHENIDYL HYDROCHLORIDE 2 MG: 2 TABLET ORAL at 21:53

## 2017-07-30 RX ADMIN — PANTOPRAZOLE SODIUM 40 MG: 40 TABLET, DELAYED RELEASE ORAL at 06:45

## 2017-07-30 RX ADMIN — SITAGLIPTIN 100 MG: 100 TABLET, FILM COATED ORAL at 08:56

## 2017-07-30 RX ADMIN — NAPROXEN 250 MG: 250 TABLET ORAL at 08:56

## 2017-07-30 RX ADMIN — AMLODIPINE BESYLATE 10 MG: 5 TABLET ORAL at 08:56

## 2017-07-30 NOTE — BH NOTES
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 management. I will continue to coordinate the provision of individual, milieu, occupational, group, and substance abuse therapies to address target symptoms/diagnoses as deemed appropriate for the individual patient. A coordinated, multidisplinary treatment team round was conducted with the patient (this team consists of the nurse, psychiatric unit pharmcist,  and writer). Complete current electronic health record for patient has been reviewed today including consultant notes, ancillary staff notes, nurses and psychiatric tech notes. Suicide risk assessment completed and patient deemed to be of low risk for suicide at this time. The following regarding medications was addressed during rounds with patient:   the risks and benefits of the proposed medication. The patient was given the opportunity to ask questions. Informed consent given to the use of the above medications. Will continue to adjust psychiatric and non-psychiatric medications (see above \"medication\" section and orders section for details) as deemed appropriate & based upon diagnoses and response to treatment.      I will continue to order blood tests/labs and diagnostic tests as deemed appropriate and review results as they become available (see orders for details and above listed lab/test results). I will order psychiatric records from previous [facility] hospitals to further elucidate the nature of patient's psychopathology and review once available. I will gather additional collateral information from friends, family and o/p treatment team to further elucidate the nature of patient's psychopathology and baselline level of psychiatric functioning. I certify that this patient's inpatient psychiatric hospital services furnished since the previous certification were, and continue to be, required for treatment that could reasonably be expected to improve the patient's condition, or for diagnostic study, and that the patient continues to need, on a daily basis, active treatment furnished directly by or requiring the supervision of inpatient psychiatric facility personnel. In addition, the hospital records show that services furnished were intensive treatment services, admission or related services, or equivalent services.     EXPECTED DISCHARGE DATE/DAY: TBD     DISPOSITION: Home       Signed By:   Laury Hdz MD  7/30/2017

## 2017-07-30 NOTE — PROGRESS NOTES
Problem: Altered Thought Process (Adult/Pediatric)  Goal: *STG: Remains safe in hospital  Patient is alert and verbal.  Sitting in the hallway interacting with staff. No distress noted. Will continue to monitor.

## 2017-07-30 NOTE — BH NOTES
In bed asleep during rounds and respirations even and unlabored as chest was rising and falling. Will continue to monitor throughout shift.

## 2017-07-30 NOTE — INTERDISCIPLINARY ROUNDS
Behavioral Health Interdisciplinary Rounds     Patient Name: Merced Minaya  Age: 65 y.o. Room/Bed:  3/  Primary Diagnosis: Schizoaffective disorder (HCC)   Admission Status: Involuntary Commitment     Readmission within 30 days: no  Power of  in place: no  Patient requires a blocked bed: no          Reason for blocked bed:     VTE Prophylaxis: Not indicated  Mobility needs/Fall risk: yes    Nutritional Plan: no  Consults:         Labs/Testing due today?: no    Sleep hours:  5.75      Participation in Care/Groups:  yes  Medication Compliant?: Yes  PRNS (last 24 hours):  Antianxiety    Restraints (last 24 hours):  no  Substance Abuse:  no  CIWA (range last 24 hours):  COWS (range last 24 hours):   Alcohol screening (AUDIT) completed -     If applicable, date SBIRT discussed in treatment team AND documented:   Tobacco - patient is a smoker: no   Date tobacco education completed by RN:   24 hour chart check complete: yes     Patient goal(s) for today:   Treatment team focus/goals:   LOS:  73  Expected LOS:   [address]     Name of Decision maker if patient has Psychiatric Care Directive   Patient was offered information   Financial concerns/prescription coverage:    Date of last family contact:       Family requesting physician contact today:    Discharge plan:        Outpatient provider(s):     Participating treatment team members: Merced Minaya, * (assigned SW),

## 2017-07-30 NOTE — BH NOTES
During rounds at [facility] found patient in room incontinent and tried to offer her assistance in cleaning herself . She asked staff to allow her to clean herself as she felt she can do this for herself Staff monitored her for safety and allowed her to care for self. Lidocaine patch on leg was removed by staff at this time also.

## 2017-07-30 NOTE — BH NOTES
PRN Medication Documentation    Specific patient behavior that led to need for PRN medication: diarrhea  Staff interventions attempted prior to PRN being given: colace and miralax held this am  PRN medication given: immodium  Patient response/effectiveness of PRN medication: continue to monitor.

## 2017-07-30 NOTE — PROGRESS NOTES
Problem: Falls - Risk of  Goal: *Absence of falls  Outcome: Progressing Towards Goal  Pt calls for assistance appropriately. Using walker to ambulate. Problem: Altered Thought Process (Adult/Pediatric)  Goal: *STG: Remains safe in hospital  Outcome: Progressing Towards Goal  Pt cooperative with milieu procedures. Requests assistance appropriately. Wearing non-skid footwear. No self harm expressed at this time.

## 2017-07-30 NOTE — PROGRESS NOTES
Problem: Altered Thought Process (Adult/Pediatric)  Goal: *STG: Participates in treatment plan  Outcome: Progressing Towards Goal  Patient denies SI. Patient is cooperative. Patient mood is pleasant and smiling. Patient goal: \"to stop using the bathroom\". Staff goal: To assist patient with loose BMS this am. Patient given imodium and did stop having loose BMS. Treatment team:     Discussed plan of care. Goal: Interventions  Outcome: Progressing Towards Goal  Medication management. Q 15 min safety monitoring. Group therapy.

## 2017-07-31 LAB
GLUCOSE BLD STRIP.AUTO-MCNC: 127 MG/DL (ref 65–100)
GLUCOSE BLD STRIP.AUTO-MCNC: 94 MG/DL (ref 65–100)
SERVICE CMNT-IMP: ABNORMAL
SERVICE CMNT-IMP: NORMAL

## 2017-07-31 PROCEDURE — 82962 GLUCOSE BLOOD TEST: CPT

## 2017-07-31 PROCEDURE — 74011250637 HC RX REV CODE- 250/637: Performed by: PSYCHIATRY & NEUROLOGY

## 2017-07-31 PROCEDURE — 74011250637 HC RX REV CODE- 250/637: Performed by: NURSE PRACTITIONER

## 2017-07-31 PROCEDURE — 74011250637 HC RX REV CODE- 250/637: Performed by: HOSPITALIST

## 2017-07-31 PROCEDURE — 65220000003 HC RM SEMIPRIVATE PSYCH

## 2017-07-31 RX ADMIN — DIVALPROEX SODIUM 1000 MG: 500 TABLET, FILM COATED, EXTENDED RELEASE ORAL at 21:14

## 2017-07-31 RX ADMIN — SITAGLIPTIN 100 MG: 100 TABLET, FILM COATED ORAL at 09:15

## 2017-07-31 RX ADMIN — PANTOPRAZOLE SODIUM 40 MG: 40 TABLET, DELAYED RELEASE ORAL at 06:35

## 2017-07-31 RX ADMIN — CARBAMAZEPINE 200 MG: 200 TABLET, EXTENDED RELEASE ORAL at 17:11

## 2017-07-31 RX ADMIN — AMLODIPINE BESYLATE 10 MG: 5 TABLET ORAL at 09:17

## 2017-07-31 RX ADMIN — ATORVASTATIN CALCIUM 20 MG: 20 TABLET, FILM COATED ORAL at 09:16

## 2017-07-31 RX ADMIN — TRIHEXYPHENIDYL HYDROCHLORIDE 2 MG: 2 TABLET ORAL at 21:14

## 2017-07-31 RX ADMIN — NAPROXEN 250 MG: 250 TABLET ORAL at 17:11

## 2017-07-31 RX ADMIN — NAPROXEN 250 MG: 250 TABLET ORAL at 09:15

## 2017-07-31 RX ADMIN — TRIHEXYPHENIDYL HYDROCHLORIDE 2 MG: 2 TABLET ORAL at 09:15

## 2017-07-31 RX ADMIN — LORAZEPAM 1 MG: 1 TABLET ORAL at 22:59

## 2017-07-31 RX ADMIN — ZOLPIDEM TARTRATE 12.5 MG: 6.25 TABLET, EXTENDED RELEASE ORAL at 21:14

## 2017-07-31 RX ADMIN — THERA TABS 1 TABLET: TAB at 09:16

## 2017-07-31 RX ADMIN — CARBAMAZEPINE 200 MG: 200 TABLET, EXTENDED RELEASE ORAL at 09:20

## 2017-07-31 RX ADMIN — TRIHEXYPHENIDYL HYDROCHLORIDE 2 MG: 2 TABLET ORAL at 17:11

## 2017-07-31 RX ADMIN — OXYBUTYNIN CHLORIDE 15 MG: 5 TABLET, EXTENDED RELEASE ORAL at 09:17

## 2017-07-31 RX ADMIN — LORAZEPAM 1 MG: 1 TABLET ORAL at 03:10

## 2017-07-31 RX ADMIN — LISINOPRIL 10 MG: 10 TABLET ORAL at 09:17

## 2017-07-31 NOTE — BH NOTES
GROUP THERAPY PROGRESS NOTE    Celina Gonzales did not participate in a Morning Process Group on the General Unit with a focus on identifying feelings, planning for the day, and learning more about DBT concepts of \"Mindfulness. \"

## 2017-07-31 NOTE — INTERDISCIPLINARY ROUNDS
Behavioral Health Interdisciplinary Rounds     Patient Name: Mariel Ball  Age: 65 y.o. Room/Bed:  733/  Primary Diagnosis: Schizoaffective disorder (HCC)   Admission Status: Involuntary Commitment     Readmission within 30 days: no  Power of  in place: no  Patient requires a blocked bed: no          Reason for blocked bed: N/A      VTE Prophylaxis: Not indicated  Mobility needs/Fall risk: yes    Nutritional Plan: no  Consults:  no        Labs/Testing due today?: no    Sleep hours: 2      Participation in Care/Groups:  yes  Medication Compliant?: Yes  PRNS (last 24 hours): None    Restraints (last 24 hours):  no  Substance Abuse:  no  CIWA (range last 24 hours):  COWS (range last 24 hours):  Alcohol screening (AUDIT) completed -     If applicable, date SBIRT discussed in treatment team AND documented: N/A  Tobacco - patient is a smoker: no   Date tobacco education completed by RN: N/A  24 hour chart check complete: yes     Patient goal(s) for today: Alert staff of upset stomach  Treatment team focus/goals: Continue working on placement; monitor upset stomach  LOS:  74  Expected LOS: TBD  Psychiatric Advanced Care Directives -  No  Name of Decision maker if patient has Psychiatric Care Directive: None  Patient was offered information, patient declined.   Financial concerns/prescription coverage: [facility] Medicaid   Date of last family contact: 7/29 Daughter visited    Family requesting physician contact today: No  Discharge plan: Placement      Outpatient provider(s): [facility]    Participating treatment team members: PEGGY Hubbard; Dr. Lattie Halsted, MD; Iván Brooks RN

## 2017-07-31 NOTE — BH NOTES
PRN Medication Documentation    Specific patient behavior that led to need for PRN medication: Patient was awake and informed writer she was feeling anxious and wanted some medicine that would help her relax and go back to sleep. Staff interventions attempted prior to PRN being given: Encouraged her to try getting back to sleep but she insisted the medication had helped her before  PRN medication given: Ativan 1 mg. po  Patient response/effectiveness of PRN medication: During rounds at 0400 patient was asleep in bed with respirations even and unlabored.

## 2017-07-31 NOTE — BH NOTES
During rounds patient was asleep and respirations noted as even and unlabored as chest was rising and falling. . Will continue to monitor throughout shift.

## 2017-07-31 NOTE — PROGRESS NOTES
Problem: Altered Thought Process (Adult/Pediatric)  Goal: *STG: Attends activities and groups  Outcome: Charles Gomes continues to do well. She has been attending all offered groups and remains in good spirits despite her discharge falling through recently.

## 2017-07-31 NOTE — PROGRESS NOTES
Problem: Altered Thought Process (Adult/Pediatric)  Goal: *STG: Participates in treatment plan  Outcome: Progressing Towards Goal  Review meds, out on unit social w peers and staff. Mood and affect smiling and engaged.  Pt daily goal is to nap and play piano  Goal: *STG: Demonstrates ability to understand and use improved judgment in daily activities and relationships  Outcome: Progressing Towards Goal  Demonstrates indpenedly getting needs met and completing ADL's, appropriate behaivors while on unit

## 2017-08-01 LAB
GLUCOSE BLD STRIP.AUTO-MCNC: 80 MG/DL (ref 65–100)
GLUCOSE BLD STRIP.AUTO-MCNC: 97 MG/DL (ref 65–100)
SERVICE CMNT-IMP: NORMAL
SERVICE CMNT-IMP: NORMAL

## 2017-08-01 PROCEDURE — 74011250637 HC RX REV CODE- 250/637: Performed by: HOSPITALIST

## 2017-08-01 PROCEDURE — 65220000003 HC RM SEMIPRIVATE PSYCH

## 2017-08-01 PROCEDURE — 74011250637 HC RX REV CODE- 250/637: Performed by: PSYCHIATRY & NEUROLOGY

## 2017-08-01 PROCEDURE — 82962 GLUCOSE BLOOD TEST: CPT

## 2017-08-01 PROCEDURE — 74011250637 HC RX REV CODE- 250/637: Performed by: NURSE PRACTITIONER

## 2017-08-01 RX ORDER — LOPERAMIDE HYDROCHLORIDE 2 MG/1
2 CAPSULE ORAL
Status: DISCONTINUED | OUTPATIENT
Start: 2017-08-01 | End: 2017-08-01 | Stop reason: SDUPTHER

## 2017-08-01 RX ADMIN — ATORVASTATIN CALCIUM 20 MG: 20 TABLET, FILM COATED ORAL at 09:09

## 2017-08-01 RX ADMIN — PANTOPRAZOLE SODIUM 40 MG: 40 TABLET, DELAYED RELEASE ORAL at 07:04

## 2017-08-01 RX ADMIN — LOPERAMIDE HYDROCHLORIDE 2 MG: 2 CAPSULE ORAL at 13:02

## 2017-08-01 RX ADMIN — OXYBUTYNIN CHLORIDE 15 MG: 5 TABLET, EXTENDED RELEASE ORAL at 09:09

## 2017-08-01 RX ADMIN — LISINOPRIL 10 MG: 10 TABLET ORAL at 09:10

## 2017-08-01 RX ADMIN — ZOLPIDEM TARTRATE 12.5 MG: 6.25 TABLET, EXTENDED RELEASE ORAL at 21:09

## 2017-08-01 RX ADMIN — TRIHEXYPHENIDYL HYDROCHLORIDE 2 MG: 2 TABLET ORAL at 21:09

## 2017-08-01 RX ADMIN — NAPROXEN 250 MG: 250 TABLET ORAL at 09:10

## 2017-08-01 RX ADMIN — TRIHEXYPHENIDYL HYDROCHLORIDE 2 MG: 2 TABLET ORAL at 09:00

## 2017-08-01 RX ADMIN — AMLODIPINE BESYLATE 10 MG: 5 TABLET ORAL at 09:09

## 2017-08-01 RX ADMIN — SITAGLIPTIN 100 MG: 100 TABLET, FILM COATED ORAL at 09:09

## 2017-08-01 RX ADMIN — DIVALPROEX SODIUM 1000 MG: 500 TABLET, FILM COATED, EXTENDED RELEASE ORAL at 21:09

## 2017-08-01 RX ADMIN — LOPERAMIDE HYDROCHLORIDE 2 MG: 2 CAPSULE ORAL at 16:30

## 2017-08-01 RX ADMIN — CARBAMAZEPINE 200 MG: 200 TABLET, EXTENDED RELEASE ORAL at 17:14

## 2017-08-01 RX ADMIN — NAPROXEN 250 MG: 250 TABLET ORAL at 17:14

## 2017-08-01 RX ADMIN — THERA TABS 1 TABLET: TAB at 09:10

## 2017-08-01 RX ADMIN — CARBAMAZEPINE 200 MG: 200 TABLET, EXTENDED RELEASE ORAL at 09:13

## 2017-08-01 RX ADMIN — LORAZEPAM 1 MG: 1 TABLET ORAL at 22:28

## 2017-08-01 RX ADMIN — TRIHEXYPHENIDYL HYDROCHLORIDE 2 MG: 2 TABLET ORAL at 17:14

## 2017-08-01 NOTE — PROGRESS NOTES
Problem: Falls - Risk of  Goal: *Absence of falls  Outcome: Progressing Towards Goal  No falls      Goal: *Knowledge of fall prevention  Outcome: Progressing Towards Goal  Using walker, hand rail and asking for assistance correctly    Problem: Altered Thought Process (Adult/Pediatric)  Goal: *STG: Participates in treatment plan  Outcome: Progressing Towards Goal  Review meds, out on unit soical w peers and staff. Mood and affect smiling and euthymic. Pt daily goal is to play piano and nap     Goal: *STG: Remains safe in hospital  Outcome: Progressing Towards Goal   No falls or injury  Goal: *STG: Seeks staff when feelings of anxiety and fear arise  Outcome: Progressing Towards Goal  Denies anxiety or anger.  Describes gratitude and hope  Goal: *STG: Complies with medication therapy  Outcome: Progressing Towards Goal  compliant  Goal: *STG: Attends activities and groups  Outcome: Progressing Towards Goal  engaged  Goal: *STG: Demonstrates ability to understand and use improved judgment in daily activities and relationships  Outcome: Progressing Towards Goal  Thoughts organized, logical and goal directed, demonstrates appropriate behaviors, ability to follow staff direction and completes ADL's independently  Goal: Interventions  Outcome: Progressing Towards Goal  Staff focus is on coping skills educaiton, offering support and reassurance

## 2017-08-01 NOTE — BH NOTES
GROUP THERAPY PROGRESS NOTE    Thi Ortez did not participate in a morning Process Group on the General Unit with a focus identifying feelings, planning for the day, and learning more about DBT concepts on \"Emotion Regulation. \"

## 2017-08-01 NOTE — PROGRESS NOTES
Problem: Falls - Risk of  Goal: *Absence of falls  Outcome: Progressing Towards Goal  Visible in milieu. Ambulating steadily with walker. Has remained free of falls this shift thus far. Affect pleasant, quiet. Offers no complaints. Behavior and conversation appropriate. Pt refused blood work at this time. Will continue to monitor.      PRN Medication Documentation    Specific patient behavior that led to need for PRN medication: inability to sleep  Staff interventions attempted prior to PRN being given:review of relaxation techniques  PRN medication given: ativan 1 mg po  Patient response/effectiveness of PRN medication:

## 2017-08-01 NOTE — INTERDISCIPLINARY ROUNDS
Behavioral Health Interdisciplinary Rounds     Patient Name: Paola Betancur  Age: 65 y.o. Room/Bed:  733/  Primary Diagnosis: Schizoaffective disorder (HCC)   Admission Status: Involuntary Commitment     Readmission within 30 days: no  Power of  in place: no  Patient requires a blocked bed: yes          Reason for blocked bed: behavior: N/A    VTE Prophylaxis: Not indicated  Mobility needs/Fall risk: yes    Nutritional Plan: no  Consults: no       Labs/Testing due today?: Yes    Sleep hours: 5.50       Participation in Care/Groups:  yes  Medication Compliant?: Yes  PRNS (last 24 hours): Sleep Aid    Restraints (last 24 hours):   Substance Abuse:  no  CIWA (range last 24 hours):   COWS (range last 24 hours):   Alcohol screening (AUDIT) completed -     If applicable, date SBIRT discussed in treatment team AND documented: N/A  Tobacco - patient is a smoker: no   Date tobacco education completed by RN: N/A  24 hour chart check complete: yes     Patient goal(s) for today:   Treatment team focus/goals: Placement  LOS:  75  Expected LOS: TBD  Psychiatric Advanced Care Directives -  Yes  Name of Decision maker if patient has Psychiatric Care Directive: Ilana Jefferson (daughter/POA)  Patient was offered information, patient accepted.    Financial concerns/prescription coverage: [employer] Medicaid  Date of last family contact: 7/29 Daughter visited     Family requesting physician contact today: No  Discharge plan: Placement       Outpatient provider(s): TBD    Participating treatment team members: Paola PipeNery, MSW; Dr. Timoteo Barron MD; Phillip Rees, ESTER; Radha Vazquez, UmeshD

## 2017-08-01 NOTE — BH NOTES
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  female who presents with a severe exacerbation of the principal diagnosis of Schizoaffective disorder ([address]  Patient's condition is improving. Patient requires continued inpatient hospitalization for further stabilization, safety monitoring and medication management. I will continue to coordinate the provision of individual, milieu, occupational, group, and substance abuse therapies to address target symptoms/diagnoses as deemed appropriate for the individual patient. A coordinated, multidisplinary treatment team round was conducted with the patient (this team consists of the nurse, psychiatric unit pharmcist,  and writer). Complete current electronic health record for patient has been reviewed today including consultant notes, ancillary staff notes, nurses and psychiatric tech notes. Suicide risk assessment completed and patient deemed to be of low risk for suicide at this time. The following regarding medications was addressed during rounds with patient:   the risks and benefits of the proposed medication. The patient was given the opportunity to ask questions. Informed consent given to the use of the above medications.  Will continue to adjust psychiatric and non-psychiatric medications (see above \"medication\" section and orders section for details) as deemed appropriate & based upon diagnoses and response to treatment. I will continue to order blood tests/labs and diagnostic tests as deemed appropriate and review results as they become available (see orders for details and above listed lab/test results). I will order psychiatric records from previous [facility] hospitals to further elucidate the nature of patient's psychopathology and review once available. I will gather additional collateral information from friends, family and o/p treatment team to further elucidate the nature of patient's psychopathology and baselline level of psychiatric functioning. I certify that this patient's inpatient psychiatric hospital services furnished since the previous certification were, and continue to be, required for treatment that could reasonably be expected to improve the patient's condition, or for diagnostic study, and that the patient continues to need, on a daily basis, active treatment furnished directly by or requiring the supervision of inpatient psychiatric facility personnel. In addition, the hospital records show that services furnished were intensive treatment services, admission or related services, or equivalent services.     EXPECTED DISCHARGE DATE/DAY: TBD     DISPOSITION: Home       Signed By:   Lluvia Yen MD  7/31/2017

## 2017-08-01 NOTE — BH NOTES
During initial rounds noted to be in bed sleeping and respirations even and unlabored with chest rising and falling. Will continue to monitor throughout shift.

## 2017-08-02 LAB
ALBUMIN SERPL BCP-MCNC: 3.7 G/DL (ref 3.5–5)
ALBUMIN/GLOB SERPL: 1.1 {RATIO} (ref 1.1–2.2)
ALP SERPL-CCNC: 76 U/L (ref 45–117)
ALT SERPL-CCNC: 16 U/L (ref 12–78)
ANION GAP BLD CALC-SCNC: 6 MMOL/L (ref 5–15)
AST SERPL W P-5'-P-CCNC: 10 U/L (ref 15–37)
BILIRUB SERPL-MCNC: 0.2 MG/DL (ref 0.2–1)
BUN SERPL-MCNC: 24 MG/DL (ref 6–20)
BUN/CREAT SERPL: 24 (ref 12–20)
CALCIUM SERPL-MCNC: 8.5 MG/DL (ref 8.5–10.1)
CHLORIDE SERPL-SCNC: 104 MMOL/L (ref 97–108)
CO2 SERPL-SCNC: 28 MMOL/L (ref 21–32)
CREAT SERPL-MCNC: 0.99 MG/DL (ref 0.55–1.02)
GLOBULIN SER CALC-MCNC: 3.4 G/DL (ref 2–4)
GLUCOSE BLD STRIP.AUTO-MCNC: 109 MG/DL (ref 65–100)
GLUCOSE BLD STRIP.AUTO-MCNC: 99 MG/DL (ref 65–100)
GLUCOSE SERPL-MCNC: 101 MG/DL (ref 65–100)
POTASSIUM SERPL-SCNC: 4 MMOL/L (ref 3.5–5.1)
PROT SERPL-MCNC: 7.1 G/DL (ref 6.4–8.2)
SERVICE CMNT-IMP: ABNORMAL
SERVICE CMNT-IMP: NORMAL
SODIUM SERPL-SCNC: 138 MMOL/L (ref 136–145)
VALPROATE SERPL-MCNC: 93 UG/ML (ref 50–100)

## 2017-08-02 PROCEDURE — 74011250637 HC RX REV CODE- 250/637: Performed by: PSYCHIATRY & NEUROLOGY

## 2017-08-02 PROCEDURE — 82962 GLUCOSE BLOOD TEST: CPT

## 2017-08-02 PROCEDURE — 80053 COMPREHEN METABOLIC PANEL: CPT | Performed by: PSYCHIATRY & NEUROLOGY

## 2017-08-02 PROCEDURE — 36415 COLL VENOUS BLD VENIPUNCTURE: CPT | Performed by: PSYCHIATRY & NEUROLOGY

## 2017-08-02 PROCEDURE — 65220000003 HC RM SEMIPRIVATE PSYCH

## 2017-08-02 PROCEDURE — 74011250637 HC RX REV CODE- 250/637: Performed by: NURSE PRACTITIONER

## 2017-08-02 PROCEDURE — 74011250637 HC RX REV CODE- 250/637: Performed by: HOSPITALIST

## 2017-08-02 PROCEDURE — 80164 ASSAY DIPROPYLACETIC ACD TOT: CPT | Performed by: PSYCHIATRY & NEUROLOGY

## 2017-08-02 RX ADMIN — LISINOPRIL 10 MG: 10 TABLET ORAL at 09:49

## 2017-08-02 RX ADMIN — CARBAMAZEPINE 200 MG: 200 TABLET, EXTENDED RELEASE ORAL at 17:12

## 2017-08-02 RX ADMIN — NAPROXEN 250 MG: 250 TABLET ORAL at 17:09

## 2017-08-02 RX ADMIN — AMLODIPINE BESYLATE 10 MG: 5 TABLET ORAL at 09:50

## 2017-08-02 RX ADMIN — SITAGLIPTIN 100 MG: 100 TABLET, FILM COATED ORAL at 09:49

## 2017-08-02 RX ADMIN — CARBAMAZEPINE 200 MG: 200 TABLET, EXTENDED RELEASE ORAL at 09:56

## 2017-08-02 RX ADMIN — LORAZEPAM 1 MG: 1 TABLET ORAL at 23:50

## 2017-08-02 RX ADMIN — TRIHEXYPHENIDYL HYDROCHLORIDE 2 MG: 2 TABLET ORAL at 21:22

## 2017-08-02 RX ADMIN — NAPROXEN 250 MG: 250 TABLET ORAL at 09:48

## 2017-08-02 RX ADMIN — ZOLPIDEM TARTRATE 12.5 MG: 6.25 TABLET, EXTENDED RELEASE ORAL at 21:22

## 2017-08-02 RX ADMIN — TRIHEXYPHENIDYL HYDROCHLORIDE 2 MG: 2 TABLET ORAL at 17:08

## 2017-08-02 RX ADMIN — DIVALPROEX SODIUM 1000 MG: 500 TABLET, FILM COATED, EXTENDED RELEASE ORAL at 21:21

## 2017-08-02 RX ADMIN — ATORVASTATIN CALCIUM 20 MG: 20 TABLET, FILM COATED ORAL at 09:49

## 2017-08-02 RX ADMIN — THERA TABS 1 TABLET: TAB at 09:48

## 2017-08-02 RX ADMIN — PANTOPRAZOLE SODIUM 40 MG: 40 TABLET, DELAYED RELEASE ORAL at 06:37

## 2017-08-02 RX ADMIN — OXYBUTYNIN CHLORIDE 15 MG: 5 TABLET, EXTENDED RELEASE ORAL at 09:50

## 2017-08-02 RX ADMIN — TRIHEXYPHENIDYL HYDROCHLORIDE 2 MG: 2 TABLET ORAL at 09:49
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
## 2017-08-02 NOTE — PROGRESS NOTES
Problem: Falls - Risk of  Goal: *Absence of falls  Pt received in bed asleep at start of this shift. NAD. No falls noted/reported. Bathroom light is on in room. Q 15 minute checks for safety maintained.

## 2017-08-02 NOTE — PROGRESS NOTES
Problem: Falls - Risk of  Goal: *Absence of falls  Outcome: Progressing Towards Goal  Pt ambulating with walker. Reports feeling weak this morning. Tired. Euthymic mood affect congruent. Fall preventions education provided. Pt verbalizes understanding of education. Pt calm cooperative pleasant. Problem: Altered Thought Process (Adult/Pediatric)  Goal: *STG: Remains safe in hospital  Outcome: Progressing Towards Goal  Pt remains safe in hospital on q 15 min safety checks. Reports feeling tired this morning. Pt out on unit for VS, breakfast. Returns to bed. Goal: attend groups today. Problem: Diabetes Self-Management  Goal: *Using medications safely  State effect of diabetes medications on diabetes; name diabetes medication taking, action and side effects. Outcome: Progressing Towards Goal  Pt verbalizes understanding of medications and education provided. Asking appropriate questions. 1205- pt denies weakness or chills. Pt attending groups. Euthymic calm cooperative. Pt smiling and engaged.

## 2017-08-02 NOTE — PROGRESS NOTES
Problem: Altered Thought Process (Adult/Pediatric)  Goal: *STG: Remains safe in hospital  Patient is alert and verbal.  Ambulating in her room. No distress noted.   Patient remains safe in hospital.

## 2017-08-02 NOTE — BH NOTES
GROUP THERAPY PROGRESS NOTE    Jeb Spurling did not participate in the General Unit's Process Group, with a focus identifying feelings and planning for the day.

## 2017-08-02 NOTE — INTERDISCIPLINARY ROUNDS
Behavioral Health Interdisciplinary Rounds     Patient Name: Paola Betancur  Age: 65 y.o. Room/Bed:  733/01  Primary Diagnosis: Schizoaffective disorder (HCC)   Admission Status: Involuntary Commitment     Readmission within 30 days: no  Power of  in place: no  Patient requires a blocked bed: yes          Reason for blocked bed: N/A    VTE Prophylaxis: Not indicated  Mobility needs/Fall risk: yes    Nutritional Plan: no  Consults:          Labs/Testing due today?: yes    Sleep hours: 5.5       Participation in Care/Groups:  yes  Medication Compliant?: Yes  PRNS (last 24 hours): Antianxiety and Anti- Diarrheal    Restraints (last 24 hours):  no  Substance Abuse:  no  CIWA (range last 24 hours):  COWS (range last 24 hours):   Alcohol screening (AUDIT) completed -     If applicable, date SBIRT discussed in treatment team AND documented:   Tobacco - patient is a smoker: no   Date tobacco education completed by RN: n/a  24 hour chart check complete: yes     Patient goal(s) for today: Attend groups  Treatment team focus/goals: Work on placement  LOS:  76  Expected LOS: TBD  [address] -  Yes  Name of Decision maker if patient has Psychiatric Care Directive: Ilana Jefferson (daughter/POA)  Patient was offered information, patient accepted.    Financial concerns/prescription coverage: [employer] Medicaid  Date of last family contact: 7/29 Daughter visited    Family requesting physician contact today: No  Discharge plan: Placement       Outpatient provider(s): TBD    Participating treatment team members: PEGGY Milian; Dr. Timoteo Barron MD; Natalia Acosta RN

## 2017-08-02 NOTE — BH NOTES
GROUP THERAPY PROGRESS NOTE    Yusra Hamlin is participating in [place]. Group time: 30 minutes    Personal goal for participation: daily progress    Goal orientation: personal    Group therapy participation: active    Therapeutic interventions reviewed and discussed:     Impression of participation: The patient was an asset to the group.

## 2017-08-02 NOTE — PROGRESS NOTES
Laboratory Monitoring for Divalproex/Valproic Acid    This patient is currently prescribed the following medication(s):   Current Facility-Administered Medications   Medication Dose Route Frequency    oxybutynin chloride XL (DITROPAN XL) tablet 15 mg  15 mg Oral DAILY    lidocaine (LIDODERM) 5 % patch 1 Patch  1 Patch TransDERmal Q24H    therapeutic multivitamin (THERAGRAN) tablet 1 Tab  1 Tab Oral DAILY    fluPHENAZine decanoate (PROLIXIN) 25 mg/mL injection 25 mg  25 mg IntraMUSCular EVERY 2 WEEKS    lisinopril (PRINIVIL, ZESTRIL) tablet 10 mg  10 mg Oral DAILY    polyethylene glycol (MIRALAX) packet 17 g  17 g Oral DAILY    trihexyphenidyl (ARTANE) tablet 2 mg  2 mg Oral TID    docusate sodium (COLACE) capsule 100 mg  100 mg Oral BID    pantoprazole (PROTONIX) tablet 40 mg  40 mg Oral ACB    naproxen (NAPROSYN) tablet 250 mg  250 mg Oral BID WITH MEALS    divalproex ER (DEPAKOTE ER) 24 hour tablet 1,000 mg  1,000 mg Oral QHS    insulin lispro (HUMALOG) injection   SubCUTAneous BID    carBAMazepine XR (TEGretol XR) tablet 200 mg  200 mg Oral BID    zolpidem CR (AMBIEN CR) tablet 12.5 mg  12.5 mg Oral QHS    SITagliptin (JANUVIA) tablet 100 mg  100 mg Oral DAILY    atorvastatin (LIPITOR) tablet 20 mg  20 mg Oral DAILY    amLODIPine (NORVASC) tablet 10 mg  10 mg Oral DAILY       The following labs have been completed for monitoring of valproic acid:    Valproic Acid Serum Concentration  Lab Results   Component Value Date/Time    Valproic acid 93 08/02/2017 05:23 AM         Hepatic Function  Lab Results   Component Value Date/Time    Bilirubin, total 0.2 08/02/2017 05:23 AM    Protein, total 7.1 08/02/2017 05:23 AM    Albumin 3.7 08/02/2017 05:23 AM    Globulin 3.4 08/02/2017 05:23 AM    A-G Ratio 1.1 08/02/2017 05:23 AM    ALT (SGPT) 16 08/02/2017 05:23 AM    Alk.  phosphatase 76 08/02/2017 05:23 AM       Hematology  Lab Results   Component Value Date/Time    WBC 4.4 07/18/2017 05:27 AM    RBC 3.53 07/18/2017 05:27 AM    HGB 11.8 07/18/2017 05:27 AM    HCT 35.8 07/18/2017 05:27 AM    .4 07/18/2017 05:27 AM    MCH 33.4 07/18/2017 05:27 AM    MCHC 33.0 07/18/2017 05:27 AM    RDW 12.9 07/18/2017 05:27 AM    PLATELET 284 95/84/3145 05:27 AM       Assessment/Plan:  Valproic acid serum concentration is within therapeutic range. LFTs are within normal limits. PLT from 7/18/17 are within normal limits. No further laboratory monitoring for divalproex is needed at this time. Of note, patient is also on carbamazepine with last therapeutic drug monitoring on 7/18/17.          Melissa Chambers, PharmD, [address], BCPS

## 2017-08-02 NOTE — BH NOTES
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 effects and pharmacodynamically determined drug 1/2 lives. In summary, Nicole Whiting, is a 65 y.o.  female who presents with a severe exacerbation of the principal diagnosis of Schizoaffective disorder ([facility] [address]  Patient's condition is improving. Patient requires continued inpatient hospitalization for further stabilization, safety monitoring and medication management. I will continue to coordinate the provision of individual, milieu, occupational, group, and substance abuse therapies to address target symptoms/diagnoses as deemed appropriate for the individual patient. A coordinated, multidisplinary treatment team round was conducted with the patient (this team consists of the nurse, psychiatric unit pharmcist,  and writer). Complete current electronic health record for patient has been reviewed today including consultant notes, ancillary staff notes, nurses and psychiatric tech notes. Suicide risk assessment completed and patient deemed to be of low risk for suicide at this time. The following regarding medications was addressed during rounds with patient:   the risks and benefits of the proposed medication.  The patient was given the opportunity to ask questions. Informed consent given to the use of the above medications. Will continue to adjust psychiatric and non-psychiatric medications (see above \"medication\" section and orders section for details) as deemed appropriate & based upon diagnoses and response to treatment. I will continue to order blood tests/labs and diagnostic tests as deemed appropriate and review results as they become available (see orders for details and above listed lab/test results). I will order psychiatric records from previous [facility] to further elucidate the nature of patient's psychopathology and review once available. I will gather additional collateral information from friends, family and o/p treatment team to further elucidate the nature of patient's psychopathology and baselline level of psychiatric functioning. I certify that this patient's inpatient psychiatric hospital services furnished since the previous certification were, and continue to be, required for treatment that could reasonably be expected to improve the patient's condition, or for diagnostic study, and that the patient continues to need, on a daily basis, active treatment furnished directly by or requiring the supervision of inpatient psychiatric facility personnel. In addition, the hospital records show that services furnished were intensive treatment services, admission or related services, or equivalent services.     EXPECTED DISCHARGE DATE/DAY: TBD     DISPOSITION: Home       Signed By:   Lluvia Yen MD  8/1/2017

## 2017-08-03 LAB
GLUCOSE BLD STRIP.AUTO-MCNC: 111 MG/DL (ref 65–100)
GLUCOSE BLD STRIP.AUTO-MCNC: 128 MG/DL (ref 65–100)
SERVICE CMNT-IMP: ABNORMAL
SERVICE CMNT-IMP: ABNORMAL

## 2017-08-03 PROCEDURE — 82962 GLUCOSE BLOOD TEST: CPT

## 2017-08-03 PROCEDURE — 74011250637 HC RX REV CODE- 250/637: Performed by: HOSPITALIST

## 2017-08-03 PROCEDURE — 74011250637 HC RX REV CODE- 250/637: Performed by: PSYCHIATRY & NEUROLOGY

## 2017-08-03 PROCEDURE — 74011250637 HC RX REV CODE- 250/637: Performed by: NURSE PRACTITIONER

## 2017-08-03 PROCEDURE — 65220000003 HC RM SEMIPRIVATE PSYCH

## 2017-08-03 RX ADMIN — TRIHEXYPHENIDYL HYDROCHLORIDE 2 MG: 2 TABLET ORAL at 08:20

## 2017-08-03 RX ADMIN — NAPROXEN 250 MG: 250 TABLET ORAL at 08:20

## 2017-08-03 RX ADMIN — AMLODIPINE BESYLATE 10 MG: 5 TABLET ORAL at 08:20

## 2017-08-03 RX ADMIN — OXYBUTYNIN CHLORIDE 15 MG: 5 TABLET, EXTENDED RELEASE ORAL at 08:20

## 2017-08-03 RX ADMIN — ATORVASTATIN CALCIUM 20 MG: 20 TABLET, FILM COATED ORAL at 08:20

## 2017-08-03 RX ADMIN — TRIHEXYPHENIDYL HYDROCHLORIDE 2 MG: 2 TABLET ORAL at 16:30

## 2017-08-03 RX ADMIN — TRIHEXYPHENIDYL HYDROCHLORIDE 2 MG: 2 TABLET ORAL at 21:03

## 2017-08-03 RX ADMIN — ZOLPIDEM TARTRATE 12.5 MG: 6.25 TABLET, EXTENDED RELEASE ORAL at 21:04

## 2017-08-03 RX ADMIN — DOCUSATE SODIUM 100 MG: 100 CAPSULE, LIQUID FILLED ORAL at 08:20

## 2017-08-03 RX ADMIN — DIVALPROEX SODIUM 1000 MG: 500 TABLET, FILM COATED, EXTENDED RELEASE ORAL at 21:04

## 2017-08-03 RX ADMIN — PANTOPRAZOLE SODIUM 40 MG: 40 TABLET, DELAYED RELEASE ORAL at 06:46

## 2017-08-03 RX ADMIN — CARBAMAZEPINE 200 MG: 200 TABLET, EXTENDED RELEASE ORAL at 08:21

## 2017-08-03 RX ADMIN — LISINOPRIL 10 MG: 10 TABLET ORAL at 08:20

## 2017-08-03 RX ADMIN — NAPROXEN 250 MG: 250 TABLET ORAL at 16:30

## 2017-08-03 RX ADMIN — THERA TABS 1 TABLET: TAB at 08:20

## 2017-08-03 RX ADMIN — CARBAMAZEPINE 200 MG: 200 TABLET, EXTENDED RELEASE ORAL at 17:24

## 2017-08-03 RX ADMIN — SITAGLIPTIN 100 MG: 100 TABLET, FILM COATED ORAL at 08:20

## 2017-08-03 NOTE — BH NOTES
Received pt on unit. Pleasant and cooperative. No voiced complaints or acute distress at present.   Ambulates with walker,gait steady

## 2017-08-03 NOTE — PROGRESS NOTES
Problem: Altered Thought Process (Adult/Pediatric)  Goal: *STG: Remains safe in hospital  Outcome: Progressing Towards Goal  Pt received in bed asleep at the start of this shift. No reports of altered processes received and none noted. Pt has remained safe this hospitalization. Q 15 minute checks maintained for safety.

## 2017-08-03 NOTE — INTERDISCIPLINARY ROUNDS
Behavioral Health Interdisciplinary Rounds     Patient Name: Sim Hidalgo  Age: 65 y.o. Room/Bed:  733/01  Primary Diagnosis: Schizoaffective disorder (HCC)   Admission Status: Involuntary Commitment     Readmission within 30 days: no  Power of  in place: no  Patient requires a blocked bed: yes          Reason for blocked bed: N/A    VTE Prophylaxis: Not indicated  Mobility needs/Fall risk: yes    Nutritional Plan: no  Consults:          Labs/Testing due today?: no    Sleep hours: 6.50       Participation in Care/Groups:  yes  Medication Compliant?: Yes  PRNS (last 24 hours): Antianxiety    Restraints (last 24 hours):  no  Substance Abuse:  no  CIWA (range last 24 hours):  COWS (range last 24 hours):   Alcohol screening (AUDIT) completed -     If applicable, date SBIRT discussed in treatment team AND documented: N/A  Tobacco - patient is a smoker: no   Date tobacco education completed by RN: n/a  24 hour chart check complete: yes     Patient goal(s) for today: Shower; self-care  Treatment team focus/goals: Fax psychiatric progress note to [facility]  LOS:  77  Expected LOS: TBD  Psychiatric [address] -  Yes  Name of Decision maker if patient has Psychiatric Care Directive: Kristel Shanae (daughter/POA)  Patient was offered information, patient accepted.   Financial concerns/prescription coverage: [employer] Medicaid   Date of last family contact: 7/29 Daughter visited     Family requesting physician contact today: No  Discharge plan: Placement       Outpatient provider(s): TBD    Participating treatment team members: Gerámn Wheeler MSW; Dr. Tip Johnson MD; Olga Lidia Ovalle RN; Janette Maxwell, UmeshD

## 2017-08-03 NOTE — PROGRESS NOTES
Problem: Falls - Risk of -- Goal to be met by 08/10/2017  Goal: *Absence of falls  Outcome: Progressing Towards Goal  Patient remains free from falls. Uses walker for ambulation without assistance. Problem: Altered Thought Process (Adult/Pediatric)  ---  Goals to be met by 08/10/2017  Goal: *STG: Participates in treatment plan  Outcome: Progressing Towards Goal  Patient denies SI/HI. Q15 checks maintained for safety. Patient is euthymic and smiling, verbalizes no concerns. Out of room for breakfast this morning. Denies discomfort. Goal: *STG: Attends activities and groups  Outcome: Progressing Towards Goal  Patient encouraged to go to groups and participate.      [facility]  Master Treatment Plan for Ashley Call    Date Treatment Plan Initiated: 08/10/2017    Treatment Plan Modalities:  Type of Modality Amount  (x minutes) Frequency (x/week) Duration (x days) Name of Responsible Staff   Community & wrap-up meetings to encourage peer interactions 15 7 1 MARIANO Blanton     Group psychotherapy to assist in building coping skills and internal controls 60 7 1 Samuel Stoner LCSW   Therapeutic activity groups to build coping skills 60 7 1 Samuel Stoner LCSW   Psychoeducation in group setting to address:   Medication education   15 7 1 Johana Sweeney RN    Coping skills         Relaxation techniques         Symptom management         Discharge planning   15 7 1 Henrique Doe [place]   Spirituality    60 1 [place]   45 1 1 VICENTA volunteers   Recovery/AA/NA         Physician medication management   15 7 1    Family meeting/discharge planning

## 2017-08-03 NOTE — BH NOTES
GROUP THERAPY PROGRESS NOTE    Celina Grandchild is participating in D/C Planning Group: Establishing boundaries in relationships    Group time: 30 minutes    Personal goal for participation: Readiness for discharge    Goal orientation: personal    Group therapy participation: active    Therapeutic interventions reviewed and discussed: Yes     Impression of participation: Iris Segura support to peer dealing with conflicts with her adult daughter and eager to share her feelings. PtAma [place] her relationship with her daughter is getting better but it takes to to make that happened. The there peer seemed to feel their is hope for she & daughter's relationship to improve.

## 2017-08-03 NOTE — BH NOTES
Pt attended reflection group and discussed topics they found interesting from groups throughout the day

## 2017-08-03 NOTE — BH NOTES
GROUP THERAPY PROGRESS NOTE    Destiney Found is participating in [place]. Group time: 25 minutes    Personal goal for participation: For her leg to feel better     Goal orientation: personal    Group therapy participation: active    Therapeutic interventions reviewed and discussed: Writer explained the rules of the group, actively listened as participants spoke, and asked open ended and clarifying questions. Impression of participation: Rufus Cortez was very active during group. She answered questions that were directed to the group, expressed thoughts and feelings, and discussed topics with other members of the group.

## 2017-08-03 NOTE — BH NOTES
PRN Medication Documentation    Specific patient behavior that led to need for PRN medication: Requested medication to help her relax and be able to get some sleep. Staff interventions attempted prior to PRN being given:Encouraged her to try to stay in bed and relax  but she felt that she could not do that.   PRN medication given: Ativan 1 mg. po  Patient response/effectiveness of PRN medication: Asleep on rounds at [facility]

## 2017-08-03 NOTE — BH NOTES
GROUP THERAPY PROGRESS NOTE    Cullen Roque participated in a Process Group with a focus identifying feelings, planning for the rest of the day, and reviewing DBT skills for managing emotional distress. Group time: 55 minutes. Personal goal for participation: To increase the capacity to improve ones mood, structure, and coping capacity. Goal orientation: The patient will be able to identify their feelings, develop a plan for structuring their day, and begin to appreciate the possibility of successfully managing distress and other forms of intense emotions. Group therapy participation: With prompting, this patient partially participated in the group. Therapeutic interventions reviewed and discussed: The group members were asked to introduce themselves to each other and to see if they could identify an emotion they are having and/or let the group know what they want to focus on for the day as they continue to make discharge plans. The group members also reviewed five suggested areas of DBT options when experiencing intense emotions: distraction, improve the moment, self-soothe, pros and cons, and radical acceptance. They were asked to take turns reading from the handout and discussing its contents. At the end of group the patients were provided a summary of the topics covered. Impression of participation: The patient joined the group about penitentiary through, saying she had been taking a shower. She was called out of the group to meet with her treatment team about five minutes after joining the group. She returned to group with about ten minutes left and she smiled as she shared about waiting for placement. Her affect was mildly euphoric and her mood was pleasant and cooperative. The patient expressed no SI/HI and no overt psychotic symptoms.

## 2017-08-04 LAB
GLUCOSE BLD STRIP.AUTO-MCNC: 119 MG/DL (ref 65–100)
GLUCOSE BLD STRIP.AUTO-MCNC: 152 MG/DL (ref 65–100)
SERVICE CMNT-IMP: ABNORMAL
SERVICE CMNT-IMP: ABNORMAL

## 2017-08-04 PROCEDURE — 65220000003 HC RM SEMIPRIVATE PSYCH

## 2017-08-04 PROCEDURE — 82962 GLUCOSE BLOOD TEST: CPT

## 2017-08-04 PROCEDURE — 74011250637 HC RX REV CODE- 250/637: Performed by: HOSPITALIST

## 2017-08-04 PROCEDURE — 74011250637 HC RX REV CODE- 250/637: Performed by: PSYCHIATRY & NEUROLOGY

## 2017-08-04 PROCEDURE — 74011250636 HC RX REV CODE- 250/636: Performed by: PSYCHIATRY & NEUROLOGY

## 2017-08-04 PROCEDURE — 74011250637 HC RX REV CODE- 250/637: Performed by: NURSE PRACTITIONER

## 2017-08-04 RX ORDER — DOCUSATE SODIUM 100 MG/1
100 CAPSULE, LIQUID FILLED ORAL
Status: DISCONTINUED | OUTPATIENT
Start: 2017-08-04 | End: 2017-08-16 | Stop reason: HOSPADM

## 2017-08-04 RX ADMIN — TRIHEXYPHENIDYL HYDROCHLORIDE 2 MG: 2 TABLET ORAL at 21:23

## 2017-08-04 RX ADMIN — ATORVASTATIN CALCIUM 20 MG: 20 TABLET, FILM COATED ORAL at 08:54

## 2017-08-04 RX ADMIN — LOPERAMIDE HYDROCHLORIDE 2 MG: 2 CAPSULE ORAL at 11:46

## 2017-08-04 RX ADMIN — FLUPHENAZINE DECANOATE 25 MG: 25 INJECTION, SOLUTION INTRAMUSCULAR; SUBCUTANEOUS at 09:02

## 2017-08-04 RX ADMIN — PANTOPRAZOLE SODIUM 40 MG: 40 TABLET, DELAYED RELEASE ORAL at 06:41

## 2017-08-04 RX ADMIN — OXYBUTYNIN CHLORIDE 15 MG: 5 TABLET, EXTENDED RELEASE ORAL at 08:56

## 2017-08-04 RX ADMIN — CARBAMAZEPINE 200 MG: 200 TABLET, EXTENDED RELEASE ORAL at 08:58

## 2017-08-04 RX ADMIN — LORAZEPAM 1 MG: 1 TABLET ORAL at 00:18

## 2017-08-04 RX ADMIN — THERA TABS 1 TABLET: TAB at 08:54

## 2017-08-04 RX ADMIN — CARBAMAZEPINE 200 MG: 200 TABLET, EXTENDED RELEASE ORAL at 16:58

## 2017-08-04 RX ADMIN — AMLODIPINE BESYLATE 10 MG: 5 TABLET ORAL at 08:55

## 2017-08-04 RX ADMIN — DIVALPROEX SODIUM 1000 MG: 500 TABLET, FILM COATED, EXTENDED RELEASE ORAL at 21:23

## 2017-08-04 RX ADMIN — ZOLPIDEM TARTRATE 12.5 MG: 6.25 TABLET, EXTENDED RELEASE ORAL at 21:24

## 2017-08-04 RX ADMIN — SITAGLIPTIN 100 MG: 100 TABLET, FILM COATED ORAL at 08:54

## 2017-08-04 RX ADMIN — LISINOPRIL 10 MG: 10 TABLET ORAL at 08:54

## 2017-08-04 RX ADMIN — ACETAMINOPHEN 650 MG: 325 TABLET, FILM COATED ORAL at 10:09

## 2017-08-04 RX ADMIN — TRIHEXYPHENIDYL HYDROCHLORIDE 2 MG: 2 TABLET ORAL at 08:54

## 2017-08-04 RX ADMIN — TRIHEXYPHENIDYL HYDROCHLORIDE 2 MG: 2 TABLET ORAL at 16:58

## 2017-08-04 RX ADMIN — NAPROXEN 250 MG: 250 TABLET ORAL at 08:54

## 2017-08-04 RX ADMIN — NAPROXEN 250 MG: 250 TABLET ORAL at 16:58

## 2017-08-04 NOTE — PROGRESS NOTES
Problem: Falls - Risk of  Goal: *Absence of falls  Outcome: Progressing Towards Goal  Patient remains free from falls and uses walker appropriately. Bed alarm in use. Problem: Altered Thought Process (Adult/Pediatric)  Goal: *STG: Participates in treatment plan  Outcome: Progressing Towards Goal  Patient denies SI/HI. Patient is up and visible on the unit. Patient is smiling and euthymic. Only c/o is knee discomfort for which patient has and is given PRN medication. Goal: *STG: Complies with medication therapy  Outcome: Progressing Towards Goal   Patient is medication adherent. 11:45  Patient declined to see Dr. Carmelo Wolff for treatment team today. MD notified. Medication orders for Colace changed d/t patient c/o multiple soft stools.

## 2017-08-04 NOTE — BH NOTES
PSYCHIATRIC PROGRESS NOTE         Patient Name  Pérez Carrillo   Date of Birth 1955   [facility] [id number]   Medical Record Number  [id number]      Age  65 y.o. PCP Hortencia Marquez MD   Admit date:  5/18/2017    Room Number  733/01  @ [facility]   Date of Service  8/4/2017          PSYCHOTHERAPY SESSION NOTE:  Length of psychotherapy session: 20 minutes    Main condition/diagnosis/issues treated during session today, 8/4/2017 : housing    I employed Cognitive Behavioral therapy techniques, Reality-Oriented psychotherapy, as well as supportive psychotherapy in regards to various ongoing psychosocial stressors, including the following: pre-admission and current problems; housing issues; stress of hospitalization. Interpersonal relationship issues and psychodynamic conflicts explored. Attempts made to alleviate maladaptive patterns. Overall, patient is progressing    Treatment Plan Update (reviewed an updated 8/4/2017) : I will modify psychotherapy tx plan by implementing more stress management strategies, building upon cognitive behavioral techniques, increasing coping skills, as well as shoring up psychological defenses). An extended energy and skill set was needed to engage pt in psychotherapy due to some of the following: resistiveness, complexity, negativity, confrontational nature, hostile behaviors, and/or severe abnormalities in thought processes/psychosis resulting in the loss of expressive/receptive language communication skills. E & M PROGRESS NOTE:         HISTORY       CC:  No complaints  HISTORY OF PRESENT ILLNESS/INTERVAL HISTORY:  (reviewed/updated 8/4/2017). per initial evaluation: The patient, Pérez Carrillo, is a 65 y.o.  BLACK OR  female with a past psychiatric history significant for Schizoaffective disorder, long history of noncompliance who presents at this time with complaints of (and/or evidence of) the following emotional symptoms: agitation, delusions and psychotic behavior. Additional symptomatology include noncompliance with medications. The above symptoms have been present for several weeks. She lives with a caretaker who reports recent paranoia, agitation. These symptoms are of high severity. These symptoms are constant in nature. The patient's condition has been precipitated by noncompliance and psychosocial stressors . No illicit substance abuse. Angelina Carrera presents/reports/evidences the following emotional symptoms today, 8/4/2017: None. Ms. Clary Maza reports feeling okay. Mood stable, adequate sleep over night. No agitation. Compliant with medications. 8/4/17- Very stable. Waiting for placement. Sleep and appetite are good. SIDE EFFECTS: (reviewed/updated 8/4/2017)  None reported or admitted to. No noted toxicity with use of Depakote/   ALLERGIES:(reviewed/updated 8/4/2017)  Allergies   Allergen Reactions    Penicillins Rash      MEDICATIONS PRIOR TO ADMISSION:(reviewed/updated 8/4/2017)  Prescriptions Prior to Admission   Medication Sig    QUEtiapine (SEROQUEL) 25 mg tablet Take 25 mg by mouth daily.  acetaminophen (TYLENOL) 500 mg tablet Take 500 mg by mouth two (2) times a day.  cloNIDine HCl (CATAPRES) 0.2 mg tablet Take  by mouth three (3) times daily.  hydrOXYzine pamoate (VISTARIL) 50 mg capsule Take 50 mg by mouth four (4) times daily.  LORazepam (ATIVAN) 0.5 mg tablet Take 0.5 mg by mouth two (2) times a day.  divalproex DR (DEPAKOTE) 500 mg tablet Take 500 mg by mouth two (2) times a day.  escitalopram oxalate (LEXAPRO) 5 mg tablet Take 5 mg by mouth daily.  naproxen (NAPROSYN) 500 mg tablet Take 500 mg by mouth two (2) times daily (with meals).  gabapentin (NEURONTIN) 100 mg capsule Take 100 mg by mouth two (2) times a day.  loperamide (IMODIUM) 2 mg capsule Take 2 mg by mouth every four (4) hours as needed for Diarrhea.  Indications: Diarrhea    amLODIPine (NORVASC) 10 mg tablet Take 1 Tab by mouth daily.  atorvastatin (LIPITOR) 20 mg tablet Take 1 Tab by mouth nightly.  carBAMazepine (TEGRETOL) 200 mg tablet Take 1 Tab by mouth three (3) times daily.  hydrochlorothiazide (HYDRODIURIL) 25 mg tablet Take 1 Tab by mouth daily.  sitaGLIPtin (JANUVIA) 100 mg tablet Take 1 Tab by mouth daily.  QUEtiapine (SEROQUEL) 100 mg tablet Take 100 mg by mouth every evening. PAST MEDICAL HISTORY: Past medical history from the initial psychiatric evaluation has been reviewed (reviewed/updated 8/4/2017) with no additional updates (I asked patient and no additional past medical history provided). Past Medical History:   Diagnosis Date    Aggressive outburst     Arthritis     Bipolar 1 disorder ([facility] 4-12-13    Diabetes mellitus ([facility]     Homicide attempt     Hypertension     Murmur     Paranoid schizophrenia ([facility]     Psychiatric disorder     [facility]     Past Surgical History:   Procedure Laterality Date    HX CHOLECYSTECTOMY      HX ORTHOPAEDIC      Excision Non-malignant bone cyst left femur      SOCIAL HISTORY: Social history from the initial psychiatric evaluation has been reviewed (reviewed/updated 8/4/2017) with no additional updates (I asked patient and no additional social history provided). Social History     Social History    Marital status:      Spouse name: N/A    Number of children: N/A    Years of education: N/A     Occupational History    Not on file. Social History Main Topics    Smoking status: Former Smoker     Years: 40.00     Quit date: 3/19/1983    Smokeless tobacco: Not on file    Alcohol use No    Drug use: No    Sexual activity: Yes     Partners: Male     Other Topics Concern    Not on file     Social History Narrative      Lives with daughter, son-in-law and 2 grandchildren. Not employed outside the home.       FAMILY HISTORY: Family history from the initial psychiatric evaluation has been reviewed (reviewed/updated 8/4/2017) with no additional updates (I asked patient and no additional family history provided). Family History   Problem Relation Age of Onset    Hypertension Mother     Diabetes Mother     Psychiatric Disorder Father     Heart Disease Mother     Heart Disease Brother     Diabetes Brother     Psychiatric Disorder Sister        REVIEW OF SYSTEMS: (reviewed/updated 8/4/2017)  Appetite:good   Sleep: decreased more than normal and poor with DIMS (difficulty initiating & maintaining sleep)   All other Review of Systems: Negative except severe psychosis and agitation         [facility] (MSE):    MSE FINDINGS ARE WITHIN NORMAL LIMITS (WNL) UNLESS OTHERWISE STATED BELOW. ( ALL OF THE BELOW CATEGORIES OF THE MSE HAVE BEEN REVIEWED (reviewed 8/4/2017) AND UPDATED AS DEEMED APPROPRIATE )  General Presentation wnl   Orientation Fully oriented and pleasant   Vital Signs  See below (reviewed 8/4/2017); Vital Signs (BP, Pulse, & Temp) are within normal limits if not listed below. Gait and Station Stable/steady, no ataxia   Musculoskeletal System No extrapyramidal symptoms (EPS); no abnormal muscular movements or Tardive Dyskinesia (TD); muscle strength and tone are within normal limits   Language No aphasia or dysarthria   Speech:  Normal volume, speed and content   Thought Processes (+)linear and logical   Thought Associations wnl   Thought Content Reality based   Suicidal Ideations none   Homicidal Ideations none   Mood:  Pleasant    Affect:  Appropriate    Memory recent    Impaired     Memory remote:  impaired   Concentration/Attention:  distractable   Fund of Knowledge below avg.    Insight:  wnl   Reliability wnl   Judgment:  wnl          VITALS:     Patient Vitals for the past 24 hrs:   Temp Pulse Resp BP SpO2   08/04/17 0854 - 63 - 140/79 -   08/04/17 0840 98.2 °F (36.8 °C) 63 16 140/79 98 %   08/03/17 2000 97.7 °F (36.5 °C) 68 16 147/90 99 %   08/03/17 1530 98.2 °F (36.8 °C) 69 16 129/85 98 %     Wt Readings from Last 3 Encounters:   07/16/17 74.8 kg (165 lb)   03/14/16 89 kg (196 lb 3.2 oz)   07/21/15 90.7 kg (200 lb)     Temp Readings from Last 3 Encounters:   08/04/17 98.2 °F (36.8 °C)   04/03/16 98.2 °F (36.8 °C)   03/14/16 99 °F (37.2 °C)     BP Readings from Last 3 Encounters:   08/04/17 140/79   04/03/16 (!) 167/93   03/14/16 (!) 188/99     Pulse Readings from Last 3 Encounters:   08/04/17 63   04/03/16 68   03/14/16 88            DATA     LABORATORY DATA:(reviewed/updated 8/4/2017)  Recent Results (from the past 24 hour(s))   GLUCOSE, POC    Collection Time: 08/03/17  4:26 PM   Result Value Ref Range    Glucose (POC) 128 (H) 65 - 100 mg/dL    Performed by Radha Palacios    GLUCOSE, POC    Collection Time: 08/04/17  7:46 AM   Result Value Ref Range    Glucose (POC) 119 (H) 65 - 100 mg/dL    Performed by [facility]      Lab Results   Component Value Date/Time    Valproic acid 93 08/02/2017 05:23 AM    Carbamazepine 6.8 07/18/2017 05:27 AM     No results found for: LITH   RADIOLOGY REPORTS:(reviewed/updated 8/4/2017)  No results found.        MEDICATIONS     ALL MEDICATIONS:   Current Facility-Administered Medications   Medication Dose Route Frequency    docusate sodium (COLACE) capsule 100 mg  100 mg Oral BID PRN    oxybutynin chloride XL (DITROPAN XL) tablet 15 mg  15 mg Oral DAILY    lidocaine (LIDODERM) 5 % patch 1 Patch  1 Patch TransDERmal Q24H    therapeutic multivitamin (THERAGRAN) tablet 1 Tab  1 Tab Oral DAILY    fluPHENAZine decanoate (PROLIXIN) 25 mg/mL injection 25 mg  25 mg IntraMUSCular EVERY 2 WEEKS    loperamide (IMODIUM) capsule 2 mg  2 mg Oral Q4H PRN    lisinopril (PRINIVIL, ZESTRIL) tablet 10 mg  10 mg Oral DAILY    polyethylene glycol (MIRALAX) packet 17 g  17 g Oral DAILY    trihexyphenidyl (ARTANE) tablet 2 mg  2 mg Oral TID    pantoprazole (PROTONIX) tablet 40 mg  40 mg Oral ACB    naproxen (NAPROSYN) tablet 250 mg  250 mg Oral BID WITH MEALS    divalproex ER (DEPAKOTE ER) 24 hour tablet 1,000 mg  1,000 mg Oral QHS    alum-mag hydroxide-simeth (MYLANTA) oral suspension 30 mL  30 mL Oral Q4H PRN    insulin lispro (HUMALOG) injection   SubCUTAneous BID    carBAMazepine XR (TEGretol XR) tablet 200 mg  200 mg Oral BID    zolpidem CR (AMBIEN CR) tablet 12.5 mg  12.5 mg Oral QHS    OLANZapine (ZyPREXA) tablet 5 mg  5 mg Oral Q6H PRN    diphenhydrAMINE (BENADRYL) injection 50 mg  50 mg IntraMUSCular Q6H PRN    LORazepam (ATIVAN) injection 1 mg  1 mg IntraMUSCular Q4H PRN    LORazepam (ATIVAN) tablet 1 mg  1 mg Oral Q4H PRN    benztropine (COGENTIN) tablet 1 mg  1 mg Oral BID PRN    benztropine (COGENTIN) injection 1 mg  1 mg IntraMUSCular BID PRN    acetaminophen (TYLENOL) tablet 650 mg  650 mg Oral Q4H PRN    magnesium hydroxide (MILK OF MAGNESIA) 400 mg/5 mL oral suspension 30 mL  30 mL Oral DAILY PRN    nicotine (NICODERM CQ) 21 mg/24 hr patch 1 Patch  1 Patch TransDERmal DAILY PRN    SITagliptin (JANUVIA) tablet 100 mg  100 mg Oral DAILY    atorvastatin (LIPITOR) tablet 20 mg  20 mg Oral DAILY    amLODIPine (NORVASC) tablet 10 mg  10 mg Oral DAILY    glucose chewable tablet 16 g  4 Tab Oral PRN    glucagon (GLUCAGEN) injection 1 mg  1 mg IntraMUSCular PRN    dextrose 10 % infusion 125-250 mL  125-250 mL IntraVENous PRN      SCHEDULED MEDICATIONS:   Current Facility-Administered Medications   Medication Dose Route Frequency    oxybutynin chloride XL (DITROPAN XL) tablet 15 mg  15 mg Oral DAILY    lidocaine (LIDODERM) 5 % patch 1 Patch  1 Patch TransDERmal Q24H    therapeutic multivitamin (THERAGRAN) tablet 1 Tab  1 Tab Oral DAILY    fluPHENAZine decanoate (PROLIXIN) 25 mg/mL injection 25 mg  25 mg IntraMUSCular EVERY 2 WEEKS    lisinopril (PRINIVIL, ZESTRIL) tablet 10 mg  10 mg Oral DAILY    polyethylene glycol (MIRALAX) packet 17 g  17 g Oral DAILY    trihexyphenidyl (ARTANE) tablet 2 mg  2 mg Oral TID    pantoprazole (PROTONIX) tablet 40 mg  40 mg Oral ACB    naproxen (NAPROSYN) tablet 250 mg  250 mg Oral BID WITH MEALS    divalproex ER (DEPAKOTE ER) 24 hour tablet 1,000 mg  1,000 mg Oral QHS    insulin lispro (HUMALOG) injection   SubCUTAneous BID    carBAMazepine XR (TEGretol XR) tablet 200 mg  200 mg Oral BID    zolpidem CR (AMBIEN CR) tablet 12.5 mg  12.5 mg Oral QHS    SITagliptin (JANUVIA) tablet 100 mg  100 mg Oral DAILY    atorvastatin (LIPITOR) tablet 20 mg  20 mg Oral DAILY    amLODIPine (NORVASC) tablet 10 mg  10 mg Oral DAILY          ASSESSMENT & PLAN     DIAGNOSES REQUIRING ACTIVE TREATMENT AND MONITORING: (reviewed/updated 8/4/2017)  Patient Active Hospital Problem List:     [facility] (5/18/2017)    Assessment: resolved psychosis    Plan:  Continue Prolixin decanoate every two weeks  Continue Depakote, monitor levels  Continue Tegretol, monitor levels  Await placement       EPS  Assessment: secondary to prolixin (now dec only)  Plan: change cogentin to artane        I will continue to monitor blood levels (Depakote---a drug with a narrow therapeutic index= NTI) and associated labs for drug therapy implemented that require intense monitoring for toxicity as deemed appropriate based on current medication side effects and pharmacodynamically determined drug 1/2 lives. In summary, Genaro Prakash, is a 65 y.o.  female who presents with a severe exacerbation of the principal diagnosis of Schizoaffective disorder ([facility]  Patient's condition is improving. Patient requires continued inpatient hospitalization for further stabilization, safety monitoring and medication management. I will continue to coordinate the provision of individual, milieu, occupational, group, and substance abuse therapies to address target symptoms/diagnoses as deemed appropriate for the individual patient.   A coordinated, multidisplinary treatment team round was conducted with the patient (this team consists of the nurse, psychiatric unit pharmcist,  and writer). Complete current electronic health record for patient has been reviewed today including consultant notes, ancillary staff notes, nurses and psychiatric tech notes. Suicide risk assessment completed and patient deemed to be of low risk for suicide at this time. The following regarding medications was addressed during rounds with patient:   the risks and benefits of the proposed medication. The patient was given the opportunity to ask questions. Informed consent given to the use of the above medications. Will continue to adjust psychiatric and non-psychiatric medications (see above \"medication\" section and orders section for details) as deemed appropriate & based upon diagnoses and response to treatment. I will continue to order blood tests/labs and diagnostic tests as deemed appropriate and review results as they become available (see orders for details and above listed lab/test results). I will order psychiatric records from previous [facility] hospitals to further elucidate the nature of patient's psychopathology and review once available. I will gather additional collateral information from friends, family and o/p treatment team to further elucidate the nature of patient's psychopathology and baselline level of psychiatric functioning. I certify that this patient's inpatient psychiatric hospital services furnished since the previous certification were, and continue to be, required for treatment that could reasonably be expected to improve the patient's condition, or for diagnostic study, and that the patient continues to need, on a daily basis, active treatment furnished directly by or requiring the supervision of inpatient psychiatric facility personnel.  In addition, the hospital records show that services furnished were intensive treatment services, admission or related services, or equivalent services.     EXPECTED DISCHARGE DATE/DAY: TBD     DISPOSITION: Home       Signed By:   Fady Draper MD  8/4/2017

## 2017-08-04 NOTE — INTERDISCIPLINARY ROUNDS
Behavioral Health Interdisciplinary Rounds     Patient Name: Luz Marina Matthews  Age: 65 y.o. Room/Bed:  733/  Primary Diagnosis: Schizoaffective disorder (HCC)   Admission Status: Involuntary Commitment     Readmission within 30 days: no  Power of  in place: no  Patient requires a blocked bed: yes          Reason for blocked bed: N/A    VTE Prophylaxis: Not indicated  Mobility needs/Fall risk: yes    Nutritional Plan: no  Consults:          Labs/Testing due today?: no    Sleep hours: 5.25       Participation in Care/Groups:  yes  Medication Compliant?: Yes  PRNS (last 24 hours): Antianxiety    Restraints (last 24 hours):  no  Substance Abuse:  no  CIWA (range last 24 hours):  COWS (range last 24 hours):   Alcohol screening (AUDIT) completed -     If applicable, date SBIRT discussed in treatment team AND documented: N/A  Tobacco - patient is a smoker: no   Date tobacco education completed by RN: n/a  24 hour chart check complete: yes     Patient goal(s) for today: Attend groups; Self-care  Treatment team focus/goals: F/U on [facility]; Fax referral to [facility]  LOS:  78  Expected LOS: TBD  Psychiatric [address] -  Yes  Name of Decision maker if patient has Psychiatric Care Directive: Carrie Shin (daughter/POA)   Patient was offered information, patient accepted.    Financial concerns/prescription coverage: [employer] Medicaid  Date of last family contact: 7/29 Daughter visited     Family requesting physician contact today: No  Discharge plan: Placement       Outpatient provider(s): TBD    Participating treatment team members: Pat De La Rosa MSW; Dr. Annette Raya MD; Saumya Vazquez, ESTER; Taina Canales, UmeshD

## 2017-08-04 NOTE — BH NOTES
PSYCHIATRIC PROGRESS NOTE         Patient Name  Nicole Whiting   Date of Birth 1955   [facility] [id number]   Medical Record Number  [id number]      Age  65 y.o. PCP Hayley Sun MD   Admit date:  5/18/2017    Room Number  733/01  @ [facility]   Date of Service  8/3/2017          PSYCHOTHERAPY SESSION NOTE:  Length of psychotherapy session: 20 minutes    Main condition/diagnosis/issues treated during session today, 8/3/2017 : housing    I employed Cognitive Behavioral therapy techniques, Reality-Oriented psychotherapy, as well as supportive psychotherapy in regards to various ongoing psychosocial stressors, including the following: pre-admission and current problems; housing issues; stress of hospitalization. Interpersonal relationship issues and psychodynamic conflicts explored. Attempts made to alleviate maladaptive patterns. Overall, patient is progressing    Treatment Plan Update (reviewed an updated 8/3/2017) : I will modify psychotherapy tx plan by implementing more stress management strategies, building upon cognitive behavioral techniques, increasing coping skills, as well as shoring up psychological defenses). An extended energy and skill set was needed to engage pt in psychotherapy due to some of the following: resistiveness, complexity, negativity, confrontational nature, hostile behaviors, and/or severe abnormalities in thought processes/psychosis resulting in the loss of expressive/receptive language communication skills. E & M PROGRESS NOTE:         HISTORY       CC:  No complaints  HISTORY OF PRESENT ILLNESS/INTERVAL HISTORY:  (reviewed/updated 8/3/2017). per initial evaluation: The patient, Nicole Whiting, is a 65 y.o.  BLACK OR  female with a past psychiatric history significant for Schizoaffective disorder, long history of noncompliance who presents at this time with complaints of (and/or evidence of) the following emotional symptoms: agitation, delusions and psychotic behavior. Additional symptomatology include noncompliance with medications. The above symptoms have been present for several weeks. She lives with a caretaker who reports recent paranoia, agitation. These symptoms are of high severity. These symptoms are constant in nature. The patient's condition has been precipitated by noncompliance and psychosocial stressors . No illicit substance abuse. Pérez Carrillo presents/reports/evidences the following emotional symptoms today, 8/3/2017: None. Ms. J Carlos Amaya reports feeling okay. Mood stable, adequate sleep over night. No agitation. Compliant with medications. SIDE EFFECTS: (reviewed/updated 8/3/2017)  None reported or admitted to. No noted toxicity with use of Depakote/   ALLERGIES:(reviewed/updated 8/3/2017)  Allergies   Allergen Reactions    Penicillins Rash      MEDICATIONS PRIOR TO ADMISSION:(reviewed/updated 8/3/2017)  Prescriptions Prior to Admission   Medication Sig    QUEtiapine (SEROQUEL) 25 mg tablet Take 25 mg by mouth daily.  acetaminophen (TYLENOL) 500 mg tablet Take 500 mg by mouth two (2) times a day.  cloNIDine HCl (CATAPRES) 0.2 mg tablet Take  by mouth three (3) times daily.  hydrOXYzine pamoate (VISTARIL) 50 mg capsule Take 50 mg by mouth four (4) times daily.  LORazepam (ATIVAN) 0.5 mg tablet Take 0.5 mg by mouth two (2) times a day.  divalproex DR (DEPAKOTE) 500 mg tablet Take 500 mg by mouth two (2) times a day.  escitalopram oxalate (LEXAPRO) 5 mg tablet Take 5 mg by mouth daily.  naproxen (NAPROSYN) 500 mg tablet Take 500 mg by mouth two (2) times daily (with meals).  gabapentin (NEURONTIN) 100 mg capsule Take 100 mg by mouth two (2) times a day.  loperamide (IMODIUM) 2 mg capsule Take 2 mg by mouth every four (4) hours as needed for Diarrhea. Indications: Diarrhea    amLODIPine (NORVASC) 10 mg tablet Take 1 Tab by mouth daily.     atorvastatin (LIPITOR) 20 mg tablet Take 1 Tab by mouth nightly.  carBAMazepine (TEGRETOL) 200 mg tablet Take 1 Tab by mouth three (3) times daily.  hydrochlorothiazide (HYDRODIURIL) 25 mg tablet Take 1 Tab by mouth daily.  sitaGLIPtin (JANUVIA) 100 mg tablet Take 1 Tab by mouth daily.  QUEtiapine (SEROQUEL) 100 mg tablet Take 100 mg by mouth every evening. PAST MEDICAL HISTORY: Past medical history from the initial psychiatric evaluation has been reviewed (reviewed/updated 8/3/2017) with no additional updates (I asked patient and no additional past medical history provided). Past Medical History:   Diagnosis Date    Aggressive outburst     Arthritis     Bipolar 1 disorder ([facility] 4-12-13    Diabetes mellitus ([facility]     Homicide attempt     Hypertension     Murmur     Paranoid schizophrenia ([facility]     Psychiatric disorder     [facility] 3/20/2013     Past Surgical History:   Procedure Laterality Date    HX CHOLECYSTECTOMY      HX ORTHOPAEDIC      Excision Non-malignant bone cyst left femur      SOCIAL HISTORY: Social history from the initial psychiatric evaluation has been reviewed (reviewed/updated 8/3/2017) with no additional updates (I asked patient and no additional social history provided). Social History     Social History    Marital status:      Spouse name: N/A    Number of children: N/A    Years of education: N/A     Occupational History    Not on file. Social History Main Topics    Smoking status: Former Smoker     Years: 40.00     Quit date: 3/19/1983    Smokeless tobacco: Not on file    Alcohol use No    Drug use: No    Sexual activity: Yes     Partners: Male     Other Topics Concern    Not on file     Social History Narrative      Lives with daughter, son-in-law and 2 grandchildren. Not employed outside the home.       FAMILY HISTORY: Family history from the initial psychiatric evaluation has been reviewed (reviewed/updated 8/3/2017) with no additional updates (I asked patient and no additional family history provided). Family History   Problem Relation Age of Onset    Hypertension Mother     Diabetes Mother     Psychiatric Disorder Father     Heart Disease Mother     Heart Disease Brother     Diabetes Brother     Psychiatric Disorder Sister        REVIEW OF SYSTEMS: (reviewed/updated 8/3/2017)  Appetite:good   Sleep: decreased more than normal and poor with DIMS (difficulty initiating & maintaining sleep)   All other Review of Systems: Negative except severe psychosis and agitation         [facility] (MSE):    MSE FINDINGS ARE WITHIN NORMAL LIMITS (WNL) UNLESS OTHERWISE STATED BELOW. ( ALL OF THE BELOW CATEGORIES OF THE MSE HAVE BEEN REVIEWED (reviewed 8/3/2017) AND UPDATED AS DEEMED APPROPRIATE )  General Presentation wnl   Orientation Fully oriented and pleasant   Vital Signs  See below (reviewed 8/3/2017); Vital Signs (BP, Pulse, & Temp) are within normal limits if not listed below. Gait and Station Stable/steady, no ataxia   Musculoskeletal System No extrapyramidal symptoms (EPS); no abnormal muscular movements or Tardive Dyskinesia (TD); muscle strength and tone are within normal limits   Language No aphasia or dysarthria   Speech:  Normal volume, speed and content   Thought Processes (+)linear and logical   Thought Associations wnl   Thought Content Reality based   Suicidal Ideations none   Homicidal Ideations none   Mood:  Pleasant    Affect:  Appropriate    Memory recent    Impaired     Memory remote:  impaired   Concentration/Attention:  distractable   Fund of Knowledge below avg.    Insight:  wnl   Reliability wnl   Judgment:  wnl          VITALS:     Patient Vitals for the past 24 hrs:   Temp Pulse Resp BP SpO2   08/03/17 2000 97.7 °F (36.5 °C) 68 16 147/90 99 %   08/03/17 1530 98.2 °F (36.8 °C) 69 16 129/85 98 %   08/03/17 0800 98 °F (36.7 °C) 61 16 141/90 99 %     Wt Readings from Last 3 Encounters: 07/16/17 74.8 kg (165 lb)   03/14/16 89 kg (196 lb 3.2 oz)   07/21/15 90.7 kg (200 lb)     Temp Readings from Last 3 Encounters:   08/03/17 97.7 °F (36.5 °C)   04/03/16 98.2 °F (36.8 °C)   03/14/16 99 °F (37.2 °C)     BP Readings from Last 3 Encounters:   08/03/17 147/90   04/03/16 (!) 167/93   03/14/16 (!) 188/99     Pulse Readings from Last 3 Encounters:   08/03/17 68   04/03/16 68   03/14/16 88            DATA     LABORATORY DATA:(reviewed/updated 8/3/2017)  Recent Results (from the past 24 hour(s))   GLUCOSE, POC    Collection Time: 08/03/17  7:27 AM   Result Value Ref Range    Glucose (POC) 111 (H) 65 - 100 mg/dL    Performed by Samuel Carias, POC    Collection Time: 08/03/17  4:26 PM   Result Value Ref Range    Glucose (POC) 128 (H) 65 - 100 mg/dL    Performed by Adilene Hopper      Lab Results   Component Value Date/Time    Valproic acid 93 08/02/2017 05:23 AM    Carbamazepine 6.8 07/18/2017 05:27 AM     No results found for: LITHM   RADIOLOGY REPORTS:(reviewed/updated 8/3/2017)  No results found.        MEDICATIONS     ALL MEDICATIONS:   Current Facility-Administered Medications   Medication Dose Route Frequency    oxybutynin chloride XL (DITROPAN XL) tablet 15 mg  15 mg Oral DAILY    lidocaine (LIDODERM) 5 % patch 1 Patch  1 Patch TransDERmal Q24H    therapeutic multivitamin (THERAGRAN) tablet 1 Tab  1 Tab Oral DAILY    fluPHENAZine decanoate (PROLIXIN) 25 mg/mL injection 25 mg  25 mg IntraMUSCular EVERY 2 WEEKS    loperamide (IMODIUM) capsule 2 mg  2 mg Oral Q4H PRN    lisinopril (PRINIVIL, ZESTRIL) tablet 10 mg  10 mg Oral DAILY    polyethylene glycol (MIRALAX) packet 17 g  17 g Oral DAILY    trihexyphenidyl (ARTANE) tablet 2 mg  2 mg Oral TID    docusate sodium (COLACE) capsule 100 mg  100 mg Oral BID    pantoprazole (PROTONIX) tablet 40 mg  40 mg Oral ACB    naproxen (NAPROSYN) tablet 250 mg  250 mg Oral BID WITH MEALS    divalproex ER (DEPAKOTE ER) 24 hour tablet 1,000 mg  1,000 mg Oral QHS    alum-mag hydroxide-simeth (MYLANTA) oral suspension 30 mL  30 mL Oral Q4H PRN    insulin lispro (HUMALOG) injection   SubCUTAneous BID    carBAMazepine XR (TEGretol XR) tablet 200 mg  200 mg Oral BID    zolpidem CR (AMBIEN CR) tablet 12.5 mg  12.5 mg Oral QHS    OLANZapine (ZyPREXA) tablet 5 mg  5 mg Oral Q6H PRN    diphenhydrAMINE (BENADRYL) injection 50 mg  50 mg IntraMUSCular Q6H PRN    LORazepam (ATIVAN) injection 1 mg  1 mg IntraMUSCular Q4H PRN    LORazepam (ATIVAN) tablet 1 mg  1 mg Oral Q4H PRN    benztropine (COGENTIN) tablet 1 mg  1 mg Oral BID PRN    benztropine (COGENTIN) injection 1 mg  1 mg IntraMUSCular BID PRN    acetaminophen (TYLENOL) tablet 650 mg  650 mg Oral Q4H PRN    magnesium hydroxide (MILK OF MAGNESIA) 400 mg/5 mL oral suspension 30 mL  30 mL Oral DAILY PRN    nicotine (NICODERM CQ) 21 mg/24 hr patch 1 Patch  1 Patch TransDERmal DAILY PRN    SITagliptin (JANUVIA) tablet 100 mg  100 mg Oral DAILY    atorvastatin (LIPITOR) tablet 20 mg  20 mg Oral DAILY    amLODIPine (NORVASC) tablet 10 mg  10 mg Oral DAILY    glucose chewable tablet 16 g  4 Tab Oral PRN    glucagon (GLUCAGEN) injection 1 mg  1 mg IntraMUSCular PRN    dextrose 10 % infusion 125-250 mL  125-250 mL IntraVENous PRN      SCHEDULED MEDICATIONS:   Current Facility-Administered Medications   Medication Dose Route Frequency    oxybutynin chloride XL (DITROPAN XL) tablet 15 mg  15 mg Oral DAILY    lidocaine (LIDODERM) 5 % patch 1 Patch  1 Patch TransDERmal Q24H    therapeutic multivitamin (THERAGRAN) tablet 1 Tab  1 Tab Oral DAILY    fluPHENAZine decanoate (PROLIXIN) 25 mg/mL injection 25 mg  25 mg IntraMUSCular EVERY 2 WEEKS    lisinopril (PRINIVIL, ZESTRIL) tablet 10 mg  10 mg Oral DAILY    polyethylene glycol (MIRALAX) packet 17 g  17 g Oral DAILY    trihexyphenidyl (ARTANE) tablet 2 mg  2 mg Oral TID    docusate sodium (COLACE) capsule 100 mg  100 mg Oral BID    pantoprazole (PROTONIX) tablet 40 mg  40 mg Oral ACB    naproxen (NAPROSYN) tablet 250 mg  250 mg Oral BID WITH MEALS    divalproex ER (DEPAKOTE ER) 24 hour tablet 1,000 mg  1,000 mg Oral QHS    insulin lispro (HUMALOG) injection   SubCUTAneous BID    carBAMazepine XR (TEGretol XR) tablet 200 mg  200 mg Oral BID    zolpidem CR (AMBIEN CR) tablet 12.5 mg  12.5 mg Oral QHS    SITagliptin (JANUVIA) tablet 100 mg  100 mg Oral DAILY    atorvastatin (LIPITOR) tablet 20 mg  20 mg Oral DAILY    amLODIPine (NORVASC) tablet 10 mg  10 mg Oral DAILY          ASSESSMENT & PLAN     DIAGNOSES REQUIRING ACTIVE TREATMENT AND MONITORING: (reviewed/updated 8/3/2017)  Patient Active Hospital Problem List:     [facility] (5/18/2017)    Assessment: resolved psychosis    Plan:  Continue Prolixin decanoate every two weeks  Continue Depakote, monitor levels  Continue Tegretol, monitor levels  Await placement       EPS  Assessment: secondary to prolixin (now dec only)  Plan: change cogentin to artane        I will continue to monitor blood levels (Depakote---a drug with a narrow therapeutic index= NTI) and associated labs for drug therapy implemented that require intense monitoring for toxicity as deemed appropriate based on current medication side effects and pharmacodynamically determined drug 1/2 lives. In summary, Nicole Whiting, is a 65 y.o.  female who presents with a severe exacerbation of the principal diagnosis of Schizoaffective disorder ([facility]  Patient's condition is improving. Patient requires continued inpatient hospitalization for further stabilization, safety monitoring and medication management. I will continue to coordinate the provision of individual, milieu, occupational, group, and substance abuse therapies to address target symptoms/diagnoses as deemed appropriate for the individual patient.   A coordinated, multidisplinary treatment team round was conducted with the patient (this team consists of the nurse, psychiatric unit pharmcist,  and writer). Complete current electronic health record for patient has been reviewed today including consultant notes, ancillary staff notes, nurses and psychiatric tech notes. Suicide risk assessment completed and patient deemed to be of low risk for suicide at this time. The following regarding medications was addressed during rounds with patient:   the risks and benefits of the proposed medication. The patient was given the opportunity to ask questions. Informed consent given to the use of the above medications. Will continue to adjust psychiatric and non-psychiatric medications (see above \"medication\" section and orders section for details) as deemed appropriate & based upon diagnoses and response to treatment. I will continue to order blood tests/labs and diagnostic tests as deemed appropriate and review results as they become available (see orders for details and above listed lab/test results). I will order psychiatric records from previous [facility] hospitals to further elucidate the nature of patient's psychopathology and review once available. I will gather additional collateral information from friends, family and o/p treatment team to further elucidate the nature of patient's psychopathology and baselline level of psychiatric functioning. I certify that this patient's inpatient psychiatric hospital services furnished since the previous certification were, and continue to be, required for treatment that could reasonably be expected to improve the patient's condition, or for diagnostic study, and that the patient continues to need, on a daily basis, active treatment furnished directly by or requiring the supervision of inpatient psychiatric facility personnel. In addition, the hospital records show that services furnished were intensive treatment services, admission or related services, or equivalent services.     EXPECTED DISCHARGE DATE/DAY: TBD     DISPOSITION: Home       Signed By:   Sameer Osuna MD  8/3/2017

## 2017-08-04 NOTE — PROGRESS NOTES
Problem: Falls - Risk of  Goal: *Absence of falls  Outcome: Progressing Towards Goal  Pt received in bed asleep. NAD. No falls noted/reported. Bathroom light remains on in room. Q 15 minute checks for safety maintained. PRN Medication Documentation    Specific patient behavior that led to need for PRN medication: C/o anxiety. Staff interventions attempted prior to PRN being given: Dimmed lights, encouraged to use coping skills. PRN medication given: Ativan 1 mg po @ 0018.   Patient response/effectiveness of PRN medication: Pending    0100 - Pt sleeping

## 2017-08-04 NOTE — PROGRESS NOTES
Problem: Altered Thought Process (Adult/Pediatric)  Goal: *STG: Remains safe in hospital  Outcome: Progressing Towards Goal  1530: Greeted patient on unit in dining room watching tv. Patient smiling upon approach. No voiced concerns at present. Vitals stable, wnl.   Medication and meal compliant.

## 2017-08-04 NOTE — BH NOTES
PRN Medication Documentation    Specific patient behavior that led to need for PRN medication: Patient is complaining of pain 8/10 a headache (Anterior)  Staff interventions attempted prior to PRN being given: Distraction, Deep breathing exercises taught, therapeutic communication and education given. PRN medication given: 1009: 650 mg Tylenol given    Patient response/effectiveness of PRN medication: Patient is laying in her bed resting. Will re assess patient. 1050: Patient is in her room sleeping. No signs of pain or distress noted. PRN Medication Documentation    Specific patient behavior that led to need for PRN medication: Patient has had 3  Large Soft  BM since this morning. Staff interventions attempted prior to PRN being given: Educated patient to stay hydrated. Stool softeners held this morning. MD to change stool softeners to PRN. PRN medication given: 1145: Immodium 2 mg given PO  Patient response/effectiveness of PRN medication: Will re assess patient. 1240: Patient reports having 1 more soft BM. Will give medication when due again if needed.

## 2017-08-04 NOTE — BH NOTES
GROUP THERAPY PROGRESS NOTE    Luz Marina Matthews did not participate in a Morning Process Group on the General Unit focused on identifying feelings, planning for the day, and learning more about DBT concepts of \"Elf Help\" and the importance of self-care.

## 2017-08-05 LAB
GLUCOSE BLD STRIP.AUTO-MCNC: 133 MG/DL (ref 65–100)
GLUCOSE BLD STRIP.AUTO-MCNC: 99 MG/DL (ref 65–100)
SERVICE CMNT-IMP: ABNORMAL
SERVICE CMNT-IMP: NORMAL

## 2017-08-05 PROCEDURE — 65220000003 HC RM SEMIPRIVATE PSYCH

## 2017-08-05 PROCEDURE — 74011250637 HC RX REV CODE- 250/637: Performed by: NURSE PRACTITIONER

## 2017-08-05 PROCEDURE — 74011250637 HC RX REV CODE- 250/637: Performed by: HOSPITALIST

## 2017-08-05 PROCEDURE — 74011250637 HC RX REV CODE- 250/637: Performed by: PSYCHIATRY & NEUROLOGY

## 2017-08-05 PROCEDURE — 82962 GLUCOSE BLOOD TEST: CPT

## 2017-08-05 RX ADMIN — ACETAMINOPHEN 650 MG: 325 TABLET, FILM COATED ORAL at 10:41

## 2017-08-05 RX ADMIN — NAPROXEN 250 MG: 250 TABLET ORAL at 16:11

## 2017-08-05 RX ADMIN — DIVALPROEX SODIUM 1000 MG: 500 TABLET, FILM COATED, EXTENDED RELEASE ORAL at 21:28

## 2017-08-05 RX ADMIN — LISINOPRIL 10 MG: 10 TABLET ORAL at 08:41

## 2017-08-05 RX ADMIN — OXYBUTYNIN CHLORIDE 15 MG: 5 TABLET, EXTENDED RELEASE ORAL at 08:41

## 2017-08-05 RX ADMIN — TRIHEXYPHENIDYL HYDROCHLORIDE 2 MG: 2 TABLET ORAL at 08:41

## 2017-08-05 RX ADMIN — AMLODIPINE BESYLATE 10 MG: 5 TABLET ORAL at 08:41

## 2017-08-05 RX ADMIN — PANTOPRAZOLE SODIUM 40 MG: 40 TABLET, DELAYED RELEASE ORAL at 07:20

## 2017-08-05 RX ADMIN — TRIHEXYPHENIDYL HYDROCHLORIDE 2 MG: 2 TABLET ORAL at 21:28

## 2017-08-05 RX ADMIN — NAPROXEN 250 MG: 250 TABLET ORAL at 08:41

## 2017-08-05 RX ADMIN — CARBAMAZEPINE 200 MG: 200 TABLET, EXTENDED RELEASE ORAL at 18:00

## 2017-08-05 RX ADMIN — CARBAMAZEPINE 200 MG: 200 TABLET, EXTENDED RELEASE ORAL at 08:41

## 2017-08-05 RX ADMIN — THERA TABS 1 TABLET: TAB at 08:41

## 2017-08-05 RX ADMIN — TRIHEXYPHENIDYL HYDROCHLORIDE 2 MG: 2 TABLET ORAL at 16:11

## 2017-08-05 RX ADMIN — ZOLPIDEM TARTRATE 12.5 MG: 6.25 TABLET, EXTENDED RELEASE ORAL at 21:28

## 2017-08-05 RX ADMIN — SITAGLIPTIN 100 MG: 100 TABLET, FILM COATED ORAL at 08:41

## 2017-08-05 RX ADMIN — LORAZEPAM 1 MG: 1 TABLET ORAL at 02:47

## 2017-08-05 RX ADMIN — ATORVASTATIN CALCIUM 20 MG: 20 TABLET, FILM COATED ORAL at 08:41

## 2017-08-05 NOTE — INTERDISCIPLINARY ROUNDS
Behavioral Health Interdisciplinary Rounds     Patient Name: Destiney Sanchez  Age: 65 y.o. Room/Bed:  733/01  Primary Diagnosis: Schizoaffective disorder (HCC)   Admission Status: Involuntary Commitment     Readmission within 30 days: no  Power of  in place: no  Patient requires a blocked bed: no          Reason for blocked bed:     VTE Prophylaxis: Not indicated  Mobility needs/Fall risk: no    Nutritional Plan: no  Consults:          Labs/Testing due today?: no    Sleep hours: 5.0       Participation in Care/Groups:  yes  Medication Compliant?: Yes  PRNS (last 24 hours):  Antianxiety    Restraints (last 24 hours):  no  Substance Abuse:  no  CIWA (range last 24 hours):  COWS (range last 24 hours):   Alcohol screening (AUDIT) completed -     If applicable, date SBIRT discussed in treatment team AND documented:   Tobacco - patient is a smoker: no   Date tobacco education completed by RN:   24 hour chart check complete: yes    Patient goal(s) for today: Attend groups and ADLS  Treatment team focus/goals: Eval meds   LOS:  79  Expected LOS:   [address] -     Name of Decision maker if patient has Psychiatric Care Directive   Patient was offered information   Financial concerns/prescription coverage:    Date of last family contact:      Family requesting physician contact today:  no  Discharge plan: TBD      Outpatient provider(s): TBD    Participating treatment team members: Destiney Sanchez, Dr Rex Acevedo

## 2017-08-05 NOTE — BH NOTES
GROUP THERAPY PROGRESS NOTE    Eveline Hammond is participating in Coping Skills Group.      Group time: 15 minutes    Goal orientation: personal    Group therapy participation: active    Therapeutic interventions reviewed and discussed: yes    Impression of participation: engaged

## 2017-08-05 NOTE — BH NOTES
In bed asleep during initial rounds with respirations even and unlabored. Awake around 0230 and asked for Ativan as she stated she felt stressed and some discomfort in her chest. Could not give me a number to correlate with pain and said it was getting a little better. Noted to be crying and would not tell writer cause of this.  Assessment B/P-150/90, P-69, R-18 O2 sats-96%

## 2017-08-05 NOTE — PROGRESS NOTES
Problem: Altered Thought Process (Adult/Pediatric)  Goal: *STG: Participates in treatment plan  Outcome: Progressing Towards Goal  Pt met with doctor. Patient is anxious. Brighter mood when approach my staff. Patient's focusing on finding placement. NAD. Q-15 checks for safety. Goal: *STG: Remains safe in hospital  Outcome: Progressing Towards Goal  Patient remains safe. Pt using walker to ambulate. Goal: *STG: Complies with medication therapy  Outcome: Progressing Towards Goal  Pt is medication compliant. Goal: Interventions  Outcome: Progressing Towards Goal  Staff to instill jesus and hope to pt Pt to focus on positive coping skills. Medication and safety education.

## 2017-08-05 NOTE — PROGRESS NOTES
Problem: Altered Thought Process (Adult/Pediatric)  Goal: *STG: Seeks staff when feelings of anxiety and fear arise  Outcome: Progressing Towards Goal  Pt received this afternoon resting in bed quietly. Pt appears to be in no acute distress. Staff will continue to monitor with Q 15 minute safety checks.

## 2017-08-05 NOTE — BH NOTES
PRN Medication Documentation    Specific patient behavior that led to need for PRN medication: Complained of feeling stressed and anxious and requested prn for these symptoms. Staff interventions attempted prior to PRN being given: Attempted to have her tell me if anything specific was bothering her but no response. Stated chest was hurting some but would not give a number. PRN medication given:Ativan 1 mg.  Po   Patient response/effectiveness of PRN medication:

## 2017-08-05 NOTE — BH NOTES
PSYCHIATRIC PROGRESS NOTE         Patient Name  Edwin Mcginnis   Date of Birth 1955   [facility] [id number]   Medical Record Number  [id number]      Age  65 y.o. PCP Flaca Urbina MD   Admit date:  5/18/2017    Room Number  733/01  @ [facility]   Date of Service  8/5/2017          PSYCHOTHERAPY SESSION NOTE:  Length of psychotherapy session: 15 minutes    Main condition/diagnosis/issues treated during session today, 8/5/2017 : housing    I employed Cognitive Behavioral therapy techniques, Reality-Oriented psychotherapy, as well as supportive psychotherapy in regards to various ongoing psychosocial stressors, including the following: pre-admission and current problems; housing issues; stress of hospitalization. Interpersonal relationship issues and psychodynamic conflicts explored. Attempts made to alleviate maladaptive patterns. Overall, patient is progressing    Treatment Plan Update (reviewed an updated 8/5/2017) : I will modify psychotherapy tx plan by implementing more stress management strategies, building upon cognitive behavioral techniques, increasing coping skills, as well as shoring up psychological defenses). An extended energy and skill set was needed to engage pt in psychotherapy due to some of the following: resistiveness, complexity, negativity, confrontational nature, hostile behaviors, and/or severe abnormalities in thought processes/psychosis resulting in the loss of expressive/receptive language communication skills. E & M PROGRESS NOTE:         HISTORY       CC:  No complaints  HISTORY OF PRESENT ILLNESS/INTERVAL HISTORY:  (reviewed/updated 8/5/2017). per initial evaluation: The patient, Edwin Mcginnis, is a 65 y.o.  BLACK OR  female with a past psychiatric history significant for Schizoaffective disorder, long history of noncompliance who presents at this time with complaints of (and/or evidence of) the following emotional symptoms: agitation, delusions and psychotic behavior. Additional symptomatology include noncompliance with medications. The above symptoms have been present for several weeks. She lives with a caretaker who reports recent paranoia, agitation. These symptoms are of high severity. These symptoms are constant in nature. The patient's condition has been precipitated by noncompliance and psychosocial stressors . No illicit substance abuse. Paola Betancur presents/reports/evidences the following emotional symptoms today, 8/5/2017: None. Ms. Toney Shay reports feeling okay. Mood stable, adequate sleep over night. No agitation. Compliant with medications. 8/4/17- Very stable. Waiting for placement. Sleep and appetite are good. 8/5/17 She is frustrated that she still her and no anger issues and no aggressive behavior  And she is  sleeping better. SIDE EFFECTS: (reviewed/updated 8/5/2017)  None reported or admitted to. No noted toxicity with use of Depakote/   ALLERGIES:(reviewed/updated 8/5/2017)  Allergies   Allergen Reactions    Penicillins Rash      MEDICATIONS PRIOR TO ADMISSION:(reviewed/updated 8/5/2017)  Prescriptions Prior to Admission   Medication Sig    QUEtiapine (SEROQUEL) 25 mg tablet Take 25 mg by mouth daily.  acetaminophen (TYLENOL) 500 mg tablet Take 500 mg by mouth two (2) times a day.  cloNIDine HCl (CATAPRES) 0.2 mg tablet Take  by mouth three (3) times daily.  hydrOXYzine pamoate (VISTARIL) 50 mg capsule Take 50 mg by mouth four (4) times daily.  LORazepam (ATIVAN) 0.5 mg tablet Take 0.5 mg by mouth two (2) times a day.  divalproex DR (DEPAKOTE) 500 mg tablet Take 500 mg by mouth two (2) times a day.  escitalopram oxalate (LEXAPRO) 5 mg tablet Take 5 mg by mouth daily.  naproxen (NAPROSYN) 500 mg tablet Take 500 mg by mouth two (2) times daily (with meals).  gabapentin (NEURONTIN) 100 mg capsule Take 100 mg by mouth two (2) times a day.     loperamide (IMODIUM) 2 mg capsule Take 2 mg by mouth every four (4) hours as needed for Diarrhea. Indications: Diarrhea    amLODIPine (NORVASC) 10 mg tablet Take 1 Tab by mouth daily.  atorvastatin (LIPITOR) 20 mg tablet Take 1 Tab by mouth nightly.  carBAMazepine (TEGRETOL) 200 mg tablet Take 1 Tab by mouth three (3) times daily.  hydrochlorothiazide (HYDRODIURIL) 25 mg tablet Take 1 Tab by mouth daily.  sitaGLIPtin (JANUVIA) 100 mg tablet Take 1 Tab by mouth daily.  QUEtiapine (SEROQUEL) 100 mg tablet Take 100 mg by mouth every evening. PAST MEDICAL HISTORY: Past medical history from the initial psychiatric evaluation has been reviewed (reviewed/updated 8/5/2017) with no additional updates (I asked patient and no additional past medical history provided). Past Medical History:   Diagnosis Date    Aggressive outburst     Arthritis     Bipolar 1 disorder ([facility] 4-12-13    Diabetes mellitus ([facility]     Homicide attempt     Hypertension     Murmur     Paranoid schizophrenia ([facility]     Psychiatric disorder     Schizophrenia, paranoid type ([facility] 3/20/2013     Past Surgical History:   Procedure Laterality Date    HX CHOLECYSTECTOMY      HX ORTHOPAEDIC      Excision Non-malignant bone cyst left femur      SOCIAL HISTORY: Social history from the initial psychiatric evaluation has been reviewed (reviewed/updated 8/5/2017) with no additional updates (I asked patient and no additional social history provided). Social History     Social History    Marital status:      Spouse name: N/A    Number of children: N/A    Years of education: N/A     Occupational History    Not on file.      Social History Main Topics    Smoking status: Former Smoker     Years: 40.00     Quit date: 3/19/1983    Smokeless tobacco: Not on file    Alcohol use No    Drug use: No    Sexual activity: Yes     Partners: Male     Other Topics Concern    Not on file     Social History Narrative      Lives with daughter, son-in-law and 2 grandchildren. Not employed outside the home. FAMILY HISTORY: Family history from the initial psychiatric evaluation has been reviewed (reviewed/updated 8/5/2017) with no additional updates (I asked patient and no additional family history provided). Family History   Problem Relation Age of Onset    Hypertension Mother     Diabetes Mother     Psychiatric Disorder Father     Heart Disease Mother     Heart Disease Brother     Diabetes Brother     Psychiatric Disorder Sister        REVIEW OF SYSTEMS: (reviewed/updated 8/5/2017)  Appetite:good   Sleep: decreased more than normal and poor with DIMS (difficulty initiating & maintaining sleep)   All other Review of Systems: Negative except severe psychosis and agitation         [facility] (MSE):    MSE FINDINGS ARE WITHIN NORMAL LIMITS (WNL) UNLESS OTHERWISE STATED BELOW. ( ALL OF THE BELOW CATEGORIES OF THE MSE HAVE BEEN REVIEWED (reviewed 8/5/2017) AND UPDATED AS DEEMED APPROPRIATE )  General Presentation wnl   Orientation Fully oriented and pleasant   Vital Signs  See below (reviewed 8/5/2017); Vital Signs (BP, Pulse, & Temp) are within normal limits if not listed below. Gait and Station Stable/steady, no ataxia   Musculoskeletal System No extrapyramidal symptoms (EPS); no abnormal muscular movements or Tardive Dyskinesia (TD); muscle strength and tone are within normal limits   Language No aphasia or dysarthria   Speech:  Normal volume, speed and content   Thought Processes (+)linear and logical   Thought Associations wnl   Thought Content Reality based   Suicidal Ideations none   Homicidal Ideations none   Mood:  Pleasant    Affect:  Appropriate    Memory recent    Impaired     Memory remote:  impaired   Concentration/Attention:  distractable   Fund of Knowledge below avg.    Insight:  wnl   Reliability wnl   Judgment:  wnl          VITALS:     Patient Vitals for the past 24 hrs:   Temp Pulse Resp BP SpO2 08/05/17 0815 97.7 °F (36.5 °C) 63 18 138/89 -   08/04/17 1948 98 °F (36.7 °C) 66 20 (!) 168/92 -   08/04/17 1555 98 °F (36.7 °C) 65 20 (!) 153/93 97 %     Wt Readings from Last 3 Encounters:   07/16/17 74.8 kg (165 lb)   03/14/16 89 kg (196 lb 3.2 oz)   07/21/15 90.7 kg (200 lb)     Temp Readings from Last 3 Encounters:   08/05/17 97.7 °F (36.5 °C)   04/03/16 98.2 °F (36.8 °C)   03/14/16 99 °F (37.2 °C)     BP Readings from Last 3 Encounters:   08/05/17 138/89   04/03/16 (!) 167/93   03/14/16 (!) 188/99     Pulse Readings from Last 3 Encounters:   08/05/17 63   04/03/16 68   03/14/16 88            DATA     LABORATORY DATA:(reviewed/updated 8/5/2017)  Recent Results (from the past 24 hour(s))   GLUCOSE, POC    Collection Time: 08/04/17  4:32 PM   Result Value Ref Range    Glucose (POC) 152 (H) 65 - 100 mg/dL    Performed by Vicente Rivera    GLUCOSE, POC    Collection Time: 08/05/17  8:14 AM   Result Value Ref Range    Glucose (POC) 99 65 - 100 mg/dL    Performed by Mary Lind      Lab Results   Component Value Date/Time    Valproic acid 93 08/02/2017 05:23 AM    Carbamazepine 6.8 07/18/2017 05:27 AM     No results found for: LITHM   RADIOLOGY REPORTS:(reviewed/updated 8/5/2017)  No results found.        MEDICATIONS     ALL MEDICATIONS:   Current Facility-Administered Medications   Medication Dose Route Frequency    docusate sodium (COLACE) capsule 100 mg  100 mg Oral BID PRN    oxybutynin chloride XL (DITROPAN XL) tablet 15 mg  15 mg Oral DAILY    lidocaine (LIDODERM) 5 % patch 1 Patch  1 Patch TransDERmal Q24H    therapeutic multivitamin (THERAGRAN) tablet 1 Tab  1 Tab Oral DAILY    fluPHENAZine decanoate (PROLIXIN) 25 mg/mL injection 25 mg  25 mg IntraMUSCular EVERY 2 WEEKS    loperamide (IMODIUM) capsule 2 mg  2 mg Oral Q4H PRN    lisinopril (PRINIVIL, ZESTRIL) tablet 10 mg  10 mg Oral DAILY    polyethylene glycol (MIRALAX) packet 17 g  17 g Oral DAILY    trihexyphenidyl (ARTANE) tablet 2 mg  2 mg Oral TID    pantoprazole (PROTONIX) tablet 40 mg  40 mg Oral ACB    naproxen (NAPROSYN) tablet 250 mg  250 mg Oral BID WITH MEALS    divalproex ER (DEPAKOTE ER) 24 hour tablet 1,000 mg  1,000 mg Oral QHS    alum-mag hydroxide-simeth (MYLANTA) oral suspension 30 mL  30 mL Oral Q4H PRN    insulin lispro (HUMALOG) injection   SubCUTAneous BID    carBAMazepine XR (TEGretol XR) tablet 200 mg  200 mg Oral BID    zolpidem CR (AMBIEN CR) tablet 12.5 mg  12.5 mg Oral QHS    OLANZapine (ZyPREXA) tablet 5 mg  5 mg Oral Q6H PRN    diphenhydrAMINE (BENADRYL) injection 50 mg  50 mg IntraMUSCular Q6H PRN    LORazepam (ATIVAN) injection 1 mg  1 mg IntraMUSCular Q4H PRN    LORazepam (ATIVAN) tablet 1 mg  1 mg Oral Q4H PRN    benztropine (COGENTIN) tablet 1 mg  1 mg Oral BID PRN    benztropine (COGENTIN) injection 1 mg  1 mg IntraMUSCular BID PRN    acetaminophen (TYLENOL) tablet 650 mg  650 mg Oral Q4H PRN    nicotine (NICODERM CQ) 21 mg/24 hr patch 1 Patch  1 Patch TransDERmal DAILY PRN    SITagliptin (JANUVIA) tablet 100 mg  100 mg Oral DAILY    atorvastatin (LIPITOR) tablet 20 mg  20 mg Oral DAILY    amLODIPine (NORVASC) tablet 10 mg  10 mg Oral DAILY    glucose chewable tablet 16 g  4 Tab Oral PRN    glucagon (GLUCAGEN) injection 1 mg  1 mg IntraMUSCular PRN    dextrose 10 % infusion 125-250 mL  125-250 mL IntraVENous PRN      SCHEDULED MEDICATIONS:   Current Facility-Administered Medications   Medication Dose Route Frequency    oxybutynin chloride XL (DITROPAN XL) tablet 15 mg  15 mg Oral DAILY    lidocaine (LIDODERM) 5 % patch 1 Patch  1 Patch TransDERmal Q24H    therapeutic multivitamin (THERAGRAN) tablet 1 Tab  1 Tab Oral DAILY    fluPHENAZine decanoate (PROLIXIN) 25 mg/mL injection 25 mg  25 mg IntraMUSCular EVERY 2 WEEKS    lisinopril (PRINIVIL, ZESTRIL) tablet 10 mg  10 mg Oral DAILY    polyethylene glycol (MIRALAX) packet 17 g  17 g Oral DAILY    trihexyphenidyl (ARTANE) tablet 2 mg 2 mg Oral TID    pantoprazole (PROTONIX) tablet 40 mg  40 mg Oral ACB    naproxen (NAPROSYN) tablet 250 mg  250 mg Oral BID WITH MEALS    divalproex ER (DEPAKOTE ER) 24 hour tablet 1,000 mg  1,000 mg Oral QHS    insulin lispro (HUMALOG) injection   SubCUTAneous BID    carBAMazepine XR (TEGretol XR) tablet 200 mg  200 mg Oral BID    zolpidem CR (AMBIEN CR) tablet 12.5 mg  12.5 mg Oral QHS    SITagliptin (JANUVIA) tablet 100 mg  100 mg Oral DAILY    atorvastatin (LIPITOR) tablet 20 mg  20 mg Oral DAILY    amLODIPine (NORVASC) tablet 10 mg  10 mg Oral DAILY          ASSESSMENT & PLAN     DIAGNOSES REQUIRING ACTIVE TREATMENT AND MONITORING: (reviewed/updated 8/5/2017)  Patient Active Hospital Problem List:     [facility] (5/18/2017)    Assessment: resolved psychosis    Plan:  Continue Prolixin decanoate every two weeks  Continue Depakote, monitor levels  Continue Tegretol, monitor levels  Await placement       EPS  Assessment: secondary to prolixin (now dec only)  Plan: change cogentin to artane    8/5/17 Continue same medications    I will continue to monitor blood levels (Depakote---a drug with a narrow therapeutic index= NTI) and associated labs for drug therapy implemented that require intense monitoring for toxicity as deemed appropriate based on current medication side effects and pharmacodynamically determined drug 1/2 lives. In summary, Yusra Hamlin, is a 65 y.o.  female who presents with a severe exacerbation of the principal diagnosis of Schizoaffective disorder ([facility]  Patient's condition is improving. Patient requires continued inpatient hospitalization for further stabilization, safety monitoring and medication management. I will continue to coordinate the provision of individual, milieu, occupational, group, and substance abuse therapies to address target symptoms/diagnoses as deemed appropriate for the individual patient.   A coordinated, multidisplinary treatment team round was conducted with the patient (this team consists of the nurse, psychiatric unit pharmcist,  and writer). Complete current electronic health record for patient has been reviewed today including consultant notes, ancillary staff notes, nurses and psychiatric tech notes. Suicide risk assessment completed and patient deemed to be of low risk for suicide at this time. The following regarding medications was addressed during rounds with patient:   the risks and benefits of the proposed medication. The patient was given the opportunity to ask questions. Informed consent given to the use of the above medications. Will continue to adjust psychiatric and non-psychiatric medications (see above \"medication\" section and orders section for details) as deemed appropriate & based upon diagnoses and response to treatment. I will continue to order blood tests/labs and diagnostic tests as deemed appropriate and review results as they become available (see orders for details and above listed lab/test results). I will order psychiatric records from previous [facility] hospitals to further elucidate the nature of patient's psychopathology and review once available. I will gather additional collateral information from friends, family and o/p treatment team to further elucidate the nature of patient's psychopathology and baselline level of psychiatric functioning. I certify that this patient's inpatient psychiatric hospital services furnished since the previous certification were, and continue to be, required for treatment that could reasonably be expected to improve the patient's condition, or for diagnostic study, and that the patient continues to need, on a daily basis, active treatment furnished directly by or requiring the supervision of inpatient psychiatric facility personnel.  In addition, the hospital records show that services furnished were intensive treatment services, admission or related services, or equivalent services.     EXPECTED DISCHARGE DATE/DAY: TBD     DISPOSITION: Home       Signed By:   Kristel Leija MD  8/5/2017

## 2017-08-06 LAB
GLUCOSE BLD STRIP.AUTO-MCNC: 108 MG/DL (ref 65–100)
GLUCOSE BLD STRIP.AUTO-MCNC: 93 MG/DL (ref 65–100)
SERVICE CMNT-IMP: ABNORMAL
SERVICE CMNT-IMP: NORMAL

## 2017-08-06 PROCEDURE — 74011250637 HC RX REV CODE- 250/637: Performed by: PSYCHIATRY & NEUROLOGY

## 2017-08-06 PROCEDURE — 74011250637 HC RX REV CODE- 250/637: Performed by: NURSE PRACTITIONER

## 2017-08-06 PROCEDURE — 74011250637 HC RX REV CODE- 250/637: Performed by: HOSPITALIST

## 2017-08-06 PROCEDURE — 82962 GLUCOSE BLOOD TEST: CPT

## 2017-08-06 PROCEDURE — 65220000003 HC RM SEMIPRIVATE PSYCH

## 2017-08-06 RX ADMIN — DIVALPROEX SODIUM 1000 MG: 500 TABLET, FILM COATED, EXTENDED RELEASE ORAL at 21:18

## 2017-08-06 RX ADMIN — SITAGLIPTIN 100 MG: 100 TABLET, FILM COATED ORAL at 08:44

## 2017-08-06 RX ADMIN — CARBAMAZEPINE 200 MG: 200 TABLET, EXTENDED RELEASE ORAL at 08:46

## 2017-08-06 RX ADMIN — ATORVASTATIN CALCIUM 20 MG: 20 TABLET, FILM COATED ORAL at 08:44

## 2017-08-06 RX ADMIN — TRIHEXYPHENIDYL HYDROCHLORIDE 2 MG: 2 TABLET ORAL at 16:43

## 2017-08-06 RX ADMIN — THERA TABS 1 TABLET: TAB at 08:44

## 2017-08-06 RX ADMIN — PANTOPRAZOLE SODIUM 40 MG: 40 TABLET, DELAYED RELEASE ORAL at 07:09

## 2017-08-06 RX ADMIN — OXYBUTYNIN CHLORIDE 15 MG: 5 TABLET, EXTENDED RELEASE ORAL at 08:44

## 2017-08-06 RX ADMIN — LISINOPRIL 10 MG: 10 TABLET ORAL at 08:45

## 2017-08-06 RX ADMIN — LORAZEPAM 1 MG: 1 TABLET ORAL at 02:05

## 2017-08-06 RX ADMIN — TRIHEXYPHENIDYL HYDROCHLORIDE 2 MG: 2 TABLET ORAL at 21:18

## 2017-08-06 RX ADMIN — AMLODIPINE BESYLATE 10 MG: 5 TABLET ORAL at 08:46

## 2017-08-06 RX ADMIN — ZOLPIDEM TARTRATE 12.5 MG: 6.25 TABLET, EXTENDED RELEASE ORAL at 21:18

## 2017-08-06 RX ADMIN — NAPROXEN 250 MG: 250 TABLET ORAL at 08:44

## 2017-08-06 RX ADMIN — TRIHEXYPHENIDYL HYDROCHLORIDE 2 MG: 2 TABLET ORAL at 08:44

## 2017-08-06 RX ADMIN — CARBAMAZEPINE 200 MG: 200 TABLET, EXTENDED RELEASE ORAL at 16:44

## 2017-08-06 RX ADMIN — NAPROXEN 250 MG: 250 TABLET ORAL at 16:43

## 2017-08-06 RX ADMIN — ACETAMINOPHEN 650 MG: 325 TABLET, FILM COATED ORAL at 02:32

## 2017-08-06 NOTE — PROGRESS NOTES
Problem: Altered Thought Process (Adult/Pediatric)  Goal: *STG: Participates in treatment plan  Outcome: Progressing Towards Goal  Pt met with tx team. Patient looks sad. Focusing on being discharge. Pt described mood as, \" up and down. \"  Goal: *STG: Remains safe in hospital  Outcome: Progressing Towards Goal  Pt remains safe in the hospital.  Goal: *STG: Attends activities and groups  Outcome: Progressing Towards Goal  Patient attending group activities. Goal: Interventions  Outcome: Progressing Towards Goal  Staff to focus on positive coping skills.

## 2017-08-06 NOTE — PROGRESS NOTES
Problem: Falls - Risk of  Goal: *Absence of falls  Outcome: Progressing Towards Goal  Gait is steady with use of a rolling walker. Pt takes several laps, up and down the gonzáles, stating that she is getting her exercise. Problem: Altered Thought Process (Adult/Pediatric)  Goal: *STG: Demonstrates ability to understand and use improved judgment in daily activities and relationships  Outcome: Progressing Towards Goal  Pt is social with peers and offers them encouragement and emotional support.

## 2017-08-06 NOTE — INTERDISCIPLINARY ROUNDS
Behavioral Health Interdisciplinary Rounds     Patient Name: Valente Mosher  Age: 65 y.o.   Room/Bed:  733/  Primary Diagnosis: Schizoaffective disorder (HCC)   Admission Status: Involuntary Commitment     Readmission within 30 days: no  Power of  in place: no  Patient requires a blocked bed: yes          Reason for blocked bed: behavi0ral    VTE Prophylaxis: Not indicated  Mobility needs/Fall risk: no    Nutritional Plan: no  Consults:          Labs/Testing due today?: no    Sleep hours:  5.75      Participation in Care/Groups:  yes  Medication Compliant?: Yes  PRNS (last 24 hours): Sleep Aid    Restraints (last 24 hours):  no  Substance Abuse:  no  CIWA (range last 24 hours):  COWS (range last 24 hours):   Alcohol screening (AUDIT) completed -     If applicable, date SBIRT discussed in treatment team AND documented:   Tobacco - patient is a smoker: no   Date tobacco education completed by RN:   24 hour chart check complete: yes     Patient goal(s) for today:  Treatment team focus/goals:   LOS:  80  Expected LOS:  [address] -     Name of Decision maker if patient has Psychiatric Care Directive  Patient was offered information   Financial concerns/prescription coverage:    Date of last family contact:      Family requesting physician contact today:    Discharge plan:       Outpatient provider(s):     Participating treatment team members: Valente Vidhi, * (assigned SW),

## 2017-08-06 NOTE — BH NOTES
PSYCHIATRIC PROGRESS NOTE         Patient Name  Karin Ayala   Date of Birth 1955   [facility] [id number]   Medical Record Number  [id number]      Age  65 y.o. PCP Juan Luis Scott MD   Admit date:  5/18/2017    Room Number  733/01  @ [facility]   Date of Service  8/6/2017          PSYCHOTHERAPY SESSION NOTE:  Length of psychotherapy session: 15 minutes    Main condition/diagnosis/issues treated during session today, 8/6/2017 : housing    I employed Cognitive Behavioral therapy techniques, Reality-Oriented psychotherapy, as well as supportive psychotherapy in regards to various ongoing psychosocial stressors, including the following: pre-admission and current problems; housing issues; stress of hospitalization. Interpersonal relationship issues and psychodynamic conflicts explored. Attempts made to alleviate maladaptive patterns. Overall, patient is progressing    Treatment Plan Update (reviewed an updated 8/6/2017) : I will modify psychotherapy tx plan by implementing more stress management strategies, building upon cognitive behavioral techniques, increasing coping skills, as well as shoring up psychological defenses). An extended energy and skill set was needed to engage pt in psychotherapy due to some of the following: resistiveness, complexity, negativity, confrontational nature, hostile behaviors, and/or severe abnormalities in thought processes/psychosis resulting in the loss of expressive/receptive language communication skills. E & M PROGRESS NOTE:         HISTORY       CC:  No complaints  HISTORY OF PRESENT ILLNESS/INTERVAL HISTORY:  (reviewed/updated 8/6/2017). per initial evaluation: The patient, Karin Ayala, is a 65 y.o.  BLACK OR  female with a past psychiatric history significant for Schizoaffective disorder, long history of noncompliance who presents at this time with complaints of (and/or evidence of) the following emotional symptoms: agitation, delusions and psychotic behavior. Additional symptomatology include noncompliance with medications. The above symptoms have been present for several weeks. She lives with a caretaker who reports recent paranoia, agitation. These symptoms are of high severity. These symptoms are constant in nature. The patient's condition has been precipitated by noncompliance and psychosocial stressors . No illicit substance abuse. Reese Weinberg presents/reports/evidences the following emotional symptoms today, 8/6/2017: None. Ms. Marga Fuentes reports feeling okay. Mood stable, adequate sleep over night. No agitation. Compliant with medications. 8/4/17- Very stable. Waiting for placement. Sleep and appetite are good. 8/5/17 She is frustrated that she still her and no anger issues and no aggressive behavior  And she is  sleeping better. 8/6/17 She is doing better and no anger issues and no anger issues and no aggressive and no mood swings and no issues with sleep       SIDE EFFECTS: (reviewed/updated 8/6/2017)  None reported or admitted to. No noted toxicity with use of Depakote/   ALLERGIES:(reviewed/updated 8/6/2017)  Allergies   Allergen Reactions    Penicillins Rash      MEDICATIONS PRIOR TO ADMISSION:(reviewed/updated 8/6/2017)  Prescriptions Prior to Admission   Medication Sig    QUEtiapine (SEROQUEL) 25 mg tablet Take 25 mg by mouth daily.  acetaminophen (TYLENOL) 500 mg tablet Take 500 mg by mouth two (2) times a day.  cloNIDine HCl (CATAPRES) 0.2 mg tablet Take  by mouth three (3) times daily.  hydrOXYzine pamoate (VISTARIL) 50 mg capsule Take 50 mg by mouth four (4) times daily.  LORazepam (ATIVAN) 0.5 mg tablet Take 0.5 mg by mouth two (2) times a day.  divalproex DR (DEPAKOTE) 500 mg tablet Take 500 mg by mouth two (2) times a day.  escitalopram oxalate (LEXAPRO) 5 mg tablet Take 5 mg by mouth daily.     naproxen (NAPROSYN) 500 mg tablet Take 500 mg by mouth two (2) times daily (with meals).  gabapentin (NEURONTIN) 100 mg capsule Take 100 mg by mouth two (2) times a day.  loperamide (IMODIUM) 2 mg capsule Take 2 mg by mouth every four (4) hours as needed for Diarrhea. Indications: Diarrhea    amLODIPine (NORVASC) 10 mg tablet Take 1 Tab by mouth daily.  atorvastatin (LIPITOR) 20 mg tablet Take 1 Tab by mouth nightly.  carBAMazepine (TEGRETOL) 200 mg tablet Take 1 Tab by mouth three (3) times daily.  hydrochlorothiazide (HYDRODIURIL) 25 mg tablet Take 1 Tab by mouth daily.  sitaGLIPtin (JANUVIA) 100 mg tablet Take 1 Tab by mouth daily.  QUEtiapine (SEROQUEL) 100 mg tablet Take 100 mg by mouth every evening. PAST MEDICAL HISTORY: Past medical history from the initial psychiatric evaluation has been reviewed (reviewed/updated 8/6/2017) with no additional updates (I asked patient and no additional past medical history provided). Past Medical History:   Diagnosis Date    Aggressive outburst     Arthritis     Bipolar 1 disorder ([facility] 4-12-13    Diabetes mellitus ([facility]     Homicide attempt     Hypertension     Murmur     Paranoid schizophrenia ([facility]     Psychiatric disorder     [facility] 3/20/2013     Past Surgical History:   Procedure Laterality Date    HX CHOLECYSTECTOMY      HX ORTHOPAEDIC      Excision Non-malignant bone cyst left femur      SOCIAL HISTORY: Social history from the initial psychiatric evaluation has been reviewed (reviewed/updated 8/6/2017) with no additional updates (I asked patient and no additional social history provided). Social History     Social History    Marital status:      Spouse name: N/A    Number of children: N/A    Years of education: N/A     Occupational History    Not on file.      Social History Main Topics    Smoking status: Former Smoker     Years: 40.00     Quit date: 3/19/1983    Smokeless tobacco: Not on file    Alcohol use No    Drug use: No    Sexual activity: Yes Partners: Male     Other Topics Concern    Not on file     Social History Narrative      Lives with daughter, son-in-law and 2 grandchildren. Not employed outside the home. FAMILY HISTORY: Family history from the initial psychiatric evaluation has been reviewed (reviewed/updated 8/6/2017) with no additional updates (I asked patient and no additional family history provided). Family History   Problem Relation Age of Onset    Hypertension Mother     Diabetes Mother     Psychiatric Disorder Father     Heart Disease Mother     Heart Disease Brother     Diabetes Brother     Psychiatric Disorder Sister        REVIEW OF SYSTEMS: (reviewed/updated 8/6/2017)  Appetite:good   Sleep: decreased more than normal and poor with DIMS (difficulty initiating & maintaining sleep)   All other Review of Systems: Negative except severe psychosis and agitation         [facility] (MSE):    MSE FINDINGS ARE WITHIN NORMAL LIMITS (WNL) UNLESS OTHERWISE STATED BELOW. ( ALL OF THE BELOW CATEGORIES OF THE MSE HAVE BEEN REVIEWED (reviewed 8/6/2017) AND UPDATED AS DEEMED APPROPRIATE )  General Presentation wnl   Orientation Fully oriented and pleasant   Vital Signs  See below (reviewed 8/6/2017); Vital Signs (BP, Pulse, & Temp) are within normal limits if not listed below. Gait and Station Stable/steady, no ataxia   Musculoskeletal System No extrapyramidal symptoms (EPS); no abnormal muscular movements or Tardive Dyskinesia (TD); muscle strength and tone are within normal limits   Language No aphasia or dysarthria   Speech:  Normal volume, speed and content   Thought Processes (+)linear and logical   Thought Associations wnl   Thought Content Reality based   Suicidal Ideations none   Homicidal Ideations none   Mood:  Pleasant    Affect:  Appropriate    Memory recent    Impaired     Memory remote:  impaired   Concentration/Attention:  distractable   Fund of Knowledge below avg. Insight:  wnl   Reliability wnl   Judgment:  wnl          VITALS:     Patient Vitals for the past 24 hrs:   Temp Pulse Resp BP SpO2   08/06/17 0840 97.7 °F (36.5 °C) 61 20 132/87 97 %   08/06/17 0205 98.1 °F (36.7 °C) 66 16 (!) 152/91 97 %   08/05/17 1950 97.8 °F (36.6 °C) 65 16 (!) 150/92 97 %     Wt Readings from Last 3 Encounters:   08/06/17 78 kg (172 lb)   03/14/16 89 kg (196 lb 3.2 oz)   07/21/15 90.7 kg (200 lb)     Temp Readings from Last 3 Encounters:   08/06/17 97.7 °F (36.5 °C)   04/03/16 98.2 °F (36.8 °C)   03/14/16 99 °F (37.2 °C)     BP Readings from Last 3 Encounters:   08/06/17 132/87   04/03/16 (!) 167/93   03/14/16 (!) 188/99     Pulse Readings from Last 3 Encounters:   08/06/17 61   04/03/16 68   03/14/16 88            DATA     LABORATORY DATA:(reviewed/updated 8/6/2017)  Recent Results (from the past 24 hour(s))   GLUCOSE, POC    Collection Time: 08/06/17  8:31 AM   Result Value Ref Range    Glucose (POC) 93 65 - 100 mg/dL    Performed by Corey Morris      Lab Results   Component Value Date/Time    Valproic acid 93 08/02/2017 05:23 AM    Carbamazepine 6.8 07/18/2017 05:27 AM     No results found for: LITHM   RADIOLOGY REPORTS:(reviewed/updated 8/6/2017)  No results found.        MEDICATIONS     ALL MEDICATIONS:   Current Facility-Administered Medications   Medication Dose Route Frequency    docusate sodium (COLACE) capsule 100 mg  100 mg Oral BID PRN    oxybutynin chloride XL (DITROPAN XL) tablet 15 mg  15 mg Oral DAILY    lidocaine (LIDODERM) 5 % patch 1 Patch  1 Patch TransDERmal Q24H    therapeutic multivitamin (THERAGRAN) tablet 1 Tab  1 Tab Oral DAILY    fluPHENAZine decanoate (PROLIXIN) 25 mg/mL injection 25 mg  25 mg IntraMUSCular EVERY 2 WEEKS    loperamide (IMODIUM) capsule 2 mg  2 mg Oral Q4H PRN    lisinopril (PRINIVIL, ZESTRIL) tablet 10 mg  10 mg Oral DAILY    polyethylene glycol (MIRALAX) packet 17 g  17 g Oral DAILY    trihexyphenidyl (ARTANE) tablet 2 mg  2 mg Oral TID    pantoprazole (PROTONIX) tablet 40 mg  40 mg Oral ACB    naproxen (NAPROSYN) tablet 250 mg  250 mg Oral BID WITH MEALS    divalproex ER (DEPAKOTE ER) 24 hour tablet 1,000 mg  1,000 mg Oral QHS    alum-mag hydroxide-simeth (MYLANTA) oral suspension 30 mL  30 mL Oral Q4H PRN    insulin lispro (HUMALOG) injection   SubCUTAneous BID    carBAMazepine XR (TEGretol XR) tablet 200 mg  200 mg Oral BID    zolpidem CR (AMBIEN CR) tablet 12.5 mg  12.5 mg Oral QHS    OLANZapine (ZyPREXA) tablet 5 mg  5 mg Oral Q6H PRN    diphenhydrAMINE (BENADRYL) injection 50 mg  50 mg IntraMUSCular Q6H PRN    LORazepam (ATIVAN) injection 1 mg  1 mg IntraMUSCular Q4H PRN    LORazepam (ATIVAN) tablet 1 mg  1 mg Oral Q4H PRN    benztropine (COGENTIN) tablet 1 mg  1 mg Oral BID PRN    benztropine (COGENTIN) injection 1 mg  1 mg IntraMUSCular BID PRN    acetaminophen (TYLENOL) tablet 650 mg  650 mg Oral Q4H PRN    nicotine (NICODERM CQ) 21 mg/24 hr patch 1 Patch  1 Patch TransDERmal DAILY PRN    SITagliptin (JANUVIA) tablet 100 mg  100 mg Oral DAILY    atorvastatin (LIPITOR) tablet 20 mg  20 mg Oral DAILY    amLODIPine (NORVASC) tablet 10 mg  10 mg Oral DAILY    glucose chewable tablet 16 g  4 Tab Oral PRN    glucagon (GLUCAGEN) injection 1 mg  1 mg IntraMUSCular PRN    dextrose 10 % infusion 125-250 mL  125-250 mL IntraVENous PRN      SCHEDULED MEDICATIONS:   Current Facility-Administered Medications   Medication Dose Route Frequency    oxybutynin chloride XL (DITROPAN XL) tablet 15 mg  15 mg Oral DAILY    lidocaine (LIDODERM) 5 % patch 1 Patch  1 Patch TransDERmal Q24H    therapeutic multivitamin (THERAGRAN) tablet 1 Tab  1 Tab Oral DAILY    fluPHENAZine decanoate (PROLIXIN) 25 mg/mL injection 25 mg  25 mg IntraMUSCular EVERY 2 WEEKS    lisinopril (PRINIVIL, ZESTRIL) tablet 10 mg  10 mg Oral DAILY    polyethylene glycol (MIRALAX) packet 17 g  17 g Oral DAILY    trihexyphenidyl (ARTANE) tablet 2 mg  2 mg Oral TID  pantoprazole (PROTONIX) tablet 40 mg  40 mg Oral ACB    naproxen (NAPROSYN) tablet 250 mg  250 mg Oral BID WITH MEALS    divalproex ER (DEPAKOTE ER) 24 hour tablet 1,000 mg  1,000 mg Oral QHS    insulin lispro (HUMALOG) injection   SubCUTAneous BID    carBAMazepine XR (TEGretol XR) tablet 200 mg  200 mg Oral BID    zolpidem CR (AMBIEN CR) tablet 12.5 mg  12.5 mg Oral QHS    SITagliptin (JANUVIA) tablet 100 mg  100 mg Oral DAILY    atorvastatin (LIPITOR) tablet 20 mg  20 mg Oral DAILY    amLODIPine (NORVASC) tablet 10 mg  10 mg Oral DAILY          ASSESSMENT & PLAN     DIAGNOSES REQUIRING ACTIVE TREATMENT AND MONITORING: (reviewed/updated 8/6/2017)  Patient Active Hospital Problem List:     Schizoaffective disorder ([facility] (5/18/2017)    Assessment: resolved psychosis    Plan:  Continue Prolixin decanoate every two weeks  Continue Depakote, monitor levels  Continue Tegretol, monitor levels  Await placement       EPS  Assessment: secondary to prolixin (now dec only)  Plan: change cogentin to artane    8/5/17 Continue same medications   8/6/17 continue same medications    I will continue to monitor blood levels (Depakote---a drug with a narrow therapeutic index= NTI) and associated labs for drug therapy implemented that require intense monitoring for toxicity as deemed appropriate based on current medication side effects and pharmacodynamically determined drug 1/2 lives. In summary, Pérez Brito, is a 65 y.o.  female who presents with a severe exacerbation of the principal diagnosis of Schizoaffective disorder ([facility]  Patient's condition is improving. Patient requires continued inpatient hospitalization for further stabilization, safety monitoring and medication management. I will continue to coordinate the provision of individual, milieu, occupational, group, and substance abuse therapies to address target symptoms/diagnoses as deemed appropriate for the individual patient.   A coordinated, multidisplinary treatment team round was conducted with the patient (this team consists of the nurse, psychiatric unit pharmcist,  and writer). Complete current electronic health record for patient has been reviewed today including consultant notes, ancillary staff notes, nurses and psychiatric tech notes. Suicide risk assessment completed and patient deemed to be of low risk for suicide at this time. The following regarding medications was addressed during rounds with patient:   the risks and benefits of the proposed medication. The patient was given the opportunity to ask questions. Informed consent given to the use of the above medications. Will continue to adjust psychiatric and non-psychiatric medications (see above \"medication\" section and orders section for details) as deemed appropriate & based upon diagnoses and response to treatment. I will continue to order blood tests/labs and diagnostic tests as deemed appropriate and review results as they become available (see orders for details and above listed lab/test results). I will order psychiatric records from previous [facility] hospitals to further elucidate the nature of patient's psychopathology and review once available. I will gather additional collateral information from friends, family and o/p treatment team to further elucidate the nature of patient's psychopathology and baselline level of psychiatric functioning. I certify that this patient's inpatient psychiatric hospital services furnished since the previous certification were, and continue to be, required for treatment that could reasonably be expected to improve the patient's condition, or for diagnostic study, and that the patient continues to need, on a daily basis, active treatment furnished directly by or requiring the supervision of inpatient psychiatric facility personnel.  In addition, the hospital records show that services furnished were intensive treatment services, admission or related services, or equivalent services.     EXPECTED DISCHARGE DATE/DAY: TBD     DISPOSITION: Home       Signed By:   Mateo Mustafa MD  8/6/2017

## 2017-08-07 LAB
GLUCOSE BLD STRIP.AUTO-MCNC: 111 MG/DL (ref 65–100)
GLUCOSE BLD STRIP.AUTO-MCNC: 133 MG/DL (ref 65–100)
SERVICE CMNT-IMP: ABNORMAL
SERVICE CMNT-IMP: ABNORMAL

## 2017-08-07 PROCEDURE — 74011250637 HC RX REV CODE- 250/637: Performed by: PSYCHIATRY & NEUROLOGY

## 2017-08-07 PROCEDURE — 74011250637 HC RX REV CODE- 250/637: Performed by: HOSPITALIST

## 2017-08-07 PROCEDURE — 82962 GLUCOSE BLOOD TEST: CPT

## 2017-08-07 PROCEDURE — 65220000003 HC RM SEMIPRIVATE PSYCH

## 2017-08-07 PROCEDURE — 74011250637 HC RX REV CODE- 250/637: Performed by: NURSE PRACTITIONER

## 2017-08-07 RX ADMIN — ATORVASTATIN CALCIUM 20 MG: 20 TABLET, FILM COATED ORAL at 10:43

## 2017-08-07 RX ADMIN — PANTOPRAZOLE SODIUM 40 MG: 40 TABLET, DELAYED RELEASE ORAL at 08:48

## 2017-08-07 RX ADMIN — LISINOPRIL 10 MG: 10 TABLET ORAL at 10:42

## 2017-08-07 RX ADMIN — CARBAMAZEPINE 200 MG: 200 TABLET, EXTENDED RELEASE ORAL at 21:15

## 2017-08-07 RX ADMIN — NAPROXEN 250 MG: 250 TABLET ORAL at 10:45

## 2017-08-07 RX ADMIN — DIVALPROEX SODIUM 1000 MG: 500 TABLET, FILM COATED, EXTENDED RELEASE ORAL at 21:17

## 2017-08-07 RX ADMIN — NAPROXEN 250 MG: 250 TABLET ORAL at 16:40

## 2017-08-07 RX ADMIN — ACETAMINOPHEN 650 MG: 325 TABLET, FILM COATED ORAL at 12:58

## 2017-08-07 RX ADMIN — LORAZEPAM 1 MG: 1 TABLET ORAL at 03:36

## 2017-08-07 RX ADMIN — ZOLPIDEM TARTRATE 12.5 MG: 6.25 TABLET, EXTENDED RELEASE ORAL at 21:18

## 2017-08-07 RX ADMIN — OXYBUTYNIN CHLORIDE 15 MG: 5 TABLET, EXTENDED RELEASE ORAL at 10:42

## 2017-08-07 RX ADMIN — AMLODIPINE BESYLATE 10 MG: 5 TABLET ORAL at 10:42

## 2017-08-07 RX ADMIN — CARBAMAZEPINE 200 MG: 200 TABLET, EXTENDED RELEASE ORAL at 10:46

## 2017-08-07 RX ADMIN — TRIHEXYPHENIDYL HYDROCHLORIDE 2 MG: 2 TABLET ORAL at 10:39

## 2017-08-07 RX ADMIN — TRIHEXYPHENIDYL HYDROCHLORIDE 2 MG: 2 TABLET ORAL at 16:40

## 2017-08-07 RX ADMIN — SITAGLIPTIN 100 MG: 100 TABLET, FILM COATED ORAL at 10:39

## 2017-08-07 RX ADMIN — THERA TABS 1 TABLET: TAB at 10:38

## 2017-08-07 RX ADMIN — TRIHEXYPHENIDYL HYDROCHLORIDE 2 MG: 2 TABLET ORAL at 21:17

## 2017-08-07 NOTE — PROGRESS NOTES
Problem: Falls - Risk of  Goal: *Absence of falls  Outcome: Progressing Towards Goal  Pt is free from falls. Pt ambulates independently with use of a walker. Problem: Altered Thought Process (Adult/Pediatric)  Goal: *STG: Demonstrates ability to understand and use improved judgment in daily activities and relationships  Outcome: Progressing Towards Goal  Pt participates in unit activities and socializes with peers. Affect is bright.

## 2017-08-07 NOTE — INTERDISCIPLINARY ROUNDS
Behavioral Health Interdisciplinary Rounds     Patient Name: Yovany Vásquez  Age: 65 y.o. Room/Bed:  733/  Primary Diagnosis: Schizoaffective disorder (HCC)   Admission Status: Involuntary Commitment     Readmission within 30 days: no  Power of  in place: no  Patient requires a blocked bed: yes         Reason for blocked bed: bevioral    VTE Prophylaxis: Not indicated  Mobility needs/Fall risk: no    Nutritional Plan: no  Consults: no         Labs/Testing due today?: no    Sleep hours: 6.5       Participation in Care/Groups:  yes  Medication Compliant?: Yes  PRNS (last 24 hours): Antianxiety and Sleep Aid    Restraints (last 24 hours):  no  Substance Abuse:  no  CIWA (range last 24 hours):  COWS (range last 24 hours):   Alcohol screening (AUDIT) completed -     If applicable, date SBIRT discussed in treatment team AND documented: N/A  Tobacco - patient is a smoker: no   Date tobacco education completed by RN: N/A  24 hour chart check complete: yes     Patient goal(s) for today: Attend some groups  Treatment team focus/goals: Placement  LOS:  81  Expected LOS: TBD  Psychiatric [address] -  Yes  Name of Decision maker if patient has Psychiatric Care Directive: John Butterfieldks (daughter/POA)  Patient was offered information, patient accepted.   Financial concerns/prescription coverage: [employer] Medicaid  Date of last family contact:      Family requesting physician contact today: No  Discharge plan: Placement      Outpatient provider(s): TBD    Participating treatment team members: PEGGY Dave; Dr. Sarah Oro MD; Robert Bloom RN

## 2017-08-07 NOTE — PROGRESS NOTES
Problem: Altered Thought Process (Adult/Pediatric)  Goal: *STG: Participates in treatment plan  Outcome: Progressing Towards Goal  Mood is \"down today\" per pt. Patient affect is smiling. Patient in bed more today. Did eat breakfast. Patient stating, \"I just hope they can find me a place\". Patient goal: \"to rest and for my knee to feel better\". Staff goal: to encourage patient to come out of room, and go to groups, and give positive reinforcement for going to groups. Treatment team at [phone]: With Dr. Karyna Contreras:     Discussed POC, mood and clients left knee pain. Goal: *STG: Complies with medication therapy  Outcome: Progressing Towards Goal  Compliant. Goal: Interventions  Outcome: Progressing Towards Goal  Medication management. Group therapy. Q 15 min safety rounds.

## 2017-08-07 NOTE — BH NOTES
PRN Medication Documentation    Specific patient behavior that led to need for PRN medication: Awoke during the night and complained of not being able to sleep as she felt \"nervoous\" and asked for Ativan to help her relax. Staff interventions attempted prior to PRN being given: Assessed  what was causing her to be nervous but she could not talk about reason. PRN medication given: Ativan 1 mg p.o. For anxiety.   Patient response/effectiveness of PRN medication:

## 2017-08-07 NOTE — BH NOTES
GROUP THERAPY PROGRESS NOTE    Angelina Carrera did not participate in a Morning Process Group on the General Unit with a focus on identifying feelings, planning for the day, and learning more about DBT concepts of \"Mindfulness. \"

## 2017-08-07 NOTE — BH NOTES
GROUP THERAPY PROGRESS NOTE    Merced Minaya is participating in Reflections. Group time: 15 minutes    Personal goal for participation:    Goal orientation: community    Group therapy participation: active    Therapeutic interventions reviewed and discussed:     Impression of participation: Patient was pleasant,cooperative,quiet at times,enjoyied watching movies with peers.

## 2017-08-07 NOTE — BH NOTES
PRN Medication Documentation    Specific patient behavior that led to need for PRN medication: c/o head and leg pain  Staff interventions attempted prior to PRN being given: none  PRN medication given: tylenol 650 PO  Patient response/effectiveness of PRN medication: Pt reports moderate relief, declined additional intervention from staff.   Pt playing piano and socializing

## 2017-08-07 NOTE — BH NOTES
In bed asleep at start of shift with respirations even and unlabored. Will continue to monitor throughout shift.

## 2017-08-08 LAB
GLUCOSE BLD STRIP.AUTO-MCNC: 110 MG/DL (ref 65–100)
GLUCOSE BLD STRIP.AUTO-MCNC: 119 MG/DL (ref 65–100)
SERVICE CMNT-IMP: ABNORMAL
SERVICE CMNT-IMP: ABNORMAL

## 2017-08-08 PROCEDURE — 74011250637 HC RX REV CODE- 250/637: Performed by: PSYCHIATRY & NEUROLOGY

## 2017-08-08 PROCEDURE — 82962 GLUCOSE BLOOD TEST: CPT

## 2017-08-08 PROCEDURE — 74011250637 HC RX REV CODE- 250/637: Performed by: NURSE PRACTITIONER

## 2017-08-08 PROCEDURE — 65220000003 HC RM SEMIPRIVATE PSYCH

## 2017-08-08 PROCEDURE — 74011250637 HC RX REV CODE- 250/637: Performed by: HOSPITALIST

## 2017-08-08 RX ADMIN — DIVALPROEX SODIUM 1000 MG: 500 TABLET, FILM COATED, EXTENDED RELEASE ORAL at 21:16

## 2017-08-08 RX ADMIN — AMLODIPINE BESYLATE 10 MG: 5 TABLET ORAL at 09:10

## 2017-08-08 RX ADMIN — CARBAMAZEPINE 200 MG: 200 TABLET, EXTENDED RELEASE ORAL at 09:12

## 2017-08-08 RX ADMIN — OXYBUTYNIN CHLORIDE 15 MG: 5 TABLET, EXTENDED RELEASE ORAL at 09:10

## 2017-08-08 RX ADMIN — ATORVASTATIN CALCIUM 20 MG: 20 TABLET, FILM COATED ORAL at 09:10

## 2017-08-08 RX ADMIN — PANTOPRAZOLE SODIUM 40 MG: 40 TABLET, DELAYED RELEASE ORAL at 06:54

## 2017-08-08 RX ADMIN — NAPROXEN 250 MG: 250 TABLET ORAL at 09:10

## 2017-08-08 RX ADMIN — NAPROXEN 250 MG: 250 TABLET ORAL at 17:11

## 2017-08-08 RX ADMIN — LISINOPRIL 10 MG: 10 TABLET ORAL at 09:10

## 2017-08-08 RX ADMIN — ACETAMINOPHEN 650 MG: 325 TABLET, FILM COATED ORAL at 11:35

## 2017-08-08 RX ADMIN — THERA TABS 1 TABLET: TAB at 09:10

## 2017-08-08 RX ADMIN — TRIHEXYPHENIDYL HYDROCHLORIDE 2 MG: 2 TABLET ORAL at 17:11

## 2017-08-08 RX ADMIN — MENTHOL, METHYL SALICYLATE: 10; 15 CREAM TOPICAL at 21:19

## 2017-08-08 RX ADMIN — TRIHEXYPHENIDYL HYDROCHLORIDE 2 MG: 2 TABLET ORAL at 21:16

## 2017-08-08 RX ADMIN — TRIHEXYPHENIDYL HYDROCHLORIDE 2 MG: 2 TABLET ORAL at 09:10

## 2017-08-08 RX ADMIN — SITAGLIPTIN 100 MG: 100 TABLET, FILM COATED ORAL at 09:10

## 2017-08-08 RX ADMIN — ZOLPIDEM TARTRATE 12.5 MG: 6.25 TABLET, EXTENDED RELEASE ORAL at 21:16

## 2017-08-08 RX ADMIN — MENTHOL, METHYL SALICYLATE: 10; 15 CREAM TOPICAL at 12:54

## 2017-08-08 RX ADMIN — CARBAMAZEPINE 200 MG: 200 TABLET, EXTENDED RELEASE ORAL at 17:12

## 2017-08-08 NOTE — BH NOTES
GROUP THERAPY PROGRESS NOTE    Rubi Santiago did not participate in a morning Process Group on the General Unit with a focus identifying feelings, planning for the day, and learning more about DBT concepts on \"Emotion Regulation. \"
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
## 2017-08-08 NOTE — PROGRESS NOTES
Problem: Falls - Risk of  Goal: *Absence of falls  Outcome: Progressing Towards Goal  No falls or injury    Problem: Altered Thought Process (Adult/Pediatric)  Goal: *STG: Participates in treatment plan  Outcome: Progressing Towards Goal  Review meds, out on unit social w peers and staff. Mood and affect brighter when engaged.  Pt daily goal is to rest, check with tx team about placement  Goal: *STG: Seeks staff when feelings of anxiety and fear arise  Outcome: Progressing Towards Goal  Continues to share feelings of sadness over her difficulty finding placemnet  Goal: *STG: Complies with medication therapy  Outcome: Progressing Towards Goal  complaint  Goal: *STG: Attends activities and groups  Outcome: Progressing Towards Goal  engaged  Goal: *STG: Demonstrates ability to understand and use improved judgment in daily activities and relationships  Outcome: Progressing Towards Goal  Independent with ADL's, able to get her needs met, demonstrates appropriate behaviors and ability to follow staff direction and unit routine  Goal: Interventions  Outcome: Progressing Towards Goal  Staff focus is on coping skills and working on d/c planning

## 2017-08-08 NOTE — PROGRESS NOTES
Participated in [id number] group. Focus of today's group was empowerment to do good.     Jay Fonseca  [facility] Staff  ([facility] Patient Care Specialist)   Paging Service [phone]

## 2017-08-08 NOTE — BH NOTES
During initial rounds patient was in bed asleep with respirations even and unlabored as chest was rising and falling. Woke up later and requesting prn for anxiety. Will try to get her to settle and go back to sleep if possible.

## 2017-08-08 NOTE — INTERDISCIPLINARY ROUNDS
Behavioral Health Interdisciplinary Rounds     Patient Name: Basilio Kuhn  Age: 65 y.o. Room/Bed:  733/01  Primary Diagnosis: Schizoaffective disorder (HCC)   Admission Status: Involuntary Commitment     Readmission within 30 days: no  Power of  in place: no  Patient requires a blocked bed: yes          Reason for blocked bed: behavior    VTE Prophylaxis: Not indicated  Mobility needs/Fall risk: no    Nutritional Plan: no  Consults:          Labs/Testing due today?: no    Sleep hours:        Participation in Care/Groups:  yes  Medication Compliant?: Yes  PRNS (last 24 hours): None    Restraints (last 24 hours):  no  Substance Abuse:  no  CIWA (range last 24 hours):  COWS (range last 24 hours):   Alcohol screening (AUDIT) completed -     If applicable, date SBIRT discussed in treatment team AND documented: N/A  Tobacco - patient is a smoker: no   Date tobacco education completed by RN: N/A  24 hour chart check complete: yes     Patient goal(s) for today: Rest legs  Treatment team focus/goals: Follow-up on placement options  LOS:  82  Expected LOS: TBD  Psychiatric [address] -  Yes   Name of Decision maker if patient has Psychiatric Care Directive: Francine Fleming (daughter/POA)  Patient was offered information, patient accepted.    Financial concerns/prescription coverage: [employer] Medicaid  Date of last family contact:      Family requesting physician contact today: No  Discharge plan: Placement       Outpatient provider(s): [facility]    Participating treatment team members: Basilio Kuhn, PEGGY Gonzalez; Dr. Karyna Contreras MD; Randa Maria RN; Wendy Lopez, PharmD; Dr. Ilia Ramos resident

## 2017-08-08 NOTE — PROGRESS NOTES
Problem: Falls - Risk of  Goal: *Absence of falls  Outcome: Progressing Towards Goal  Gait ios staedy with use of walker. Pt has been ambulating up and down the hallway. Problem: Altered Thought Process (Adult/Pediatric)  Goal: *STG: Demonstrates ability to understand and use improved judgment in daily activities and relationships  Outcome: Progressing Towards Goal  Pt socializes with peers and offers encouragement and support to them. GROUP THERAPY PROGRESS NOTE    Rubi Santiago is participating in Goals Group.      Group time: 30 minutes    Personal goal for participation: Review daily goals    Goal orientation: community    Group therapy participation: active    Therapeutic interventions reviewed and discussed: Making daily achievable goals, to set self up for daily successes    Impression of participation: engaged

## 2017-08-08 NOTE — BH NOTES
PRN Medication Documentation  At 1135  Specific patient behavior that led to need for PRN medication: c/o HA 5/10  Staff interventions attempted prior to PRN being given: relaxation  PRN medication given: Tylenol 650 mg po prn   Patient response/effectiveness of PRN medication: At 1205 med effective pain 0/10

## 2017-08-09 LAB
GLUCOSE BLD STRIP.AUTO-MCNC: 109 MG/DL (ref 65–100)
GLUCOSE BLD STRIP.AUTO-MCNC: 119 MG/DL (ref 65–100)
SERVICE CMNT-IMP: ABNORMAL
SERVICE CMNT-IMP: ABNORMAL

## 2017-08-09 PROCEDURE — 74011250637 HC RX REV CODE- 250/637: Performed by: NURSE PRACTITIONER

## 2017-08-09 PROCEDURE — 82962 GLUCOSE BLOOD TEST: CPT

## 2017-08-09 PROCEDURE — 74011250637 HC RX REV CODE- 250/637: Performed by: PSYCHIATRY & NEUROLOGY

## 2017-08-09 PROCEDURE — 74011250637 HC RX REV CODE- 250/637: Performed by: HOSPITALIST

## 2017-08-09 PROCEDURE — 65220000003 HC RM SEMIPRIVATE PSYCH

## 2017-08-09 RX ORDER — NAPROXEN 250 MG/1
375 TABLET ORAL 2 TIMES DAILY WITH MEALS
Status: DISCONTINUED | OUTPATIENT
Start: 2017-08-09 | End: 2017-08-15

## 2017-08-09 RX ADMIN — THERA TABS 1 TABLET: TAB at 08:56

## 2017-08-09 RX ADMIN — LISINOPRIL 10 MG: 10 TABLET ORAL at 08:56

## 2017-08-09 RX ADMIN — DIVALPROEX SODIUM 1000 MG: 500 TABLET, FILM COATED, EXTENDED RELEASE ORAL at 21:24

## 2017-08-09 RX ADMIN — TRIHEXYPHENIDYL HYDROCHLORIDE 2 MG: 2 TABLET ORAL at 21:24

## 2017-08-09 RX ADMIN — ACETAMINOPHEN 650 MG: 325 TABLET, FILM COATED ORAL at 12:02

## 2017-08-09 RX ADMIN — ZOLPIDEM TARTRATE 12.5 MG: 6.25 TABLET, EXTENDED RELEASE ORAL at 21:24

## 2017-08-09 RX ADMIN — NAPROXEN 250 MG: 250 TABLET ORAL at 08:55

## 2017-08-09 RX ADMIN — CARBAMAZEPINE 200 MG: 200 TABLET, EXTENDED RELEASE ORAL at 17:45

## 2017-08-09 RX ADMIN — PANTOPRAZOLE SODIUM 40 MG: 40 TABLET, DELAYED RELEASE ORAL at 06:37

## 2017-08-09 RX ADMIN — MENTHOL, METHYL SALICYLATE: 10; 15 CREAM TOPICAL at 08:53

## 2017-08-09 RX ADMIN — ATORVASTATIN CALCIUM 20 MG: 20 TABLET, FILM COATED ORAL at 08:56

## 2017-08-09 RX ADMIN — MENTHOL, METHYL SALICYLATE: 10; 15 CREAM TOPICAL at 21:23

## 2017-08-09 RX ADMIN — MENTHOL, METHYL SALICYLATE: 10; 15 CREAM TOPICAL at 16:16

## 2017-08-09 RX ADMIN — AMLODIPINE BESYLATE 10 MG: 5 TABLET ORAL at 08:55

## 2017-08-09 RX ADMIN — ACETAMINOPHEN 650 MG: 325 TABLET, FILM COATED ORAL at 21:25

## 2017-08-09 RX ADMIN — SITAGLIPTIN 100 MG: 100 TABLET, FILM COATED ORAL at 08:55

## 2017-08-09 RX ADMIN — TRIHEXYPHENIDYL HYDROCHLORIDE 2 MG: 2 TABLET ORAL at 16:17

## 2017-08-09 RX ADMIN — CARBAMAZEPINE 200 MG: 200 TABLET, EXTENDED RELEASE ORAL at 08:55

## 2017-08-09 RX ADMIN — LORAZEPAM 1 MG: 1 TABLET ORAL at 02:52

## 2017-08-09 RX ADMIN — TRIHEXYPHENIDYL HYDROCHLORIDE 2 MG: 2 TABLET ORAL at 08:56

## 2017-08-09 RX ADMIN — OXYBUTYNIN CHLORIDE 15 MG: 5 TABLET, EXTENDED RELEASE ORAL at 08:55

## 2017-08-09 RX ADMIN — NAPROXEN 375 MG: 250 TABLET ORAL at 16:17
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
## 2017-08-09 NOTE — BH NOTES
PRN Medication Documentation    Specific patient behavior that led to need for PRN medication: pt c/o increased anxiety  Staff interventions attempted prior to PRN being given: coping skills encouraged  PRN medication given: ativan 1mg po  Patient response/effectiveness of PRN medication: continue to monitor.

## 2017-08-09 NOTE — PROGRESS NOTES
Problem: Falls - Risk of  Goal: *Absence of falls  Outcome: Progressing Towards Goal  Pt remains safe in hospital on q 15 min safety checks. Bed alarm on call bell with in reach. Problem: Diabetes Self-Management  Goal: *Disease process and treatment process  Define diabetes and identify own type of diabetes; list 3 options for treating diabetes. Outcome: Progressing Towards Goal  Pt verbalize understanding. Goal: *Monitoring blood glucose, interpreting and using results  Identify recommended blood glucose targets and personal targets. Outcome: Progressing Towards Goal  Education provided. AM . Pt eating regular meals. Demonstrating understanding of regulating appropriate BS.   1204- PRN Medication Documentation    Specific patient behavior that led to need for PRN medication: pt c/o 9/10 bilateral knee pain chronic  Staff interventions attempted prior to PRN being given: exercise, scheduled medications .   PRN medication given: po 650 mg Tylenol   Patient response/effectiveness of PRN medication: pt alert oriented with staff

## 2017-08-09 NOTE — INTERDISCIPLINARY ROUNDS
Behavioral Health Interdisciplinary Rounds     Patient Name: Merced Minaya  Age: 65 y.o. Room/Bed:  733/01  Primary Diagnosis: Schizoaffective disorder (HCC)   Admission Status: Involuntary Commitment     Readmission within 30 days: no  Power of  in place: no  Patient requires a blocked bed: no         Reason for blocked bed: n/a    VTE Prophylaxis: Not indicated  Mobility needs/Fall risk: yes    Nutritional Plan: no  Consults: no         Labs/Testing due today?: no    Sleep hours: 6       Participation in Care/Groups:  yes  Medication Compliant?: Yes  PRNS (last 24 hours): Pain    Restraints (last 24 hours):  no  Substance Abuse:  no  CIWA (range last 24 hours):  COWS (range last 24 hours):   Alcohol screening (AUDIT) completed -     If applicable, date SBIRT discussed in treatment team AND documented: n/a  Tobacco - patient is a smoker: yes   Date tobacco education completed by RN: TO BE COMPLETED  24 hour chart check complete: yes     Patient goal(s) for today: Relief from arthritis pain; attend groups  Treatment team focus/goals: Follow-up on placement  LOS:  83  Expected LOS: TBD  Psychiatric [address] -  Yes  Name of Decision maker if patient has Psychiatric Care Directive: Dustin Rodriguez (daughter/POA)   Patient was offered information, patient accepted. Financial concerns/prescription coverage: [employer] Medicaid  Date of last family contact: 8/8 Pt spoke to daughter on the phone    Family requesting physician contact today: No  Discharge plan: Placement       Outpatient provider(s): TBD    Participating treatment team members: Merced Minaya, PEGGY Sanchez; Dr. Demarcus Hurtado MD; Katie Stringer, ESTER; Leanne Byrne, Camelia; Dr. Stefania Brower, Family medicine resident;  Pharmacy resident

## 2017-08-09 NOTE — BH NOTES
GROUP THERAPY PROGRESS NOTE    Jeb Spurling is participating in activity group where we played games. Group time: 30 minutes    Goal orientation: stress relief    Group therapy participation: full participation.      Impression of participation: active

## 2017-08-10 LAB
GLUCOSE BLD STRIP.AUTO-MCNC: 143 MG/DL (ref 65–100)
GLUCOSE BLD STRIP.AUTO-MCNC: 95 MG/DL (ref 65–100)
SERVICE CMNT-IMP: ABNORMAL
SERVICE CMNT-IMP: NORMAL

## 2017-08-10 PROCEDURE — 74011250637 HC RX REV CODE- 250/637: Performed by: PSYCHIATRY & NEUROLOGY

## 2017-08-10 PROCEDURE — 65220000003 HC RM SEMIPRIVATE PSYCH

## 2017-08-10 PROCEDURE — 74011250637 HC RX REV CODE- 250/637: Performed by: NURSE PRACTITIONER

## 2017-08-10 PROCEDURE — 74011250637 HC RX REV CODE- 250/637: Performed by: HOSPITALIST

## 2017-08-10 PROCEDURE — 82962 GLUCOSE BLOOD TEST: CPT

## 2017-08-10 RX ADMIN — LORAZEPAM 1 MG: 1 TABLET ORAL at 02:06

## 2017-08-10 RX ADMIN — TRIHEXYPHENIDYL HYDROCHLORIDE 2 MG: 2 TABLET ORAL at 09:41

## 2017-08-10 RX ADMIN — MENTHOL, METHYL SALICYLATE: 10; 15 CREAM TOPICAL at 22:00

## 2017-08-10 RX ADMIN — DIVALPROEX SODIUM 1000 MG: 500 TABLET, FILM COATED, EXTENDED RELEASE ORAL at 21:42

## 2017-08-10 RX ADMIN — CARBAMAZEPINE 200 MG: 200 TABLET, EXTENDED RELEASE ORAL at 17:08

## 2017-08-10 RX ADMIN — ATORVASTATIN CALCIUM 20 MG: 20 TABLET, FILM COATED ORAL at 09:43

## 2017-08-10 RX ADMIN — THERA TABS 1 TABLET: TAB at 09:42

## 2017-08-10 RX ADMIN — LISINOPRIL 10 MG: 10 TABLET ORAL at 09:43

## 2017-08-10 RX ADMIN — MENTHOL, METHYL SALICYLATE: 10; 15 CREAM TOPICAL at 09:55

## 2017-08-10 RX ADMIN — LOPERAMIDE HYDROCHLORIDE 2 MG: 2 CAPSULE ORAL at 12:08

## 2017-08-10 RX ADMIN — SITAGLIPTIN 100 MG: 100 TABLET, FILM COATED ORAL at 09:42

## 2017-08-10 RX ADMIN — PANTOPRAZOLE SODIUM 40 MG: 40 TABLET, DELAYED RELEASE ORAL at 06:33

## 2017-08-10 RX ADMIN — OXYBUTYNIN CHLORIDE 15 MG: 5 TABLET, EXTENDED RELEASE ORAL at 09:43

## 2017-08-10 RX ADMIN — LOPERAMIDE HYDROCHLORIDE 2 MG: 2 CAPSULE ORAL at 16:18

## 2017-08-10 RX ADMIN — TRIHEXYPHENIDYL HYDROCHLORIDE 2 MG: 2 TABLET ORAL at 16:17

## 2017-08-10 RX ADMIN — TRIHEXYPHENIDYL HYDROCHLORIDE 2 MG: 2 TABLET ORAL at 21:42

## 2017-08-10 RX ADMIN — ACETAMINOPHEN 650 MG: 325 TABLET, FILM COATED ORAL at 12:10

## 2017-08-10 RX ADMIN — CARBAMAZEPINE 200 MG: 200 TABLET, EXTENDED RELEASE ORAL at 09:55

## 2017-08-10 RX ADMIN — NAPROXEN 375 MG: 250 TABLET ORAL at 16:17

## 2017-08-10 RX ADMIN — ZOLPIDEM TARTRATE 12.5 MG: 6.25 TABLET, EXTENDED RELEASE ORAL at 21:42

## 2017-08-10 RX ADMIN — AMLODIPINE BESYLATE 10 MG: 5 TABLET ORAL at 09:42

## 2017-08-10 RX ADMIN — NAPROXEN 375 MG: 250 TABLET ORAL at 09:42

## 2017-08-10 RX ADMIN — MENTHOL, METHYL SALICYLATE: 10; 15 CREAM TOPICAL at 16:20

## 2017-08-10 NOTE — BH NOTES
Pt received resting comfortably in bed. Respirations even and unlabored. NAD. Continue to monitor via 15 minute observation. 24 hour chart review completed. 0206- PRN Medication Documentation    Specific patient behavior that led to need for PRN medication: c/o anxiety  Staff interventions attempted prior to PRN being given: coping skills encouraged  PRN medication given: ativan 1mg po  Patient response/effectiveness of PRN medication: continue to monitor.
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
## 2017-08-10 NOTE — BH NOTES
PRN Medication Documentation    Specific patient behavior that led to need for PRN medication: Patient complained of knee pain and excessive diarrhea. Staff interventions attempted prior to PRN being given: Offered PRN medication. PRN medication given: Imodium 2 mg PO and Tylenol 650 mg PO  Patient response/effectiveness of PRN medication: 1310 - Patient rated pain as 2 (0-10) scale.

## 2017-08-10 NOTE — PROGRESS NOTES
Problem: Diabetes Self-Management  Goal: *Developing strategies to promote health/change behavior  Outcome: Progressing Towards Goal  Pt was open and honest that prior to POC testing she ate a small snack, pt reports she knows that this has effect on her blood sugar.  Pt declined insulin

## 2017-08-10 NOTE — PROGRESS NOTES
[facility]  Master Treatment Plan for Destiney Found    Date Treatment Plan Initiated: 8/10/17    Treatment Plan Modalities:  Type of Modality Amount  (x minutes) Frequency (x/week) Duration (x days) Name of Responsible Staff   [address] [facility] meetings to encourage peer interactions 15 [phone] [facility] psychotherapy to assist in building coping skills and internal controls 60 7 1 Samuel Carter   Therapeutic activity groups to build coping skills 60 7 1 Samuel Carter   Psychoeducation in group setting to address:   Medication education   [facility] skills         Relaxation techniques         Symptom management         Discharge planning   [address]   60 1 1 volunteer   Recovery/AA/NA         Physician medication management   15 7 1 Dr. Oralia Dudley   Family meeting/discharge planning                                                Problem: Altered Thought Process (Adult/Pediatric) these goals will be met by 8/18/17  Goal: *STG: Participates in treatment plan  Outcome: Progressing Towards Goal  Review meds, out on unit social and engaged. Mood and affect euthymic. Pt daily goal is to rest and talk to family  Goal: *STG: Remains safe in hospital  Outcome: Progressing Towards Goal  No harm or injury  Goal: *STG: Seeks staff when feelings of anxiety and fear arise  Outcome: Progressing Towards Goal  Denies fear or anxiety describes slight disapointment with lack of housing  Goal: *STG: Complies with medication therapy  Outcome: Progressing Towards Goal  compliant  Goal: *STG: Attends activities and groups  Outcome: Progressing Towards Goal  engaged  Goal: *STG: Demonstrates ability to understand and use improved judgment in daily activities and relationships  Outcome: Progressing Towards Goal  Demonstrates ability to follow direction and unit routine, displays appropriate behaviors.    Goal: Interventions  Outcome: Progressing Towards Goal  Continue to focus on d/c planning    Problem: Diabetes Self-Management these goals will be met by 8/18/17  Goal: *Monitoring blood glucose, interpreting and using results  Identify recommended blood glucose targets and personal targets.    Outcome: Progressing Towards Goal  Compliant w POC testing
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
## 2017-08-10 NOTE — BH NOTES
GROUP THERAPY PROGRESS NOTE    Paola Betancur did not participate in a Process Group on the General Unit with a focus identifying feelings, planning for the day, and reviewing DBT coping skills related to distress management.

## 2017-08-10 NOTE — INTERDISCIPLINARY ROUNDS
Behavioral Health Interdisciplinary Rounds     Patient Name: Darcy Anderson  Age: 65 y.o.   Room/Bed:  733/  Primary Diagnosis: Schizoaffective disorder (HCC)   Admission Status: Involuntary Commitment     Readmission within 30 days: no  Power of  in place: no  Patient requires a blocked bed: no          Reason for blocked bed: n/a    VTE Prophylaxis: Not indicated  Mobility needs/Fall risk: yes    Nutritional Plan: no  Consults: no         Labs/Testing due today?: no    Sleep hours: 6:15       Participation in Care/Groups:  yes  Medication Compliant?: Yes  PRNS (last 24 hours): Pain    Restraints (last 24 hours):  no  Substance Abuse:  no  CIWA (range last 24 hours):  COWS (range last 24 hours):   Alcohol screening (AUDIT) completed -     If applicable, date SBIRT discussed in treatment team AND documented: n/a  Tobacco - patient is a smoker: yes   Date tobacco education completed by RN: TO BE COMPLETED  24 hour chart check complete: yes     Patient goal(s) for today:   Treatment team focus/goals:   LOS:  84  Expected LOS:   [address] -     Name of Decision maker if patient has Psychiatric Care Directive   Patient was offered information   Financial concerns/prescription coverage:    Date of last family contact:       Family requesting physician contact today:    Discharge plan:        Outpatient provider(s):     Participating treatment team members: Darcy Anderson, * (assigned SW),

## 2017-08-10 NOTE — PROGRESS NOTES
Problem: Altered Thought Process (Adult/Pediatric)  Goal: *STG: Participates in treatment plan  Outcome: Progressing Towards Goal  Pt participates in all unit activities. Affect is bright.

## 2017-08-10 NOTE — BH NOTES
GROUP THERAPY PROGRESS NOTE    Reese Weinberg is participating in D/C Planning Group: Building a Recovery and Support System    Group time: 1.5 hour    Personal goal for participation: Readiness for discharge     Goal orientation: personal    Group therapy participation: minimal    Therapeutic interventions reviewed and discussed:     Impression of participation: pt as always very supportive of the group with her testimonies of wisdom and her willingness to share her feelings and thoughts.

## 2017-08-11 ENCOUNTER — APPOINTMENT (OUTPATIENT)
Dept: GENERAL RADIOLOGY | Age: 62
DRG: 750 | End: 2017-08-11
Attending: ORTHOPAEDIC SURGERY
Payer: MEDICAID

## 2017-08-11 LAB
GLUCOSE BLD STRIP.AUTO-MCNC: 110 MG/DL (ref 65–100)
GLUCOSE BLD STRIP.AUTO-MCNC: 93 MG/DL (ref 65–100)
SERVICE CMNT-IMP: ABNORMAL
SERVICE CMNT-IMP: NORMAL

## 2017-08-11 PROCEDURE — 73562 X-RAY EXAM OF KNEE 3: CPT

## 2017-08-11 PROCEDURE — 74011250637 HC RX REV CODE- 250/637: Performed by: NURSE PRACTITIONER

## 2017-08-11 PROCEDURE — 82962 GLUCOSE BLOOD TEST: CPT

## 2017-08-11 PROCEDURE — 74011250637 HC RX REV CODE- 250/637: Performed by: PSYCHIATRY & NEUROLOGY

## 2017-08-11 PROCEDURE — 65220000003 HC RM SEMIPRIVATE PSYCH

## 2017-08-11 PROCEDURE — 74011250637 HC RX REV CODE- 250/637: Performed by: HOSPITALIST

## 2017-08-11 RX ADMIN — LISINOPRIL 10 MG: 10 TABLET ORAL at 09:24

## 2017-08-11 RX ADMIN — OXYBUTYNIN CHLORIDE 15 MG: 5 TABLET, EXTENDED RELEASE ORAL at 09:25

## 2017-08-11 RX ADMIN — ACETAMINOPHEN 650 MG: 325 TABLET, FILM COATED ORAL at 18:00

## 2017-08-11 RX ADMIN — DIVALPROEX SODIUM 1000 MG: 500 TABLET, FILM COATED, EXTENDED RELEASE ORAL at 21:39

## 2017-08-11 RX ADMIN — SITAGLIPTIN 100 MG: 100 TABLET, FILM COATED ORAL at 09:23

## 2017-08-11 RX ADMIN — THERA TABS 1 TABLET: TAB at 09:23

## 2017-08-11 RX ADMIN — AMLODIPINE BESYLATE 10 MG: 5 TABLET ORAL at 09:25

## 2017-08-11 RX ADMIN — TRIHEXYPHENIDYL HYDROCHLORIDE 2 MG: 2 TABLET ORAL at 09:23

## 2017-08-11 RX ADMIN — CARBAMAZEPINE 200 MG: 200 TABLET, EXTENDED RELEASE ORAL at 09:26

## 2017-08-11 RX ADMIN — NAPROXEN 375 MG: 250 TABLET ORAL at 16:00

## 2017-08-11 RX ADMIN — MENTHOL, METHYL SALICYLATE: 10; 15 CREAM TOPICAL at 22:00

## 2017-08-11 RX ADMIN — ZOLPIDEM TARTRATE 12.5 MG: 6.25 TABLET, EXTENDED RELEASE ORAL at 21:38

## 2017-08-11 RX ADMIN — CARBAMAZEPINE 200 MG: 200 TABLET, EXTENDED RELEASE ORAL at 18:00

## 2017-08-11 RX ADMIN — TRIHEXYPHENIDYL HYDROCHLORIDE 2 MG: 2 TABLET ORAL at 21:39

## 2017-08-11 RX ADMIN — PANTOPRAZOLE SODIUM 40 MG: 40 TABLET, DELAYED RELEASE ORAL at 06:31

## 2017-08-11 RX ADMIN — LORAZEPAM 1 MG: 1 TABLET ORAL at 00:01

## 2017-08-11 RX ADMIN — ATORVASTATIN CALCIUM 20 MG: 20 TABLET, FILM COATED ORAL at 09:24

## 2017-08-11 RX ADMIN — TRIHEXYPHENIDYL HYDROCHLORIDE 2 MG: 2 TABLET ORAL at 15:59

## 2017-08-11 RX ADMIN — NAPROXEN 375 MG: 250 TABLET ORAL at 09:24

## 2017-08-11 NOTE — PROGRESS NOTES
Problem: Altered Thought Process (Adult/Pediatric)  Goal: *STG: Participates in treatment plan  Outcome: Progressing Towards Goal  Review meds, out on unit social w peers and staff. Mood and affect smiling.  Pt daily goal is to meet group home respresentative  Goal: *STG: Remains safe in hospital  Outcome: Progressing Towards Goal  No injury  Goal: *STG: Seeks staff when feelings of anxiety and fear arise  Outcome: Progressing Towards Goal  Hopeful and positive outlook, anticipating meeting group home staff  Goal: *STG: Complies with medication therapy  Outcome: Progressing Towards Goal  complaint  Goal: *STG: Attends activities and groups  Outcome: Progressing Towards Goal  engaged  Goal: *STG: Demonstrates ability to understand and use improved judgment in daily activities and relationships  Outcome: Progressing Towards Goal  Demonstrates appropriate behaviors, ability to follow staff direction and independent in getting needs met and completing toileting and ADLs    1109: hospitalist paged

## 2017-08-11 NOTE — BH NOTES
GROUP THERAPY PROGRESS NOTE    Kelvin Lopez is participating in Leisure-Creative Group.      Group time: 15 minutes    Personal goal for participation: n/a    Goal orientation: relaxation    Group therapy participation: active    Therapeutic interventions reviewed and discussed: Review of unit guidelines and what to expect while hospitalized    Impression of participation: active

## 2017-08-11 NOTE — BH NOTES
PRN Medication Documentation    Specific patient behavior that led to need for PRN medication: c/o chronic knee pain 6/10  Staff interventions attempted prior to PRN being given: Ortho consult, encouraged rest  PRN medication given: 650 mg Tylenol PO  Patient response/effectiveness of PRN medication: \"It helped some. I'll get my Amaryllis Parrish at bedtime\". Pt declined further intervention.

## 2017-08-11 NOTE — BH NOTES
GROUP THERAPY PROGRESS NOTE    Kelvin Lopez is participating in D/C Planning Group: Wellness Recovery Action Plan    Group time: 1 hour    Personal goal for participation: Develop wellness plan    Goal orientation: personal    Group therapy participation: active    Therapeutic interventions reviewed and discussed: Yes    Impression of participation: Pt.'s participation was very profound and greatly contributed to the well being of the group. Pt shared that today she is meeting someone from a home and she is hoping the meeting goes well. Pt received strong support from her younger peers. Pt is familiar with WRAP from previous attendances and help new peers to see the benefits on developing d/c planning.

## 2017-08-11 NOTE — PROGRESS NOTES
Problem: Falls - Risk of  Goal: *Absence of falls  Outcome: Progressing Towards Goal  Pt has been visible in milieu. Pleasant, behavior and conversation appropriate. Has remained free of falls thus far this shift. Will continue to monitor.

## 2017-08-11 NOTE — INTERDISCIPLINARY ROUNDS
Behavioral Health Interdisciplinary Rounds     Patient Name: Nelli Ortez  Age: 65 y.o. Room/Bed:  733/  Primary Diagnosis: Schizoaffective disorder (HCC)   Admission Status: Voluntary     Readmission within 30 days: no  Power of  in place: no  Patient requires a blocked bed: no          Reason for blocked bed: N/A    VTE Prophylaxis: Not indicated  Mobility needs/Fall risk: yes    Nutritional Plan: Yes  Consults: no         Labs/Testing due today?: no    Sleep hours: 4:30       Participation in Care/Groups:  yes  Medication Compliant?: Yes  PRNS (last 24 hours): Antianxiety, Pain and Antidiarrheal    Restraints (last 24 hours):  no  Substance Abuse:  no  CIWA (range last 24 hours):  COWS (range last 24 hours):   Alcohol screening (AUDIT) completed -     If applicable, date SBIRT discussed in treatment team AND documented: n/a  Tobacco - patient is a smoker: yes   Date tobacco education completed by RN: TO BE COMPLETED  24 hour chart check complete: yes     Patient goal(s) for today: Visit [address] team focus/goals: Visit from Lucero Perez  LOS:  85  Expected LOS: TBD  Psychiatric [address] -  Yes  Name of Decision maker if patient has Psychiatric Care Directive: Marqueslowell Gray (daughter/POA)  Patient was offered information, patient accepted.    Financial concerns/prescription coverage: [employer] Medicaid  Date of last family contact:       Family requesting physician contact today: No  Discharge plan: Placement       Outpatient provider(s): TBD    Participating treatment team members: Nelli Ortez, Irvin Lee MSW; Dr. Jessica Sadler MD; Gabrielle Rapp, ESTER; María Polanco, UmeshD

## 2017-08-11 NOTE — BH NOTES
PSYCHIATRIC PROGRESS NOTE         Patient Name  Ge Dia   Date of Birth 1955   [facility] [id number]   Medical Record Number  [id number]      Age  65 y.o. PCP Celena Crawford MD   Admit date:  5/18/2017    Room Number  733/01  @ [facility]   Date of Service  8/11/2017          PSYCHOTHERAPY SESSION NOTE:  Length of psychotherapy session: 15 minutes    Main condition/diagnosis/issues treated during session today, 8/11/2017 : housing    I employed Cognitive Behavioral therapy techniques, Reality-Oriented psychotherapy, as well as supportive psychotherapy in regards to various ongoing psychosocial stressors, including the following: pre-admission and current problems; housing issues; stress of hospitalization. Interpersonal relationship issues and psychodynamic conflicts explored. Attempts made to alleviate maladaptive patterns. Overall, patient is progressing    Treatment Plan Update (reviewed an updated 8/11/2017) : I will modify psychotherapy tx plan by implementing more stress management strategies, building upon cognitive behavioral techniques, increasing coping skills, as well as shoring up psychological defenses). An extended energy and skill set was needed to engage pt in psychotherapy due to some of the following: resistiveness, complexity, negativity, confrontational nature, hostile behaviors, and/or severe abnormalities in thought processes/psychosis resulting in the loss of expressive/receptive language communication skills. E & M PROGRESS NOTE:         HISTORY       CC:  No complaints  HISTORY OF PRESENT ILLNESS/INTERVAL HISTORY:  (reviewed/updated 8/11/2017). per initial evaluation: The patient, Ge Dia, is a 65 y.o.  BLACK OR  female with a past psychiatric history significant for Schizoaffective disorder, long history of noncompliance who presents at this time with complaints of (and/or evidence of) the following emotional symptoms: agitation, delusions and psychotic behavior. Additional symptomatology include noncompliance with medications. The above symptoms have been present for several weeks. She lives with a caretaker who reports recent paranoia, agitation. These symptoms are of high severity. These symptoms are constant in nature. The patient's condition has been precipitated by noncompliance and psychosocial stressors . No illicit substance abuse. Ashley Call presents/reports/evidences the following emotional symptoms today, 8/11/2017: None. Ms. Benito Chau reports feeling okay. Mood stable, adequate sleep over night. No agitation. Compliant with medications. 8/4/17- Very stable. Waiting for placement. Sleep and appetite are good. 8/5/17 She is frustrated that she still her and no anger issues and no aggressive behavior  And she is  sleeping better. 8/6/17 She is doing better and no anger issues and no anger issues and no aggressive and no mood swings and no issues with sleep   8/7/17- mrs. Benito Chau continues to exhibit stable mood, behavior, thinking. She expresses some sadness due to prolonged hospital course. 8/8/17- Mrs. Benito Chau denies any complaints this morning. She was in a very bright and funny mood. Good hygiene, dressed appropriately. 8/9/17- Mrs. Benito Chau was very pleasant and engaging this morning. Sleeping well at night, compliant with medications. 8/10/17- Mrs. Benito hCau is looking forward to her interview tomorrow with a potential assisted living/ group home. Sleeping well at night. Eating her meals. Compliant with medications. Attending and participating in groups. 8/11/17- Patient slept well over night and she reports feeling well rested. She expressed appropriate level of anxiety about her interview for housing today. No psychosis or agitation. Tolerating medications. SIDE EFFECTS: (reviewed/updated 8/11/2017)  None reported or admitted to.   No noted toxicity with use of Depakote/ ALLERGIES:(reviewed/updated 8/11/2017)  Allergies   Allergen Reactions    Penicillins Rash      MEDICATIONS PRIOR TO ADMISSION:(reviewed/updated 8/11/2017)  Prescriptions Prior to Admission   Medication Sig    QUEtiapine (SEROQUEL) 25 mg tablet Take 25 mg by mouth daily.  acetaminophen (TYLENOL) 500 mg tablet Take 500 mg by mouth two (2) times a day.  cloNIDine HCl (CATAPRES) 0.2 mg tablet Take  by mouth three (3) times daily.  hydrOXYzine pamoate (VISTARIL) 50 mg capsule Take 50 mg by mouth four (4) times daily.  LORazepam (ATIVAN) 0.5 mg tablet Take 0.5 mg by mouth two (2) times a day.  divalproex DR (DEPAKOTE) 500 mg tablet Take 500 mg by mouth two (2) times a day.  escitalopram oxalate (LEXAPRO) 5 mg tablet Take 5 mg by mouth daily.  naproxen (NAPROSYN) 500 mg tablet Take 500 mg by mouth two (2) times daily (with meals).  gabapentin (NEURONTIN) 100 mg capsule Take 100 mg by mouth two (2) times a day.  loperamide (IMODIUM) 2 mg capsule Take 2 mg by mouth every four (4) hours as needed for Diarrhea. Indications: Diarrhea    amLODIPine (NORVASC) 10 mg tablet Take 1 Tab by mouth daily.  atorvastatin (LIPITOR) 20 mg tablet Take 1 Tab by mouth nightly.  carBAMazepine (TEGRETOL) 200 mg tablet Take 1 Tab by mouth three (3) times daily.  hydrochlorothiazide (HYDRODIURIL) 25 mg tablet Take 1 Tab by mouth daily.  sitaGLIPtin (JANUVIA) 100 mg tablet Take 1 Tab by mouth daily.  QUEtiapine (SEROQUEL) 100 mg tablet Take 100 mg by mouth every evening. PAST MEDICAL HISTORY: Past medical history from the initial psychiatric evaluation has been reviewed (reviewed/updated 8/11/2017) with no additional updates (I asked patient and no additional past medical history provided).    Past Medical History:   Diagnosis Date    Aggressive outburst     Arthritis     Bipolar 1 disorder ([facility] [address] 4-12-13    Diabetes mellitus ([facility] [address]     Homicide attempt     Hypertension     Murmur     Paranoid schizophrenia ([facility] [address]     Psychiatric disorder     Schizophrenia, paranoid type ([facility] [address] 3/20/2013     Past Surgical History:   Procedure Laterality Date    HX CHOLECYSTECTOMY      HX ORTHOPAEDIC      Excision Non-malignant bone cyst left femur      SOCIAL HISTORY: Social history from the initial psychiatric evaluation has been reviewed (reviewed/updated 8/11/2017) with no additional updates (I asked patient and no additional social history provided). Social History     Social History    Marital status:      Spouse name: N/A    Number of children: N/A    Years of education: N/A     Occupational History    Not on file. Social History Main Topics    Smoking status: Former Smoker     Years: 40.00     Quit date: 3/19/1983    Smokeless tobacco: Not on file    Alcohol use No    Drug use: No    Sexual activity: Yes     Partners: Male     Other Topics Concern    Not on file     Social History Narrative      Lives with daughter, son-in-law and 2 grandchildren. Not employed outside the home. FAMILY HISTORY: Family history from the initial psychiatric evaluation has been reviewed (reviewed/updated 8/11/2017) with no additional updates (I asked patient and no additional family history provided).    Family History   Problem Relation Age of Onset    Hypertension Mother     Diabetes Mother     Psychiatric Disorder Father     Heart Disease Mother     Heart Disease Brother     Diabetes Brother     Psychiatric Disorder Sister        REVIEW OF SYSTEMS: (reviewed/updated 8/11/2017)  Appetite:good   Sleep: decreased more than normal and poor with DIMS (difficulty initiating & maintaining sleep)   All other Review of Systems: Negative except severe psychosis and agitation         [facility] (MSE):    MSE FINDINGS ARE WITHIN NORMAL LIMITS (WNL) UNLESS OTHERWISE STATED BELOW. ( ALL OF THE BELOW CATEGORIES OF THE MSE HAVE BEEN REVIEWED (reviewed 8/11/2017) AND UPDATED AS DEEMED APPROPRIATE )  General Presentation wnl   Orientation Fully oriented and pleasant   Vital Signs  See below (reviewed 8/11/2017); Vital Signs (BP, Pulse, & Temp) are within normal limits if not listed below. Gait and Station Stable/steady, no ataxia   Musculoskeletal System No extrapyramidal symptoms (EPS); no abnormal muscular movements or Tardive Dyskinesia (TD); muscle strength and tone are within normal limits   Language No aphasia or dysarthria   Speech:  Normal volume, speed and content   Thought Processes (+)linear and logical   Thought Associations wnl   Thought Content Reality based   Suicidal Ideations none   Homicidal Ideations none   Mood:  Pleasant    Affect:  Appropriate    Memory recent  fair   Memory remote:  fair   Concentration/Attention:  distractable   Fund of Knowledge below avg.    Insight:  wnl   Reliability wnl   Judgment:  wnl          VITALS:     Patient Vitals for the past 24 hrs:   Temp Pulse Resp BP SpO2   08/11/17 0901 97.7 °F (36.5 °C) 65 18 132/89 -   08/10/17 2030 98.4 °F (36.9 °C) 70 16 161/90 -   08/10/17 1618 98 °F (36.7 °C) 69 16 (!) 152/92 99 %     Wt Readings from Last 3 Encounters:   08/06/17 78 kg (172 lb)   03/14/16 89 kg (196 lb 3.2 oz)   07/21/15 90.7 kg (200 lb)     Temp Readings from Last 3 Encounters:   08/11/17 97.7 °F (36.5 °C)   04/03/16 98.2 °F (36.8 °C)   03/14/16 99 °F (37.2 °C)     BP Readings from Last 3 Encounters:   08/11/17 132/89   04/03/16 (!) 167/93   03/14/16 (!) 188/99     Pulse Readings from Last 3 Encounters:   08/11/17 65   04/03/16 68   03/14/16 88            DATA     LABORATORY DATA:(reviewed/updated 8/11/2017)  Recent Results (from the past 24 hour(s))   GLUCOSE, POC    Collection Time: 08/10/17  4:13 PM   Result Value Ref Range    Glucose (POC) 143 (H) 65 - 100 mg/dL    Performed by Rhoda Dick    GLUCOSE, POC    Collection Time: 08/11/17  8:57 AM   Result Value Ref Range    Glucose (POC) 93 65 - 100 mg/dL Performed by Chanell Cunningham      Lab Results   Component Value Date/Time    Valproic acid 93 08/02/2017 05:23 AM    Carbamazepine 6.8 07/18/2017 05:27 AM     No results found for: LITHM   RADIOLOGY REPORTS:(reviewed/updated 8/11/2017)  No results found.        MEDICATIONS     ALL MEDICATIONS:   Current Facility-Administered Medications   Medication Dose Route Frequency    naproxen (NAPROSYN) tablet 375 mg  375 mg Oral BID WITH MEALS    methyl salicylate-menthol (BENGAY) 15-10 % cream   Topical TID    docusate sodium (COLACE) capsule 100 mg  100 mg Oral BID PRN    oxybutynin chloride XL (DITROPAN XL) tablet 15 mg  15 mg Oral DAILY    lidocaine (LIDODERM) 5 % patch 1 Patch  1 Patch TransDERmal Q24H    therapeutic multivitamin (THERAGRAN) tablet 1 Tab  1 Tab Oral DAILY    fluPHENAZine decanoate (PROLIXIN) 25 mg/mL injection 25 mg  25 mg IntraMUSCular EVERY 2 WEEKS    loperamide (IMODIUM) capsule 2 mg  2 mg Oral Q4H PRN    lisinopril (PRINIVIL, ZESTRIL) tablet 10 mg  10 mg Oral DAILY    polyethylene glycol (MIRALAX) packet 17 g  17 g Oral DAILY    trihexyphenidyl (ARTANE) tablet 2 mg  2 mg Oral TID    pantoprazole (PROTONIX) tablet 40 mg  40 mg Oral ACB    divalproex ER (DEPAKOTE ER) 24 hour tablet 1,000 mg  1,000 mg Oral QHS    alum-mag hydroxide-simeth (MYLANTA) oral suspension 30 mL  30 mL Oral Q4H PRN    insulin lispro (HUMALOG) injection   SubCUTAneous BID    carBAMazepine XR (TEGretol XR) tablet 200 mg  200 mg Oral BID    zolpidem CR (AMBIEN CR) tablet 12.5 mg  12.5 mg Oral QHS    OLANZapine (ZyPREXA) tablet 5 mg  5 mg Oral Q6H PRN    diphenhydrAMINE (BENADRYL) injection 50 mg  50 mg IntraMUSCular Q6H PRN    LORazepam (ATIVAN) injection 1 mg  1 mg IntraMUSCular Q4H PRN    LORazepam (ATIVAN) tablet 1 mg  1 mg Oral Q4H PRN    benztropine (COGENTIN) tablet 1 mg  1 mg Oral BID PRN    benztropine (COGENTIN) injection 1 mg  1 mg IntraMUSCular BID PRN    acetaminophen (TYLENOL) tablet 650 mg  650 mg Oral Q4H PRN    nicotine (NICODERM CQ) 21 mg/24 hr patch 1 Patch  1 Patch TransDERmal DAILY PRN    SITagliptin (JANUVIA) tablet 100 mg  100 mg Oral DAILY    atorvastatin (LIPITOR) tablet 20 mg  20 mg Oral DAILY    amLODIPine (NORVASC) tablet 10 mg  10 mg Oral DAILY    glucose chewable tablet 16 g  4 Tab Oral PRN    glucagon (GLUCAGEN) injection 1 mg  1 mg IntraMUSCular PRN    dextrose 10 % infusion 125-250 mL  125-250 mL IntraVENous PRN      SCHEDULED MEDICATIONS:   Current Facility-Administered Medications   Medication Dose Route Frequency    naproxen (NAPROSYN) tablet 375 mg  375 mg Oral BID WITH MEALS    methyl salicylate-menthol (BENGAY) 15-10 % cream   Topical TID    oxybutynin chloride XL (DITROPAN XL) tablet 15 mg  15 mg Oral DAILY    lidocaine (LIDODERM) 5 % patch 1 Patch  1 Patch TransDERmal Q24H    therapeutic multivitamin (THERAGRAN) tablet 1 Tab  1 Tab Oral DAILY    fluPHENAZine decanoate (PROLIXIN) 25 mg/mL injection 25 mg  25 mg IntraMUSCular EVERY 2 WEEKS    lisinopril (PRINIVIL, ZESTRIL) tablet 10 mg  10 mg Oral DAILY    polyethylene glycol (MIRALAX) packet 17 g  17 g Oral DAILY    trihexyphenidyl (ARTANE) tablet 2 mg  2 mg Oral TID    pantoprazole (PROTONIX) tablet 40 mg  40 mg Oral ACB    divalproex ER (DEPAKOTE ER) 24 hour tablet 1,000 mg  1,000 mg Oral QHS    insulin lispro (HUMALOG) injection   SubCUTAneous BID    carBAMazepine XR (TEGretol XR) tablet 200 mg  200 mg Oral BID    zolpidem CR (AMBIEN CR) tablet 12.5 mg  12.5 mg Oral QHS    SITagliptin (JANUVIA) tablet 100 mg  100 mg Oral DAILY    atorvastatin (LIPITOR) tablet 20 mg  20 mg Oral DAILY    amLODIPine (NORVASC) tablet 10 mg  10 mg Oral DAILY          ASSESSMENT & PLAN     DIAGNOSES REQUIRING ACTIVE TREATMENT AND MONITORING: (reviewed/updated 8/11/2017)  Patient Active Hospital Problem List:     Schizoaffective disorder ([facility] [address] (5/18/2017)    Assessment: resolved psychosis    Plan:  Continue Prolixin decanoate every two weeks  Continue Depakote, monitor levels  Continue Tegretol, monitor levels  Await placement       EPS  Assessment: secondary to prolixin (now dec only)  Plan: change cogentin to artane    8/5/17 Continue same medications   8/6/17 continue same medications    I will continue to monitor blood levels (Depakote---a drug with a narrow therapeutic index= NTI) and associated labs for drug therapy implemented that require intense monitoring for toxicity as deemed appropriate based on current medication side effects and pharmacodynamically determined drug 1/2 lives. In summary, Akin Brito, is a 65 y.o.  female who presents with a severe exacerbation of the principal diagnosis of Schizoaffective disorder ([facility] [address]  Patient's condition is improving. Patient requires continued inpatient hospitalization for further stabilization, safety monitoring and medication management. I will continue to coordinate the provision of individual, milieu, occupational, group, and substance abuse therapies to address target symptoms/diagnoses as deemed appropriate for the individual patient. A coordinated, multidisplinary treatment team round was conducted with the patient (this team consists of the nurse, psychiatric unit pharmcist,  and writer). Complete current electronic health record for patient has been reviewed today including consultant notes, ancillary staff notes, nurses and psychiatric tech notes. Suicide risk assessment completed and patient deemed to be of low risk for suicide at this time. The following regarding medications was addressed during rounds with patient:   the risks and benefits of the proposed medication. The patient was given the opportunity to ask questions. Informed consent given to the use of the above medications.  Will continue to adjust psychiatric and non-psychiatric medications (see above \"medication\" section and orders section for details) as deemed appropriate & based upon diagnoses and response to treatment. I will continue to order blood tests/labs and diagnostic tests as deemed appropriate and review results as they become available (see orders for details and above listed lab/test results). I will order psychiatric records from previous [facility] hospitals to further elucidate the nature of patient's psychopathology and review once available. I will gather additional collateral information from friends, family and o/p treatment team to further elucidate the nature of patient's psychopathology and baselline level of psychiatric functioning. I certify that this patient's inpatient psychiatric hospital services furnished since the previous certification were, and continue to be, required for treatment that could reasonably be expected to improve the patient's condition, or for diagnostic study, and that the patient continues to need, on a daily basis, active treatment furnished directly by or requiring the supervision of inpatient psychiatric facility personnel. In addition, the hospital records show that services furnished were intensive treatment services, admission or related services, or equivalent services.     EXPECTED DISCHARGE DATE/DAY: TBD     DISPOSITION: Home       Signed By:   General Luana MD  8/11/2017

## 2017-08-11 NOTE — CONSULTS
MEDICAL CONSULTATION      Reason for consult: arthritis  Consulting physician: Dr. Tip Johnson    CC: knee pain      HPI: this is a 65 y.o lady with type II DM, HTN, schizoaffective disorder, who is currently in the behavioral health unit. She reports bilateral knee pain that is constant and dull, worse in the left knee, moderate in severity, non-radiating, worsened with any activity, alleviated partially by rest and with naproxen. She reports dealing with arthritis in her needs for many years. She has been getting scheduled naproxen, acetaminophen, and lidocaine patches, with only mild relief. We are consulted to help manage her knee pain. PMH/PSH:  Past Medical History:   Diagnosis Date    Aggressive outburst     Arthritis     Bipolar 1 disorder ([facility] [address] 4-12-13    Diabetes mellitus ([facility] [address]     Homicide attempt     Hypertension     Murmur     Paranoid schizophrenia ([facility] [address]     Psychiatric disorder     [facility]     Past Surgical History:   Procedure Laterality Date    HX CHOLECYSTECTOMY      HX ORTHOPAEDIC      Excision Non-malignant bone cyst left femur       Home meds:   Prior to Admission medications    Medication Sig Start Date End Date Taking? Authorizing Provider   amLODIPine (NORVASC) 10 mg tablet Take 1 Tab by mouth daily. Indications: hypertension 7/27/17  Yes Timbo León MD   atorvastatin (LIPITOR) 20 mg tablet Take 1 Tab by mouth daily. Indications: hyperlipidemia 7/27/17  Yes Timbo León MD   carBAMazepine XR (TEGRETOL XR) 200 mg SR tablet Take 1 Tab by mouth two (2) times a day. Indications: Cris associated with Bipolar Disorder 7/27/17  Yes Timbo León MD   divalproex ER (DEPAKOTE ER) 500 mg ER tablet Take 2 Tabs by mouth nightly. Indications: Cris associated with Bipolar Disorder 7/27/17  Yes Timbo León MD   docusate sodium (COLACE) 100 mg capsule Take 1 Cap by mouth two (2) times a day for 90 days.  Indications: constipation 7/27/17 10/25/17 Yes Sharon Lyons MD   fluPHENAZine decanoate (PROLIXIN) 25 mg/mL injection 1 mL by IntraMUSCular route Once every 2 weeks. Indications: schizoaffective disorder 8/4/17  Yes Sharon Lyons MD   lisinopril (PRINIVIL, ZESTRIL) 10 mg tablet Take 1 Tab by mouth daily. Indications: hypertension 7/27/17  Yes Sharon Lyons MD   naproxen (NAPROSYN) 250 mg tablet Take 1 Tab by mouth two (2) times daily (with meals). Indications: OSTEOARTHRITIS 7/27/17  Yes Sharon Lyons MD   oxybutynin chloride XL (DITROPAN XL) 15 mg CR tablet Take 1 Tab by mouth daily. Indications: URINARY URGE INCONTINENCE 7/27/17  Yes Sharon Lyons MD   pantoprazole (PROTONIX) 40 mg tablet Take 1 Tab by mouth Daily (before breakfast). Indications: gastroesophageal reflux disease 7/27/17  Yes Sharon Lyons MD   polyethylene glycol (MIRALAX) 17 gram packet Take 1 Packet by mouth daily as needed. Indications: constipation 7/27/17  Yes Sharon Lyons MD   SITagliptin (JANUVIA) 100 mg tablet Take 1 Tab by mouth daily. Indications: type 2 diabetes mellitus 7/27/17  Yes Sharon Lyons MD   trihexyphenidyl (ARTANE) 2 mg tablet Take 1 Tab by mouth three (3) times daily for 90 days. Indications: extrapyramidal disease 7/27/17 10/25/17 Yes Sharon Lyons MD   zolpidem CR (AMBIEN CR) 12.5 mg tablet Take 1 Tab by mouth nightly as needed for Sleep. Max Daily Amount: 12.5 mg. Indications: INSOMNIA 7/27/17  Yes Sharon Lyons MD   QUEtiapine (SEROQUEL) 25 mg tablet Take 25 mg by mouth daily. Yes Historical Provider   acetaminophen (TYLENOL) 500 mg tablet Take 500 mg by mouth two (2) times a day. Yes Historical Provider   cloNIDine HCl (CATAPRES) 0.2 mg tablet Take  by mouth three (3) times daily. Yes Historical Provider   hydrOXYzine pamoate (VISTARIL) 50 mg capsule Take 50 mg by mouth four (4) times daily. Yes Historical Provider   LORazepam (ATIVAN) 0.5 mg tablet Take 0.5 mg by mouth two (2) times a day.    Yes Historical Provider divalproex DR (DEPAKOTE) 500 mg tablet Take 500 mg by mouth two (2) times a day. Yes Historical Provider   escitalopram oxalate (LEXAPRO) 5 mg tablet Take 5 mg by mouth daily. Yes Historical Provider   naproxen (NAPROSYN) 500 mg tablet Take 500 mg by mouth two (2) times daily (with meals). Yes Historical Provider   gabapentin (NEURONTIN) 100 mg capsule Take 100 mg by mouth two (2) times a day. Yes Historical Provider   loperamide (IMODIUM) 2 mg capsule Take 2 mg by mouth every four (4) hours as needed for Diarrhea. Indications: Diarrhea   Yes Historical Provider   amLODIPine (NORVASC) 10 mg tablet Take 1 Tab by mouth daily. 5/22/15  Yes Tootie Love MD   atorvastatin (LIPITOR) 20 mg tablet Take 1 Tab by mouth nightly. 5/22/15  Yes Tootie Love MD   carBAMazepine (TEGRETOL) 200 mg tablet Take 1 Tab by mouth three (3) times daily. 5/22/15  Yes Tootie Love MD   hydrochlorothiazide (HYDRODIURIL) 25 mg tablet Take 1 Tab by mouth daily. 5/22/15  Yes Tootie Love MD   sitaGLIPtin (JANUVIA) 100 mg tablet Take 1 Tab by mouth daily. 5/22/15  Yes Tootie Love MD   QUEtiapine (SEROQUEL) 100 mg tablet Take 100 mg by mouth every evening. Historical Provider       Allergies: Allergies   Allergen Reactions    Penicillins Rash       FH:  Family History   Problem Relation Age of Onset    Hypertension Mother     Diabetes Mother     Psychiatric Disorder Father     Heart Disease Mother     Heart Disease Brother     Diabetes Brother     Psychiatric Disorder Sister        SH:  Social History   Substance Use Topics    Smoking status: Former Smoker     Years: 40.00     Quit date: 3/19/1983    Smokeless tobacco: Not on file    Alcohol use No       ROS: A comprehensive review of systems was negative except for that written in the HPI.       PHYSICAL EXAM:  Visit Vitals    /89    Pulse 65    Temp 97.7 °F (36.5 °C)    Resp 18    Ht 5' 5\" (1.651 m)    Wt 78 kg (172 lb)    SpO2 99%    BMI 28.62 kg/m2       Gen: NAD, non-toxic  HEENT: anicteric sclerae  Neck: supple  Heart: trace BLE edema  Lungs: non-labored respirations  Extr: bilateral knees with full ROM with mild pain, ++crapitations, small R knee swelling, no erythema or warmth  Skin: mild chronic venous stasis dermatitis more notable on the LLE  Psych: flat affect      Labs/Imaging:  No recent labs/imaging        Assessment & Plan:  this is a 65 y.o lady with osteoarthritis, type II DM, HTN, schizoaffective disorder, who is currently in the behavioral health unit, we are consulted for chronic knee pain.     Bilateral knee OA: on acetaminophen, NSAIDs, lidocaine patches, only with some relief.   -recommend corticosteroid injections in the bilateral knees - will consult ortho  -physical therapy  -if pain is improved her naproxen dose should be reduced to 220 mg BID and eventually changed to PRN only      Signed By: Arturo Mancini MD     August 11, 2017

## 2017-08-11 NOTE — BH NOTES
Pt received resting comfortably in bed. Respirations even and unlabored. NAD. Continue to monitor via 15 minute observation. 24 hour chart review completed. PRN Medication Documentation-0001    Specific patient behavior that led to need for PRN medication: c/o anxiety  Staff interventions attempted prior to PRN being given: coping skills encouraged  PRN medication given: ativan 1mg po prn as ordered  Patient response/effectiveness of PRN medication: 0030-upon hourly round at [address], pt sleeping in bed with an audible snore. Prn effective.

## 2017-08-11 NOTE — BH NOTES
GROUP THERAPY PROGRESS NOTE    Nelli Ortez participated in a Morning Process Group on the General Unit with a focus on identifying feelings, planning for the day, and learning more about Self-nurturance. Group time: 60 minutes. Personal goal for participation: To identify feelings, increase the capacity to manage ones ability to cope through Wellness in several areas of personal life. Goal orientation: The patient will be able to introduce themselves to their peers, identify their feelings, and consider the importance of coping skill development, particularly in seven areas of ones life. Group therapy participation: With prompting, this patient partially participated in the group. Therapeutic interventions reviewed and discussed: The patients were encouraged to identify themselves by their first names, speak to their feelings, and define a goal for their day. The group participated in a discussion of a handout that suggested seven areas of one's life that may contribute to self-nurturance: physical nurturing, adult relationships, my voice, creativity, self-compassion, contributions and/or meaning, and limit setting. The group members were provided a copy of the handout to review on their own. Impression of participation: The patient was late for group and entered about correction through the session. She was also called out to meet with her treatment team but did return to group. She said she was feeling \"optimistic\" about the search to find her a nice place to live; they are looking at a home in [place], [place]. ..about four hours from my family in [facility] 1946. \" There was sadness and disappointment in her voice and she acknowledged that she had hoped to be living with one of her daughters or perhaps \"find another man,\" to help her. The patient expressed no SI/HI and no overt psychotic symptoms. Her affect was sad and her mood was determined and lonely.  She is attempting to cope with diminished expectations for her later life as her health declines. She also wondered if she might be able to walk without her walker.  It was suggested that a consult with PT might be something she could begin to look into before she leaves the hospital.

## 2017-08-12 LAB
GLUCOSE BLD STRIP.AUTO-MCNC: 119 MG/DL (ref 65–100)
GLUCOSE BLD STRIP.AUTO-MCNC: 93 MG/DL (ref 65–100)
SERVICE CMNT-IMP: ABNORMAL
SERVICE CMNT-IMP: NORMAL

## 2017-08-12 PROCEDURE — 82962 GLUCOSE BLOOD TEST: CPT

## 2017-08-12 PROCEDURE — 74011250637 HC RX REV CODE- 250/637: Performed by: NURSE PRACTITIONER

## 2017-08-12 PROCEDURE — 74011250637 HC RX REV CODE- 250/637: Performed by: HOSPITALIST

## 2017-08-12 PROCEDURE — 65220000003 HC RM SEMIPRIVATE PSYCH

## 2017-08-12 PROCEDURE — 74011250637 HC RX REV CODE- 250/637: Performed by: PSYCHIATRY & NEUROLOGY

## 2017-08-12 RX ADMIN — DIVALPROEX SODIUM 1000 MG: 500 TABLET, FILM COATED, EXTENDED RELEASE ORAL at 21:08

## 2017-08-12 RX ADMIN — PANTOPRAZOLE SODIUM 40 MG: 40 TABLET, DELAYED RELEASE ORAL at 06:52

## 2017-08-12 RX ADMIN — SITAGLIPTIN 100 MG: 100 TABLET, FILM COATED ORAL at 09:35

## 2017-08-12 RX ADMIN — LORAZEPAM 1 MG: 1 TABLET ORAL at 02:40

## 2017-08-12 RX ADMIN — NAPROXEN 375 MG: 250 TABLET ORAL at 09:36

## 2017-08-12 RX ADMIN — MENTHOL, METHYL SALICYLATE: 10; 15 CREAM TOPICAL at 09:36

## 2017-08-12 RX ADMIN — CARBAMAZEPINE 200 MG: 200 TABLET, EXTENDED RELEASE ORAL at 09:38

## 2017-08-12 RX ADMIN — TRIHEXYPHENIDYL HYDROCHLORIDE 2 MG: 2 TABLET ORAL at 17:03

## 2017-08-12 RX ADMIN — THERA TABS 1 TABLET: TAB at 09:36

## 2017-08-12 RX ADMIN — TRIHEXYPHENIDYL HYDROCHLORIDE 2 MG: 2 TABLET ORAL at 21:38

## 2017-08-12 RX ADMIN — ZOLPIDEM TARTRATE 12.5 MG: 6.25 TABLET, EXTENDED RELEASE ORAL at 21:07

## 2017-08-12 RX ADMIN — NAPROXEN 375 MG: 250 TABLET ORAL at 17:03

## 2017-08-12 RX ADMIN — TRIHEXYPHENIDYL HYDROCHLORIDE 2 MG: 2 TABLET ORAL at 09:35

## 2017-08-12 RX ADMIN — AMLODIPINE BESYLATE 10 MG: 5 TABLET ORAL at 09:36

## 2017-08-12 RX ADMIN — OXYBUTYNIN CHLORIDE 15 MG: 5 TABLET, EXTENDED RELEASE ORAL at 09:36

## 2017-08-12 RX ADMIN — MENTHOL, METHYL SALICYLATE: 10; 15 CREAM TOPICAL at 21:11

## 2017-08-12 RX ADMIN — ATORVASTATIN CALCIUM 20 MG: 20 TABLET, FILM COATED ORAL at 09:36

## 2017-08-12 RX ADMIN — LISINOPRIL 10 MG: 10 TABLET ORAL at 09:36

## 2017-08-12 RX ADMIN — CARBAMAZEPINE 200 MG: 200 TABLET, EXTENDED RELEASE ORAL at 18:53

## 2017-08-12 RX ADMIN — ACETAMINOPHEN 650 MG: 325 TABLET, FILM COATED ORAL at 15:15

## 2017-08-12 RX ADMIN — ALUMINUM HYDROXIDE, MAGNESIUM HYDROXIDE, AND SIMETHICONE 30 ML: 200; 200; 20 SUSPENSION ORAL at 22:30

## 2017-08-12 NOTE — PROGRESS NOTES
Problem: Falls - Risk of  Goal: *Absence of falls  Outcome: Progressing Towards Goal  Visible on the unit. Compliant with meals and medications. Pt denies SI. Pt ambulates with a walker. Fall prevention education provided. Continue to encourage and educate. Pt has been calm and cooperative. PT saw pt and provided exercises. Continue to monitor. 1424: Ortho  is here seeing pt.

## 2017-08-12 NOTE — PROGRESS NOTES
Problem: Altered Thought Process (Adult/Pediatric)  Goal: *STG: Remains safe in hospital  Outcome: Progressing Towards Goal  1530: Greeted patient on unit in room resting. Appears in no acute distress. Smiling upon approach. Will continue to monitor on Q 15 minute safety checks     Alert. Vitals stable. wnl  Medication and meal compliant. Cooperative with staff and peers.
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
## 2017-08-12 NOTE — PROGRESS NOTES
Physical Therapy  8/12/17    New order received for quad strengthening exercise program. Patient on unit and able to participate in education on LE strengthening. Handout provided to patient and placed on patient chart. Patient to complete 3 x daily with supervision of RN/PCT. Lucretia Alfredo, PT, DPT

## 2017-08-12 NOTE — BH NOTES
PRN Medication Documentation    Specific patient behavior that led to need for PRN medication: C/o Anxiety  Staff interventions attempted prior to PRN being given: Encouraged use of coping skills, relaxation techniques. PRN medication given: Ativan 1 mg po @ 7042  Patient response/effectiveness of PRN medication: Pt returned to bed. Effectiveness pending. 0330 - Pt sleeping.

## 2017-08-12 NOTE — PROGRESS NOTES
Problem: Falls - Risk of  Goal: *Absence of falls  Outcome: Progressing Towards Goal  Pt  Awake in bed at start of this shift. NAD. Verbalized no concerns/complaints. No falls noted/reported. Bathroom light is on in room, walker is within patient's reach, patient educated to arise slowly when getting out of bed and Q 15 minute checks for safety maintained.

## 2017-08-12 NOTE — INTERDISCIPLINARY ROUNDS
Behavioral Health Interdisciplinary Rounds     Patient Name: Merced Minaya  Age: 65 y.o. Room/Bed:  733/  Primary Diagnosis: Schizoaffective disorder (HCC)   Admission Status: Voluntary     Readmission within 30 days: no  Power of  in place: no  Patient requires a blocked bed: no          Reason for blocked bed: n./a    VTE Prophylaxis: Not indicated  Mobility needs/Fall risk: yes    Nutritional Plan: yes  Consults:          Labs/Testing due today?: no    Sleep hours: 4.75       Participation in Care/Groups:  yes  Medication Compliant?: Yes  PRNS (last 24 hours): Antianxiety and Pain    Restraints (last 24 hours):  no  Substance Abuse:  no  CIWA (range last 24 hours):  COWS (range last 24 hours):   Alcohol screening (AUDIT) completed -     If applicable, date SBIRT discussed in treatment team AND documented:   Tobacco - patient is a smoker: yes   Date tobacco education completed by RN: TO BE COMPLETED  24 hour chart check complete: yes     Patient goal(s) for today: Gabriela Bettencourt with another placement disappointment  Treatment team focus/goals: Offer support and encouragement  LOS:  86  Expected LOS:   [address] -     Name of Decision maker if patient has Psychiatric Care Directive   Patient was offered information   Financial concerns/prescription coverage:    Date of last family contact:       Family requesting physician contact today:  no  Discharge plan: Placement      Outpatient provider(s): MOLLY    Participating treatment team members: DR. Bradley Childers SW

## 2017-08-13 LAB
GLUCOSE BLD STRIP.AUTO-MCNC: 124 MG/DL (ref 65–100)
GLUCOSE BLD STRIP.AUTO-MCNC: 99 MG/DL (ref 65–100)
SERVICE CMNT-IMP: ABNORMAL
SERVICE CMNT-IMP: NORMAL

## 2017-08-13 PROCEDURE — 74011250637 HC RX REV CODE- 250/637: Performed by: PSYCHIATRY & NEUROLOGY

## 2017-08-13 PROCEDURE — 74011250637 HC RX REV CODE- 250/637: Performed by: HOSPITALIST

## 2017-08-13 PROCEDURE — 74011250637 HC RX REV CODE- 250/637: Performed by: NURSE PRACTITIONER

## 2017-08-13 PROCEDURE — 65220000003 HC RM SEMIPRIVATE PSYCH

## 2017-08-13 PROCEDURE — 82962 GLUCOSE BLOOD TEST: CPT

## 2017-08-13 RX ADMIN — AMLODIPINE BESYLATE 10 MG: 5 TABLET ORAL at 08:27

## 2017-08-13 RX ADMIN — DIVALPROEX SODIUM 1000 MG: 500 TABLET, FILM COATED, EXTENDED RELEASE ORAL at 21:17

## 2017-08-13 RX ADMIN — LOPERAMIDE HYDROCHLORIDE 2 MG: 2 CAPSULE ORAL at 21:18

## 2017-08-13 RX ADMIN — THERA TABS 1 TABLET: TAB at 08:26

## 2017-08-13 RX ADMIN — TRIHEXYPHENIDYL HYDROCHLORIDE 2 MG: 2 TABLET ORAL at 08:27

## 2017-08-13 RX ADMIN — NAPROXEN 375 MG: 250 TABLET ORAL at 17:24

## 2017-08-13 RX ADMIN — ATORVASTATIN CALCIUM 20 MG: 20 TABLET, FILM COATED ORAL at 08:27

## 2017-08-13 RX ADMIN — LOPERAMIDE HYDROCHLORIDE 2 MG: 2 CAPSULE ORAL at 09:39

## 2017-08-13 RX ADMIN — NAPROXEN 375 MG: 250 TABLET ORAL at 08:27

## 2017-08-13 RX ADMIN — SITAGLIPTIN 100 MG: 100 TABLET, FILM COATED ORAL at 08:26

## 2017-08-13 RX ADMIN — MENTHOL, METHYL SALICYLATE: 10; 15 CREAM TOPICAL at 21:19

## 2017-08-13 RX ADMIN — OXYBUTYNIN CHLORIDE 15 MG: 5 TABLET, EXTENDED RELEASE ORAL at 08:26

## 2017-08-13 RX ADMIN — ZOLPIDEM TARTRATE 12.5 MG: 6.25 TABLET, EXTENDED RELEASE ORAL at 21:17

## 2017-08-13 RX ADMIN — TRIHEXYPHENIDYL HYDROCHLORIDE 2 MG: 2 TABLET ORAL at 17:24

## 2017-08-13 RX ADMIN — LISINOPRIL 10 MG: 10 TABLET ORAL at 08:26

## 2017-08-13 RX ADMIN — LORAZEPAM 1 MG: 1 TABLET ORAL at 00:43

## 2017-08-13 RX ADMIN — CARBAMAZEPINE 200 MG: 200 TABLET, EXTENDED RELEASE ORAL at 17:32

## 2017-08-13 RX ADMIN — PANTOPRAZOLE SODIUM 40 MG: 40 TABLET, DELAYED RELEASE ORAL at 06:30

## 2017-08-13 RX ADMIN — TRIHEXYPHENIDYL HYDROCHLORIDE 2 MG: 2 TABLET ORAL at 21:17

## 2017-08-13 RX ADMIN — MENTHOL, METHYL SALICYLATE: 10; 15 CREAM TOPICAL at 08:29

## 2017-08-13 RX ADMIN — CARBAMAZEPINE 200 MG: 200 TABLET, EXTENDED RELEASE ORAL at 08:30

## 2017-08-13 RX ADMIN — ACETAMINOPHEN 650 MG: 325 TABLET, FILM COATED ORAL at 15:30

## 2017-08-13 RX ADMIN — LOPERAMIDE HYDROCHLORIDE 2 MG: 2 CAPSULE ORAL at 14:05

## 2017-08-13 NOTE — PROGRESS NOTES
Problem: Falls - Risk of  Goal: *Absence of falls  Outcome: Progressing Towards Goal  Visible on the unit. Ambulates with a walker. Educated on fall prevention. Mood is calm. Pt denies SI. Compliant with meals and medications. Continue to encourage and educate. 0974: PRN Medication Documentation  Specific patient behavior that led to need for PRN medication: Pt requested  PRN medication given: imodium     1405: PRN imodium given for c/o loose stools. Per pt \"I had milk and I think that's what's causing it. \"

## 2017-08-13 NOTE — BH NOTES
PSYCHIATRIC PROGRESS NOTE         Patient Name  Mariel Ball   Date of Birth 1955   [facility] [id number]   Medical Record Number  [id number]      Age  65 y.o. PCP Angelia Acuña MD   Admit date:  5/18/2017    Room Number  733/01  @ [facility]   Date of Service  8/12/2017          PSYCHOTHERAPY SESSION NOTE:  Length of psychotherapy session: 15 minutes    Main condition/diagnosis/issues treated during session today, 8/12/2017 : housing    I employed Cognitive Behavioral therapy techniques, Reality-Oriented psychotherapy, as well as supportive psychotherapy in regards to various ongoing psychosocial stressors, including the following: pre-admission and current problems; housing issues; stress of hospitalization. Interpersonal relationship issues and psychodynamic conflicts explored. Attempts made to alleviate maladaptive patterns. Overall, patient is progressing    Treatment Plan Update (reviewed an updated 8/12/2017) : I will modify psychotherapy tx plan by implementing more stress management strategies, building upon cognitive behavioral techniques, increasing coping skills, as well as shoring up psychological defenses). An extended energy and skill set was needed to engage pt in psychotherapy due to some of the following: resistiveness, complexity, negativity, confrontational nature, hostile behaviors, and/or severe abnormalities in thought processes/psychosis resulting in the loss of expressive/receptive language communication skills. E & M PROGRESS NOTE:         HISTORY       CC:  No complaints  HISTORY OF PRESENT ILLNESS/INTERVAL HISTORY:  (reviewed/updated 8/12/2017). per initial evaluation: The patient, Mariel Ball, is a 65 y.o.  BLACK OR  female with a past psychiatric history significant for Schizoaffective disorder, long history of noncompliance who presents at this time with complaints of (and/or evidence of) the following emotional symptoms: agitation, delusions and psychotic behavior. Additional symptomatology include noncompliance with medications. The above symptoms have been present for several weeks. She lives with a caretaker who reports recent paranoia, agitation. These symptoms are of high severity. These symptoms are constant in nature. The patient's condition has been precipitated by noncompliance and psychosocial stressors . No illicit substance abuse. Moraima Ricks presents/reports/evidences the following emotional symptoms today, 8/12/2017: None. Ms. Tamiko Wilder reports feeling okay. Mood stable, adequate sleep over night. No agitation. Compliant with medications. 8/4/17- Very stable. Waiting for placement. Sleep and appetite are good. 8/5/17 She is frustrated that she still her and no anger issues and no aggressive behavior  And she is  sleeping better. 8/6/17 She is doing better and no anger issues and no anger issues and no aggressive and no mood swings and no issues with sleep   8/7/17- mrs. Tamiko Wilder continues to exhibit stable mood, behavior, thinking. She expresses some sadness due to prolonged hospital course. 8/8/17- Mrs. Tamiko Wilder denies any complaints this morning. She was in a very bright and funny mood. Good hygiene, dressed appropriately. 8/9/17- Mrs. Tamiko Wilder was very pleasant and engaging this morning. Sleeping well at night, compliant with medications. 8/10/17- Mrs. Tamiko Wilder is looking forward to her interview tomorrow with a potential assisted living/ group home. Sleeping well at night. Eating her meals. Compliant with medications. Attending and participating in groups. 8/11/17- Patient slept well over night and she reports feeling well rested. She expressed appropriate level of anxiety about her interview for housing today. No psychosis or agitation. Tolerating medications. 8/12/17-Reportedly housing interviewer from yesterday was a no show. Patient is ambulating with the aid of a walker. C/O B/L knee pain. Being followed by Medicine. Ortho consult is pending. No A/V/H. SIDE EFFECTS: (reviewed/updated 8/12/2017)  None reported or admitted to. No noted toxicity with use of Depakote/   ALLERGIES:(reviewed/updated 8/12/2017)  Allergies   Allergen Reactions    Penicillins Rash      MEDICATIONS PRIOR TO ADMISSION:(reviewed/updated 8/12/2017)  Prescriptions Prior to Admission   Medication Sig    QUEtiapine (SEROQUEL) 25 mg tablet Take 25 mg by mouth daily.  acetaminophen (TYLENOL) 500 mg tablet Take 500 mg by mouth two (2) times a day.  cloNIDine HCl (CATAPRES) 0.2 mg tablet Take  by mouth three (3) times daily.  hydrOXYzine pamoate (VISTARIL) 50 mg capsule Take 50 mg by mouth four (4) times daily.  LORazepam (ATIVAN) 0.5 mg tablet Take 0.5 mg by mouth two (2) times a day.  divalproex DR (DEPAKOTE) 500 mg tablet Take 500 mg by mouth two (2) times a day.  escitalopram oxalate (LEXAPRO) 5 mg tablet Take 5 mg by mouth daily.  naproxen (NAPROSYN) 500 mg tablet Take 500 mg by mouth two (2) times daily (with meals).  gabapentin (NEURONTIN) 100 mg capsule Take 100 mg by mouth two (2) times a day.  loperamide (IMODIUM) 2 mg capsule Take 2 mg by mouth every four (4) hours as needed for Diarrhea. Indications: Diarrhea    amLODIPine (NORVASC) 10 mg tablet Take 1 Tab by mouth daily.  atorvastatin (LIPITOR) 20 mg tablet Take 1 Tab by mouth nightly.  carBAMazepine (TEGRETOL) 200 mg tablet Take 1 Tab by mouth three (3) times daily.  hydrochlorothiazide (HYDRODIURIL) 25 mg tablet Take 1 Tab by mouth daily.  sitaGLIPtin (JANUVIA) 100 mg tablet Take 1 Tab by mouth daily.  QUEtiapine (SEROQUEL) 100 mg tablet Take 100 mg by mouth every evening. PAST MEDICAL HISTORY: Past medical history from the initial psychiatric evaluation has been reviewed (reviewed/updated 8/12/2017) with no additional updates (I asked patient and no additional past medical history provided).    Past Medical History: Diagnosis Date    Aggressive outburst     Arthritis     Bipolar [facility] 4-12-13    Diabetes mellitus ([facility]     Homicide attempt     Hypertension     Murmur     Paranoid schizophrenia ([facility]     Psychiatric disorder     Schizophrenia, paranoid type ([facility] 3/20/2013     Past Surgical History:   Procedure Laterality Date    HX CHOLECYSTECTOMY      HX ORTHOPAEDIC      Excision Non-malignant bone cyst left femur      SOCIAL HISTORY: Social history from the initial psychiatric evaluation has been reviewed (reviewed/updated 8/12/2017) with no additional updates (I asked patient and no additional social history provided). Social History     Social History    Marital status:      Spouse name: N/A    Number of children: N/A    Years of education: N/A     Occupational History    Not on file. Social History Main Topics    Smoking status: Former Smoker     Years: 40.00     Quit date: 3/19/1983    Smokeless tobacco: Not on file    Alcohol use No    Drug use: No    Sexual activity: Yes     Partners: Male     Other Topics Concern    Not on file     Social History Narrative      Lives with daughter, son-in-law and 2 grandchildren. Not employed outside the home. FAMILY HISTORY: Family history from the initial psychiatric evaluation has been reviewed (reviewed/updated 8/12/2017) with no additional updates (I asked patient and no additional family history provided).    Family History   Problem Relation Age of Onset    Hypertension Mother     Diabetes Mother     Psychiatric Disorder Father     Heart Disease Mother     Heart Disease Brother     Diabetes Brother     Psychiatric Disorder Sister        REVIEW OF SYSTEMS: (reviewed/updated 8/12/2017)  Appetite:good   Sleep: decreased more than normal and poor with DIMS (difficulty initiating & maintaining sleep)   All other Review of Systems: Negative except severe psychosis and agitation         MENTAL STATUS EXAM & VITALS MENTAL STATUS EXAM (MSE):    MSE FINDINGS ARE WITHIN NORMAL LIMITS (WNL) UNLESS OTHERWISE STATED BELOW. ( ALL OF THE BELOW CATEGORIES OF THE MSE HAVE BEEN REVIEWED (reviewed 8/12/2017) AND UPDATED AS DEEMED APPROPRIATE )  General Presentation wnl   Orientation Fully oriented and pleasant   Vital Signs  See below (reviewed 8/12/2017); Vital Signs (BP, Pulse, & Temp) are within normal limits if not listed below. Gait and Station Stable/steady, no ataxia   Musculoskeletal System No extrapyramidal symptoms (EPS); no abnormal muscular movements or Tardive Dyskinesia (TD); muscle strength and tone are within normal limits   Language No aphasia or dysarthria   Speech:  Normal volume, speed and content   Thought Processes (+)linear and logical   Thought Associations wnl   Thought Content Reality based   Suicidal Ideations none   Homicidal Ideations none   Mood:  Pleasant    Affect:  Appropriate    Memory recent  fair   Memory remote:  fair   Concentration/Attention:  distractable   Fund of Knowledge below avg.    Insight:  wnl   Reliability wnl   Judgment:  wnl          VITALS:     Patient Vitals for the past 24 hrs:   Temp Pulse Resp BP SpO2   08/12/17 2030 98.3 °F (36.8 °C) 66 16 (!) 167/96 -   08/12/17 1550 97.8 °F (36.6 °C) 70 18 151/89 98 %   08/12/17 1145 98.1 °F (36.7 °C) 70 18 (!) 159/94 99 %   08/12/17 0906 97.9 °F (36.6 °C) 63 18 178/89 99 %     Wt Readings from Last 3 Encounters:   08/06/17 78 kg (172 lb)   03/14/16 89 kg (196 lb 3.2 oz)   07/21/15 90.7 kg (200 lb)     Temp Readings from Last 3 Encounters:   08/12/17 98.3 °F (36.8 °C)   04/03/16 98.2 °F (36.8 °C)   03/14/16 99 °F (37.2 °C)     BP Readings from Last 3 Encounters:   08/12/17 (!) 167/96   04/03/16 (!) 167/93   03/14/16 (!) 188/99     Pulse Readings from Last 3 Encounters:   08/12/17 66   04/03/16 68   03/14/16 88            DATA     LABORATORY DATA:(reviewed/updated 8/12/2017)  Recent Results (from the past 24 hour(s))   GLUCOSE, POC Collection Time: 08/12/17  9:13 AM   Result Value Ref Range    Glucose (POC) 93 65 - 100 mg/dL    Performed by Kvng Kaba    GLUCOSE, POC    Collection Time: 08/12/17  4:39 PM   Result Value Ref Range    Glucose (POC) 119 (H) 65 - 100 mg/dL    Performed by Jet Gary      Lab Results   Component Value Date/Time    Valproic acid 93 08/02/2017 05:23 AM    Carbamazepine 6.8 07/18/2017 05:27 AM     No results found for: LITHM   RADIOLOGY REPORTS:(reviewed/updated 8/12/2017)  No results found.        MEDICATIONS     ALL MEDICATIONS:   Current Facility-Administered Medications   Medication Dose Route Frequency    naproxen (NAPROSYN) tablet 375 mg  375 mg Oral BID WITH MEALS    methyl salicylate-menthol (BENGAY) 15-10 % cream   Topical TID    docusate sodium (COLACE) capsule 100 mg  100 mg Oral BID PRN    oxybutynin chloride XL (DITROPAN XL) tablet 15 mg  15 mg Oral DAILY    lidocaine (LIDODERM) 5 % patch 1 Patch  1 Patch TransDERmal Q24H    therapeutic multivitamin (THERAGRAN) tablet 1 Tab  1 Tab Oral DAILY    fluPHENAZine decanoate (PROLIXIN) 25 mg/mL injection 25 mg  25 mg IntraMUSCular EVERY 2 WEEKS    loperamide (IMODIUM) capsule 2 mg  2 mg Oral Q4H PRN    lisinopril (PRINIVIL, ZESTRIL) tablet 10 mg  10 mg Oral DAILY    polyethylene glycol (MIRALAX) packet 17 g  17 g Oral DAILY    trihexyphenidyl (ARTANE) tablet 2 mg  2 mg Oral TID    pantoprazole (PROTONIX) tablet 40 mg  40 mg Oral ACB    divalproex ER (DEPAKOTE ER) 24 hour tablet 1,000 mg  1,000 mg Oral QHS    alum-mag hydroxide-simeth (MYLANTA) oral suspension 30 mL  30 mL Oral Q4H PRN    insulin lispro (HUMALOG) injection   SubCUTAneous BID    carBAMazepine XR (TEGretol XR) tablet 200 mg  200 mg Oral BID    zolpidem CR (AMBIEN CR) tablet 12.5 mg  12.5 mg Oral QHS    OLANZapine (ZyPREXA) tablet 5 mg  5 mg Oral Q6H PRN    diphenhydrAMINE (BENADRYL) injection 50 mg  50 mg IntraMUSCular Q6H PRN    LORazepam (ATIVAN) injection 1 mg  1 mg IntraMUSCular Q4H PRN    LORazepam (ATIVAN) tablet 1 mg  1 mg Oral Q4H PRN    benztropine (COGENTIN) tablet 1 mg  1 mg Oral BID PRN    benztropine (COGENTIN) injection 1 mg  1 mg IntraMUSCular BID PRN    acetaminophen (TYLENOL) tablet 650 mg  650 mg Oral Q4H PRN    nicotine (NICODERM CQ) 21 mg/24 hr patch 1 Patch  1 Patch TransDERmal DAILY PRN    SITagliptin (JANUVIA) tablet 100 mg  100 mg Oral DAILY    atorvastatin (LIPITOR) tablet 20 mg  20 mg Oral DAILY    amLODIPine (NORVASC) tablet 10 mg  10 mg Oral DAILY    glucose chewable tablet 16 g  4 Tab Oral PRN    glucagon (GLUCAGEN) injection 1 mg  1 mg IntraMUSCular PRN    dextrose 10 % infusion 125-250 mL  125-250 mL IntraVENous PRN      SCHEDULED MEDICATIONS:   Current Facility-Administered Medications   Medication Dose Route Frequency    naproxen (NAPROSYN) tablet 375 mg  375 mg Oral BID WITH MEALS    methyl salicylate-menthol (BENGAY) 15-10 % cream   Topical TID    oxybutynin chloride XL (DITROPAN XL) tablet 15 mg  15 mg Oral DAILY    lidocaine (LIDODERM) 5 % patch 1 Patch  1 Patch TransDERmal Q24H    therapeutic multivitamin (THERAGRAN) tablet 1 Tab  1 Tab Oral DAILY    fluPHENAZine decanoate (PROLIXIN) 25 mg/mL injection 25 mg  25 mg IntraMUSCular EVERY 2 WEEKS    lisinopril (PRINIVIL, ZESTRIL) tablet 10 mg  10 mg Oral DAILY    polyethylene glycol (MIRALAX) packet 17 g  17 g Oral DAILY    trihexyphenidyl (ARTANE) tablet 2 mg  2 mg Oral TID    pantoprazole (PROTONIX) tablet 40 mg  40 mg Oral ACB    divalproex ER (DEPAKOTE ER) 24 hour tablet 1,000 mg  1,000 mg Oral QHS    insulin lispro (HUMALOG) injection   SubCUTAneous BID    carBAMazepine XR (TEGretol XR) tablet 200 mg  200 mg Oral BID    zolpidem CR (AMBIEN CR) tablet 12.5 mg  12.5 mg Oral QHS    SITagliptin (JANUVIA) tablet 100 mg  100 mg Oral DAILY    atorvastatin (LIPITOR) tablet 20 mg  20 mg Oral DAILY    amLODIPine (NORVASC) tablet 10 mg  10 mg Oral DAILY          ASSESSMENT & PLAN     DIAGNOSES REQUIRING ACTIVE TREATMENT AND MONITORING: (reviewed/updated 8/12/2017)  Patient Active Hospital Problem List:     Schizoaffective disorder ([facility] (5/18/2017)    Assessment: resolved psychosis    Plan:  Continue Prolixin decanoate every two weeks  Continue Depakote, monitor levels  Continue Tegretol, monitor levels  Await placement       EPS  Assessment: secondary to prolixin (now dec only)  Plan: change cogentin to artane    8/5/17 Continue same medications   8/6/17 continue same medications    I will continue to monitor blood levels (Depakote---a drug with a narrow therapeutic index= NTI) and associated labs for drug therapy implemented that require intense monitoring for toxicity as deemed appropriate based on current medication side effects and pharmacodynamically determined drug 1/2 lives. In summary, Akin Brito, is a 65 y.o.  female who presents with a severe exacerbation of the principal diagnosis of Schizoaffective disorder ([facility]  Patient's condition is improving. Patient requires continued inpatient hospitalization for further stabilization, safety monitoring and medication management. I will continue to coordinate the provision of individual, milieu, occupational, group, and substance abuse therapies to address target symptoms/diagnoses as deemed appropriate for the individual patient. A coordinated, multidisplinary treatment team round was conducted with the patient (this team consists of the nurse, psychiatric unit pharmcist,  and writer). Complete current electronic health record for patient has been reviewed today including consultant notes, ancillary staff notes, nurses and psychiatric tech notes. Suicide risk assessment completed and patient deemed to be of low risk for suicide at this time. The following regarding medications was addressed during rounds with patient:   the risks and benefits of the proposed medication.  The patient was given the opportunity to ask questions. Informed consent given to the use of the above medications. Will continue to adjust psychiatric and non-psychiatric medications (see above \"medication\" section and orders section for details) as deemed appropriate & based upon diagnoses and response to treatment. I will continue to order blood tests/labs and diagnostic tests as deemed appropriate and review results as they become available (see orders for details and above listed lab/test results). I will order psychiatric records from previous [facility] hospitals to further elucidate the nature of patient's psychopathology and review once available. I will gather additional collateral information from friends, family and o/p treatment team to further elucidate the nature of patient's psychopathology and baselline level of psychiatric functioning. I certify that this patient's inpatient psychiatric hospital services furnished since the previous certification were, and continue to be, required for treatment that could reasonably be expected to improve the patient's condition, or for diagnostic study, and that the patient continues to need, on a daily basis, active treatment furnished directly by or requiring the supervision of inpatient psychiatric facility personnel. In addition, the hospital records show that services furnished were intensive treatment services, admission or related services, or equivalent services.     EXPECTED DISCHARGE DATE/DAY: TBD     DISPOSITION: Home       Signed By:   Rigoberto Mora MD  8/12/2017

## 2017-08-13 NOTE — BH NOTES
Pt received resting comfortably in bed. Respirations even and unlabored. NAD. Continue to monitor via 15 minute observation. 24 hour chart review completed. 0043 PRN Medication Documentation    Specific patient behavior that led to need for PRN medication: pt c/o restlessness  Staff interventions attempted prior to PRN being given: coping skills and relaxation techniques encouraged  PRN medication given: ativan 1mg po prn as ordered  Patient response/effectiveness of PRN medication: upon reassessment, pt sleeping.  PRN effective      Pt slept 5:45 NAD

## 2017-08-13 NOTE — BH NOTES
PSYCHIATRIC PROGRESS NOTE         Patient Name  Harlan Mclean   Date of Birth 1955   [facility] [id number]   Medical Record Number  [id number]      Age  65 y.o. PCP Lesley Tabares MD   Admit date:  5/18/2017    Room Number  733/01  @ [facility]   Date of Service  8/13/2017          PSYCHOTHERAPY SESSION NOTE:  Length of psychotherapy session: 15 minutes    Main condition/diagnosis/issues treated during session today, 8/13/2017 : housing    I employed Cognitive Behavioral therapy techniques, Reality-Oriented psychotherapy, as well as supportive psychotherapy in regards to various ongoing psychosocial stressors, including the following: pre-admission and current problems; housing issues; stress of hospitalization. Interpersonal relationship issues and psychodynamic conflicts explored. Attempts made to alleviate maladaptive patterns. Overall, patient is progressing    Treatment Plan Update (reviewed an updated 8/13/2017) : I will modify psychotherapy tx plan by implementing more stress management strategies, building upon cognitive behavioral techniques, increasing coping skills, as well as shoring up psychological defenses). An extended energy and skill set was needed to engage pt in psychotherapy due to some of the following: resistiveness, complexity, negativity, confrontational nature, hostile behaviors, and/or severe abnormalities in thought processes/psychosis resulting in the loss of expressive/receptive language communication skills. E & M PROGRESS NOTE:         HISTORY       CC:  No complaints  HISTORY OF PRESENT ILLNESS/INTERVAL HISTORY:  (reviewed/updated 8/13/2017). per initial evaluation: The patient, Harlan Mclean, is a 65 y.o.  BLACK OR  female with a past psychiatric history significant for Schizoaffective disorder, long history of noncompliance who presents at this time with complaints of (and/or evidence of) the following emotional symptoms: agitation, delusions and psychotic behavior. Additional symptomatology include noncompliance with medications. The above symptoms have been present for several weeks. She lives with a caretaker who reports recent paranoia, agitation. These symptoms are of high severity. These symptoms are constant in nature. The patient's condition has been precipitated by noncompliance and psychosocial stressors . No illicit substance abuse. Karin Ayala presents/reports/evidences the following emotional symptoms today, 8/13/2017: None. Ms. Tania Ellsworth reports feeling okay. Mood stable, adequate sleep over night. No agitation. Compliant with medications. 8/4/17- Very stable. Waiting for placement. Sleep and appetite are good. 8/5/17 She is frustrated that she still her and no anger issues and no aggressive behavior  And she is  sleeping better. 8/6/17 She is doing better and no anger issues and no anger issues and no aggressive and no mood swings and no issues with sleep   8/7/17- mrs. Tania Ellsworth continues to exhibit stable mood, behavior, thinking. She expresses some sadness due to prolonged hospital course. 8/8/17- Mrs. Tania Ellsworth denies any complaints this morning. She was in a very bright and funny mood. Good hygiene, dressed appropriately. 8/9/17- Mrs. Tania Ellsworth was very pleasant and engaging this morning. Sleeping well at night, compliant with medications. 8/10/17- Mrs. Tania Ellsworth is looking forward to her interview tomorrow with a potential assisted living/ group home. Sleeping well at night. Eating her meals. Compliant with medications. Attending and participating in groups. 8/11/17- Patient slept well over night and she reports feeling well rested. She expressed appropriate level of anxiety about her interview for housing today. No psychosis or agitation. Tolerating medications. 8/12/17-Reportedly housing interviewer from yesterday was a no show. Patient is ambulating with the aid of a walker. C/O B/L knee pain. Being followed by Medicine. Ortho consult is pending. No A/V/H.  8/13/17-Patient is at her baseline. Awaiting placement. Seen by Ortho who do not recommend knee injection. SIDE EFFECTS: (reviewed/updated 8/13/2017)  None reported or admitted to. No noted toxicity with use of Depakote/   ALLERGIES:(reviewed/updated 8/13/2017)  Allergies   Allergen Reactions    Penicillins Rash      MEDICATIONS PRIOR TO ADMISSION:(reviewed/updated 8/13/2017)  Prescriptions Prior to Admission   Medication Sig    QUEtiapine (SEROQUEL) 25 mg tablet Take 25 mg by mouth daily.  acetaminophen (TYLENOL) 500 mg tablet Take 500 mg by mouth two (2) times a day.  cloNIDine HCl (CATAPRES) 0.2 mg tablet Take  by mouth three (3) times daily.  hydrOXYzine pamoate (VISTARIL) 50 mg capsule Take 50 mg by mouth four (4) times daily.  LORazepam (ATIVAN) 0.5 mg tablet Take 0.5 mg by mouth two (2) times a day.  divalproex DR (DEPAKOTE) 500 mg tablet Take 500 mg by mouth two (2) times a day.  escitalopram oxalate (LEXAPRO) 5 mg tablet Take 5 mg by mouth daily.  naproxen (NAPROSYN) 500 mg tablet Take 500 mg by mouth two (2) times daily (with meals).  gabapentin (NEURONTIN) 100 mg capsule Take 100 mg by mouth two (2) times a day.  loperamide (IMODIUM) 2 mg capsule Take 2 mg by mouth every four (4) hours as needed for Diarrhea. Indications: Diarrhea    amLODIPine (NORVASC) 10 mg tablet Take 1 Tab by mouth daily.  atorvastatin (LIPITOR) 20 mg tablet Take 1 Tab by mouth nightly.  carBAMazepine (TEGRETOL) 200 mg tablet Take 1 Tab by mouth three (3) times daily.  hydrochlorothiazide (HYDRODIURIL) 25 mg tablet Take 1 Tab by mouth daily.  sitaGLIPtin (JANUVIA) 100 mg tablet Take 1 Tab by mouth daily.  QUEtiapine (SEROQUEL) 100 mg tablet Take 100 mg by mouth every evening.       PAST MEDICAL HISTORY: Past medical history from the initial psychiatric evaluation has been reviewed (reviewed/updated 8/13/2017) with no additional updates (I asked patient and no additional past medical history provided). Past Medical History:   Diagnosis Date    Aggressive outburst     Arthritis     Bipolar 1 disorder ([facility] [address] 4-12-13    [facility]     Homicide attempt     Hypertension     Murmur     [facility]     Psychiatric disorder     Schizophrenia, paranoid type ([facility] 3/20/2013     Past Surgical History:   Procedure Laterality Date    HX CHOLECYSTECTOMY      HX ORTHOPAEDIC      Excision Non-malignant bone cyst left femur      SOCIAL HISTORY: Social history from the initial psychiatric evaluation has been reviewed (reviewed/updated 8/13/2017) with no additional updates (I asked patient and no additional social history provided). Social History     Social History    Marital status:      Spouse name: N/A    Number of children: N/A    Years of education: N/A     Occupational History    Not on file. Social History Main Topics    Smoking status: Former Smoker     Years: 40.00     Quit date: 3/19/1983    Smokeless tobacco: Not on file    Alcohol use No    Drug use: No    Sexual activity: Yes     Partners: Male     Other Topics Concern    Not on file     Social History Narrative      Lives with daughter, son-in-law and 2 grandchildren. Not employed outside the home. FAMILY HISTORY: Family history from the initial psychiatric evaluation has been reviewed (reviewed/updated 8/13/2017) with no additional updates (I asked patient and no additional family history provided).    Family History   Problem Relation Age of Onset    Hypertension Mother     Diabetes Mother     Psychiatric Disorder Father     Heart Disease Mother     Heart Disease Brother     Diabetes Brother     Psychiatric Disorder Sister        REVIEW OF SYSTEMS: (reviewed/updated 8/13/2017)  Appetite:good   Sleep: decreased more than normal and poor with DIMS (difficulty initiating & maintaining sleep)   All other Review of Systems: Negative except severe psychosis and agitation         MENTAL [employer] [address] (MSE):    MSE FINDINGS ARE WITHIN NORMAL LIMITS (WNL) UNLESS OTHERWISE STATED BELOW. ( ALL OF THE BELOW CATEGORIES OF THE MSE HAVE BEEN REVIEWED (reviewed 8/13/2017) AND UPDATED AS DEEMED APPROPRIATE )  General Presentation wnl   Orientation Fully oriented and pleasant   Vital Signs  See below (reviewed 8/13/2017); Vital Signs (BP, Pulse, & Temp) are within normal limits if not listed below. Gait and Station Stable/steady, no ataxia   Musculoskeletal System No extrapyramidal symptoms (EPS); no abnormal muscular movements or Tardive Dyskinesia (TD); muscle strength and tone are within normal limits   Language No aphasia or dysarthria   Speech:  Normal volume, speed and content   Thought Processes (+)linear and logical   Thought Associations wnl   Thought Content Reality based   Suicidal Ideations none   Homicidal Ideations none   Mood:  Pleasant    Affect:  Appropriate    Memory recent  fair   Memory remote:  fair   Concentration/Attention:  distractable   Fund of Knowledge below avg.    Insight:  wnl   Reliability wnl   Judgment:  wnl          VITALS:     Patient Vitals for the past 24 hrs:   Temp Pulse Resp BP SpO2   08/13/17 1210 97.9 °F (36.6 °C) 69 18 134/85 100 %   08/13/17 0746 98.1 °F (36.7 °C) 64 18 (!) 156/97 99 %   08/12/17 2030 98.3 °F (36.8 °C) 66 16 (!) 167/96 -     Wt Readings from Last 3 Encounters:   08/06/17 78 kg (172 lb)   03/14/16 89 kg (196 lb 3.2 oz)   07/21/15 90.7 kg (200 lb)     Temp Readings from Last 3 Encounters:   08/13/17 97.9 °F (36.6 °C)   04/03/16 98.2 °F (36.8 °C)   03/14/16 99 °F (37.2 °C)     BP Readings from Last 3 Encounters:   08/13/17 134/85   04/03/16 (!) 167/93   03/14/16 (!) 188/99     Pulse Readings from Last 3 Encounters:   08/13/17 69   04/03/16 68   03/14/16 88            DATA     LABORATORY DATA:(reviewed/updated 8/13/2017)  Recent Results (from the past 24 hour(s))   GLUCOSE, POC    Collection Time: 08/13/17  7:53 AM   Result Value Ref Range    Glucose (POC) 99 65 - 100 mg/dL    Performed by Andres Soni    GLUCOSE, POC    Collection Time: 08/13/17  4:11 PM   Result Value Ref Range    Glucose (POC) 124 (H) 65 - 100 mg/dL    Performed by [facility]      Lab Results   Component Value Date/Time    Valproic acid 93 08/02/2017 05:23 AM    Carbamazepine 6.8 07/18/2017 05:27 AM     No results found for: LITHM   RADIOLOGY REPORTS:(reviewed/updated 8/13/2017)  No results found.        MEDICATIONS     ALL MEDICATIONS:   Current Facility-Administered Medications   Medication Dose Route Frequency    naproxen (NAPROSYN) tablet 375 mg  375 mg Oral BID WITH MEALS    methyl salicylate-menthol (BENGAY) 15-10 % cream   Topical TID    docusate sodium (COLACE) capsule 100 mg  100 mg Oral BID PRN    oxybutynin chloride XL (DITROPAN XL) tablet 15 mg  15 mg Oral DAILY    lidocaine (LIDODERM) 5 % patch 1 Patch  1 Patch TransDERmal Q24H    therapeutic multivitamin (THERAGRAN) tablet 1 Tab  1 Tab Oral DAILY    fluPHENAZine decanoate (PROLIXIN) 25 mg/mL injection 25 mg  25 mg IntraMUSCular EVERY 2 WEEKS    loperamide (IMODIUM) capsule 2 mg  2 mg Oral Q4H PRN    lisinopril (PRINIVIL, ZESTRIL) tablet 10 mg  10 mg Oral DAILY    polyethylene glycol (MIRALAX) packet 17 g  17 g Oral DAILY    trihexyphenidyl (ARTANE) tablet 2 mg  2 mg Oral TID    pantoprazole (PROTONIX) tablet 40 mg  40 mg Oral ACB    divalproex ER (DEPAKOTE ER) 24 hour tablet 1,000 mg  1,000 mg Oral QHS    alum-mag hydroxide-simeth (MYLANTA) oral suspension 30 mL  30 mL Oral Q4H PRN    insulin lispro (HUMALOG) injection   SubCUTAneous BID    carBAMazepine XR (TEGretol XR) tablet 200 mg  200 mg Oral BID    zolpidem CR (AMBIEN CR) tablet 12.5 mg  12.5 mg Oral QHS    OLANZapine (ZyPREXA) tablet 5 mg  5 mg Oral Q6H PRN    diphenhydrAMINE (BENADRYL) injection 50 mg  50 mg IntraMUSCular Q6H PRN  LORazepam (ATIVAN) injection 1 mg  1 mg IntraMUSCular Q4H PRN    LORazepam (ATIVAN) tablet 1 mg  1 mg Oral Q4H PRN    benztropine (COGENTIN) tablet 1 mg  1 mg Oral BID PRN    benztropine (COGENTIN) injection 1 mg  1 mg IntraMUSCular BID PRN    acetaminophen (TYLENOL) tablet 650 mg  650 mg Oral Q4H PRN    nicotine (NICODERM CQ) 21 mg/24 hr patch 1 Patch  1 Patch TransDERmal DAILY PRN    SITagliptin (JANUVIA) tablet 100 mg  100 mg Oral DAILY    atorvastatin (LIPITOR) tablet 20 mg  20 mg Oral DAILY    amLODIPine (NORVASC) tablet 10 mg  10 mg Oral DAILY    glucose chewable tablet 16 g  4 Tab Oral PRN    glucagon (GLUCAGEN) injection 1 mg  1 mg IntraMUSCular PRN    dextrose 10 % infusion 125-250 mL  125-250 mL IntraVENous PRN      SCHEDULED MEDICATIONS:   Current Facility-Administered Medications   Medication Dose Route Frequency    naproxen (NAPROSYN) tablet 375 mg  375 mg Oral BID WITH MEALS    methyl salicylate-menthol (BENGAY) 15-10 % cream   Topical TID    oxybutynin chloride XL (DITROPAN XL) tablet 15 mg  15 mg Oral DAILY    lidocaine (LIDODERM) 5 % patch 1 Patch  1 Patch TransDERmal Q24H    therapeutic multivitamin (THERAGRAN) tablet 1 Tab  1 Tab Oral DAILY    fluPHENAZine decanoate (PROLIXIN) 25 mg/mL injection 25 mg  25 mg IntraMUSCular EVERY 2 WEEKS    lisinopril (PRINIVIL, ZESTRIL) tablet 10 mg  10 mg Oral DAILY    polyethylene glycol (MIRALAX) packet 17 g  17 g Oral DAILY    trihexyphenidyl (ARTANE) tablet 2 mg  2 mg Oral TID    pantoprazole (PROTONIX) tablet 40 mg  40 mg Oral ACB    divalproex ER (DEPAKOTE ER) 24 hour tablet 1,000 mg  1,000 mg Oral QHS    insulin lispro (HUMALOG) injection   SubCUTAneous BID    carBAMazepine XR (TEGretol XR) tablet 200 mg  200 mg Oral BID    zolpidem CR (AMBIEN CR) tablet 12.5 mg  12.5 mg Oral QHS    SITagliptin (JANUVIA) tablet 100 mg  100 mg Oral DAILY    atorvastatin (LIPITOR) tablet 20 mg  20 mg Oral DAILY    amLODIPine (NORVASC) tablet 10 mg  10 mg Oral DAILY          ASSESSMENT & PLAN     DIAGNOSES REQUIRING ACTIVE TREATMENT AND MONITORING: (reviewed/updated 8/13/2017)  Patient Active Hospital Problem List:     Schizoaffective disorder ([facility] [address] (5/18/2017)    Assessment: resolved psychosis    Plan:  Continue Prolixin decanoate every two weeks  Continue Depakote, monitor levels  Continue Tegretol, monitor levels  Await placement       EPS  Assessment: secondary to prolixin (now dec only)  Plan: change cogentin to artane    8/5/17 Continue same medications   8/6/17 continue same medications    I will continue to monitor blood levels (Depakote---a drug with a narrow therapeutic index= NTI) and associated labs for drug therapy implemented that require intense monitoring for toxicity as deemed appropriate based on current medication side effects and pharmacodynamically determined drug 1/2 lives. In summary, Rubi Santaigo, is a 65 y.o.  female who presents with a severe exacerbation of the principal diagnosis of Schizoaffective disorder ([facility] [address]  Patient's condition is improving. Patient requires continued inpatient hospitalization for further stabilization, safety monitoring and medication management. I will continue to coordinate the provision of individual, milieu, occupational, group, and substance abuse therapies to address target symptoms/diagnoses as deemed appropriate for the individual patient. A coordinated, multidisplinary treatment team round was conducted with the patient (this team consists of the nurse, psychiatric unit pharmcist,  and writer). Complete current electronic health record for patient has been reviewed today including consultant notes, ancillary staff notes, nurses and psychiatric tech notes. Suicide risk assessment completed and patient deemed to be of low risk for suicide at this time.      The following regarding medications was addressed during rounds with patient:   the risks and benefits of the proposed medication. The patient was given the opportunity to ask questions. Informed consent given to the use of the above medications. Will continue to adjust psychiatric and non-psychiatric medications (see above \"medication\" section and orders section for details) as deemed appropriate & based upon diagnoses and response to treatment. I will continue to order blood tests/labs and diagnostic tests as deemed appropriate and review results as they become available (see orders for details and above listed lab/test results). I will order psychiatric records from previous [facility] hospitals to further elucidate the nature of patient's psychopathology and review once available. I will gather additional collateral information from friends, family and o/p treatment team to further elucidate the nature of patient's psychopathology and baselline level of psychiatric functioning. I certify that this patient's inpatient psychiatric hospital services furnished since the previous certification were, and continue to be, required for treatment that could reasonably be expected to improve the patient's condition, or for diagnostic study, and that the patient continues to need, on a daily basis, active treatment furnished directly by or requiring the supervision of inpatient psychiatric facility personnel. In addition, the hospital records show that services furnished were intensive treatment services, admission or related services, or equivalent services.     EXPECTED DISCHARGE DATE/DAY: TBD     DISPOSITION: Home       Signed By:   Ludwin Suarez MD  8/13/2017

## 2017-08-13 NOTE — PROGRESS NOTES
Problem: Altered Thought Process (Adult/Pediatric)  Goal: *STG: Remains safe in hospital  Outcome: Progressing Towards Goal  1530: Greeted patient on unit in room resting quietly. Appears in no acute distress. Ambulating with steady gait with the use of her walker  No voiced concerns at present. BS steady at 124.      1630:  Alert and oriented x4  Medication and meal compliant. Patient remains calm and cooperative with staff and peers. Visible on unit.

## 2017-08-14 ENCOUNTER — APPOINTMENT (OUTPATIENT)
Dept: GENERAL RADIOLOGY | Age: 62
DRG: 750 | End: 2017-08-14
Attending: PSYCHIATRY & NEUROLOGY
Payer: MEDICAID

## 2017-08-14 LAB
GLUCOSE BLD STRIP.AUTO-MCNC: 118 MG/DL (ref 65–100)
GLUCOSE BLD STRIP.AUTO-MCNC: 165 MG/DL (ref 65–100)
SERVICE CMNT-IMP: ABNORMAL
SERVICE CMNT-IMP: ABNORMAL

## 2017-08-14 PROCEDURE — 74011250637 HC RX REV CODE- 250/637: Performed by: PSYCHIATRY & NEUROLOGY

## 2017-08-14 PROCEDURE — 74011636637 HC RX REV CODE- 636/637: Performed by: PSYCHIATRY & NEUROLOGY

## 2017-08-14 PROCEDURE — 71010 XR CHEST PORT: CPT

## 2017-08-14 PROCEDURE — 74011250637 HC RX REV CODE- 250/637: Performed by: HOSPITALIST

## 2017-08-14 PROCEDURE — 82962 GLUCOSE BLOOD TEST: CPT

## 2017-08-14 PROCEDURE — 74011250637 HC RX REV CODE- 250/637: Performed by: NURSE PRACTITIONER

## 2017-08-14 PROCEDURE — 65220000003 HC RM SEMIPRIVATE PSYCH

## 2017-08-14 RX ADMIN — THERA TABS 1 TABLET: TAB at 09:16

## 2017-08-14 RX ADMIN — ACETAMINOPHEN 650 MG: 325 TABLET, FILM COATED ORAL at 21:35

## 2017-08-14 RX ADMIN — AMLODIPINE BESYLATE 10 MG: 5 TABLET ORAL at 09:15

## 2017-08-14 RX ADMIN — ZOLPIDEM TARTRATE 12.5 MG: 6.25 TABLET, EXTENDED RELEASE ORAL at 21:23

## 2017-08-14 RX ADMIN — INSULIN LISPRO 2 UNITS: 100 INJECTION, SOLUTION INTRAVENOUS; SUBCUTANEOUS at 18:57

## 2017-08-14 RX ADMIN — CARBAMAZEPINE 200 MG: 200 TABLET, EXTENDED RELEASE ORAL at 17:46

## 2017-08-14 RX ADMIN — TRIHEXYPHENIDYL HYDROCHLORIDE 2 MG: 2 TABLET ORAL at 09:16

## 2017-08-14 RX ADMIN — NAPROXEN 375 MG: 250 TABLET ORAL at 17:46

## 2017-08-14 RX ADMIN — DIVALPROEX SODIUM 1000 MG: 500 TABLET, FILM COATED, EXTENDED RELEASE ORAL at 21:23

## 2017-08-14 RX ADMIN — PANTOPRAZOLE SODIUM 40 MG: 40 TABLET, DELAYED RELEASE ORAL at 07:36

## 2017-08-14 RX ADMIN — TRIHEXYPHENIDYL HYDROCHLORIDE 2 MG: 2 TABLET ORAL at 17:46

## 2017-08-14 RX ADMIN — OXYBUTYNIN CHLORIDE 15 MG: 5 TABLET, EXTENDED RELEASE ORAL at 09:15

## 2017-08-14 RX ADMIN — LISINOPRIL 10 MG: 10 TABLET ORAL at 09:17

## 2017-08-14 RX ADMIN — SITAGLIPTIN 100 MG: 100 TABLET, FILM COATED ORAL at 09:16

## 2017-08-14 RX ADMIN — MENTHOL, METHYL SALICYLATE: 10; 15 CREAM TOPICAL at 21:28

## 2017-08-14 RX ADMIN — ATORVASTATIN CALCIUM 20 MG: 20 TABLET, FILM COATED ORAL at 09:16

## 2017-08-14 RX ADMIN — MENTHOL, METHYL SALICYLATE: 10; 15 CREAM TOPICAL at 17:46

## 2017-08-14 RX ADMIN — MENTHOL, METHYL SALICYLATE: 10; 15 CREAM TOPICAL at 09:17

## 2017-08-14 RX ADMIN — NAPROXEN 375 MG: 250 TABLET ORAL at 09:16

## 2017-08-14 RX ADMIN — TRIHEXYPHENIDYL HYDROCHLORIDE 2 MG: 2 TABLET ORAL at 21:23

## 2017-08-14 RX ADMIN — CARBAMAZEPINE 200 MG: 200 TABLET, EXTENDED RELEASE ORAL at 09:15

## 2017-08-14 NOTE — PROGRESS NOTES
Problem: Falls - Risk of  Goal: *Absence of falls  Outcome: Progressing Towards Goal  No falls using walker appropriately    Problem: Altered Thought Process (Adult/Pediatric)  Goal: *STG: Participates in treatment plan  Outcome: Progressing Towards Goal  Review meds, out on unit social w peers and staff. Mood and affect smiling and hopeful. Pt daily goal is to talk to daughter and tx team about d/c placement  Goal: *STG: Remains safe in hospital  Outcome: Progressing Towards Goal  No injury or harm  Goal: *STG: Seeks staff when feelings of anxiety and fear arise  Outcome: Progressing Towards Goal  Denies anxiety or fear, noted pt smiling and bright  Goal: *STG: Complies with medication therapy  Outcome: Progressing Towards Goal  compliant  Goal: *STG: Attends activities and groups  Outcome: Progressing Towards Goal  engaged  Goal: *STG: Demonstrates ability to understand and use improved judgment in daily activities and relationships  Outcome: Progressing Towards Goal  Demonstrates appropriate behaviors, ability to follow staff direction and stable mood. Problem: Diabetes Self-Management  Goal: *Monitoring blood glucose, interpreting and using results  Identify recommended blood glucose targets and personal targets.    Outcome: Progressing Towards Goal  Compliant with POC testing and verbalized understanding purpose of glucose monitoring

## 2017-08-14 NOTE — INTERDISCIPLINARY ROUNDS
Behavioral Health Interdisciplinary Rounds     Patient Name: Evangelina Davis  Age: 65 y.o. Room/Bed:  733/  Primary Diagnosis: Schizoaffective disorder (HCC)   Admission Status: Voluntary     Readmission within 30 days: no  Power of  in place: no  Patient requires a blocked bed: yes          Reason for blocked bed:     VTE Prophylaxis: Not indicated  Mobility needs/Fall risk: yes    Nutritional Plan: yes  Consults:          Labs/Testing due today?: no    Sleep hours: 4.50      Participation in Care/Groups:  yes  Medication Compliant?: Yes  PRNS (last 24 hours): Sleep Aid    Restraints (last 24 hours):  no  Substance Abuse:  no  CIWA (range last 24 hours):  COWS (range last 24 hours):   Alcohol screening (AUDIT) completed -     If applicable, date SBIRT discussed in treatment team AND documented: N/A  Tobacco - patient is a smoker: yes   Date tobacco education completed by RN:   24 hour chart check complete: yes     Patient goal(s) for today:   Treatment team focus/goals: Follow-up with Mr. Anthony Love  LOS:  80  Expected LOS: TBD  Psychiatric [address] - Yes   Name of Decision maker if patient has Psychiatric Care Directive: Andrews Evangelista (daughter/POA)   Patient was offered information, patient accepted.    Financial concerns/prescription coverage: [employer] Medicaid  Date of last family contact:      Family requesting physician contact today: No   Discharge plan: Placement     Outpatient provider(s): [facility]    Participating treatment team members: PEGGY Ward; Dr. Valente Almonte MD; Verner Eaton, ESTER; Kailash Egan, ESTER

## 2017-08-14 NOTE — BH NOTES
GROUP THERAPY PROGRESS NOTE    Pérez Brito participated in a Morning Process Group on the General Unit with a focus on identifying feelings, planning for the day, and learning more about DBT concepts of \"Mindfulness. \"     Group time: 65 minutes. Personal goal for participation: To identify feelings and increase the capacity to manage ones ability to cope. Goal orientation: The patient will be able to introduce themselves to their peers, identify their feelings, and consider the importance of coping skill development. The patients were also introduced to the handout on Mindfulness. This included a didactic section and an opportunity for patients to respond. Group therapy participation: This patient partially participated in the therapy session. Therapeutic interventions reviewed and discussed: The patients were encouraged to identify themselves by their first names, acknowledge their feelings, and define a goal for their day. This was followed by a didactic session on DBT mindsets. These concepts included:   1) One-Mindfully: In the moment on the front and the following suggestions on the back of the card: a) Just do one thing at a time; b) Let go of distractions; c) Come back to the moment and what you are doing; and d) Do each thing with all your attention. 2) Effectiveness: Focus on what works: a) Do what needs to be done; b) Play by the rules (I am not an exception) and consider the context; c) Act skillfully within the given situation; d) Keep an eye on your objectives (and do what is necessary); e) Let go of vengeance, useless anger, and the need to be righteous. 3) Observe: Just notice: a) Have a Ray mind; b) Control your attention, cling to nothing; c) Be alert; d) Step inside and observe; e) Watch thoughts come and go; f) Notice what flows through your senses.    4) Nonjudgmental Stance: a) Be aware but dont evaluate; b) Sort opinions from facts; c) Accept each moment; d) Acknowledge the helpful and the harmful, but dont  it; e) Dont  your judging. 5) Wise Mind: a) Integration of emotion mind and reasonable mind; b) Allows intuition; c) Find it in the belly, the center of your head, or by following your breath. At the end of the session all the group members were provided a summary of the topics discussed and a worksheet to review on their own time. Impression of participation: The patient said she was hopeful that she might be discharged within the next couple of days to go to her [facility] in [place], [place]. The patient expressed no SI/HI and no overt psychotic symptoms. She was called out of group to meet with her treatment team and returned afterwards. Her affect was slightly anxious and her mood was cooperative and wanting to engage in the group process as much as she can. She identified her  warning signs as not taking her medication and having racing thoughts. This patient listened to the didactic portion of the session but decided not to take a copy of the handout for self-review.

## 2017-08-14 NOTE — BH NOTES
PSYCHIATRIC PROGRESS NOTE         Patient Name  Eveline Hammond   Date of Birth 1955   [facility] [id number]   Medical Record Number  [id number]      Age  65 y.o. PCP Savanah Cordero MD   Admit date:  5/18/2017    Room Number  733/01  @ [facility]   Date of Service  8/14/2017          PSYCHOTHERAPY SESSION NOTE:  Length of psychotherapy session: 15 minutes    Main condition/diagnosis/issues treated during session today, 8/14/2017 : housing    I employed Cognitive Behavioral therapy techniques, Reality-Oriented psychotherapy, as well as supportive psychotherapy in regards to various ongoing psychosocial stressors, including the following: pre-admission and current problems; housing issues; stress of hospitalization. Interpersonal relationship issues and psychodynamic conflicts explored. Attempts made to alleviate maladaptive patterns. Overall, patient is progressing    Treatment Plan Update (reviewed an updated 8/14/2017) : I will modify psychotherapy tx plan by implementing more stress management strategies, building upon cognitive behavioral techniques, increasing coping skills, as well as shoring up psychological defenses). An extended energy and skill set was needed to engage pt in psychotherapy due to some of the following: resistiveness, complexity, negativity, confrontational nature, hostile behaviors, and/or severe abnormalities in thought processes/psychosis resulting in the loss of expressive/receptive language communication skills. E & M PROGRESS NOTE:         HISTORY       CC:  No complaints  HISTORY OF PRESENT ILLNESS/INTERVAL HISTORY:  (reviewed/updated 8/14/2017). per initial evaluation: The patient, Eveline Hammond, is a 65 y.o.  BLACK OR  female with a past psychiatric history significant for Schizoaffective disorder, long history of noncompliance who presents at this time with complaints of (and/or evidence of) the following emotional symptoms: agitation, delusions and psychotic behavior. Additional symptomatology include noncompliance with medications. The above symptoms have been present for several weeks. She lives with a caretaker who reports recent paranoia, agitation. These symptoms are of high severity. These symptoms are constant in nature. The patient's condition has been precipitated by noncompliance and psychosocial stressors . No illicit substance abuse. Eveline Hammond presents/reports/evidences the following emotional symptoms today, 8/14/2017: None. Ms. Dank Astudillo reports feeling okay. Mood stable, adequate sleep over night. No agitation. Compliant with medications. 8/4/17- Very stable. Waiting for placement. Sleep and appetite are good. 8/5/17 She is frustrated that she still her and no anger issues and no aggressive behavior  And she is  sleeping better. 8/6/17 She is doing better and no anger issues and no anger issues and no aggressive and no mood swings and no issues with sleep   8/7/17- mrs. Dank Astudillo continues to exhibit stable mood, behavior, thinking. She expresses some sadness due to prolonged hospital course. 8/8/17- Mrs. Dank Astudillo denies any complaints this morning. She was in a very bright and funny mood. Good hygiene, dressed appropriately. 8/9/17- Mrs. Dank Astudillo was very pleasant and engaging this morning. Sleeping well at night, compliant with medications. 8/10/17- Mrs. Dank Astudillo is looking forward to her interview tomorrow with a potential assisted living/ group home. Sleeping well at night. Eating her meals. Compliant with medications. Attending and participating in groups. 8/11/17- Patient slept well over night and she reports feeling well rested. She expressed appropriate level of anxiety about her interview for housing today. No psychosis or agitation. Tolerating medications. 8/12/17-Reportedly housing interviewer from yesterday was a no show. Patient is ambulating with the aid of a walker. C/O B/L knee pain. Being followed by Medicine. Ortho consult is pending. No A/V/H.  8/13/17-Patient is at her baseline. Awaiting placement. Seen by Ortho who do not recommend knee injection. 8/14/17- Mrs. Marga Fuentes was very bright in affect this morning. She is looking forward to discharge tomorrow. SIDE EFFECTS: (reviewed/updated 8/14/2017)  None reported or admitted to. No noted toxicity with use of Depakote/   ALLERGIES:(reviewed/updated 8/14/2017)  Allergies   Allergen Reactions    Penicillins Rash      MEDICATIONS PRIOR TO ADMISSION:(reviewed/updated 8/14/2017)  Prescriptions Prior to Admission   Medication Sig    QUEtiapine (SEROQUEL) 25 mg tablet Take 25 mg by mouth daily.  acetaminophen (TYLENOL) 500 mg tablet Take 500 mg by mouth two (2) times a day.  cloNIDine HCl (CATAPRES) 0.2 mg tablet Take  by mouth three (3) times daily.  hydrOXYzine pamoate (VISTARIL) 50 mg capsule Take 50 mg by mouth four (4) times daily.  LORazepam (ATIVAN) 0.5 mg tablet Take 0.5 mg by mouth two (2) times a day.  divalproex DR (DEPAKOTE) 500 mg tablet Take 500 mg by mouth two (2) times a day.  escitalopram oxalate (LEXAPRO) 5 mg tablet Take 5 mg by mouth daily.  naproxen (NAPROSYN) 500 mg tablet Take 500 mg by mouth two (2) times daily (with meals).  gabapentin (NEURONTIN) 100 mg capsule Take 100 mg by mouth two (2) times a day.  loperamide (IMODIUM) 2 mg capsule Take 2 mg by mouth every four (4) hours as needed for Diarrhea. Indications: Diarrhea    amLODIPine (NORVASC) 10 mg tablet Take 1 Tab by mouth daily.  atorvastatin (LIPITOR) 20 mg tablet Take 1 Tab by mouth nightly.  carBAMazepine (TEGRETOL) 200 mg tablet Take 1 Tab by mouth three (3) times daily.  hydrochlorothiazide (HYDRODIURIL) 25 mg tablet Take 1 Tab by mouth daily.  sitaGLIPtin (JANUVIA) 100 mg tablet Take 1 Tab by mouth daily.  QUEtiapine (SEROQUEL) 100 mg tablet Take 100 mg by mouth every evening.       PAST MEDICAL HISTORY: Past medical history from the initial psychiatric evaluation has been reviewed (reviewed/updated 8/14/2017) with no additional updates (I asked patient and no additional past medical history provided). Past Medical History:   Diagnosis Date    Aggressive outburst     Arthritis     Bipolar 1 disorder ([facility] 4-12-13    Diabetes mellitus ([facility]     Homicide attempt     Hypertension     Murmur     Paranoid schizophrenia ([facility]     Psychiatric disorder     [facility] 3/20/2013     Past Surgical History:   Procedure Laterality Date    HX CHOLECYSTECTOMY      HX ORTHOPAEDIC      Excision Non-malignant bone cyst left femur      SOCIAL HISTORY: Social history from the initial psychiatric evaluation has been reviewed (reviewed/updated 8/14/2017) with no additional updates (I asked patient and no additional social history provided). Social History     Social History    Marital status:      Spouse name: N/A    Number of children: N/A    Years of education: N/A     Occupational History    Not on file. Social History Main Topics    Smoking status: Former Smoker     Years: 40.00     Quit date: 3/19/1983    Smokeless tobacco: Not on file    Alcohol use No    Drug use: No    Sexual activity: Yes     Partners: Male     Other Topics Concern    Not on file     Social History Narrative      Lives with daughter, son-in-law and 2 grandchildren. Not employed outside the home. FAMILY HISTORY: Family history from the initial psychiatric evaluation has been reviewed (reviewed/updated 8/14/2017) with no additional updates (I asked patient and no additional family history provided).    Family History   Problem Relation Age of Onset    Hypertension Mother     Diabetes Mother     Psychiatric Disorder Father     Heart Disease Mother     Heart Disease Brother     Diabetes Brother     Psychiatric Disorder Sister        REVIEW OF SYSTEMS: (reviewed/updated 8/14/2017)  Appetite:good   Sleep: decreased more than normal and poor with DIMS (difficulty initiating & maintaining sleep)   All other Review of Systems: Negative except severe psychosis and agitation         [facility] (MSE):    MSE FINDINGS ARE WITHIN NORMAL LIMITS (WNL) UNLESS OTHERWISE STATED BELOW. ( ALL OF THE BELOW CATEGORIES OF THE MSE HAVE BEEN REVIEWED (reviewed 8/14/2017) AND UPDATED AS DEEMED APPROPRIATE )  General Presentation wnl   Orientation Fully oriented and pleasant   Vital Signs  See below (reviewed 8/14/2017); Vital Signs (BP, Pulse, & Temp) are within normal limits if not listed below. Gait and Station Stable/steady, no ataxia   Musculoskeletal System No extrapyramidal symptoms (EPS); no abnormal muscular movements or Tardive Dyskinesia (TD); muscle strength and tone are within normal limits   Language No aphasia or dysarthria   Speech:  Normal volume, speed and content   Thought Processes (+)linear and logical   Thought Associations wnl   Thought Content Reality based   Suicidal Ideations none   Homicidal Ideations none   Mood:  Pleasant    Affect:  Appropriate    Memory recent  fair   Memory remote:  fair   Concentration/Attention:  distractable   Fund of Knowledge below avg.    Insight:  wnl   Reliability wnl   Judgment:  wnl          VITALS:     Patient Vitals for the past 24 hrs:   Temp Pulse Resp BP SpO2   08/14/17 0753 97.4 °F (36.3 °C) 66 16 (!) 156/92 100 %   08/13/17 2015 98.1 °F (36.7 °C) 69 16 147/83 97 %     Wt Readings from Last 3 Encounters:   08/06/17 78 kg (172 lb)   03/14/16 89 kg (196 lb 3.2 oz)   07/21/15 90.7 kg (200 lb)     Temp Readings from Last 3 Encounters:   08/14/17 97.4 °F (36.3 °C)   04/03/16 98.2 °F (36.8 °C)   03/14/16 99 °F (37.2 °C)     BP Readings from Last 3 Encounters:   08/14/17 (!) 156/92   04/03/16 (!) 167/93   03/14/16 (!) 188/99     Pulse Readings from Last 3 Encounters:   08/14/17 66   04/03/16 68   03/14/16 88            DATA     LABORATORY DATA:(reviewed/updated 8/14/2017)  Recent Results (from the past 24 hour(s))   GLUCOSE, POC    Collection Time: 08/14/17  8:01 AM   Result Value Ref Range    Glucose (POC) 118 (H) 65 - 100 mg/dL    Performed by Lazarus Hillier    GLUCOSE, POC    Collection Time: 08/14/17  4:24 PM   Result Value Ref Range    Glucose (POC) 165 (H) 65 - 100 mg/dL    Performed by Narciso Garner      Lab Results   Component Value Date/Time    Valproic acid 93 08/02/2017 05:23 AM    Carbamazepine 6.8 07/18/2017 05:27 AM     No results found for: LITHM   RADIOLOGY REPORTS:(reviewed/updated 8/14/2017)  No results found.        MEDICATIONS     ALL MEDICATIONS:   Current Facility-Administered Medications   Medication Dose Route Frequency    naproxen (NAPROSYN) tablet 375 mg  375 mg Oral BID WITH MEALS    methyl salicylate-menthol (BENGAY) 15-10 % cream   Topical TID    docusate sodium (COLACE) capsule 100 mg  100 mg Oral BID PRN    oxybutynin chloride XL (DITROPAN XL) tablet 15 mg  15 mg Oral DAILY    lidocaine (LIDODERM) 5 % patch 1 Patch  1 Patch TransDERmal Q24H    therapeutic multivitamin (THERAGRAN) tablet 1 Tab  1 Tab Oral DAILY    fluPHENAZine decanoate (PROLIXIN) 25 mg/mL injection 25 mg  25 mg IntraMUSCular EVERY 2 WEEKS    loperamide (IMODIUM) capsule 2 mg  2 mg Oral Q4H PRN    lisinopril (PRINIVIL, ZESTRIL) tablet 10 mg  10 mg Oral DAILY    polyethylene glycol (MIRALAX) packet 17 g  17 g Oral DAILY    trihexyphenidyl (ARTANE) tablet 2 mg  2 mg Oral TID    pantoprazole (PROTONIX) tablet 40 mg  40 mg Oral ACB    divalproex ER (DEPAKOTE ER) 24 hour tablet 1,000 mg  1,000 mg Oral QHS    alum-mag hydroxide-simeth (MYLANTA) oral suspension 30 mL  30 mL Oral Q4H PRN    insulin lispro (HUMALOG) injection   SubCUTAneous BID    carBAMazepine XR (TEGretol XR) tablet 200 mg  200 mg Oral BID    zolpidem CR (AMBIEN CR) tablet 12.5 mg  12.5 mg Oral QHS    OLANZapine (ZyPREXA) tablet 5 mg  5 mg Oral Q6H PRN    diphenhydrAMINE (BENADRYL) injection 50 mg  50 mg IntraMUSCular Q6H PRN    LORazepam (ATIVAN) injection 1 mg  1 mg IntraMUSCular Q4H PRN    LORazepam (ATIVAN) tablet 1 mg  1 mg Oral Q4H PRN    benztropine (COGENTIN) tablet 1 mg  1 mg Oral BID PRN    benztropine (COGENTIN) injection 1 mg  1 mg IntraMUSCular BID PRN    acetaminophen (TYLENOL) tablet 650 mg  650 mg Oral Q4H PRN    nicotine (NICODERM CQ) 21 mg/24 hr patch 1 Patch  1 Patch TransDERmal DAILY PRN    SITagliptin (JANUVIA) tablet 100 mg  100 mg Oral DAILY    atorvastatin (LIPITOR) tablet 20 mg  20 mg Oral DAILY    amLODIPine (NORVASC) tablet 10 mg  10 mg Oral DAILY    glucose chewable tablet 16 g  4 Tab Oral PRN    glucagon (GLUCAGEN) injection 1 mg  1 mg IntraMUSCular PRN    dextrose 10 % infusion 125-250 mL  125-250 mL IntraVENous PRN      SCHEDULED MEDICATIONS:   Current Facility-Administered Medications   Medication Dose Route Frequency    naproxen (NAPROSYN) tablet 375 mg  375 mg Oral BID WITH MEALS    methyl salicylate-menthol (BENGAY) 15-10 % cream   Topical TID    oxybutynin chloride XL (DITROPAN XL) tablet 15 mg  15 mg Oral DAILY    lidocaine (LIDODERM) 5 % patch 1 Patch  1 Patch TransDERmal Q24H    therapeutic multivitamin (THERAGRAN) tablet 1 Tab  1 Tab Oral DAILY    fluPHENAZine decanoate (PROLIXIN) 25 mg/mL injection 25 mg  25 mg IntraMUSCular EVERY 2 WEEKS    lisinopril (PRINIVIL, ZESTRIL) tablet 10 mg  10 mg Oral DAILY    polyethylene glycol (MIRALAX) packet 17 g  17 g Oral DAILY    trihexyphenidyl (ARTANE) tablet 2 mg  2 mg Oral TID    pantoprazole (PROTONIX) tablet 40 mg  40 mg Oral ACB    divalproex ER (DEPAKOTE ER) 24 hour tablet 1,000 mg  1,000 mg Oral QHS    insulin lispro (HUMALOG) injection   SubCUTAneous BID    carBAMazepine XR (TEGretol XR) tablet 200 mg  200 mg Oral BID    zolpidem CR (AMBIEN CR) tablet 12.5 mg  12.5 mg Oral QHS    SITagliptin (JANUVIA) tablet 100 mg  100 mg Oral DAILY    atorvastatin (LIPITOR) tablet 20 mg  20 mg Oral DAILY    amLODIPine (NORVASC) tablet 10 mg  10 mg Oral DAILY          ASSESSMENT & PLAN     DIAGNOSES REQUIRING ACTIVE TREATMENT AND MONITORING: (reviewed/updated 8/14/2017)  Patient Active Hospital Problem List:     Schizoaffective disorder ([facility] (5/18/2017)    Assessment: resolved psychosis    Plan:  Continue Prolixin decanoate every two weeks  Continue Depakote, monitor levels  Continue Tegretol, monitor levels  Await placement       EPS  Assessment: secondary to prolixin (now dec only)  Plan: change cogentin to artane    8/5/17 Continue same medications   8/6/17 continue same medications    I will continue to monitor blood levels (Depakote---a drug with a narrow therapeutic index= NTI) and associated labs for drug therapy implemented that require intense monitoring for toxicity as deemed appropriate based on current medication side effects and pharmacodynamically determined drug 1/2 lives. In summary, Destiney Found, is a 65 y.o.  female who presents with a severe exacerbation of the principal diagnosis of Schizoaffective disorder ([facility]  Patient's condition is improving. Patient requires continued inpatient hospitalization for further stabilization, safety monitoring and medication management. I will continue to coordinate the provision of individual, milieu, occupational, group, and substance abuse therapies to address target symptoms/diagnoses as deemed appropriate for the individual patient. A coordinated, multidisplinary treatment team round was conducted with the patient (this team consists of the nurse, psychiatric unit pharmcist,  and writer). Complete current electronic health record for patient has been reviewed today including consultant notes, ancillary staff notes, nurses and psychiatric tech notes. Suicide risk assessment completed and patient deemed to be of low risk for suicide at this time.      The following regarding medications was addressed during rounds with patient:   the risks and benefits of the proposed medication. The patient was given the opportunity to ask questions. Informed consent given to the use of the above medications. Will continue to adjust psychiatric and non-psychiatric medications (see above \"medication\" section and orders section for details) as deemed appropriate & based upon diagnoses and response to treatment. I will continue to order blood tests/labs and diagnostic tests as deemed appropriate and review results as they become available (see orders for details and above listed lab/test results). I will order psychiatric records from previous [facility] hospitals to further elucidate the nature of patient's psychopathology and review once available. I will gather additional collateral information from friends, family and o/p treatment team to further elucidate the nature of patient's psychopathology and baselline level of psychiatric functioning. I certify that this patient's inpatient psychiatric hospital services furnished since the previous certification were, and continue to be, required for treatment that could reasonably be expected to improve the patient's condition, or for diagnostic study, and that the patient continues to need, on a daily basis, active treatment furnished directly by or requiring the supervision of inpatient psychiatric facility personnel. In addition, the hospital records show that services furnished were intensive treatment services, admission or related services, or equivalent services.     EXPECTED DISCHARGE DATE/DAY: TBD     DISPOSITION: Home       Signed By:   Zo Sheldon MD  8/14/2017

## 2017-08-14 NOTE — BH NOTES
Was asleep at start of shift with respirations noted as even and unlabored. as chest was rising and falling. Will continue to monitor throughout shift.

## 2017-08-15 LAB
GLUCOSE BLD STRIP.AUTO-MCNC: 102 MG/DL (ref 65–100)
GLUCOSE BLD STRIP.AUTO-MCNC: 131 MG/DL (ref 65–100)
SERVICE CMNT-IMP: ABNORMAL
SERVICE CMNT-IMP: ABNORMAL

## 2017-08-15 PROCEDURE — 65220000003 HC RM SEMIPRIVATE PSYCH

## 2017-08-15 PROCEDURE — 82962 GLUCOSE BLOOD TEST: CPT

## 2017-08-15 PROCEDURE — 74011250637 HC RX REV CODE- 250/637: Performed by: HOSPITALIST

## 2017-08-15 PROCEDURE — 74011250637 HC RX REV CODE- 250/637: Performed by: NURSE PRACTITIONER

## 2017-08-15 PROCEDURE — 74011250637 HC RX REV CODE- 250/637: Performed by: PSYCHIATRY & NEUROLOGY

## 2017-08-15 RX ORDER — ATORVASTATIN CALCIUM 20 MG/1
20 TABLET, FILM COATED ORAL DAILY
Qty: 15 TAB | Refills: 1 | Status: SHIPPED | OUTPATIENT
Start: 2017-08-15

## 2017-08-15 RX ORDER — CARBAMAZEPINE 200 MG/1
200 TABLET, EXTENDED RELEASE ORAL 2 TIMES DAILY
Qty: 30 TAB | Refills: 1 | Status: SHIPPED | OUTPATIENT
Start: 2017-08-15 | End: 2020-08-07

## 2017-08-15 RX ORDER — DOCUSATE SODIUM 100 MG/1
100 CAPSULE, LIQUID FILLED ORAL
Qty: 30 CAP | Refills: 1 | Status: SHIPPED | OUTPATIENT
Start: 2017-08-15 | End: 2017-11-13

## 2017-08-15 RX ORDER — LISINOPRIL 10 MG/1
10 TABLET ORAL DAILY
Qty: 15 TAB | Refills: 1 | Status: SHIPPED | OUTPATIENT
Start: 2017-08-15 | End: 2020-08-07

## 2017-08-15 RX ORDER — LIDOCAINE 50 MG/G
2 PATCH TOPICAL EVERY 24 HOURS
Status: DISCONTINUED | OUTPATIENT
Start: 2017-08-16 | End: 2017-08-16 | Stop reason: HOSPADM

## 2017-08-15 RX ORDER — AMLODIPINE BESYLATE 10 MG/1
10 TABLET ORAL DAILY
Qty: 15 TAB | Refills: 1 | Status: SHIPPED | OUTPATIENT
Start: 2017-08-15 | End: 2020-08-07

## 2017-08-15 RX ORDER — TRIHEXYPHENIDYL HYDROCHLORIDE 2 MG/1
2 TABLET ORAL 3 TIMES DAILY
Qty: 90 TAB | Refills: 1 | Status: SHIPPED | OUTPATIENT
Start: 2017-08-15 | End: 2017-11-13

## 2017-08-15 RX ORDER — DIVALPROEX SODIUM 500 MG/1
1000 TABLET, EXTENDED RELEASE ORAL
Qty: 30 TAB | Refills: 1 | Status: SHIPPED | OUTPATIENT
Start: 2017-08-15 | End: 2020-08-07

## 2017-08-15 RX ORDER — NAPROXEN 375 MG/1
375 TABLET ORAL 2 TIMES DAILY WITH MEALS
Qty: 30 TAB | Refills: 1 | Status: SHIPPED | OUTPATIENT
Start: 2017-08-15 | End: 2017-08-16

## 2017-08-15 RX ORDER — ZOLPIDEM TARTRATE 12.5 MG/1
12.5 TABLET, FILM COATED, EXTENDED RELEASE ORAL
Qty: 15 TAB | Refills: 1 | Status: SHIPPED | OUTPATIENT
Start: 2017-08-15 | End: 2020-08-07

## 2017-08-15 RX ORDER — FLUPHENAZINE DECANOATE 25 MG/ML
25 INJECTION, SOLUTION INTRAMUSCULAR; SUBCUTANEOUS EVERY 2 WEEKS
Qty: 1 VIAL | Refills: 1 | Status: SHIPPED | OUTPATIENT
Start: 2017-08-18

## 2017-08-15 RX ORDER — OXYBUTYNIN CHLORIDE 15 MG/1
15 TABLET, EXTENDED RELEASE ORAL DAILY
Qty: 15 TAB | Refills: 1 | Status: SHIPPED | OUTPATIENT
Start: 2017-08-15 | End: 2020-08-07

## 2017-08-15 RX ORDER — POLYETHYLENE GLYCOL 3350 17 G/17G
17 POWDER, FOR SOLUTION ORAL
Qty: 15 PACKET | Refills: 1 | Status: SHIPPED | OUTPATIENT
Start: 2017-08-15 | End: 2020-08-07

## 2017-08-15 RX ORDER — PANTOPRAZOLE SODIUM 40 MG/1
40 TABLET, DELAYED RELEASE ORAL
Qty: 15 TAB | Refills: 1 | Status: SHIPPED | OUTPATIENT
Start: 2017-08-15 | End: 2020-08-07

## 2017-08-15 RX ORDER — NAPROXEN 250 MG/1
375 TABLET ORAL
Status: DISCONTINUED | OUTPATIENT
Start: 2017-08-15 | End: 2017-08-16 | Stop reason: HOSPADM

## 2017-08-15 RX ORDER — LIDOCAINE 50 MG/G
PATCH TOPICAL
Qty: 15 EACH | Refills: 0 | Status: SHIPPED | OUTPATIENT
Start: 2017-08-15 | End: 2020-08-07

## 2017-08-15 RX ADMIN — TRIHEXYPHENIDYL HYDROCHLORIDE 2 MG: 2 TABLET ORAL at 08:38

## 2017-08-15 RX ADMIN — THERA TABS 1 TABLET: TAB at 08:38

## 2017-08-15 RX ADMIN — SITAGLIPTIN 100 MG: 100 TABLET, FILM COATED ORAL at 08:38

## 2017-08-15 RX ADMIN — MENTHOL, METHYL SALICYLATE: 10; 15 CREAM TOPICAL at 15:18

## 2017-08-15 RX ADMIN — OXYBUTYNIN CHLORIDE 15 MG: 5 TABLET, EXTENDED RELEASE ORAL at 08:38

## 2017-08-15 RX ADMIN — LISINOPRIL 10 MG: 10 TABLET ORAL at 08:38

## 2017-08-15 RX ADMIN — DIVALPROEX SODIUM 1000 MG: 500 TABLET, FILM COATED, EXTENDED RELEASE ORAL at 21:38

## 2017-08-15 RX ADMIN — MENTHOL, METHYL SALICYLATE: 10; 15 CREAM TOPICAL at 21:39

## 2017-08-15 RX ADMIN — MENTHOL, METHYL SALICYLATE: 10; 15 CREAM TOPICAL at 08:41

## 2017-08-15 RX ADMIN — NAPROXEN 375 MG: 250 TABLET ORAL at 08:38

## 2017-08-15 RX ADMIN — ZOLPIDEM TARTRATE 12.5 MG: 6.25 TABLET, EXTENDED RELEASE ORAL at 21:38

## 2017-08-15 RX ADMIN — PANTOPRAZOLE SODIUM 40 MG: 40 TABLET, DELAYED RELEASE ORAL at 07:20

## 2017-08-15 RX ADMIN — CARBAMAZEPINE 200 MG: 200 TABLET, EXTENDED RELEASE ORAL at 17:52

## 2017-08-15 RX ADMIN — TRIHEXYPHENIDYL HYDROCHLORIDE 2 MG: 2 TABLET ORAL at 21:38

## 2017-08-15 RX ADMIN — TRIHEXYPHENIDYL HYDROCHLORIDE 2 MG: 2 TABLET ORAL at 17:13

## 2017-08-15 RX ADMIN — CARBAMAZEPINE 200 MG: 200 TABLET, EXTENDED RELEASE ORAL at 08:37

## 2017-08-15 RX ADMIN — AMLODIPINE BESYLATE 10 MG: 5 TABLET ORAL at 08:38

## 2017-08-15 RX ADMIN — ACETAMINOPHEN 650 MG: 325 TABLET, FILM COATED ORAL at 15:20

## 2017-08-15 RX ADMIN — ATORVASTATIN CALCIUM 20 MG: 20 TABLET, FILM COATED ORAL at 08:38

## 2017-08-15 NOTE — PROGRESS NOTES
Problem: Falls - Risk of  Goal: *Absence of falls  Outcome: Progressing Towards Goal  Remain injury free. Goal: *Knowledge of fall prevention  Outcome: Progressing Towards Goal  Verbalizes understanding    Problem: Altered Thought Process (Adult/Pediatric)  Goal: *STG: Participates in treatment plan  Outcome: Resolved/Met Date Met:  08/15/17  Out on unit, in dayroom, and participates to group. Possible discharge today. Goal: *STG: Remains safe in hospital  Outcome: Resolved/Met Date Met:  08/15/17  Understands how to remain safe by using her walker and asking for assistance as needed. Goal: *STG: Seeks staff when feelings of anxiety and fear arise  Outcome: Progressing Towards Goal  Comes to talk to staff when symptoms arise. Goal: *STG: Complies with medication therapy  Outcome: Progressing Towards Goal  Hellen Kraus takes all meds as ordered by her doctor. Verbalizes what her medications are for and what the side effects are. Goal: *STG: Attends activities and groups  Outcome: Resolved/Met Date Met:  08/15/17  Hellen Kraus is out in the dayroom, goes to groups, and participates in all activities.

## 2017-08-15 NOTE — BH NOTES
Awake early part of shift and settled to sleep with respirations noted as even and unlabored by this writing . Will continue to monitor throughout shift.

## 2017-08-15 NOTE — BH NOTES
PRN Medication Documentation    Specific patient behavior that led to need for PRN medication: headache pain and knee pain  Staff interventions attempted prior to PRN being given: rest, dimming lights  PRN medication given: * mg tylenol  Patient response/effectiveness of PRN medication: will reassess pain within 1 hour

## 2017-08-15 NOTE — BH NOTES
PSYCHIATRIC PROGRESS NOTE         Patient Name  Ashley Charles   Date of Birth 1955   [facility] [id number]   Medical Record Number  [id number]      Age  65 y.o. PCP Flex Berry MD   Admit date:  5/18/2017    Room Number  733/01  @ [facility]   Date of Service  8/15/2017          PSYCHOTHERAPY SESSION NOTE:  Length of psychotherapy session: 15 minutes    Main condition/diagnosis/issues treated during session today, 8/15/2017 : housing    I employed Cognitive Behavioral therapy techniques, Reality-Oriented psychotherapy, as well as supportive psychotherapy in regards to various ongoing psychosocial stressors, including the following: pre-admission and current problems; housing issues; stress of hospitalization. Interpersonal relationship issues and psychodynamic conflicts explored. Attempts made to alleviate maladaptive patterns. Overall, patient is progressing    Treatment Plan Update (reviewed an updated 8/15/2017) : I will modify psychotherapy tx plan by implementing more stress management strategies, building upon cognitive behavioral techniques, increasing coping skills, as well as shoring up psychological defenses). An extended energy and skill set was needed to engage pt in psychotherapy due to some of the following: resistiveness, complexity, negativity, confrontational nature, hostile behaviors, and/or severe abnormalities in thought processes/psychosis resulting in the loss of expressive/receptive language communication skills. E & M PROGRESS NOTE:         HISTORY       CC:  No complaints  HISTORY OF PRESENT ILLNESS/INTERVAL HISTORY:  (reviewed/updated 8/15/2017). per initial evaluation: The patient, Ashley Charles, is a 65 y.o.  BLACK OR  female with a past psychiatric history significant for Schizoaffective disorder, long history of noncompliance who presents at this time with complaints of (and/or evidence of) the following emotional symptoms: agitation, delusions and psychotic behavior. Additional symptomatology include noncompliance with medications. The above symptoms have been present for several weeks. She lives with a caretaker who reports recent paranoia, agitation. These symptoms are of high severity. These symptoms are constant in nature. The patient's condition has been precipitated by noncompliance and psychosocial stressors . No illicit substance abuse. Ashley Call presents/reports/evidences the following emotional symptoms today, 8/15/2017: None. Ms. Benito Chau reports feeling okay. Mood stable, adequate sleep over night. No agitation. Compliant with medications. 8/4/17- Very stable. Waiting for placement. Sleep and appetite are good. 8/5/17 She is frustrated that she still her and no anger issues and no aggressive behavior  And she is  sleeping better. 8/6/17 She is doing better and no anger issues and no anger issues and no aggressive and no mood swings and no issues with sleep   8/7/17- mrs. Benito Chau continues to exhibit stable mood, behavior, thinking. She expresses some sadness due to prolonged hospital course. 8/8/17- Mrs. Benito Chau denies any complaints this morning. She was in a very bright and funny mood. Good hygiene, dressed appropriately. 8/9/17- Mrs. Benito Chau was very pleasant and engaging this morning. Sleeping well at night, compliant with medications. 8/10/17- Mrs. Benito Chau is looking forward to her interview tomorrow with a potential assisted living/ group home. Sleeping well at night. Eating her meals. Compliant with medications. Attending and participating in groups. 8/11/17- Patient slept well over night and she reports feeling well rested. She expressed appropriate level of anxiety about her interview for housing today. No psychosis or agitation. Tolerating medications. 8/12/17-Reportedly housing interviewer from yesterday was a no show. Patient is ambulating with the aid of a walker. C/O B/L knee pain. Being followed by Medicine. Ortho consult is pending. No A/V/H.  8/13/17-Patient is at her baseline. Awaiting placement. Seen by Ortho who do not recommend knee injection. 8/14/17- Mrs. Rei Landry was very bright in affect this morning. She is looking forward to discharge tomorrow. 8/15/17- no acute events over night. She was accepting of discharge for tomorrow. SIDE EFFECTS: (reviewed/updated 8/15/2017)  None reported or admitted to. No noted toxicity with use of Depakote/   ALLERGIES:(reviewed/updated 8/15/2017)  Allergies   Allergen Reactions    Penicillins Rash      MEDICATIONS PRIOR TO ADMISSION:(reviewed/updated 8/15/2017)  Prescriptions Prior to Admission   Medication Sig    QUEtiapine (SEROQUEL) 25 mg tablet Take 25 mg by mouth daily.  acetaminophen (TYLENOL) 500 mg tablet Take 500 mg by mouth two (2) times a day.  cloNIDine HCl (CATAPRES) 0.2 mg tablet Take  by mouth three (3) times daily.  hydrOXYzine pamoate (VISTARIL) 50 mg capsule Take 50 mg by mouth four (4) times daily.  LORazepam (ATIVAN) 0.5 mg tablet Take 0.5 mg by mouth two (2) times a day.  divalproex DR (DEPAKOTE) 500 mg tablet Take 500 mg by mouth two (2) times a day.  escitalopram oxalate (LEXAPRO) 5 mg tablet Take 5 mg by mouth daily.  naproxen (NAPROSYN) 500 mg tablet Take 500 mg by mouth two (2) times daily (with meals).  gabapentin (NEURONTIN) 100 mg capsule Take 100 mg by mouth two (2) times a day.  loperamide (IMODIUM) 2 mg capsule Take 2 mg by mouth every four (4) hours as needed for Diarrhea. Indications: Diarrhea    amLODIPine (NORVASC) 10 mg tablet Take 1 Tab by mouth daily.  atorvastatin (LIPITOR) 20 mg tablet Take 1 Tab by mouth nightly.  carBAMazepine (TEGRETOL) 200 mg tablet Take 1 Tab by mouth three (3) times daily.  hydrochlorothiazide (HYDRODIURIL) 25 mg tablet Take 1 Tab by mouth daily.  sitaGLIPtin (JANUVIA) 100 mg tablet Take 1 Tab by mouth daily.     QUEtiapine (SEROQUEL) 100 mg tablet Take 100 mg by mouth every evening. PAST MEDICAL HISTORY: Past medical history from the initial psychiatric evaluation has been reviewed (reviewed/updated 8/15/2017) with no additional updates (I asked patient and no additional past medical history provided). Past Medical History:   Diagnosis Date    Aggressive outburst     Arthritis     Bipolar 1 disorder ([facility] [address] 4-12-13    Diabetes mellitus ([facility] [address]     Homicide attempt     Hypertension     Murmur     Paranoid schizophrenia ([facility] [address]     Psychiatric disorder     [facility] 3/20/2013     Past Surgical History:   Procedure Laterality Date    HX CHOLECYSTECTOMY      HX ORTHOPAEDIC      Excision Non-malignant bone cyst left femur      SOCIAL HISTORY: Social history from the initial psychiatric evaluation has been reviewed (reviewed/updated 8/15/2017) with no additional updates (I asked patient and no additional social history provided). Social History     Social History    Marital status:      Spouse name: N/A    Number of children: N/A    Years of education: N/A     Occupational History    Not on file. Social History Main Topics    Smoking status: Former Smoker     Years: 40.00     Quit date: 3/19/1983    Smokeless tobacco: Not on file    Alcohol use No    Drug use: No    Sexual activity: Yes     Partners: Male     Other Topics Concern    Not on file     Social History Narrative      Lives with daughter, son-in-law and 2 grandchildren. Not employed outside the home. FAMILY HISTORY: Family history from the initial psychiatric evaluation has been reviewed (reviewed/updated 8/15/2017) with no additional updates (I asked patient and no additional family history provided).    Family History   Problem Relation Age of Onset    Hypertension Mother     Diabetes Mother     Psychiatric Disorder Father     Heart Disease Mother     Heart Disease Brother     Diabetes Brother     Psychiatric Disorder Sister        REVIEW OF SYSTEMS: (reviewed/updated 8/15/2017)  Appetite:good   Sleep: decreased more than normal and poor with DIMS (difficulty initiating & maintaining sleep)   All other Review of Systems: Negative except severe psychosis and agitation         [facility] (MSE):    MSE FINDINGS ARE WITHIN NORMAL LIMITS (WNL) UNLESS OTHERWISE STATED BELOW. ( ALL OF THE BELOW CATEGORIES OF THE MSE HAVE BEEN REVIEWED (reviewed 8/15/2017) AND UPDATED AS DEEMED APPROPRIATE )  General Presentation wnl   Orientation Fully oriented and pleasant   Vital Signs  See below (reviewed 8/15/2017); Vital Signs (BP, Pulse, & Temp) are within normal limits if not listed below. Gait and Station Stable/steady, no ataxia   Musculoskeletal System No extrapyramidal symptoms (EPS); no abnormal muscular movements or Tardive Dyskinesia (TD); muscle strength and tone are within normal limits   Language No aphasia or dysarthria   Speech:  Normal volume, speed and content   Thought Processes (+)linear and logical   Thought Associations wnl   Thought Content Reality based   Suicidal Ideations none   Homicidal Ideations none   Mood:  Pleasant    Affect:  Appropriate    Memory recent  fair   Memory remote:  fair   Concentration/Attention:  distractable   Fund of Knowledge below avg.    Insight:  wnl   Reliability wnl   Judgment:  wnl          VITALS:     Patient Vitals for the past 24 hrs:   Temp Pulse Resp BP SpO2   08/15/17 0825 98.2 °F (36.8 °C) 68 18 (!) 159/96 98 %   08/14/17 2030 98 °F (36.7 °C) 74 16 (!) 164/96 -   08/14/17 1600 98.7 °F (37.1 °C) 71 16 136/79 98 %     Wt Readings from Last 3 Encounters:   08/06/17 78 kg (172 lb)   03/14/16 89 kg (196 lb 3.2 oz)   07/21/15 90.7 kg (200 lb)     Temp Readings from Last 3 Encounters:   08/15/17 98.2 °F (36.8 °C)   04/03/16 98.2 °F (36.8 °C)   03/14/16 99 °F (37.2 °C)     BP Readings from Last 3 Encounters:   08/15/17 (!) 159/96   04/03/16 (!) 167/93   03/14/16 (!) 188/99     Pulse Readings from Last 3 Encounters:   08/15/17 68   04/03/16 68   03/14/16 88            DATA     LABORATORY DATA:(reviewed/updated 8/15/2017)  Recent Results (from the past 24 hour(s))   GLUCOSE, POC    Collection Time: 08/14/17  4:24 PM   Result Value Ref Range    Glucose (POC) 165 (H) 65 - 100 mg/dL    Performed by Sreedhar Shields, POC    Collection Time: 08/15/17  8:01 AM   Result Value Ref Range    Glucose (POC) 102 (H) 65 - 100 mg/dL    Performed by Narinder Aldana      Lab Results   Component Value Date/Time    Valproic acid 93 08/02/2017 05:23 AM    Carbamazepine 6.8 07/18/2017 05:27 AM     No results found for: LITHM   RADIOLOGY REPORTS:(reviewed/updated 8/15/2017)  No results found.        MEDICATIONS     ALL MEDICATIONS:   Current Facility-Administered Medications   Medication Dose Route Frequency    naproxen (NAPROSYN) tablet 375 mg  375 mg Oral BID WITH MEALS    methyl salicylate-menthol (BENGAY) 15-10 % cream   Topical TID    docusate sodium (COLACE) capsule 100 mg  100 mg Oral BID PRN    oxybutynin chloride XL (DITROPAN XL) tablet 15 mg  15 mg Oral DAILY    lidocaine (LIDODERM) 5 % patch 1 Patch  1 Patch TransDERmal Q24H    therapeutic multivitamin (THERAGRAN) tablet 1 Tab  1 Tab Oral DAILY    fluPHENAZine decanoate (PROLIXIN) 25 mg/mL injection 25 mg  25 mg IntraMUSCular EVERY 2 WEEKS    loperamide (IMODIUM) capsule 2 mg  2 mg Oral Q4H PRN    lisinopril (PRINIVIL, ZESTRIL) tablet 10 mg  10 mg Oral DAILY    polyethylene glycol (MIRALAX) packet 17 g  17 g Oral DAILY    trihexyphenidyl (ARTANE) tablet 2 mg  2 mg Oral TID    pantoprazole (PROTONIX) tablet 40 mg  40 mg Oral ACB    divalproex ER (DEPAKOTE ER) 24 hour tablet 1,000 mg  1,000 mg Oral QHS    alum-mag hydroxide-simeth (MYLANTA) oral suspension 30 mL  30 mL Oral Q4H PRN    insulin lispro (HUMALOG) injection   SubCUTAneous BID    carBAMazepine XR (TEGretol XR) tablet 200 mg  200 mg Oral BID    zolpidem CR (AMBIEN CR) tablet 12.5 mg  12.5 mg Oral QHS    OLANZapine (ZyPREXA) tablet 5 mg  5 mg Oral Q6H PRN    diphenhydrAMINE (BENADRYL) injection 50 mg  50 mg IntraMUSCular Q6H PRN    LORazepam (ATIVAN) injection 1 mg  1 mg IntraMUSCular Q4H PRN    LORazepam (ATIVAN) tablet 1 mg  1 mg Oral Q4H PRN    benztropine (COGENTIN) tablet 1 mg  1 mg Oral BID PRN    benztropine (COGENTIN) injection 1 mg  1 mg IntraMUSCular BID PRN    acetaminophen (TYLENOL) tablet 650 mg  650 mg Oral Q4H PRN    nicotine (NICODERM CQ) 21 mg/24 hr patch 1 Patch  1 Patch TransDERmal DAILY PRN    SITagliptin (JANUVIA) tablet 100 mg  100 mg Oral DAILY    atorvastatin (LIPITOR) tablet 20 mg  20 mg Oral DAILY    amLODIPine (NORVASC) tablet 10 mg  10 mg Oral DAILY    glucose chewable tablet 16 g  4 Tab Oral PRN    glucagon (GLUCAGEN) injection 1 mg  1 mg IntraMUSCular PRN    dextrose 10 % infusion 125-250 mL  125-250 mL IntraVENous PRN      SCHEDULED MEDICATIONS:   Current Facility-Administered Medications   Medication Dose Route Frequency    naproxen (NAPROSYN) tablet 375 mg  375 mg Oral BID WITH MEALS    methyl salicylate-menthol (BENGAY) 15-10 % cream   Topical TID    oxybutynin chloride XL (DITROPAN XL) tablet 15 mg  15 mg Oral DAILY    lidocaine (LIDODERM) 5 % patch 1 Patch  1 Patch TransDERmal Q24H    therapeutic multivitamin (THERAGRAN) tablet 1 Tab  1 Tab Oral DAILY    fluPHENAZine decanoate (PROLIXIN) 25 mg/mL injection 25 mg  25 mg IntraMUSCular EVERY 2 WEEKS    lisinopril (PRINIVIL, ZESTRIL) tablet 10 mg  10 mg Oral DAILY    polyethylene glycol (MIRALAX) packet 17 g  17 g Oral DAILY    trihexyphenidyl (ARTANE) tablet 2 mg  2 mg Oral TID    pantoprazole (PROTONIX) tablet 40 mg  40 mg Oral ACB    divalproex ER (DEPAKOTE ER) 24 hour tablet 1,000 mg  1,000 mg Oral QHS    insulin lispro (HUMALOG) injection   SubCUTAneous BID    carBAMazepine XR (TEGretol XR) tablet 200 mg  200 mg Oral BID    zolpidem CR (AMBIEN CR) tablet 12.5 mg  12.5 mg Oral QHS    SITagliptin (JANUVIA) tablet 100 mg  100 mg Oral DAILY    atorvastatin (LIPITOR) tablet 20 mg  20 mg Oral DAILY    amLODIPine (NORVASC) tablet 10 mg  10 mg Oral DAILY          ASSESSMENT & PLAN     DIAGNOSES REQUIRING ACTIVE TREATMENT AND MONITORING: (reviewed/updated 8/15/2017)  Patient Active Hospital Problem List:     Schizoaffective disorder ([facility] [address] (5/18/2017)    Assessment: resolved psychosis    Plan:  Continue Prolixin decanoate every two weeks  Continue Depakote, monitor levels  Continue Tegretol, monitor levels  Await placement       EPS  Assessment: secondary to prolixin (now dec only)  Plan: change cogentin to artane    8/5/17 Continue same medications   8/6/17 continue same medications    I will continue to monitor blood levels (Depakote---a drug with a narrow therapeutic index= NTI) and associated labs for drug therapy implemented that require intense monitoring for toxicity as deemed appropriate based on current medication side effects and pharmacodynamically determined drug 1/2 lives. In summary, Angelina Carrera, is a 65 y.o.  female who presents with a severe exacerbation of the principal diagnosis of Schizoaffective disorder ([facility] [address]  Patient's condition is improving. Patient requires continued inpatient hospitalization for further stabilization, safety monitoring and medication management. I will continue to coordinate the provision of individual, milieu, occupational, group, and substance abuse therapies to address target symptoms/diagnoses as deemed appropriate for the individual patient. A coordinated, multidisplinary treatment team round was conducted with the patient (this team consists of the nurse, psychiatric unit pharmcist,  and writer). Complete current electronic health record for patient has been reviewed today including consultant notes, ancillary staff notes, nurses and psychiatric tech notes.     Suicide risk assessment completed and patient deemed to be of low risk for suicide at this time. The following regarding medications was addressed during rounds with patient:   the risks and benefits of the proposed medication. The patient was given the opportunity to ask questions. Informed consent given to the use of the above medications. Will continue to adjust psychiatric and non-psychiatric medications (see above \"medication\" section and orders section for details) as deemed appropriate & based upon diagnoses and response to treatment. I will continue to order blood tests/labs and diagnostic tests as deemed appropriate and review results as they become available (see orders for details and above listed lab/test results). I will order psychiatric records from previous [facility] hospitals to further elucidate the nature of patient's psychopathology and review once available. I will gather additional collateral information from friends, family and o/p treatment team to further elucidate the nature of patient's psychopathology and baselline level of psychiatric functioning. I certify that this patient's inpatient psychiatric hospital services furnished since the previous certification were, and continue to be, required for treatment that could reasonably be expected to improve the patient's condition, or for diagnostic study, and that the patient continues to need, on a daily basis, active treatment furnished directly by or requiring the supervision of inpatient psychiatric facility personnel. In addition, the hospital records show that services furnished were intensive treatment services, admission or related services, or equivalent services.     EXPECTED DISCHARGE DATE/DAY: TBD     DISPOSITION: Home       Signed By:   Gab Chi MD  8/15/2017

## 2017-08-15 NOTE — INTERDISCIPLINARY ROUNDS
Behavioral Health Interdisciplinary Rounds     Patient Name: Rubi Santiago  Age: 65 y.o. Room/Bed:  3/  Primary Diagnosis: Schizoaffective disorder (HCC)   Admission Status: Voluntary     Readmission within 30 days: no  Power of  in place: no  Patient requires a blocked bed: yes         Reason for blocked bed: disruptive    VTE Prophylaxis: Not indicated  Mobility needs/Fall risk: no    Nutritional Plan: no  Consults:          Labs/Testing due today?: no    Sleep hours: 4.75       Participation in Care/Groups:  no  Medication Compliant?: Yes  PRNS (last 24 hours): None    Restraints (last 24 hours):  no  Substance Abuse:  no  CIWA (range last 24 hours):  COWS (range last 24 hours):   Alcohol screening (AUDIT) completed -     If applicable, date SBIRT discussed in treatment team AND documented: COMPLETED  Tobacco - patient is a smoker: yes   Date tobacco education completed by RN: COMPLETED  24 hour chart check complete: yes     Patient goal(s) for today: Attend groups; prepare for discharge tomorrow  Treatment team focus/goals: Follow-up with placement  LOS:  89  Expected LOS: 90  Psychiatric [address] -  Yes  Name of Decision maker if patient has Psychiatric Care Directive: Corina Jones (daughter/POA)   Patient was offered information, patient accepted.    Financial concerns/prescription coverage: [employer] Medicaid   Date of last family contact: 8/14 SW spoke to Charlene (daughter)      Family requesting physician contact today: No  Discharge plan: Placement at [facility]       Outpatient provider(s): TBD    Participating treatment team members: Rubi Santiago, PEGGY Cassidy; Dr. Britt Pack MD; Saul Lynne, ESTER; Uriel Walls RN

## 2017-08-15 NOTE — PROGRESS NOTES
Chart reviewed. AFVSS except for elevated BP. Visit Vitals    BP (!) 147/91    Pulse 69    Temp 98 °F (36.7 °C)    Resp 16    Ht 5' 5\" (1.651 m)    Wt 78 kg (172 lb)    SpO2 98%    BMI 28.62 kg/m2     Recommend stop scheduled BID naproxen and change to BID prn with food due to elevated BP. Change lidocaine patch to 1 patch to each knee. Continue Bengay. Ortho recommendation against steroid joint injection noted. Will continue to follow peripherally.      Love Valenzuela MD, MHS

## 2017-08-16 VITALS
RESPIRATION RATE: 16 BRPM | HEART RATE: 62 BPM | HEIGHT: 65 IN | BODY MASS INDEX: 28.66 KG/M2 | SYSTOLIC BLOOD PRESSURE: 168 MMHG | OXYGEN SATURATION: 100 % | WEIGHT: 172 LBS | DIASTOLIC BLOOD PRESSURE: 87 MMHG | TEMPERATURE: 98.4 F

## 2017-08-16 LAB
ANION GAP BLD CALC-SCNC: 7 MMOL/L (ref 5–15)
BUN SERPL-MCNC: 24 MG/DL (ref 6–20)
BUN/CREAT SERPL: 28 (ref 12–20)
CALCIUM SERPL-MCNC: 8.7 MG/DL (ref 8.5–10.1)
CARBAMAZEPINE SERPL-MCNC: 6 UG/ML (ref 4–12)
CHLORIDE SERPL-SCNC: 106 MMOL/L (ref 97–108)
CO2 SERPL-SCNC: 29 MMOL/L (ref 21–32)
CREAT SERPL-MCNC: 0.87 MG/DL (ref 0.55–1.02)
GLUCOSE BLD STRIP.AUTO-MCNC: 100 MG/DL (ref 65–100)
GLUCOSE SERPL-MCNC: 102 MG/DL (ref 65–100)
POTASSIUM SERPL-SCNC: 4 MMOL/L (ref 3.5–5.1)
SERVICE CMNT-IMP: NORMAL
SODIUM SERPL-SCNC: 142 MMOL/L (ref 136–145)

## 2017-08-16 PROCEDURE — 74011250637 HC RX REV CODE- 250/637: Performed by: INTERNAL MEDICINE

## 2017-08-16 PROCEDURE — 80048 BASIC METABOLIC PNL TOTAL CA: CPT | Performed by: PSYCHIATRY & NEUROLOGY

## 2017-08-16 PROCEDURE — 74011250637 HC RX REV CODE- 250/637: Performed by: HOSPITALIST

## 2017-08-16 PROCEDURE — 74011250637 HC RX REV CODE- 250/637: Performed by: NURSE PRACTITIONER

## 2017-08-16 PROCEDURE — 36415 COLL VENOUS BLD VENIPUNCTURE: CPT | Performed by: PSYCHIATRY & NEUROLOGY

## 2017-08-16 PROCEDURE — 74011250637 HC RX REV CODE- 250/637: Performed by: PSYCHIATRY & NEUROLOGY

## 2017-08-16 PROCEDURE — 82962 GLUCOSE BLOOD TEST: CPT

## 2017-08-16 PROCEDURE — 80156 ASSAY CARBAMAZEPINE TOTAL: CPT | Performed by: PSYCHIATRY & NEUROLOGY

## 2017-08-16 RX ORDER — INSULIN LISPRO 100 [IU]/ML
INJECTION, SOLUTION INTRAVENOUS; SUBCUTANEOUS
Qty: 1 VIAL | Refills: 0 | Status: SHIPPED | OUTPATIENT
Start: 2017-08-16 | End: 2020-08-07

## 2017-08-16 RX ADMIN — PANTOPRAZOLE SODIUM 40 MG: 40 TABLET, DELAYED RELEASE ORAL at 07:45

## 2017-08-16 RX ADMIN — OXYBUTYNIN CHLORIDE 15 MG: 5 TABLET, EXTENDED RELEASE ORAL at 09:58

## 2017-08-16 RX ADMIN — AMLODIPINE BESYLATE 10 MG: 5 TABLET ORAL at 09:58

## 2017-08-16 RX ADMIN — THERA TABS 1 TABLET: TAB at 09:59

## 2017-08-16 RX ADMIN — LISINOPRIL 10 MG: 10 TABLET ORAL at 09:59

## 2017-08-16 RX ADMIN — TRIHEXYPHENIDYL HYDROCHLORIDE 2 MG: 2 TABLET ORAL at 10:00

## 2017-08-16 RX ADMIN — CARBAMAZEPINE 200 MG: 200 TABLET, EXTENDED RELEASE ORAL at 10:02

## 2017-08-16 RX ADMIN — MENTHOL, METHYL SALICYLATE: 10; 15 CREAM TOPICAL at 10:01

## 2017-08-16 RX ADMIN — SITAGLIPTIN 100 MG: 100 TABLET, FILM COATED ORAL at 09:59

## 2017-08-16 RX ADMIN — ATORVASTATIN CALCIUM 20 MG: 20 TABLET, FILM COATED ORAL at 09:59

## 2017-08-16 NOTE — PROGRESS NOTES
Chart reviewed and discussed with nurse. AFVSS. BP much improved. Pain improved per nurse. Patient scheduled for discharge this morning. Continue current regimen of pain (including lidocaine patches) and BP medications. Will sign off. Thank you for allowing us to participate in the care of your patient.     Higinio Lewis MD, S

## 2017-08-16 NOTE — BH NOTES
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 MG/DL    BUN/Creatinine ratio 25 (H) 12 - 20      GFR est AA >60 >60 ml/min/1.73m2    GFR est non-AA 56 (L) >60 ml/min/1.73m2    Calcium 8.4 (L) 8.5 - 10.1 MG/DL    Bilirubin, total 0.2 0.2 - 1.0 MG/DL    ALT (SGPT) 14 12 - 78 U/L    AST (SGOT) 9 (L) 15 - 37 U/L    Alk.  phosphatase 70 45 - 117 U/L    Protein, total 6.7 6.4 - 8.2 g/dL    Albumin 3.4 (L) 3.5 - 5.0 g/dL    Globulin 3.3 2.0 - 4.0 g/dL    A-G Ratio 1.0 (L) 1.1 - 2.2     VALPROIC ACID   Result Value Ref Range    Valproic acid 78 50 - 510 ug/ml   METABOLIC PANEL, COMPREHENSIVE   Result Value Ref Range    Sodium 138 136 - 145 mmol/L    Potassium 4.0 3.5 - 5.1 mmol/L    Chloride 104 97 - 108 mmol/L    CO2 28 21 - 32 mmol/L    Anion gap 6 5 - 15 mmol/L    Glucose 101 (H) 65 - 100 mg/dL BUN 24 (H) 6 - 20 MG/DL    Creatinine 0.99 0.55 - 1.02 MG/DL    BUN/Creatinine ratio 24 (H) 12 - 20      GFR est AA >60 >60 ml/min/1.73m2    GFR est non-AA 57 (L) >60 ml/min/1.73m2    Calcium 8.5 8.5 - 10.1 MG/DL    Bilirubin, total 0.2 0.2 - 1.0 MG/DL    ALT (SGPT) 16 12 - 78 U/L    AST (SGOT) 10 (L) 15 - 37 U/L    Alk.  phosphatase 76 45 - 117 U/L    Protein, total 7.1 6.4 - 8.2 g/dL    Albumin 3.7 3.5 - 5.0 g/dL    Globulin 3.4 2.0 - 4.0 g/dL    A-G Ratio 1.1 1.1 - 2.2     VALPROIC ACID   Result Value Ref Range    Valproic acid 93 50 - 100 ug/ml   CARBAMAZEPINE   Result Value Ref Range    Carbamazepine 6.0 4.0 - 83.9 ug/mL   METABOLIC PANEL, BASIC   Result Value Ref Range    Sodium 142 136 - 145 mmol/L    Potassium 4.0 3.5 - 5.1 mmol/L    Chloride 106 97 - 108 mmol/L    CO2 29 21 - 32 mmol/L    Anion gap 7 5 - 15 mmol/L    Glucose 102 (H) 65 - 100 mg/dL    BUN 24 (H) 6 - 20 MG/DL    Creatinine 0.87 0.55 - 1.02 MG/DL    BUN/Creatinine ratio 28 (H) 12 - 20      GFR est AA >60 >60 ml/min/1.73m2    GFR est non-AA >60 >60 ml/min/1.73m2    Calcium 8.7 8.5 - 10.1 MG/DL   GLUCOSE, POC   Result Value Ref Range    Glucose (POC) 103 (H) 65 - 100 mg/dL    Performed by [facility]    GLUCOSE, POC   Result Value Ref Range    Glucose (POC) 134 (H) 65 - 100 mg/dL    Performed by [facility]    GLUCOSE, POC   Result Value Ref Range    Glucose (POC) 116 (H) 65 - 100 mg/dL    Performed by Osvaldo Nicole    GLUCOSE, POC   Result Value Ref Range    Glucose (POC) 142 (H) 65 - 100 mg/dL    Performed by Avni Mcfadden    GLUCOSE, POC   Result Value Ref Range    Glucose (POC) 164 (H) 65 - 100 mg/dL    Performed by [employer], POC   Result Value Ref Range    Glucose (POC) 131 (H) 65 - 100 mg/dL    Performed by [employer]    GLUCOSE, POC   Result Value Ref Range    Glucose (POC) 78 65 - 100 mg/dL    Performed by Meche Schafer.    GLUCOSE, POC   Result Value Ref Range    Glucose (POC) 97 65 - 100 mg/dL    Performed by BAKARI THOMPSON    GLUCOSE, POC   Result Value Ref Range    Glucose (POC) 85 65 - 100 mg/dL    Performed by Renaldo Das    GLUCOSE, POC   Result Value Ref Range    Glucose (POC) 110 (H) 65 - 100 mg/dL    Performed by Yumiko Carrasco    GLUCOSE, POC   Result Value Ref Range    Glucose (POC) 273 (H) 65 - 100 mg/dL    Performed by [employer] [address] POC   Result Value Ref Range    Glucose (POC) 130 (H) 65 - 100 mg/dL    Performed by [employer]    GLUCOSE, POC   Result Value Ref Range    Glucose (POC) 128 (H) 65 - 100 mg/dL    Performed by Renaldo Das    GLUCOSE, POC   Result Value Ref Range    Glucose (POC) 203 (H) 65 - 100 mg/dL    Performed by Kenia Martin    GLUCOSE, POC   Result Value Ref Range    Glucose (POC) 177 (H) 65 - 100 mg/dL    Performed by Yumiko Carrasco    GLUCOSE, POC   Result Value Ref Range    Glucose (POC) 135 (H) 65 - 100 mg/dL    Performed by Maddy Tucker    GLUCOSE, POC   Result Value Ref Range    Glucose (POC) 134 (H) 65 - 100 mg/dL    Performed by Gracy Ware    GLUCOSE, POC   Result Value Ref Range    Glucose (POC) 115 (H) 65 - 100 mg/dL    Performed by [address], POC   Result Value Ref Range    Glucose (POC) 100 65 - 100 mg/dL    Performed by Denise Mcmahan    GLUCOSE, POC   Result Value Ref Range    Glucose (POC) 130 (H) 65 - 100 mg/dL    Performed by Nomi Villafuerte    GLUCOSE, POC   Result Value Ref Range    Glucose (POC) 104 (H) 65 - 100 mg/dL    Performed by [address] POC   Result Value Ref Range    Glucose (POC) 92 65 - 100 mg/dL    Performed by [employer]    GLUCOSE, POC   Result Value Ref Range    Glucose (POC) 105 (H) 65 - 100 mg/dL    Performed by [facility] Diana    GLUCOSE, POC   Result Value Ref Range    Glucose (POC) 105 (H) 65 - 100 mg/dL    Performed by Donnia Show    GLUCOSE, POC   Result Value Ref Range    Glucose (POC) 117 (H) 65 - 100 mg/dL    Performed by Donnia Show    GLUCOSE, POC   Result Value Ref Range Glucose (POC) 123 (H) 65 - 100 mg/dL    Performed by [facility]    GLUCOSE, POC   Result Value Ref Range    Glucose (POC) 174 (H) 65 - 100 mg/dL    Performed by Matthew dOonnell    GLUCOSE, POC   Result Value Ref Range    Glucose (POC) 101 (H) 65 - 100 mg/dL    Performed by Kanchan Vasquez    GLUCOSE, POC   Result Value Ref Range    Glucose (POC) 164 (H) 65 - 100 mg/dL    Performed by Matthew Odonnell    GLUCOSE, POC   Result Value Ref Range    Glucose (POC) 98 65 - 100 mg/dL    Performed by Laurel Marcelino    GLUCOSE, POC   Result Value Ref Range    Glucose (POC) 125 (H) 65 - 100 mg/dL    Performed by [facility] Fern    GLUCOSE, POC   Result Value Ref Range    Glucose (POC) 115 (H) 65 - 100 mg/dL    Performed by ANITA DELGADO    GLUCOSE, POC   Result Value Ref Range    Glucose (POC) 129 (H) 65 - 100 mg/dL    Performed by ANITA DELGADO    GLUCOSE, POC   Result Value Ref Range    Glucose (POC) 113 (H) 65 - 100 mg/dL    Performed by Amy Dickey    GLUCOSE, POC   Result Value Ref Range    Glucose (POC) 115 (H) 65 - 100 mg/dL    Performed by [facility]    GLUCOSE, POC   Result Value Ref Range    Glucose (POC) 125 (H) 65 - 100 mg/dL    Performed by [facility]    GLUCOSE, POC   Result Value Ref Range    Glucose (POC) 99 65 - 100 mg/dL    Performed by Amy Dickey    GLUCOSE, POC   Result Value Ref Range    Glucose (POC) 123 (H) 65 - 100 mg/dL    Performed by [facility]    GLUCOSE, POC   Result Value Ref Range    Glucose (POC) 143 (H) 65 - 100 mg/dL    Performed by [facility]    GLUCOSE, POC   Result Value Ref Range    Glucose (POC) 98 65 - 100 mg/dL    Performed by Amy Dickey    GLUCOSE, POC   Result Value Ref Range    Glucose (POC) 122 (H) 65 - 100 mg/dL    Performed by Francisco Javier GALLAGHER    GLUCOSE, POC   Result Value Ref Range    Glucose (POC) 101 (H) 65 - 100 mg/dL    Performed by Aniceto AARON    GLUCOSE, POC   Result Value Ref Range    Glucose (POC) 140 (H) 65 - 100 mg/dL    Performed by Huseyin Arreola Pillo Medina    GLUCOSE, POC   Result Value Ref Range    Glucose (POC) 114 (H) 65 - 100 mg/dL    Performed by [address], POC   Result Value Ref Range    Glucose (POC) 135 (H) 65 - 100 mg/dL    Performed by [address], POC   Result Value Ref Range    Glucose (POC) 86 65 - 100 mg/dL    Performed by [facility] Fern    GLUCOSE, POC   Result Value Ref Range    Glucose (POC) 106 (H) 65 - 100 mg/dL    Performed by Arthur Alves    GLUCOSE, POC   Result Value Ref Range    Glucose (POC) 99 65 - 100 mg/dL    Performed by [facility] Fern    GLUCOSE, POC   Result Value Ref Range    Glucose (POC) 114 (H) 65 - 100 mg/dL    Performed by [address] [place] [address], POC   Result Value Ref Range    Glucose (POC) 101 (H) 65 - 100 mg/dL    Performed by Faye Naik    GLUCOSE, POC   Result Value Ref Range    Glucose (POC) 92 65 - 100 mg/dL    Performed by [address] [place] [address], POC   Result Value Ref Range    Glucose (POC) 125 (H) 65 - 100 mg/dL    Performed by Alan Deluca    GLUCOSE, POC   Result Value Ref Range    Glucose (POC) 100 65 - 100 mg/dL    Performed by Arthur Alves    GLUCOSE, POC   Result Value Ref Range    Glucose (POC) 120 (H) 65 - 100 mg/dL    Performed by [employer]    GLUCOSE, POC   Result Value Ref Range    Glucose (POC) 92 65 - 100 mg/dL    Performed by Kindra Dejesus.    GLUCOSE, POC   Result Value Ref Range    Glucose (POC) 114 (H) 65 - 100 mg/dL    Performed by Korey Wilks    GLUCOSE, POC   Result Value Ref Range    Glucose (POC) 110 (H) 65 - 100 mg/dL    Performed by Lior Reed    GLUCOSE, POC   Result Value Ref Range    Glucose (POC) 104 (H) 65 - 100 mg/dL    Performed by Wendy Hobson    GLUCOSE, POC   Result Value Ref Range    Glucose (POC) 101 (H) 65 - 100 mg/dL    Performed by Arthur Alves    GLUCOSE, POC   Result Value Ref Range    Glucose (POC) 123 (H) 65 - 100 mg/dL    Performed by Korey Wilks    GLUCOSE, POC   Result Value Ref Range    Glucose (POC) 104 (H) 65 - 100 mg/dL    Performed by Fernanda Brooke    GLUCOSE, POC   Result Value Ref Range    Glucose (POC) 111 (H) 65 - 100 mg/dL    Performed by Olivia [address], POC   Result Value Ref Range    Glucose (POC) 122 (H) 65 - 100 mg/dL    Performed by Seb Morris.    GLUCOSE, POC   Result Value Ref Range    Glucose (POC) 105 (H) 65 - 100 mg/dL    Performed by Chad Rouse., POC   Result Value Ref Range    Glucose (POC) 103 (H) 65 - 100 mg/dL    Performed by Seb Morris.    GLUCOSE, POC   Result Value Ref Range    Glucose (POC) 125 (H) 65 - 100 mg/dL    Performed by Tanda Raciel    GLUCOSE, POC   Result Value Ref Range    Glucose (POC) 94 65 - 100 mg/dL    Performed by Lazarus Hillier    GLUCOSE, POC   Result Value Ref Range    Glucose (POC) 145 (H) 65 - 100 mg/dL    Performed by Tanda Raciel    GLUCOSE, POC   Result Value Ref Range    Glucose (POC) 107 (H) 65 - 100 mg/dL    Performed by Julianne Briggs    GLUCOSE, POC   Result Value Ref Range    Glucose (POC) 113 (H) 65 - 100 mg/dL    Performed by Xiomara Fleming    GLUCOSE, POC   Result Value Ref Range    Glucose (POC) 91 65 - 100 mg/dL    Performed by Julianne Briggs    GLUCOSE, POC   Result Value Ref Range    Glucose (POC) 177 (H) 65 - 100 mg/dL    Performed by Xiomara Fleming    GLUCOSE, POC   Result Value Ref Range    Glucose (POC) 95 65 - 100 mg/dL    Performed by Sisi Vogt    GLUCOSE, POC   Result Value Ref Range    Glucose (POC) 95 65 - 100 mg/dL    Performed by Thomas Steven    GLUCOSE, POC   Result Value Ref Range    Glucose (POC) 115 (H) 65 - 100 mg/dL    Performed by Yvrose Shipman    GLUCOSE, POC   Result Value Ref Range    Glucose (POC) 83 65 - 100 mg/dL    Performed by Lazarus Hillier    GLUCOSE, POC   Result Value Ref Range    Glucose (POC) 85 65 - 100 mg/dL    Performed by Seb Morris.    GLUCOSE, POC   Result Value Ref Range    Glucose (POC) 85 65 - 100 mg/dL    Performed by Kristie Shirley    GLUCOSE, POC   Result Value Ref Range Glucose (POC) 110 (H) 65 - 100 mg/dL    Performed by [address], POC   Result Value Ref Range    Glucose (POC) 101 (H) 65 - 100 mg/dL    Performed by Renaldo Das    GLUCOSE, POC   Result Value Ref Range    Glucose (POC) 128 (H) 65 - 100 mg/dL    Performed by [address], POC   Result Value Ref Range    Glucose (POC) 90 65 - 100 mg/dL    Performed by Kvng Arias, POC   Result Value Ref Range    Glucose (POC) 107 (H) 65 - 100 mg/dL    Performed by Chad Loo, POC   Result Value Ref Range    Glucose (POC) 91 65 - 100 mg/dL    Performed by [facility] Alexandroia    GLUCOSE, POC   Result Value Ref Range    Glucose (POC) 114 (H) 65 - 100 mg/dL    Performed by [facility] Diana    GLUCOSE, POC   Result Value Ref Range    Glucose (POC) 115 (H) 65 - 100 mg/dL    Performed by Nomi Knight.    GLUCOSE, POC   Result Value Ref Range    Glucose (POC) 90 65 - 100 mg/dL    Performed by Orquidea Lorenz    GLUCOSE, POC   Result Value Ref Range    Glucose (POC) 100 65 - 100 mg/dL    Performed by [facility] Alexandroia    GLUCOSE, POC   Result Value Ref Range    Glucose (POC) 113 (H) 65 - 100 mg/dL    Performed by Orquidea Lorenz    GLUCOSE, POC   Result Value Ref Range    Glucose (POC) 98 65 - 100 mg/dL    Performed by Nomi Knight.    GLUCOSE, POC   Result Value Ref Range    Glucose (POC) 118 (H) 65 - 100 mg/dL    Performed by ANITA DELGADO    GLUCOSE, POC   Result Value Ref Range    Glucose (POC) 87 65 - 100 mg/dL    Performed by [facility], POC   Result Value Ref Range    Glucose (POC) 111 (H) 65 - 100 mg/dL    Performed by [facility], POC   Result Value Ref Range    Glucose (POC) 101 (H) 65 - 100 mg/dL    Performed by [facility], POC   Result Value Ref Range    Glucose (POC) 99 65 - 100 mg/dL    Performed by [facility] Diana    GLUCOSE, POC   Result Value Ref Range    Glucose (POC) 103 (H) 65 - 100 mg/dL    Performed by Linda Davila GLUCOSE, POC   Result Value Ref Range    Glucose (POC) 116 (H) 65 - 100 mg/dL    Performed by [address], POC   Result Value Ref Range    Glucose (POC) 93 65 - 100 mg/dL    Performed by [facility] Fern    GLUCOSE, POC   Result Value Ref Range    Glucose (POC) 109 (H) 65 - 100 mg/dL    Performed by Matthew Odonnell    GLUCOSE, POC   Result Value Ref Range    Glucose (POC) 94 65 - 100 mg/dL    Performed by Fabi Chavira    GLUCOSE, POC   Result Value Ref Range    Glucose (POC) 92 65 - 100 mg/dL    Performed by Em Dodd    GLUCOSE, POC   Result Value Ref Range    Glucose (POC) 94 65 - 100 mg/dL    Performed by Anne Miller    GLUCOSE, POC   Result Value Ref Range    Glucose (POC) 78 65 - 100 mg/dL    Performed by Rama Hernandez    GLUCOSE, POC   Result Value Ref Range    Glucose (POC) 87 65 - 100 mg/dL    Performed by Rama Hernandez    GLUCOSE, POC   Result Value Ref Range    Glucose (POC) 80 65 - 100 mg/dL    Performed by [facility]    GLUCOSE, POC   Result Value Ref Range    Glucose (POC) 124 (H) 65 - 100 mg/dL    Performed by [employer]    GLUCOSE, POC   Result Value Ref Range    Glucose (POC) 116 (H) 65 - 100 mg/dL    Performed by [facility]    GLUCOSE, POC   Result Value Ref Range    Glucose (POC) 88 65 - 100 mg/dL    Performed by Rama Hernandez    GLUCOSE, POC   Result Value Ref Range    Glucose (POC) 88 65 - 100 mg/dL    Performed by Fabi Chavira    GLUCOSE, POC   Result Value Ref Range    Glucose (POC) 83 65 - 100 mg/dL    Performed by Rama Hernandez    GLUCOSE, POC   Result Value Ref Range    Glucose (POC) 94 65 - 100 mg/dL    Performed by [address], POC   Result Value Ref Range    Glucose (POC) 87 65 - 100 mg/dL    Performed by Chad Rouse., POC   Result Value Ref Range    Glucose (POC) 100 65 - 100 mg/dL    Performed by [facility] Fern    GLUCOSE, POC   Result Value Ref Range    Glucose (POC) 111 (H) 65 - 100 mg/dL    Performed by Rama Hernandez GLUCOSE, POC   Result Value Ref Range    Glucose (POC) 97 65 - 100 mg/dL    Performed by Natty Shipman    GLUCOSE, POC   Result Value Ref Range    Glucose (POC) 116 (H) 65 - 100 mg/dL    Performed by [address], POC   Result Value Ref Range    Glucose (POC) 90 65 - 100 mg/dL    Performed by Rona Wise.    GLUCOSE, POC   Result Value Ref Range    Glucose (POC) 107 (H) 65 - 100 mg/dL    Performed by Devora Kaiser    GLUCOSE, POC   Result Value Ref Range    Glucose (POC) 97 65 - 100 mg/dL    Performed by Maco Palafox    GLUCOSE, POC   Result Value Ref Range    Glucose (POC) 141 (H) 65 - 100 mg/dL    Performed by [facility] Diana    GLUCOSE, POC   Result Value Ref Range    Glucose (POC) 106 (H) 65 - 100 mg/dL    Performed by [address], POC   Result Value Ref Range    Glucose (POC) 80 65 - 100 mg/dL    Performed by Luzmaria Durham    GLUCOSE, POC   Result Value Ref Range    Glucose (POC) 93 65 - 100 mg/dL    Performed by [address], POC   Result Value Ref Range    Glucose (POC) 103 (H) 65 - 100 mg/dL    Performed by Luzmaria Durham    GLUCOSE, POC   Result Value Ref Range    Glucose (POC) 83 65 - 100 mg/dL    Performed by [facility] Fern    GLUCOSE, POC   Result Value Ref Range    Glucose (POC) 100 65 - 100 mg/dL    Performed by Devora Kaiser    GLUCOSE, POC   Result Value Ref Range    Glucose (POC) 90 65 - 100 mg/dL    Performed by Matthew Baker    GLUCOSE, POC   Result Value Ref Range    Glucose (POC) 102 (H) 65 - 100 mg/dL    Performed by [facility] Diana    GLUCOSE, POC   Result Value Ref Range    Glucose (POC) 92 65 - 100 mg/dL    Performed by Rona Wise.    GLUCOSE, POC   Result Value Ref Range    Glucose (POC) 118 (H) 65 - 100 mg/dL    Performed by Toñito Salas    GLUCOSE, POC   Result Value Ref Range    Glucose (POC) 103 (H) 65 - 100 mg/dL    Performed by Rona Wise.    GLUCOSE, POC   Result Value Ref Range    Glucose (POC) 115 (H) 65 - 100 mg/dL Performed by Paul Pinto    GLUCOSE, POC   Result Value Ref Range    Glucose (POC) 99 65 - 100 mg/dL    Performed by Kadi Crawford    GLUCOSE, POC   Result Value Ref Range    Glucose (POC) 116 (H) 65 - 100 mg/dL    Performed by CARLA [address] [place] [address], POC   Result Value Ref Range    Glucose (POC) 100 65 - 100 mg/dL    Performed by [address] [place], POC   Result Value Ref Range    Glucose (POC) 104 (H) 65 - 100 mg/dL    Performed by Richelle Mendiola    GLUCOSE, POC   Result Value Ref Range    Glucose (POC) 93 65 - 100 mg/dL    Performed by [id number] [facility], POC   Result Value Ref Range    Glucose (POC) 103 (H) 65 - 100 mg/dL    Performed by ANITA DELGADO    GLUCOSE, POC   Result Value Ref Range    Glucose (POC) 86 65 - 100 mg/dL    Performed by Guilherme Harding    GLUCOSE, POC   Result Value Ref Range    Glucose (POC) 94 65 - 100 mg/dL    Performed by Guilherme Harding    GLUCOSE, POC   Result Value Ref Range    Glucose (POC) 93 65 - 100 mg/dL    Performed by Nasra Ansari    GLUCOSE, POC   Result Value Ref Range    Glucose (POC) 106 (H) 65 - 100 mg/dL    Performed by Chad Rouse., POC   Result Value Ref Range    Glucose (POC) 103 (H) 65 - 100 mg/dL    Performed by Sommer Mathtews    GLUCOSE, POC   Result Value Ref Range    Glucose (POC) 89 65 - 100 mg/dL    Performed by Josefina Schroeder    GLUCOSE, POC   Result Value Ref Range    Glucose (POC) 91 65 - 100 mg/dL    Performed by Filemon Lee    GLUCOSE, POC   Result Value Ref Range    Glucose (POC) 146 (H) 65 - 100 mg/dL    Performed by [facility] Diana    GLUCOSE, POC   Result Value Ref Range    Glucose (POC) 127 (H) 65 - 100 mg/dL    Performed by Nasra Ansari    GLUCOSE, POC   Result Value Ref Range    Glucose (POC) 105 (H) 65 - 100 mg/dL    Performed by Guilherme Harding    GLUCOSE, POC   Result Value Ref Range    Glucose (POC) 97 65 - 100 mg/dL    Performed by Ann Nur    GLUCOSE, POC   Result Value Ref Range    Glucose (POC) 101 (H) 65 - 100 mg/dL Performed by Dnailo Castillo    GLUCOSE, POC   Result Value Ref Range    Glucose (POC) 122 (H) 65 - 100 mg/dL    Performed by [employer]    GLUCOSE, POC   Result Value Ref Range    Glucose (POC) 99 65 - 100 mg/dL    Performed by BEHZAD Corey    GLUCOSE, POC   Result Value Ref Range    Glucose (POC) 107 (H) 65 - 100 mg/dL    Performed by ANITA DELGADO    GLUCOSE, POC   Result Value Ref Range    Glucose (POC) 94 65 - 100 mg/dL    Performed by Danilo Castillo    GLUCOSE, POC   Result Value Ref Range    Glucose (POC) 127 (H) 65 - 100 mg/dL    Performed by Matthew Odonnell    GLUCOSE, POC   Result Value Ref Range    Glucose (POC) 80 65 - 100 mg/dL    Performed by [address], POC   Result Value Ref Range    Glucose (POC) 97 65 - 100 mg/dL    Performed by [address], POC   Result Value Ref Range    Glucose (POC) 99 65 - 100 mg/dL    Performed by Bridget Aguayo.    GLUCOSE, POC   Result Value Ref Range    Glucose (POC) 109 (H) 65 - 100 mg/dL    Performed by Kalyn An    GLUCOSE, POC   Result Value Ref Range    Glucose (POC) 111 (H) 65 - 100 mg/dL    Performed by [place], POC   Result Value Ref Range    Glucose (POC) 128 (H) 65 - 100 mg/dL    Performed by [facility]    GLUCOSE, POC   Result Value Ref Range    Glucose (POC) 119 (H) 65 - 100 mg/dL    Performed by AZAM SHAW    GLUCOSE, POC   Result Value Ref Range    Glucose (POC) 152 (H) 65 - 100 mg/dL    Performed by Ladi Kingston    GLUCOSE, POC   Result Value Ref Range    Glucose (POC) 99 65 - 100 mg/dL    Performed by Bety Kenney    GLUCOSE, POC   Result Value Ref Range    Glucose (POC) 133 (H) 65 - 100 mg/dL    Performed by Matthew Odonnell    GLUCOSE, POC   Result Value Ref Range    Glucose (POC) 93 65 - 100 mg/dL    Performed by Alan Deluca    GLUCOSE, POC   Result Value Ref Range    Glucose (POC) 108 (H) 65 - 100 mg/dL    Performed by ANITA DELGADO    GLUCOSE, POC   Result Value Ref Range    Glucose (POC) 111 (H) 65 - 100 mg/dL    Performed by Jose Manuel Bustillos    GLUCOSE, POC   Result Value Ref Range    Glucose (POC) 133 (H) 65 - 100 mg/dL    Performed by [address], POC   Result Value Ref Range    Glucose (POC) 110 (H) 65 - 100 mg/dL    Performed by Nikki Almaraz    GLUCOSE, POC   Result Value Ref Range    Glucose (POC) 119 (H) 65 - 100 mg/dL    Performed by Javier, POC   Result Value Ref Range    Glucose (POC) 109 (H) 65 - 100 mg/dL    Performed by Jamey Polanco    GLUCOSE, POC   Result Value Ref Range    Glucose (POC) 119 (H) 65 - 100 mg/dL    Performed by Nikki Almaraz    GLUCOSE, POC   Result Value Ref Range    Glucose (POC) 95 65 - 100 mg/dL    Performed by Nikki Almaraz    GLUCOSE, POC   Result Value Ref Range    Glucose (POC) 143 (H) 65 - 100 mg/dL    Performed by James Mota    GLUCOSE, POC   Result Value Ref Range    Glucose (POC) 93 65 - 100 mg/dL    Performed by [facility] Fern    GLUCOSE, POC   Result Value Ref Range    Glucose (POC) 110 (H) 65 - 100 mg/dL    Performed by [facility] Diana    GLUCOSE, POC   Result Value Ref Range    Glucose (POC) 93 65 - 100 mg/dL    Performed by Margaret Granjoseph    GLUCOSE, POC   Result Value Ref Range    Glucose (POC) 119 (H) 65 - 100 mg/dL    Performed by Funmi Kaur    GLUCOSE, POC   Result Value Ref Range    Glucose (POC) 99 65 - 100 mg/dL    Performed by Margaret Grange    GLUCOSE, POC   Result Value Ref Range    Glucose (POC) 124 (H) 65 - 100 mg/dL    Performed by Funmi Kaur    GLUCOSE, POC   Result Value Ref Range    Glucose (POC) 118 (H) 65 - 100 mg/dL    Performed by Nikki Almaraz    GLUCOSE, POC   Result Value Ref Range    Glucose (POC) 165 (H) 65 - 100 mg/dL    Performed by James Mota    GLUCOSE, POC   Result Value Ref Range    Glucose (POC) 102 (H) 65 - 100 mg/dL    Performed by Mina Pantoja, POC   Result Value Ref Range    Glucose (POC) 131 (H) 65 - 100 mg/dL    Performed by Chad Rouse., POC Result Value Ref Range    Glucose (POC) 100 65 - 100 mg/dL    Performed by Rona Wise.    EKG, 12 LEAD, INITIAL   Result Value Ref Range    Ventricular Rate 83 BPM    Atrial Rate 83 BPM    P-R Interval 158 ms    QRS Duration 106 ms    Q-T Interval 428 ms    QTC Calculation (Bezet) 502 ms    Calculated P Axis 48 degrees    Calculated R Axis -22 degrees    Calculated T Axis 57 degrees    Diagnosis       Normal sinus rhythm  Prolonged QT  Abnormal ECG  No previous ECGs available  Confirmed by Italia Iyer MD. ([id number] on 5/26/2017 4:12:04 PM     EKG, 12 LEAD, INITIAL   Result Value Ref Range    Ventricular Rate 86 BPM    Atrial Rate 86 BPM    P-R Interval 146 ms    QRS Duration 96 ms    Q-T Interval 400 ms    QTC Calculation (Bezet) 478 ms    Calculated P Axis 70 degrees    Calculated R Axis -23 degrees    Calculated T Axis 61 degrees    Diagnosis       Normal sinus rhythm  Nonspecific ST abnormality  Abnormal ECG  When compared with ECG of 26-MAY-2017 13:16,  No significant change was found  Confirmed by Neva Palacios MD ([id number]) on 5/30/2017 11:43:53 AM         Immunizations administered during this encounter: There is no immunization history on file for this patient. Screening for Metabolic Disorders for Patients on Antipsychotic Medications    Estimated Body Mass Index  Estimated body mass index is 28.62 kg/(m^2) as calculated from the following:    Height as of this encounter: 5' 5\" (1.651 m). Weight as of this encounter: 78 kg (172 lb).      Vital Signs/Blood Pressure  Visit Vitals    /87    Pulse 62    Temp 98.4 °F (36.9 °C)    Resp 16    Ht 5' 5\" (1.651 m)    Wt 78 kg (172 lb)    SpO2 100%    BMI 28.62 kg/m2       Blood Glucose/Hemoglobin A1c  Lab Results   Component Value Date/Time    Glucose 102 08/16/2017 06:50 AM    Glucose (POC) 100 08/16/2017 08:02 AM        Lab Results   Component Value Date/Time    Hemoglobin A1c 6.0 05/27/2017 09:40 AM        Lipid Panel  Lab Results Component Value Date/Time    Cholesterol, total 150 2017 09:40 AM    HDL Cholesterol 83 2017 09:40 AM    LDL, calculated 53.8 2017 09:40 AM    Triglyceride 66 2017 09:40 AM    CHOL/HDL Ratio 1.8 2017 09:40 AM            Discharge Diagnosis: Schizoaffective disorder (ICD-10-CM: F25.9)    Discharge Plan:   Meagan Johnson  : 1955  MRN: [id number]    The patient Evangelina Davis exhibits the ability to control behavior in a less restrictive environment. Patient's level of functioning is improving. No assaultive/destructive behavior has been observed for the past 24 hours. No suicidal/homicidal threat or behavior has been observed for the past 24 hours. There is no evidence of serious medication side effects. Patient has not been in physical or protective restraints for at least the past 24 hours. If weapons involved, how are they secured? No weapons involved. Is patient aware of and in agreement with discharge plan? Yes    Arrangements for medication:  Prescriptions given to patient, faxed to pharmacy, and sent to [facility]. Copy of discharge instructions to provider?:  Yes, Mr. Marlon Funes at [facility] ([phone])    Arrangements for transportation home:  Medicaid taxi to [facility]    Keep all follow up appointments as scheduled, continue to take prescribed medications per physician instructions. Mental health crisis number:  282 or your local mental health crisis line number at [phone]. Discharge Medication List and Instructions:   Discharge Medication List as of 2017 10:51 AM      START taking these medications    Details   insulin lispro (HUMALOG) 100 unit/mL injection Sliding scale insulin twice daily  Indications: type 2 diabetes mellitus, Print, Disp-1 Vial, R-0      lidocaine (LIDODERM) 5 % Apply patch to the affected area for 12 hours a day and remove for 12 hours a day.   Indications: knee pain, Print, Disp-15 Each, R-0 methyl salicylate-menthol (BENGAY) 15-10 % topical cream Apply  to affected area three (3) times daily. Indications: arthritis in knees, Print, Disp-1 Tube, R-0      brief disposable (ADULT) misc 1 Package by Does Not Apply route. Indications: urinary incontinence, Print, Disp-10 Package, R-0         CONTINUE these medications which have CHANGED    Details   docusate sodium (COLACE) 100 mg capsule Take 1 Cap by mouth two (2) times daily as needed for Constipation for up to 90 days. Indications: constipation, Print, Disp-30 Cap, R-1      amLODIPine (NORVASC) 10 mg tablet Take 1 Tab by mouth daily. Indications: hypertension, Print, Disp-15 Tab, R-1      atorvastatin (LIPITOR) 20 mg tablet Take 1 Tab by mouth daily. Indications: hyperlipidemia, Print, Disp-15 Tab, R-1      carBAMazepine XR (TEGRETOL XR) 200 mg SR tablet Take 1 Tab by mouth two (2) times a day. Indications: Cris associated with Bipolar Disorder, Print, Disp-30 Tab, R-1      divalproex ER (DEPAKOTE ER) 500 mg ER tablet Take 2 Tabs by mouth nightly. Indications: Cris associated with Bipolar Disorder, Print, Disp-30 Tab, R-1      fluPHENAZine decanoate (PROLIXIN) 25 mg/mL injection 1 mL by IntraMUSCular route Once every 2 weeks. Next injection due on August 18th  Indications: schizoaffective disorder, Print, Disp-1 Vial, R-1      lisinopril (PRINIVIL, ZESTRIL) 10 mg tablet Take 1 Tab by mouth daily. Indications: hypertension, Print, Disp-15 Tab, R-1      oxybutynin chloride XL (DITROPAN XL) 15 mg CR tablet Take 1 Tab by mouth daily. Indications: URINARY URGE INCONTINENCE, Print, Disp-15 Tab, R-1      pantoprazole (PROTONIX) 40 mg tablet Take 1 Tab by mouth Daily (before breakfast). Indications: gastroesophageal reflux disease, Print, Disp-15 Tab, R-1      polyethylene glycol (MIRALAX) 17 gram packet Take 1 Packet by mouth daily as needed.  Indications: constipation, Print, Disp-15 Packet, R-1      SITagliptin (JANUVIA) 100 mg tablet Take 1 Tab by mouth daily. Indications: type 2 diabetes mellitus, Print, Disp-15 Tab, R-1      trihexyphenidyl (ARTANE) 2 mg tablet Take 1 Tab by mouth three (3) times daily for 90 days. Indications: extrapyramidal disease, Print, Disp-90 Tab, R-1      zolpidem CR (AMBIEN CR) 12.5 mg tablet Take 1 Tab by mouth nightly as needed for Sleep. Max Daily Amount: 12.5 mg. Indications: INSOMNIA, Print, Disp-15 Tab, R-1         STOP taking these medications       naproxen (NAPROSYN) 375 mg tablet Comments:   Reason for Stopping:         naproxen (NAPROSYN) 250 mg tablet Comments:   Reason for Stopping:         QUEtiapine (SEROQUEL) 25 mg tablet Comments:   Reason for Stopping:         QUEtiapine (SEROQUEL) 100 mg tablet Comments:   Reason for Stopping:         acetaminophen (TYLENOL) 500 mg tablet Comments:   Reason for Stopping:         cloNIDine HCl (CATAPRES) 0.2 mg tablet Comments:   Reason for Stopping:         hydrOXYzine pamoate (VISTARIL) 50 mg capsule Comments:   Reason for Stopping:         LORazepam (ATIVAN) 0.5 mg tablet Comments:   Reason for Stopping:         divalproex DR (DEPAKOTE) 500 mg tablet Comments:   Reason for Stopping:         escitalopram oxalate (LEXAPRO) 5 mg tablet Comments:   Reason for Stopping:         naproxen (NAPROSYN) 500 mg tablet Comments:   Reason for Stopping:         gabapentin (NEURONTIN) 100 mg capsule Comments:   Reason for Stopping:         loperamide (IMODIUM) 2 mg capsule Comments:   Reason for Stopping:         carBAMazepine (TEGRETOL) 200 mg tablet Comments:   Reason for Stopping:         hydrochlorothiazide (HYDRODIURIL) 25 mg tablet Comments:   Reason for Stopping:               Unresulted Labs     None        To obtain results of studies pending at discharge, please contact N/A    Follow-up Information     Follow up With Details Comments Contact Info    Fluphenazine decanoate 25 mg IM injection On 8/18/2017 Continuation of fluphenazine decanoate 25 mg IM injection every 2 weeks.  Patient last received fluphenazine decanoate 25 mg IM injection on 7/21/17. Attending Physician @ [facility] [address]  [place], [address]  [phone]    Attending Psychiatrist On 8/18/2017 Patient has an appointment to meet with the visiting psychiatrist at the [facility] on Friday. ODILON VIVAR [facility]  [address]  [place], [address]  [phone]    Jimmie Tao, [address] [place] [address] [address] [place] [address]  [phone]            Advanced Directive:   Does the patient have an appointed surrogate decision maker? Yes  Does the patient have a Medical Advance Directive? Yes  Does the patient have a Psychiatric Advance Directive? Yes  If the patient does not have a surrogate or Medical Advance Directive AND Psychiatric Advance Directive, the patient was offered information on these advance directives Yes    Patient Instructions: Please continue all medications until otherwise directed by physician. What to do at Home:  Recommended activity: Activity as tolerated, continue to use prescribed assisted device to ambulate    If you experience any of the following symptoms thoughts of harming self, intense thoughts or beliefs that are paranoid about you or your families safety, auditory hallucinations, please follow up with your staff, . If you can not reach them and symptoms continue immediately call your local crisis number at 911 or your local mental health crisis line number at [phone]. *  Please give a list of your current medications to your Primary Care Provider. *  Please update this list whenever your medications are discontinued, doses are      changed, or new medications (including over-the-counter products) are added. *  Please carry medication information at all times in case of emergency situations. Tobacco Cessation Discharge Plan:   Is the patient a smoker and needs referral for smoking cessation?  Yes  Patient referred to the following for smoking cessation with an appointment? Refused     Patient was offered medication to assist with smoking cessation at discharge? Refused  Was education for smoking cessation added to the discharge instructions? Yes    Alcohol/Substance Abuse Discharge Plan:   Does the patient have a history of substance/alcohol abuse and requires a referral for treatment? No  Patient referred to the following for substance/alcohol abuse treatment with an appointment? Not applicable  Patient was offered medication to assist with alcohol cessation at discharge? Not applicable  Was education for substance/alcohol abuse added to discharge instructions?  Not applicable    Patient discharged to Inpatient facility; discussed with the receiving facility  24-hour/7-day contact information , plan for follow-up care and Primary physician, other healthcare professional, or site designated for follow up care

## 2017-08-16 NOTE — BH NOTES
Pt attended reflections group. Discussed some unit rules and coping skills. Pt attentive; pt seemed to understand.

## 2017-08-16 NOTE — INTERDISCIPLINARY ROUNDS
Behavioral Health Interdisciplinary Rounds     Patient Name: Evangelina Davis  Age: 65 y.o.   Room/Bed:  3/  Primary Diagnosis: Schizoaffective disorder (HCC)   Admission Status: Voluntary     Readmission within 30 days: no  Power of  in place: no  Patient requires a blocked bed: yes          Reason for blocked bed: disruptive      VTE Prophylaxis: Not indicated  Mobility needs/Fall risk: no    Nutritional Plan: no  Consults:          Labs/Testing due today?: no    Sleep hours:        Participation in Care/Groups:  yes  Medication Compliant?: Yes  PRNS (last 24 hours): None    Restraints (last 24 hours):  no  Substance Abuse:  no  CIWA (range last 24 hours):  COWS (range last 24 hours):   Alcohol screening (AUDIT) completed -     If applicable, date SBIRT discussed in treatment team AND documented:   Tobacco - patient is a smoker: yes   Date tobacco education completed by RN:   24 hour chart check complete: yes     Patient goal(s) for today:   Treatment team focus/goals:   LOS:  90 Expected LOS:  [address] -     Name of Decision maker if patient has Psychiatric Care Directive   Patient was offered information   Financial concerns/prescription coverage:    Date of last family contact:       Family requesting physician contact today:    Discharge plan:        Outpatient provider(s):   Participating treatment team members: Evangelina Davis, * (assigned SW),

## 2017-08-16 NOTE — BH NOTES
[id number] Patient has been visible in dayroom all morning. Med and meal compliant. Participated in treatment team. Reviewed meds. Patient is smiling and is packing up belongings to be discharged. She reported \" I am looking forward to going to [facility] to live\". All personal belongings were returned to patient. Patient is A & O x 4. Denies SI, HI and Hallucinations. She is pleasant, polite and cooperative. Ambulates with walker.

## 2017-08-16 NOTE — BH NOTES
In bed asleep at start of shift Respirations even and unlabored as chest was rising and falling.  Will continue to monitor throughout shift

## 2017-08-16 NOTE — BH NOTES
GROUP THERAPY PROGRESS NOTE    Yovany Fahad did not participate in a Process Group on the General Unit with a focus identifying feelings, planning for the day, and learning more about DBT concepts of \"Interpersonal Effectiveness and using the [address] as a long-term personal treatment plan. She was working with nursing to prepare for her discharge.

## 2017-08-16 NOTE — BH NOTES
Patient discharged at 1120 to [facility] with medicaid cab picking her up. Patient took with her, her belongings and discharge instructions. Discharge instructions were reviewed with patient with opportunity for questions given.

## 2017-09-02 NOTE — DISCHARGE SUMMARY
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 pain. COMPARISON: None. FINDINGS: Three views of the left knee demonstrate no fracture or other acute osseous or articular abnormality. There is tricompartmental osteoarthritis, with moderately severe medial compartmental joint space narrowing. There is no effusion. IMPRESSION:  No acute fracture or dislocation. Tricompartmental osteoarthritis, most significant in the medial compartment. Xr Knee Rt 3 V    Result Date: 8/11/2017  EXAM:  XR KNEE RT 3 V INDICATION:   Bilateral atraumatic knee pain. COMPARISON: None. FINDINGS: Three views of the right knee demonstrate no fracture or other acute osseous or articular abnormality. There is tricompartmental osteoarthritis, with moderately severe medial compartmental joint space narrowing. There is no effusion. IMPRESSION:  No acute fracture or dislocation. Tricompartmental osteoarthritis, most significant in the medial compartment. DISPOSITION:    Home. Patient to f/u with psychiatric and psychotherapy appointments. Patient is to f/u with internist as directed.   Patient should have a Tegretol and depakote level and associated labs checked within the next 1-2 weeks by patient's o/p psychiatrist/internist.               FOLLOW-UP CARE:    Activity as tolerated  Regular Diet  Wound Care: none needed. Follow-up Information     Follow up With Details Comments Contact Info    Fluphenazine decanoate 25 mg IM injection On 8/18/2017 Continuation of fluphenazine decanoate 25 mg IM injection every 2 weeks. Patient last received fluphenazine decanoate 25 mg IM injection on 7/21/17. Attending Physician @ [facility] [address]  [address] [facility], [address]  [phone]    Attending Psychiatrist On 8/18/2017 Patient has an appointment to meet with the visiting psychiatrist at the [facility] on Friday. ODILON VIVAR [facility]  [address] [facility], [address]  [phone]    Denice Hampton, [address] [place] [address]  [phone]                   PROGNOSIS:   Good ---- based on nature of patient's pathology/ies and treatment compliance issues. Prognosis is greatly dependent upon patient's ability to f/u with psychiatric/psychotherapy appointments as well as to comply with psychiatric medications as prescribed. DISCHARGE MEDICATIONS: (no changes made). Informed consent given for the use of following psychotropic medications:  Discharge Medication List as of 8/16/2017 10:51 AM      START taking these medications    Details   insulin lispro (HUMALOG) 100 unit/mL injection Sliding scale insulin twice daily  Indications: type 2 diabetes mellitus, Print, Disp-1 Vial, R-0      lidocaine (LIDODERM) 5 % Apply patch to the affected area for 12 hours a day and remove for 12 hours a day. Indications: knee pain, Print, Disp-15 Each, R-0      methyl salicylate-menthol (BENGAY) 15-10 % topical cream Apply  to affected area three (3) times daily. Indications: arthritis in knees, Print, Disp-1 Tube, R-0      brief disposable (ADULT) misc 1 Package by Does Not Apply route.  Indications: urinary incontinence, Print, Disp-10 Package, R-0         CONTINUE these medications which have CHANGED    Details   docusate sodium (COLACE) 100 mg capsule Take 1 Cap by mouth two (2) times daily as needed for Constipation for up to 90 days. Indications: constipation, Print, Disp-30 Cap, R-1      amLODIPine (NORVASC) 10 mg tablet Take 1 Tab by mouth daily. Indications: hypertension, Print, Disp-15 Tab, R-1      atorvastatin (LIPITOR) 20 mg tablet Take 1 Tab by mouth daily. Indications: hyperlipidemia, Print, Disp-15 Tab, R-1      carBAMazepine XR (TEGRETOL XR) 200 mg SR tablet Take 1 Tab by mouth two (2) times a day. Indications: Cris associated with Bipolar Disorder, Print, Disp-30 Tab, R-1      divalproex ER (DEPAKOTE ER) 500 mg ER tablet Take 2 Tabs by mouth nightly. Indications: Cris associated with Bipolar Disorder, Print, Disp-30 Tab, R-1      fluPHENAZine decanoate (PROLIXIN) 25 mg/mL injection 1 mL by IntraMUSCular route Once every 2 weeks. Next injection due on August 18th  Indications: schizoaffective disorder, Print, Disp-1 Vial, R-1      lisinopril (PRINIVIL, ZESTRIL) 10 mg tablet Take 1 Tab by mouth daily. Indications: hypertension, Print, Disp-15 Tab, R-1      oxybutynin chloride XL (DITROPAN XL) 15 mg CR tablet Take 1 Tab by mouth daily. Indications: URINARY URGE INCONTINENCE, Print, Disp-15 Tab, R-1      pantoprazole (PROTONIX) 40 mg tablet Take 1 Tab by mouth Daily (before breakfast). Indications: gastroesophageal reflux disease, Print, Disp-15 Tab, R-1      polyethylene glycol (MIRALAX) 17 gram packet Take 1 Packet by mouth daily as needed. Indications: constipation, Print, Disp-15 Packet, R-1      SITagliptin (JANUVIA) 100 mg tablet Take 1 Tab by mouth daily. Indications: type 2 diabetes mellitus, Print, Disp-15 Tab, R-1      trihexyphenidyl (ARTANE) 2 mg tablet Take 1 Tab by mouth three (3) times daily for 90 days. Indications: extrapyramidal disease, Print, Disp-90 Tab, R-1      zolpidem CR (AMBIEN CR) 12.5 mg tablet Take 1 Tab by mouth nightly as needed for Sleep. Max Daily Amount: 12.5 mg.  Indications: INSOMNIA, Print, Disp-15 Tab, R-1         STOP taking these medications       naproxen (NAPROSYN) 375 mg tablet Comments:   Reason for Stopping:         naproxen (NAPROSYN) 250 mg tablet Comments:   Reason for Stopping:         QUEtiapine (SEROQUEL) 25 mg tablet Comments:   Reason for Stopping:         QUEtiapine (SEROQUEL) 100 mg tablet Comments:   Reason for Stopping:         acetaminophen (TYLENOL) 500 mg tablet Comments:   Reason for Stopping:         cloNIDine HCl (CATAPRES) 0.2 mg tablet Comments:   Reason for Stopping:         hydrOXYzine pamoate (VISTARIL) 50 mg capsule Comments:   Reason for Stopping:         LORazepam (ATIVAN) 0.5 mg tablet Comments:   Reason for Stopping:         divalproex DR (DEPAKOTE) 500 mg tablet Comments:   Reason for Stopping:         escitalopram oxalate (LEXAPRO) 5 mg tablet Comments:   Reason for Stopping:         naproxen (NAPROSYN) 500 mg tablet Comments:   Reason for Stopping:         gabapentin (NEURONTIN) 100 mg capsule Comments:   Reason for Stopping:         loperamide (IMODIUM) 2 mg capsule Comments:   Reason for Stopping:         carBAMazepine (TEGRETOL) 200 mg tablet Comments:   Reason for Stopping:         hydrochlorothiazide (HYDRODIURIL) 25 mg tablet Comments:   Reason for Stopping:                      A coordinated, multidisplinary treatment team round was conducted with Vinod Lewis is done daily here at [facility]. This team consists of the nurse, psychiatric unit pharmcist,  and writer. I have spent greater than 35 minutes on discharge work.     Signed:  Anahi Sharp MD  9/2/2017

## 2018-06-13 NOTE — PROGRESS NOTES
Contacted patient but writer had a very difficult time hearing her due to either poor connection or phone itself.  Explained to Shyann that there are many people who are trying to help her.  I am concerned that she has No showed Dr. Phillips and cancelled previous appointments with PCP.  It is important that continue to follow providers in order to not only receive her medications, but also get the therapy she needs for her depression.    RNCC had previous conversation with her  Trevon.  Trevon is very worried about her and says \"I don't want to lose her\".  I try to get her to do things , to go outside and she just stays in the apartment.      Noted to patient that mailed out [employer] [facility] [employer] information for free temporary dental clinic to get services she needs.  Will follow up in the coming weeks to see if patient scheduled with providers.    Problem: Falls - Risk of  Goal: *Knowledge of fall prevention  Outcome: Progressing Towards Goal  Using walker appropriately, improved safety awareness and following staff direction. Problem: Altered Thought Process (Adult/Pediatric)  Goal: *STG: Participates in treatment plan  Outcome: Progressing Towards Goal  Review meds, out on unit social w peers and staff. Demonstrates appropriate behaviors while on unit. Daily goal is to walk hallway   Goal: *STG: Complies with medication therapy  Outcome: Progressing Towards Goal  Med compliant   Goal: *STG: Decreased delusional thinking  Outcome: Progressing Towards Goal  Noted decrease in paranoid thinking. Does not voice delusional statements during am meal and community group. Goal: *STG: Demonstrates ability to understand and use improved judgment in daily activities and relationships  Outcome: Progressing Towards Goal  Improved thought organization, decrease in loose associations and flight of ideas. No intrusive behaviors. Goal: Interventions  Outcome: Progressing Towards Goal  Staff focus is on encouraging pt to read personal goals, praise positive behaviors and medication compliance    0939 demonstrates appropriate behaviors, polite, tolerant of peer interruptions and others behaviors.

## 2018-10-09 NOTE — BH NOTES
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 previous [facility] hospitals to further elucidate the nature of patient's psychopathology and review once available. I will gather additional collateral information from friends, family and o/p treatment team to further elucidate the nature of patient's psychopathology and baselline level of psychiatric functioning. I certify that this patient's inpatient psychiatric hospital services furnished since the previous certification were, and continue to be, required for treatment that could reasonably be expected to improve the patient's condition, or for diagnostic study, and that the patient continues to need, on a daily basis, active treatment furnished directly by or requiring the supervision of inpatient psychiatric facility personnel. In addition, the hospital records show that services furnished were intensive treatment services, admission or related services, or equivalent services.     EXPECTED DISCHARGE DATE/DAY: TBD     DISPOSITION: Home       Signed By:   Valentina Wagner MD  7/21/2017 [id number]

## 2019-05-14 NOTE — BH NOTES
Received pt in dining room  Noted sl irritable behav,but redirectable. Some paranoid behav noted  2000- note while md  In room trying to complete physical examination on room mate  Pt became very intrusive and demanding to speak with md  While md was interviewing room mate. Staff attempted to redirect pt behav,note moderate difficulties.   Refused to lower her voice while md was speaking to pt,refused to exit room for room mates   Privacy  Pt states\"I WANT HIM TOO SEE ME NOW!!!!,DONT TELL ME TO LOWER MY VOICE!!\"  2100- pt refused hs vital signs  aware Patient's belongings returned

## 2019-09-11 PROBLEM — E78.5 HYPERLIPIDEMIA: Status: ACTIVE | Noted: 2018-10-09

## 2020-08-07 PROBLEM — U07.1 COVID-19: Status: ACTIVE | Noted: 2020-08-07

## 2020-08-07 PROBLEM — U07.1 COVID-19 VIRUS INFECTION: Status: ACTIVE | Noted: 2020-08-07

## 2020-08-07 PROBLEM — R45.1 AGITATION: Status: ACTIVE | Noted: 2020-08-07

## 2020-08-11 PROBLEM — F25.0 SCHIZOAFFECTIVE DISORDER, BIPOLAR TYPE (HCC): Status: ACTIVE | Noted: 2020-08-11

## 2020-08-11 PROBLEM — N30.00 ACUTE CYSTITIS: Status: ACTIVE | Noted: 2020-08-11

## 2020-08-27 PROBLEM — L89.159 PRESSURE INJURY OF SKIN OF SACRAL REGION: Status: ACTIVE | Noted: 2020-08-27

## 2021-07-23 ENCOUNTER — HOSPITAL ENCOUNTER (EMERGENCY)
Age: 66
Discharge: HOME | End: 2021-07-24
Attending: EMERGENCY MEDICINE
Payer: MEDICAID

## 2021-07-23 VITALS
WEIGHT: 170 LBS | DIASTOLIC BLOOD PRESSURE: 88 MMHG | BODY MASS INDEX: 28.29 KG/M2 | HEART RATE: 90 BPM | OXYGEN SATURATION: 98 % | SYSTOLIC BLOOD PRESSURE: 140 MMHG | RESPIRATION RATE: 16 BRPM

## 2021-07-23 DIAGNOSIS — F20.0 SCHIZOPHRENIA, PARANOID TYPE (HCC): Primary | ICD-10-CM

## 2021-07-23 LAB
ALBUMIN SERPL-MCNC: 3.6 G/DL (ref 3.5–5)
ALBUMIN/GLOB SERPL: 0.9 {RATIO} (ref 1.1–2.2)
ALP SERPL-CCNC: 134 U/L (ref 45–117)
ALT SERPL-CCNC: 18 U/L (ref 12–78)
AMPHET UR QL SCN: NEGATIVE
ANION GAP SERPL CALC-SCNC: 13 MMOL/L (ref 5–15)
APAP SERPL-MCNC: <2 UG/ML (ref 10–30)
APPEARANCE UR: CLEAR
AST SERPL-CCNC: 18 U/L (ref 15–37)
BACTERIA URNS QL MICRO: NEGATIVE /HPF
BARBITURATES UR QL SCN: NEGATIVE
BASOPHILS # BLD: 0 K/UL (ref 0–0.1)
BASOPHILS NFR BLD: 0 % (ref 0–1)
BENZODIAZ UR QL: NEGATIVE
BILIRUB SERPL-MCNC: 0.5 MG/DL (ref 0.2–1)
BILIRUB UR QL: NEGATIVE
BUN SERPL-MCNC: 21 MG/DL (ref 6–20)
BUN/CREAT SERPL: 18 (ref 12–20)
CALCIUM SERPL-MCNC: 8.9 MG/DL (ref 8.5–10.1)
CANNABINOIDS UR QL SCN: NEGATIVE
CHLORIDE SERPL-SCNC: 93 MMOL/L (ref 97–108)
CO2 SERPL-SCNC: 27 MMOL/L (ref 21–32)
COCAINE UR QL SCN: NEGATIVE
COLOR UR: NORMAL
CREAT SERPL-MCNC: 1.16 MG/DL (ref 0.55–1.02)
DIFFERENTIAL METHOD BLD: NORMAL
DRUG SCRN COMMENT,DRGCM: NORMAL
EOSINOPHIL # BLD: 0.1 K/UL (ref 0–0.4)
EOSINOPHIL NFR BLD: 1 % (ref 0–7)
EPITH CASTS URNS QL MICRO: NORMAL /LPF
ERYTHROCYTE [DISTWIDTH] IN BLOOD BY AUTOMATED COUNT: 12.5 % (ref 11.5–14.5)
ETHANOL SERPL-MCNC: <10 MG/DL
FLUAV RNA SPEC QL NAA+PROBE: NOT DETECTED
FLUBV RNA SPEC QL NAA+PROBE: NOT DETECTED
GLOBULIN SER CALC-MCNC: 4 G/DL (ref 2–4)
GLUCOSE SERPL-MCNC: 398 MG/DL (ref 65–100)
GLUCOSE UR STRIP.AUTO-MCNC: NEGATIVE MG/DL
HCT VFR BLD AUTO: 38.3 % (ref 35–47)
HGB BLD-MCNC: 13.2 G/DL (ref 11.5–16)
HGB UR QL STRIP: NEGATIVE
IMM GRANULOCYTES # BLD AUTO: 0 K/UL (ref 0–0.04)
IMM GRANULOCYTES NFR BLD AUTO: 0 % (ref 0–0.5)
KETONES UR QL STRIP.AUTO: NEGATIVE MG/DL
LEUKOCYTE ESTERASE UR QL STRIP.AUTO: NEGATIVE
LYMPHOCYTES # BLD: 2.1 K/UL (ref 0.8–3.5)
LYMPHOCYTES NFR BLD: 23 % (ref 12–49)
MCH RBC QN AUTO: 31.8 PG (ref 26–34)
MCHC RBC AUTO-ENTMCNC: 34.5 G/DL (ref 30–36.5)
MCV RBC AUTO: 92.3 FL (ref 80–99)
METHADONE UR QL: NEGATIVE
MONOCYTES # BLD: 0.5 K/UL (ref 0–1)
MONOCYTES NFR BLD: 5 % (ref 5–13)
NEUTS SEG # BLD: 6.6 K/UL (ref 1.8–8)
NEUTS SEG NFR BLD: 71 % (ref 32–75)
NITRITE UR QL STRIP.AUTO: NEGATIVE
NRBC # BLD: 0 K/UL (ref 0–0.01)
NRBC BLD-RTO: 0 PER 100 WBC
OPIATES UR QL: NEGATIVE
PCP UR QL: NEGATIVE
PH UR STRIP: 5 [PH] (ref 5–8)
PLATELET # BLD AUTO: 268 K/UL (ref 150–400)
PMV BLD AUTO: 10.7 FL (ref 8.9–12.9)
POTASSIUM SERPL-SCNC: 2.7 MMOL/L (ref 3.5–5.1)
PROT SERPL-MCNC: 7.6 G/DL (ref 6.4–8.2)
PROT UR STRIP-MCNC: NEGATIVE MG/DL
RBC # BLD AUTO: 4.15 M/UL (ref 3.8–5.2)
RBC #/AREA URNS HPF: NORMAL /HPF (ref 0–5)
SALICYLATES SERPL-MCNC: NORMAL MG/DL (ref 2.8–20)
SARS-COV-2, COV2: NOT DETECTED
SODIUM SERPL-SCNC: 133 MMOL/L (ref 136–145)
SP GR UR REFRACTOMETRY: 1.01 (ref 1–1.03)
TSH SERPL DL<=0.05 MIU/L-ACNC: 1.04 UIU/ML (ref 0.36–3.74)
UA: UC IF INDICATED,UAUC: NORMAL
UROBILINOGEN UR QL STRIP.AUTO: 1 EU/DL (ref 0.2–1)
WBC # BLD AUTO: 9.3 K/UL (ref 3.6–11)
WBC URNS QL MICRO: NORMAL /HPF (ref 0–4)

## 2021-07-23 PROCEDURE — 80053 COMPREHEN METABOLIC PANEL: CPT

## 2021-07-23 PROCEDURE — 74011250637 HC RX REV CODE- 250/637: Performed by: EMERGENCY MEDICINE

## 2021-07-23 PROCEDURE — 84443 ASSAY THYROID STIM HORMONE: CPT

## 2021-07-23 PROCEDURE — 99285 EMERGENCY DEPT VISIT HI MDM: CPT

## 2021-07-23 PROCEDURE — 82077 ASSAY SPEC XCP UR&BREATH IA: CPT

## 2021-07-23 PROCEDURE — 81001 URINALYSIS AUTO W/SCOPE: CPT

## 2021-07-23 PROCEDURE — 36415 COLL VENOUS BLD VENIPUNCTURE: CPT

## 2021-07-23 PROCEDURE — 87636 SARSCOV2 & INF A&B AMP PRB: CPT

## 2021-07-23 PROCEDURE — 85025 COMPLETE CBC W/AUTO DIFF WBC: CPT

## 2021-07-23 PROCEDURE — 80307 DRUG TEST PRSMV CHEM ANLYZR: CPT

## 2021-07-23 PROCEDURE — 80143 DRUG ASSAY ACETAMINOPHEN: CPT

## 2021-07-23 PROCEDURE — 80179 DRUG ASSAY SALICYLATE: CPT

## 2021-07-23 RX ORDER — POTASSIUM CHLORIDE 750 MG/1
40 TABLET, FILM COATED, EXTENDED RELEASE ORAL
Status: COMPLETED | OUTPATIENT
Start: 2021-07-23 | End: 2021-07-23

## 2021-07-23 RX ADMIN — POTASSIUM CHLORIDE 40 MEQ: 750 TABLET, FILM COATED, EXTENDED RELEASE ORAL at 20:02

## 2021-07-23 NOTE — ED TRIAGE NOTES
Pt refusing  Triage, brought in by [facility] requesting [facility] consult. She reports pt has been manic and threatening to 2 other people. Pt denies pain, refuses triage vitals and is asking to sign out.  Racing , labile thoughts, denies SI and is requesting \"fried chicken \" while she sings out loud
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
## 2021-07-24 NOTE — BSMART NOTE
This writer accessed pt's chart in error. Notified pt is a TDO evaluation not a BSMART consult by Camron Guan RN after pt's chart was reviewed for mistaken consult.

## 2021-07-24 NOTE — ED NOTES
[facility] arrived patient was transported back to [facility] (discharge paperwork) was sent with the patient. Arrival expectant call was made to Rebecca GREEN at [facility].
Patient is laying in bed rambling. Will continue to monitor.
Patient is medically cleared
Patient report received from [facility]. Patient is awaiting medical clearance. Will continue to monitor.
Spoke to Farhana Olson at [facility] with a update that the patient will be returning. Awaiting ETA from [facility].
Spoke to Paco henriquez from the [facility], Patient will return to [place].
Female

## 2021-07-27 ENCOUNTER — HOSPITAL ENCOUNTER (EMERGENCY)
Age: 66
Discharge: HOME | End: 2021-07-28
Attending: EMERGENCY MEDICINE
Payer: MEDICAID

## 2021-07-27 VITALS
TEMPERATURE: 97.4 F | RESPIRATION RATE: 20 BRPM | DIASTOLIC BLOOD PRESSURE: 85 MMHG | OXYGEN SATURATION: 99 % | SYSTOLIC BLOOD PRESSURE: 168 MMHG | HEART RATE: 87 BPM

## 2021-07-27 DIAGNOSIS — E87.6 HYPOKALEMIA: ICD-10-CM

## 2021-07-27 DIAGNOSIS — N30.00 ACUTE CYSTITIS WITHOUT HEMATURIA: Primary | ICD-10-CM

## 2021-07-27 LAB
ANION GAP SERPL CALC-SCNC: 10 MMOL/L (ref 5–15)
APPEARANCE UR: CLEAR
BACTERIA URNS QL MICRO: ABNORMAL /HPF
BASOPHILS # BLD: 0.1 K/UL (ref 0–0.1)
BASOPHILS NFR BLD: 1 % (ref 0–1)
BILIRUB UR QL: NEGATIVE
BUN SERPL-MCNC: 21 MG/DL (ref 6–20)
BUN/CREAT SERPL: 23 (ref 12–20)
CALCIUM SERPL-MCNC: 9.2 MG/DL (ref 8.5–10.1)
CHLORIDE SERPL-SCNC: 100 MMOL/L (ref 97–108)
CO2 SERPL-SCNC: 28 MMOL/L (ref 21–32)
COLOR UR: ABNORMAL
CREAT SERPL-MCNC: 0.9 MG/DL (ref 0.55–1.02)
DIFFERENTIAL METHOD BLD: NORMAL
EOSINOPHIL # BLD: 0.2 K/UL (ref 0–0.4)
EOSINOPHIL NFR BLD: 2 % (ref 0–7)
EPITH CASTS URNS QL MICRO: ABNORMAL /LPF
ERYTHROCYTE [DISTWIDTH] IN BLOOD BY AUTOMATED COUNT: 13.2 % (ref 11.5–14.5)
GLUCOSE SERPL-MCNC: 300 MG/DL (ref 65–100)
GLUCOSE UR STRIP.AUTO-MCNC: NEGATIVE MG/DL
HCT VFR BLD AUTO: 38 % (ref 35–47)
HGB BLD-MCNC: 13 G/DL (ref 11.5–16)
HGB UR QL STRIP: ABNORMAL
IMM GRANULOCYTES # BLD AUTO: 0 K/UL (ref 0–0.04)
IMM GRANULOCYTES NFR BLD AUTO: 0 % (ref 0–0.5)
KETONES UR QL STRIP.AUTO: ABNORMAL MG/DL
LEUKOCYTE ESTERASE UR QL STRIP.AUTO: ABNORMAL
LYMPHOCYTES # BLD: 2.3 K/UL (ref 0.8–3.5)
LYMPHOCYTES NFR BLD: 24 % (ref 12–49)
MCH RBC QN AUTO: 32.1 PG (ref 26–34)
MCHC RBC AUTO-ENTMCNC: 34.2 G/DL (ref 30–36.5)
MCV RBC AUTO: 93.8 FL (ref 80–99)
MONOCYTES # BLD: 0.6 K/UL (ref 0–1)
MONOCYTES NFR BLD: 6 % (ref 5–13)
NEUTS SEG # BLD: 6.5 K/UL (ref 1.8–8)
NEUTS SEG NFR BLD: 67 % (ref 32–75)
NITRITE UR QL STRIP.AUTO: NEGATIVE
NRBC # BLD: 0 K/UL (ref 0–0.01)
NRBC BLD-RTO: 0 PER 100 WBC
PH UR STRIP: 6 [PH] (ref 5–8)
PLATELET # BLD AUTO: 288 K/UL (ref 150–400)
PMV BLD AUTO: 11 FL (ref 8.9–12.9)
POTASSIUM SERPL-SCNC: 3 MMOL/L (ref 3.5–5.1)
PROT UR STRIP-MCNC: 100 MG/DL
RBC # BLD AUTO: 4.05 M/UL (ref 3.8–5.2)
RBC #/AREA URNS HPF: ABNORMAL /HPF (ref 0–5)
SODIUM SERPL-SCNC: 138 MMOL/L (ref 136–145)
SP GR UR REFRACTOMETRY: 1.02 (ref 1–1.03)
UROBILINOGEN UR QL STRIP.AUTO: 1 EU/DL (ref 0.2–1)
WBC # BLD AUTO: 9.7 K/UL (ref 3.6–11)
WBC URNS QL MICRO: >100 /HPF (ref 0–4)

## 2021-07-27 PROCEDURE — 87186 SC STD MICRODIL/AGAR DIL: CPT

## 2021-07-27 PROCEDURE — 81001 URINALYSIS AUTO W/SCOPE: CPT

## 2021-07-27 PROCEDURE — 74011250636 HC RX REV CODE- 250/636: Performed by: EMERGENCY MEDICINE

## 2021-07-27 PROCEDURE — 74011250636 HC RX REV CODE- 250/636: Performed by: FAMILY MEDICINE

## 2021-07-27 PROCEDURE — 85025 COMPLETE CBC W/AUTO DIFF WBC: CPT

## 2021-07-27 PROCEDURE — 96367 TX/PROPH/DG ADDL SEQ IV INF: CPT

## 2021-07-27 PROCEDURE — 77030019903 HC CATH URET INT BARD -A

## 2021-07-27 PROCEDURE — 74011000258 HC RX REV CODE- 258: Performed by: EMERGENCY MEDICINE

## 2021-07-27 PROCEDURE — 96365 THER/PROPH/DIAG IV INF INIT: CPT

## 2021-07-27 PROCEDURE — 77030018842 HC SOL IRR SOD CL 9% BAXT -A

## 2021-07-27 PROCEDURE — 96366 THER/PROPH/DIAG IV INF ADDON: CPT

## 2021-07-27 PROCEDURE — 36415 COLL VENOUS BLD VENIPUNCTURE: CPT

## 2021-07-27 PROCEDURE — 80048 BASIC METABOLIC PNL TOTAL CA: CPT

## 2021-07-27 PROCEDURE — 74011250637 HC RX REV CODE- 250/637: Performed by: FAMILY MEDICINE

## 2021-07-27 PROCEDURE — 87086 URINE CULTURE/COLONY COUNT: CPT

## 2021-07-27 PROCEDURE — 87077 CULTURE AEROBIC IDENTIFY: CPT

## 2021-07-27 PROCEDURE — 96372 THER/PROPH/DIAG INJ SC/IM: CPT

## 2021-07-27 PROCEDURE — 99285 EMERGENCY DEPT VISIT HI MDM: CPT

## 2021-07-27 RX ORDER — POTASSIUM CHLORIDE 7.45 MG/ML
10 INJECTION INTRAVENOUS
Status: COMPLETED | OUTPATIENT
Start: 2021-07-27 | End: 2021-07-27

## 2021-07-27 RX ORDER — LORAZEPAM 2 MG/ML
1 INJECTION INTRAMUSCULAR
Status: COMPLETED | OUTPATIENT
Start: 2021-07-27 | End: 2021-07-27

## 2021-07-27 RX ORDER — POTASSIUM CHLORIDE 750 MG/1
40 TABLET, FILM COATED, EXTENDED RELEASE ORAL
Status: DISCONTINUED | OUTPATIENT
Start: 2021-07-27 | End: 2021-07-27

## 2021-07-27 RX ORDER — LORAZEPAM 1 MG/1
1 TABLET ORAL
Status: DISCONTINUED | OUTPATIENT
Start: 2021-07-27 | End: 2021-07-27

## 2021-07-27 RX ORDER — SODIUM CHLORIDE 0.9 % (FLUSH) 0.9 %
5-10 SYRINGE (ML) INJECTION ONCE
Status: COMPLETED | OUTPATIENT
Start: 2021-07-27 | End: 2021-07-27

## 2021-07-27 RX ORDER — LORAZEPAM 2 MG/ML
1 INJECTION INTRAMUSCULAR
Status: CANCELLED | OUTPATIENT
Start: 2021-07-27 | End: 2021-07-27

## 2021-07-27 RX ORDER — SODIUM CHLORIDE 9 MG/ML
100 INJECTION, SOLUTION INTRAVENOUS ONCE
Status: COMPLETED | OUTPATIENT
Start: 2021-07-27 | End: 2021-07-27

## 2021-07-27 RX ADMIN — POTASSIUM CHLORIDE 10 MEQ: 7.46 INJECTION, SOLUTION INTRAVENOUS at 22:28

## 2021-07-27 RX ADMIN — LORAZEPAM 1 MG: 2 INJECTION INTRAMUSCULAR; INTRAVENOUS at 21:23

## 2021-07-27 RX ADMIN — SODIUM CHLORIDE 1000 ML: 9 INJECTION, SOLUTION INTRAVENOUS at 19:34

## 2021-07-27 RX ADMIN — Medication 10 ML: at 19:35

## 2021-07-27 RX ADMIN — SODIUM CHLORIDE 100 ML/HR: 9 INJECTION, SOLUTION INTRAVENOUS at 21:30

## 2021-07-27 RX ADMIN — POTASSIUM CHLORIDE 10 MEQ: 7.46 INJECTION, SOLUTION INTRAVENOUS at 21:30

## 2021-07-27 RX ADMIN — CEFTRIAXONE SODIUM 1 G: 1 INJECTION, POWDER, FOR SOLUTION INTRAMUSCULAR; INTRAVENOUS at 19:40
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
## 2021-07-28 NOTE — ED NOTES
Elina Ellsworth LPN at [facility] was not available to speak with. Wtr informed the  to let her know that this pt's transport will be here at this ED at approx 2300.

## 2021-07-28 NOTE — ED NOTES
Wtr called [facility] and informed nurse Jordan Soriano of pt's dx and that pt will be coming back to her facility. ETA of transport will be called to Jordan Soriano when known.

## 2021-07-31 LAB
BACTERIA SPEC CULT: ABNORMAL
CC UR VC: ABNORMAL
SERVICE CMNT-IMP: ABNORMAL

## 2021-07-31 NOTE — PROGRESS NOTES
Urine culture growing ESBL e coli. Discharged on IM Rocephin; will change to Gentamycin 5 mg/kg once a day for 5 days. RN to call [facility] to determine mechanism for changing Rx.

## 2021-07-31 NOTE — PROGRESS NOTES
RN called [facility], and was informed pt is currently being evaluated at the ED at [facility] due to not cooperating with taking her medications.  RN called the [facility] ED and informed staff there of the culture result, including sensitivity to Gentamycin; they will inform the attending ED MD.

## 2021-07-31 NOTE — ED NOTES
07/31/2021  1200  Attempted to call [facility] so that we could send new RX , no answer    07/31/2021   1210- Called and spoke with Crow Cadet LPN at [facility] - pt now at [facility] ED    07/31/2021   1214- Pt currently in [facility] ED, called and spoke with Tad Mancuso RN- ED Charge . Informed Vasiliy Jerome of +[facility] and that atbx needed to be changed to Gentamycin per Dr. Maxx Valdez.  Understanding verbalized
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
## 2022-07-19 NOTE — BH NOTES
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 200 mg  200 mg Oral BID    zolpidem CR (AMBIEN CR) tablet 12.5 mg  12.5 mg Oral QHS    divalproex ER (DEPAKOTE ER) 24 hour tablet 500 mg  500 mg Oral BID    Or    fluPHENAZine (PROLIXIN) injection 2.5 mg  2.5 mg IntraMUSCular BID    OLANZapine (ZyPREXA) tablet 5 mg  5 mg Oral Q6H PRN    diphenhydrAMINE (BENADRYL) injection 50 mg  50 mg IntraMUSCular Q6H PRN    LORazepam (ATIVAN) injection 1 mg  1 mg IntraMUSCular Q4H PRN    LORazepam (ATIVAN) tablet 1 mg  1 mg Oral Q4H PRN    benztropine (COGENTIN) tablet 1 mg  1 mg Oral BID PRN    benztropine (COGENTIN) injection 1 mg  1 mg IntraMUSCular BID PRN    acetaminophen (TYLENOL) tablet 650 mg  650 mg Oral Q4H PRN    ibuprofen (MOTRIN) tablet 400 mg  400 mg Oral Q8H PRN    magnesium hydroxide (MILK OF MAGNESIA) 400 mg/5 mL oral suspension 30 mL  30 mL Oral DAILY PRN    nicotine (NICODERM CQ) 21 mg/24 hr patch 1 Patch  1 Patch TransDERmal DAILY PRN    hydroCHLOROthiazide (HYDRODIURIL) tablet 25 mg  25 mg Oral DAILY    SITagliptin (JANUVIA) tablet 100 mg  100 mg Oral DAILY    atorvastatin (LIPITOR) tablet 20 mg  20 mg Oral DAILY    amLODIPine (NORVASC) tablet 10 mg  10 mg Oral if applicable) is aware that this is a billable service, which includes applicable co-pays. This Virtual Visit was conducted with patient's (and/or legal guardian's) consent. The visit was conducted pursuant to the emergency declaration under the [address] [place] [address], [address] [place] [address] authority and the [employer] Act. Patient identification was verified, and a caregiver was present when appropriate. The patient was located in a state where the provider was licensed to provide care.          Signed By: Fifi Giang DO DAILY    glucose chewable tablet 16 g  4 Tab Oral PRN    glucagon (GLUCAGEN) injection 1 mg  1 mg IntraMUSCular PRN    dextrose 10 % infusion 125-250 mL  125-250 mL IntraVENous PRN      SCHEDULED MEDICATIONS:   Current Facility-Administered Medications   Medication Dose Route Frequency    LORazepam (ATIVAN) tablet 0.5 mg  0.5 mg Oral QHS    Or    LORazepam (ATIVAN) injection 1 mg  1 mg IntraMUSCular QHS    fluPHENAZine (PROLIXIN) tablet 15 mg  15 mg Oral BID    Or    fluPHENAZine (PROLIXIN) injection 2.5 mg  2.5 mg IntraMUSCular BID    insulin lispro (HUMALOG) injection   SubCUTAneous BID    carBAMazepine XR (TEGretol XR) tablet 200 mg  200 mg Oral BID    zolpidem CR (AMBIEN CR) tablet 12.5 mg  12.5 mg Oral QHS    divalproex ER (DEPAKOTE ER) 24 hour tablet 500 mg  500 mg Oral BID    Or    fluPHENAZine (PROLIXIN) injection 2.5 mg  2.5 mg IntraMUSCular BID    hydroCHLOROthiazide (HYDRODIURIL) tablet 25 mg  25 mg Oral DAILY    SITagliptin (JANUVIA) tablet 100 mg  100 mg Oral DAILY    atorvastatin (LIPITOR) tablet 20 mg  20 mg Oral DAILY    amLODIPine (NORVASC) tablet 10 mg  10 mg Oral DAILY          ASSESSMENT & PLAN     DIAGNOSES REQUIRING ACTIVE TREATMENT AND MONITORING: (reviewed/updated 6/12/2017)  Patient Active Hospital Problem List:   Schizoaffective disorder ([facility] [address] (5/18/2017)    Assessment: severe psychosis and emotional lability    Plan:  Committed to the hospital for treatment  Failed seroquel, will increase Prolixin to 10mg twice daily; continue Ativan but increase to 1mg tid  and continue Depakote  Forced medication order granted by the court for 45 days    5/26- Due to prolonged QT, will dc IM haldol. Encourage po zyprexa or ativan if agitated. Recheck tomorrow. Follow EKG.  May need to dc Prolixin if no improvement or patient develops symptoms of cp, sob, syncope, etc. Need to check electrolytes as this could be a contributing factor, labs ordered for the morning.  5/29- recheck EKG, change ambien to CR. Add Carbamazepine xr 200mg twice daily  EKG improved, decreased QT prolongation    6/3/17 will continue same medications   6/4/17 encourage getting out of her room , continue her medications   6/8/17- Increase dose of Prolixin to 15mg twice daily, administer Prolixin dec 25mg/ml    Patient psychiatrically stable for discharge. I will continue to monitor blood levels (Depakote---a drug with a narrow therapeutic index= NTI) and associated labs for drug therapy implemented that require intense monitoring for toxicity as deemed appropriate based on current medication side effects and pharmacodynamically determined drug 1/2 lives. In summary, Eveline Hammond, is a 65 y.o.  female who presents with a severe exacerbation of the principal diagnosis of Schizoaffective disorder ([facility] [address]  Patient's condition is improving. Patient requires continued inpatient hospitalization for further stabilization, safety monitoring and medication management. I will continue to coordinate the provision of individual, milieu, occupational, group, and substance abuse therapies to address target symptoms/diagnoses as deemed appropriate for the individual patient. A coordinated, multidisplinary treatment team round was conducted with the patient (this team consists of the nurse, psychiatric unit pharmcist,  and writer). Complete current electronic health record for patient has been reviewed today including consultant notes, ancillary staff notes, nurses and psychiatric tech notes. Suicide risk assessment completed and patient deemed to be of low risk for suicide at this time. The following regarding medications was addressed during rounds with patient:   the risks and benefits of the proposed medication. The patient was given the opportunity to ask questions. Informed consent given to the use of the above medications.  Will continue to adjust psychiatric and non-psychiatric medications (see above \"medication\" section and orders section for details) as deemed appropriate & based upon diagnoses and response to treatment. I will continue to order blood tests/labs and diagnostic tests as deemed appropriate and review results as they become available (see orders for details and above listed lab/test results). I will order psychiatric records from previous [facility] hospitals to further elucidate the nature of patient's psychopathology and review once available. I will gather additional collateral information from friends, family and o/p treatment team to further elucidate the nature of patient's psychopathology and baselline level of psychiatric functioning. I certify that this patient's inpatient psychiatric hospital services furnished since the previous certification were, and continue to be, required for treatment that could reasonably be expected to improve the patient's condition, or for diagnostic study, and that the patient continues to need, on a daily basis, active treatment furnished directly by or requiring the supervision of inpatient psychiatric facility personnel. In addition, the hospital records show that services furnished were intensive treatment services, admission or related services, or equivalent services.     EXPECTED DISCHARGE DATE/DAY: TBD     DISPOSITION: Home       Signed By:   Mary Rodriguez MD  6/12/2017

## 2023-03-23 NOTE — BH NOTES
PRN Medication Documentation [id number]    Specific patient behavior that led to need for PRN medication: anxiety  Staff interventions attempted prior to PRN being given: lights dimmed, quiet environment  PRN medication given: Ativan 1mg PO  Patient response/effectiveness of PRN medication: effective, patient resting and quiet in bed    PRN Medication Documentation [id number]    Specific patient behavior that led to need for PRN medication: c/o legs \"aching\"  Staff interventions attempted prior to PRN being given: rest in bed  PRN medication given: Tylenol 650mg PO  Patient response/effectiveness of PRN medication: Tazorac Counseling:  Patient advised that medication is irritating and drying.  Patient may need to apply sparingly and wash off after an hour before eventually leaving it on overnight.  The patient verbalized understanding of the proper use and possible adverse effects of tazorac.  All of the patient's questions and concerns were addressed.

## 2023-06-30 NOTE — BH NOTES
2330 Pt appears asleep in bed. Respirations even and unlabored. Bed alarm on, call bell within reach. Will continue to monitor with Q 15 safety check. oriented to person, place, time and situation

## 2023-10-12 NOTE — BH NOTES
Staff attempted to help pt change her briefs and pants. Pt refused. Detail Level: Zone Detail Level: Detailed Detail Level: Generalized

## 2024-11-19 NOTE — BH NOTES
GROUP THERAPY PROGRESS NOTE    Moraima Ricks is participating in Le[employer] Group. Group time: 30 minutes    Personal goal for participation: shower, and work on hygiene     Goal orientation: personal    Group therapy participation: active    Therapeutic interventions reviewed and discussed: leisure activities and current events    Impression of participation: Marielena Ramos enjoyed painting, talking with peers and stated she likes being busy. Imaging Studies